# Patient Record
Sex: FEMALE | Race: WHITE | NOT HISPANIC OR LATINO | Employment: OTHER | URBAN - METROPOLITAN AREA
[De-identification: names, ages, dates, MRNs, and addresses within clinical notes are randomized per-mention and may not be internally consistent; named-entity substitution may affect disease eponyms.]

---

## 2023-08-07 ENCOUNTER — HOSPITAL ENCOUNTER (EMERGENCY)
Facility: CLINIC | Age: 88
Discharge: HOME | End: 2023-08-07
Attending: EMERGENCY MEDICINE | Admitting: EMERGENCY MEDICINE
Payer: MEDICARE

## 2023-08-07 VITALS
DIASTOLIC BLOOD PRESSURE: 72 MMHG | RESPIRATION RATE: 18 BRPM | SYSTOLIC BLOOD PRESSURE: 134 MMHG | HEART RATE: 82 BPM | TEMPERATURE: 98 F | OXYGEN SATURATION: 98 %

## 2023-08-07 DIAGNOSIS — R53.1 WEAKNESS: ICD-10-CM

## 2023-08-07 LAB
ALBUMIN UR-MCNC: NEGATIVE MG/DL
ANION GAP SERPL CALCULATED.3IONS-SCNC: 12 MMOL/L (ref 7–15)
APPEARANCE UR: CLEAR
BASOPHILS # BLD AUTO: 0 10E3/UL (ref 0–0.2)
BASOPHILS NFR BLD AUTO: 0 %
BILIRUB UR QL STRIP: NEGATIVE
BUN SERPL-MCNC: 22.4 MG/DL (ref 8–23)
CALCIUM SERPL-MCNC: 8.8 MG/DL (ref 8.8–10.2)
CHLORIDE SERPL-SCNC: 105 MMOL/L (ref 98–107)
COLOR UR AUTO: ABNORMAL
CREAT SERPL-MCNC: 0.71 MG/DL (ref 0.51–0.95)
DEPRECATED HCO3 PLAS-SCNC: 23 MMOL/L (ref 22–29)
EOSINOPHIL # BLD AUTO: 0.1 10E3/UL (ref 0–0.7)
EOSINOPHIL NFR BLD AUTO: 2 %
ERYTHROCYTE [DISTWIDTH] IN BLOOD BY AUTOMATED COUNT: 14.3 % (ref 10–15)
GFR SERPL CREATININE-BSD FRML MDRD: 81 ML/MIN/1.73M2
GLUCOSE SERPL-MCNC: 151 MG/DL (ref 70–99)
GLUCOSE UR STRIP-MCNC: NEGATIVE MG/DL
HCT VFR BLD AUTO: 39.5 % (ref 35–47)
HGB BLD-MCNC: 12.9 G/DL (ref 11.7–15.7)
HGB UR QL STRIP: NEGATIVE
HOLD SPECIMEN: NORMAL
HOLD SPECIMEN: NORMAL
IMM GRANULOCYTES # BLD: 0 10E3/UL
IMM GRANULOCYTES NFR BLD: 0 %
KETONES UR STRIP-MCNC: NEGATIVE MG/DL
LEUKOCYTE ESTERASE UR QL STRIP: ABNORMAL
LYMPHOCYTES # BLD AUTO: 1.3 10E3/UL (ref 0.8–5.3)
LYMPHOCYTES NFR BLD AUTO: 19 %
MCH RBC QN AUTO: 31 PG (ref 26.5–33)
MCHC RBC AUTO-ENTMCNC: 32.7 G/DL (ref 31.5–36.5)
MCV RBC AUTO: 95 FL (ref 78–100)
MONOCYTES # BLD AUTO: 0.6 10E3/UL (ref 0–1.3)
MONOCYTES NFR BLD AUTO: 8 %
NEUTROPHILS # BLD AUTO: 5 10E3/UL (ref 1.6–8.3)
NEUTROPHILS NFR BLD AUTO: 71 %
NITRATE UR QL: NEGATIVE
NRBC # BLD AUTO: 0 10E3/UL
NRBC BLD AUTO-RTO: 0 /100
PH UR STRIP: 5.5 [PH] (ref 5–7)
PLATELET # BLD AUTO: 289 10E3/UL (ref 150–450)
POTASSIUM SERPL-SCNC: 3.8 MMOL/L (ref 3.4–5.3)
RBC # BLD AUTO: 4.16 10E6/UL (ref 3.8–5.2)
RBC URINE: 2 /HPF
SODIUM SERPL-SCNC: 140 MMOL/L (ref 136–145)
SP GR UR STRIP: 1.02 (ref 1–1.03)
SQUAMOUS EPITHELIAL: 1 /HPF
TROPONIN T SERPL HS-MCNC: 24 NG/L
UROBILINOGEN UR STRIP-MCNC: NORMAL MG/DL
WBC # BLD AUTO: 7.1 10E3/UL (ref 4–11)
WBC URINE: 4 /HPF

## 2023-08-07 PROCEDURE — 85014 HEMATOCRIT: CPT | Performed by: EMERGENCY MEDICINE

## 2023-08-07 PROCEDURE — 80048 BASIC METABOLIC PNL TOTAL CA: CPT | Performed by: EMERGENCY MEDICINE

## 2023-08-07 PROCEDURE — 81001 URINALYSIS AUTO W/SCOPE: CPT | Performed by: EMERGENCY MEDICINE

## 2023-08-07 PROCEDURE — 99283 EMERGENCY DEPT VISIT LOW MDM: CPT

## 2023-08-07 PROCEDURE — 85048 AUTOMATED LEUKOCYTE COUNT: CPT | Performed by: EMERGENCY MEDICINE

## 2023-08-07 PROCEDURE — 84484 ASSAY OF TROPONIN QUANT: CPT | Performed by: EMERGENCY MEDICINE

## 2023-08-07 PROCEDURE — 36415 COLL VENOUS BLD VENIPUNCTURE: CPT | Performed by: EMERGENCY MEDICINE

## 2023-08-07 ASSESSMENT — ACTIVITIES OF DAILY LIVING (ADL)
DEPENDENT_IADLS:: LAUNDRY;COOKING;CLEANING
ADLS_ACUITY_SCORE: 33

## 2023-08-07 NOTE — CONSULTS
Care Management Initial Consult    General Information  Assessment completed with: Patient,    Type of CM/SW Visit: Initial Assessment    Primary Care Provider verified and updated as needed: Yes   Readmission within the last 30 days: no previous admission in last 30 days      Reason for Consult: discharge planning  Advance Care Planning:            Communication Assessment  Patient's communication style: spoken language (English or Bilingual)             Cognitive  Cognitive/Neuro/Behavioral:                        Living Environment:   People in home: alone     Current living Arrangements: house      Able to return to prior arrangements: yes       Family/Social Support:  Care provided by: self  Provides care for: no one  Marital Status:   Children          Description of Support System: Involved, Supportive    Support Assessment: Adequate family and caregiver support    Current Resources:   Patient receiving home care services: No     Community Resources: PCA  Equipment currently used at home:    Supplies currently used at home: None    Employment/Financial:  Employment Status:          Financial Concerns:             Does the patient's insurance plan have a 3 day qualifying hospital stay waiver?  No    Lifestyle & Psychosocial Needs:  Social Determinants of Health     Tobacco Use: Not on file   Alcohol Use: Not on file   Financial Resource Strain: Not on file   Food Insecurity: Not on file   Transportation Needs: Not on file   Physical Activity: Not on file   Stress: Not on file   Social Connections: Not on file   Intimate Partner Violence: Not on file   Depression: Not on file   Housing Stability: Not on file       Functional Status:  Prior to admission patient needed assistance:   Dependent ADLs:: Ambulation-walker, Bathing  Dependent IADLs:: Laundry, Cooking, Cleaning  Assesssment of Functional Status: Not at baseline with ADL Functioning    Mental Health Status:          Chemical Dependency Status:                 Values/Beliefs:  Spiritual, Cultural Beliefs, Denominational Practices, Values that affect care:                 Additional Information:    Met with pt/son at bedside to discuss resources. Pt/son explain that pt had a fall a few weeks ago and has been deconditioned ever since. Pt reports that she lives at home alone and uses a walker at baseline. Pt has PCA services 3x per week to assist with cooking, cleaning, bathing, and laundry. Pt/son believe that pt would benefit from skilled homecare. They have PCP appointment set in September to discuss this but do not want to wait that long. Explained that SW can send referrals but if not obtained could set up a PCP with a Young FERGUSON at an earlier date. Pt/son are agreeable to this plan. SW sent homecare referrals and will follow up on referrals post-discharge if pt not staying overnight. Would need orders for homecare PT/OT/RN stating that patient is homebound. [facility] provided additional resources for private duty homecare and [facility] services.      MARILEE Lassiter, SW  Inpatient Care Coordination  Emergency Room /Float  [phone]    Heather Calloway, SAURABH

## 2023-08-07 NOTE — ED NOTES
PIT/Triage Evaluation    Patient presented with concern for bladder infection. Pt had rx for macrobid 11 days ago 5 days for bladder infection. It seemed to help but sx returned over the past week. Sx include burning with urination. Pt has had increased urgency and so has been wearing depends. No fever. NO abdominal pain. No flank pain. No V/D. Pt has had BL leg burning which she thinks is sciatica. THis sx has been going on for a few months. Pt has had increased unsteadiness over past few months and is using a walker. When asked about troponin blood test that was drawn in triage, pt denies any CP or SOB or dizziness.     Exam is notable for:    Patient Vitals for the past 24 hrs:   BP Temp Temp src Pulse Resp SpO2   08/07/23 1426 (!) 140/73 98  F (36.7  C) Temporal 86 18 97 %     General:  Alert, interactive  Cardiovascular:  Well perfused  Lungs:  No respiratory distress, no accessory muscle use  Neuro:  Moving all 4 extremities  Skin:  Warm, dry  Psych:  Normal affect    Appropriate interventions for symptom management were initiated if applicable.  Appropriate diagnostic tests were initiated if indicated.    Important information for subsequent clinician:    I briefly evaluated the patient and developed an initial plan of care. I discussed this plan and explained that this brief interaction does not constitute a full evaluation. Patient/family understands that they should wait to be fully evaluated and discuss any test results with another clinician prior to leaving the hospital.       Mike Ugarte MD  08/07/23 1533       Mike Ugarte MD  08/07/23 1535       Mike Ugarte MD  08/07/23 153

## 2023-08-07 NOTE — ED PROVIDER NOTES
History     Chief Complaint:  Dysuria     The history is provided by the patient and a relative.      Maya Cullen is a 89 year old female with history of hypertension presenting to the emergency department for evaluation of dysuria and sciatica. She reports dysuria onset two weeks ago, for which she was prescribed a five day course of antibiotics. She endorses a history of bladder infections. She additionally reports fracturing her right shoulder in a fall six weeks ago, which aggravates her existing sciatica and has made it difficult to use her right dominant arm and hand. She explains that she has been walking less due to the injury. Her son-in-law notes that family is worried about her living alone with her existing issues and would like additional help so she can remain living at home. She denies chest pain.    Independent Historian:   The patient's son-in-law endorses and supplements the above history.    Review of External Notes:   Chart review and urine cultures    Medications:   Ultram  Tegretol  Norvasc  Naprosyn  Macrobid    Past Medical History:    Glaucoma  Cataracts, bilateral  Hypertension  Anterior basement membrane dystrophy  PVD, right eye  Blepharitis, bilateral  Trigeminal neuralgia  Dermatochalasis    Past Surgical History:    Tonsillectomy  Appendectomy  TERESA and BSO  Hernia repair  Cataract removal  Trabeculectomy  HCHG trabeculoplasty by selective laser unilateral  IA laser surgery of eye    Physical Exam   Patient Vitals for the past 24 hrs:   BP Temp Temp src Pulse Resp SpO2   08/07/23 1426 (!) 140/73 98  F (36.7  C) Temporal 86 18 97 %      Physical Exam  GENERAL: well developed, pleasant  HEAD: atraumatic  EYES: pupils reactive, extraocular muscles intact, conjunctivae normal  ENT:  mucus membranes moist  NECK:  trachea midline, normal range of motion  RESPIRATORY: no tachypnea, breath sounds clear to auscultation   CVS: normal S1/S2, no murmurs, intact distal pulses  ABDOMEN:  soft, nontender, nondistention  MUSCULOSKELETAL: no deformities  SKIN: warm and dry, no acute rashes or ulceration  NEURO: GCS 15, cranial nerves intact, alert and oriented x3  PSYCH:  Mood/affect normal    Emergency Department Course   Laboratory:  Labs Ordered and Resulted from Time of ED Arrival to Time of ED Departure   BASIC METABOLIC PANEL - Abnormal       Result Value    Sodium 140      Potassium 3.8      Chloride 105      Carbon Dioxide (CO2) 23      Anion Gap 12      Urea Nitrogen 22.4      Creatinine 0.71      Calcium 8.8      Glucose 151 (*)     GFR Estimate 81     TROPONIN T, HIGH SENSITIVITY - Abnormal    Troponin T, High Sensitivity 24 (*)    ROUTINE UA WITH MICROSCOPIC REFLEX TO CULTURE - Abnormal    Color Urine Light Yellow      Appearance Urine Clear      Glucose Urine Negative      Bilirubin Urine Negative      Ketones Urine Negative      Specific Gravity Urine 1.018      Blood Urine Negative      pH Urine 5.5      Protein Albumin Urine Negative      Urobilinogen Urine Normal      Nitrite Urine Negative      Leukocyte Esterase Urine Small (*)     RBC Urine 2      WBC Urine 4      Squamous Epithelials Urine 1     CBC WITH PLATELETS AND DIFFERENTIAL    WBC Count 7.1      RBC Count 4.16      Hemoglobin 12.9      Hematocrit 39.5      MCV 95      MCH 31.0      MCHC 32.7      RDW 14.3      Platelet Count 289      % Neutrophils 71      % Lymphocytes 19      % Monocytes 8      % Eosinophils 2      % Basophils 0      % Immature Granulocytes 0      NRBCs per 100 WBC 0      Absolute Neutrophils 5.0      Absolute Lymphocytes 1.3      Absolute Monocytes 0.6      Absolute Eosinophils 0.1      Absolute Basophils 0.0      Absolute Immature Granulocytes 0.0      Absolute NRBCs 0.0       Emergency Department Course & Assessments:       Interventions:  Medications - No data to display     Assessments:  1655 I obtained history and examined the patient as noted above.   1804 I discussed findings and discharge with the  patient. All questions answered.     Independent Interpretation (X-rays, CTs, rhythm strip):  None    Consultations/Discussion of Management or Tests:  ED Course as of 08/07/23 1754   Mon Aug 07, 2023   1750 I spoke with social work regarding the patient's history and presentation in the emergency department today.      Social Determinants of Health affecting care:   None and Transportation    Disposition:  The patient was discharged to home.     Impression & Plan    Medical Decision Making:  Patient presents with concerns for UTI but family is concerned about needing further assistance.  Patient's pretty adamant that she does not want to be admitted or go to a TCU.  They have 4 5 family members helping get her around at home.  She is able to ambulate with assistance.  Family is comfortable with getting assistance with referrals.   was contacted and orders have been made for home services.  Labs and urine are fairly unremarkable.  Discussed outpatient management with them.    Diagnosis:    ICD-10-CM    1. Weakness  R53.1 Home Care Referral        Scribe Disclosure:  I, Marva Pickard, am serving as a scribe at 5:09 PM on 8/7/2023 to document services personally performed by Augustin Marie MD based on my observations and the provider's statements to me.   8/7/2023   Augustin Marie MD Adams, Shaun L, MD  08/07/23 2054

## 2023-08-07 NOTE — CONSULTS
See ED SW note 8/7/2023.    MARILEE Lassiter, [facility]  Inpatient Care Coordination  Emergency Room /Float  [phone]

## 2023-08-07 NOTE — ED TRIAGE NOTES
A&O x4, ABCs intact. Pt presents with concern for UTI. Pt states that she has burning with urination. She also reports more falls recently and sciatic pain that she has had for several months. Pt was sent to ED for this.        Triage Assessment       Row Name 08/07/23 1428       Triage Assessment (Adult)    Airway WDL WDL       Respiratory WDL    Respiratory WDL WDL       Cardiac WDL    Cardiac WDL WDL

## 2023-08-08 NOTE — PROGRESS NOTES
8/8/23-post discharge    [facility] will reach out to pt/PCP as there is not an accepting HC agency at this time. SW left note for [facility]UB yesterday 8/7.     MARILEE Lassiter, [facility]  Inpatient Care Coordination  Emergency Room /Float  [phone]

## 2023-08-11 ENCOUNTER — APPOINTMENT (OUTPATIENT)
Dept: CT IMAGING | Facility: CLINIC | Age: 88
DRG: 552 | End: 2023-08-11
Attending: EMERGENCY MEDICINE
Payer: MEDICARE

## 2023-08-11 ENCOUNTER — APPOINTMENT (OUTPATIENT)
Dept: CT IMAGING | Facility: CLINIC | Age: 88
DRG: 552 | End: 2023-08-11
Attending: PHYSICIAN ASSISTANT
Payer: MEDICARE

## 2023-08-11 ENCOUNTER — HOSPITAL ENCOUNTER (INPATIENT)
Facility: CLINIC | Age: 88
LOS: 3 days | Discharge: NURSING, REHAB OR LONG-TERM CARE | DRG: 552 | End: 2023-08-14
Attending: EMERGENCY MEDICINE | Admitting: HOSPITALIST
Payer: MEDICARE

## 2023-08-11 ENCOUNTER — APPOINTMENT (OUTPATIENT)
Dept: MRI IMAGING | Facility: CLINIC | Age: 88
DRG: 552 | End: 2023-08-11
Attending: PHYSICIAN ASSISTANT
Payer: MEDICARE

## 2023-08-11 ENCOUNTER — APPOINTMENT (OUTPATIENT)
Dept: GENERAL RADIOLOGY | Facility: CLINIC | Age: 88
DRG: 552 | End: 2023-08-11
Attending: PHYSICIAN ASSISTANT
Payer: MEDICARE

## 2023-08-11 DIAGNOSIS — I63.9 CEREBELLAR INFARCT (H): ICD-10-CM

## 2023-08-11 DIAGNOSIS — S32.040A CLOSED COMPRESSION FRACTURE OF L4 VERTEBRA, INITIAL ENCOUNTER (H): ICD-10-CM

## 2023-08-11 DIAGNOSIS — R29.6 RECURRENT FALLS: Primary | ICD-10-CM

## 2023-08-11 DIAGNOSIS — L89.322 PRESSURE ULCER OF LEFT BUTTOCK, STAGE 2 (H): ICD-10-CM

## 2023-08-11 DIAGNOSIS — G47.00 INSOMNIA, UNSPECIFIED TYPE: ICD-10-CM

## 2023-08-11 DIAGNOSIS — R11.0 NAUSEA: ICD-10-CM

## 2023-08-11 LAB
ALBUMIN UR-MCNC: NEGATIVE MG/DL
ANION GAP SERPL CALCULATED.3IONS-SCNC: 9 MMOL/L (ref 7–15)
APPEARANCE UR: CLEAR
BASOPHILS # BLD AUTO: 0 10E3/UL (ref 0–0.2)
BASOPHILS NFR BLD AUTO: 0 %
BILIRUB UR QL STRIP: NEGATIVE
BUN SERPL-MCNC: 20 MG/DL (ref 8–23)
CALCIUM SERPL-MCNC: 9.1 MG/DL (ref 8.8–10.2)
CHLORIDE SERPL-SCNC: 100 MMOL/L (ref 98–107)
CHOLEST SERPL-MCNC: 198 MG/DL
COLOR UR AUTO: YELLOW
CREAT SERPL-MCNC: 0.81 MG/DL (ref 0.51–0.95)
DEPRECATED HCO3 PLAS-SCNC: 26 MMOL/L (ref 22–29)
EOSINOPHIL # BLD AUTO: 0.2 10E3/UL (ref 0–0.7)
EOSINOPHIL NFR BLD AUTO: 2 %
ERYTHROCYTE [DISTWIDTH] IN BLOOD BY AUTOMATED COUNT: 14.5 % (ref 10–15)
GFR SERPL CREATININE-BSD FRML MDRD: 69 ML/MIN/1.73M2
GLUCOSE SERPL-MCNC: 104 MG/DL (ref 70–99)
GLUCOSE UR STRIP-MCNC: NEGATIVE MG/DL
HBA1C MFR BLD: 5.6 %
HCT VFR BLD AUTO: 40.5 % (ref 35–47)
HDLC SERPL-MCNC: 51 MG/DL
HGB BLD-MCNC: 13 G/DL (ref 11.7–15.7)
HGB UR QL STRIP: NEGATIVE
HOLD SPECIMEN: NORMAL
HOLD SPECIMEN: NORMAL
HYALINE CASTS: 1 /LPF
IMM GRANULOCYTES # BLD: 0 10E3/UL
IMM GRANULOCYTES NFR BLD: 0 %
KETONES UR STRIP-MCNC: NEGATIVE MG/DL
LDLC SERPL CALC-MCNC: 121 MG/DL
LEUKOCYTE ESTERASE UR QL STRIP: NEGATIVE
LYMPHOCYTES # BLD AUTO: 1.5 10E3/UL (ref 0.8–5.3)
LYMPHOCYTES NFR BLD AUTO: 17 %
MCH RBC QN AUTO: 30.8 PG (ref 26.5–33)
MCHC RBC AUTO-ENTMCNC: 32.1 G/DL (ref 31.5–36.5)
MCV RBC AUTO: 96 FL (ref 78–100)
MONOCYTES # BLD AUTO: 0.9 10E3/UL (ref 0–1.3)
MONOCYTES NFR BLD AUTO: 10 %
MUCOUS THREADS #/AREA URNS LPF: PRESENT /LPF
NEUTROPHILS # BLD AUTO: 6.3 10E3/UL (ref 1.6–8.3)
NEUTROPHILS NFR BLD AUTO: 71 %
NITRATE UR QL: NEGATIVE
NONHDLC SERPL-MCNC: 147 MG/DL
NRBC # BLD AUTO: 0 10E3/UL
NRBC BLD AUTO-RTO: 0 /100
NT-PROBNP SERPL-MCNC: 2050 PG/ML (ref 0–1800)
PH UR STRIP: 6 [PH] (ref 5–7)
PLATELET # BLD AUTO: 289 10E3/UL (ref 150–450)
POTASSIUM SERPL-SCNC: 4.2 MMOL/L (ref 3.4–5.3)
RBC # BLD AUTO: 4.22 10E6/UL (ref 3.8–5.2)
RBC URINE: 2 /HPF
SODIUM SERPL-SCNC: 135 MMOL/L (ref 136–145)
SP GR UR STRIP: 1.01 (ref 1–1.03)
SQUAMOUS EPITHELIAL: <1 /HPF
TRIGL SERPL-MCNC: 132 MG/DL
TROPONIN T SERPL HS-MCNC: 30 NG/L
TROPONIN T SERPL HS-MCNC: 34 NG/L
TROPONIN T SERPL HS-MCNC: 35 NG/L
UROBILINOGEN UR STRIP-MCNC: NORMAL MG/DL
WBC # BLD AUTO: 8.9 10E3/UL (ref 4–11)
WBC URINE: 2 /HPF

## 2023-08-11 PROCEDURE — 250N000013 HC RX MED GY IP 250 OP 250 PS 637: Performed by: PHYSICIAN ASSISTANT

## 2023-08-11 PROCEDURE — 36415 COLL VENOUS BLD VENIPUNCTURE: CPT | Performed by: PHYSICIAN ASSISTANT

## 2023-08-11 PROCEDURE — 250N000011 HC RX IP 250 OP 636: Performed by: EMERGENCY MEDICINE

## 2023-08-11 PROCEDURE — 71046 X-RAY EXAM CHEST 2 VIEWS: CPT

## 2023-08-11 PROCEDURE — 99223 1ST HOSP IP/OBS HIGH 75: CPT | Mod: AI | Performed by: PHYSICIAN ASSISTANT

## 2023-08-11 PROCEDURE — 96360 HYDRATION IV INFUSION INIT: CPT | Mod: 59

## 2023-08-11 PROCEDURE — 84484 ASSAY OF TROPONIN QUANT: CPT | Performed by: EMERGENCY MEDICINE

## 2023-08-11 PROCEDURE — G0378 HOSPITAL OBSERVATION PER HR: HCPCS

## 2023-08-11 PROCEDURE — 83880 ASSAY OF NATRIURETIC PEPTIDE: CPT | Performed by: PHYSICIAN ASSISTANT

## 2023-08-11 PROCEDURE — 87637 SARSCOV2&INF A&B&RSV AMP PRB: CPT | Performed by: PHYSICIAN ASSISTANT

## 2023-08-11 PROCEDURE — 255N000002 HC RX 255 OP 636: Performed by: EMERGENCY MEDICINE

## 2023-08-11 PROCEDURE — 70498 CT ANGIOGRAPHY NECK: CPT | Mod: MG

## 2023-08-11 PROCEDURE — 120N000001 HC R&B MED SURG/OB

## 2023-08-11 PROCEDURE — 70450 CT HEAD/BRAIN W/O DYE: CPT | Mod: MA

## 2023-08-11 PROCEDURE — 70553 MRI BRAIN STEM W/O & W/DYE: CPT | Mod: MG

## 2023-08-11 PROCEDURE — 72192 CT PELVIS W/O DYE: CPT | Mod: MA

## 2023-08-11 PROCEDURE — G1010 CDSM STANSON: HCPCS

## 2023-08-11 PROCEDURE — A9585 GADOBUTROL INJECTION: HCPCS | Performed by: EMERGENCY MEDICINE

## 2023-08-11 PROCEDURE — 72131 CT LUMBAR SPINE W/O DYE: CPT | Mod: MA

## 2023-08-11 PROCEDURE — 93005 ELECTROCARDIOGRAM TRACING: CPT

## 2023-08-11 PROCEDURE — 82374 ASSAY BLOOD CARBON DIOXIDE: CPT | Performed by: EMERGENCY MEDICINE

## 2023-08-11 PROCEDURE — 84484 ASSAY OF TROPONIN QUANT: CPT | Performed by: PHYSICIAN ASSISTANT

## 2023-08-11 PROCEDURE — 250N000009 HC RX 250: Performed by: EMERGENCY MEDICINE

## 2023-08-11 PROCEDURE — 80061 LIPID PANEL: CPT | Performed by: PHYSICIAN ASSISTANT

## 2023-08-11 PROCEDURE — 85025 COMPLETE CBC W/AUTO DIFF WBC: CPT | Performed by: EMERGENCY MEDICINE

## 2023-08-11 PROCEDURE — 70496 CT ANGIOGRAPHY HEAD: CPT | Mod: MG

## 2023-08-11 PROCEDURE — 36415 COLL VENOUS BLD VENIPUNCTURE: CPT | Performed by: EMERGENCY MEDICINE

## 2023-08-11 PROCEDURE — 258N000003 HC RX IP 258 OP 636: Performed by: EMERGENCY MEDICINE

## 2023-08-11 PROCEDURE — 82310 ASSAY OF CALCIUM: CPT | Performed by: EMERGENCY MEDICINE

## 2023-08-11 PROCEDURE — 99285 EMERGENCY DEPT VISIT HI MDM: CPT | Mod: 25

## 2023-08-11 PROCEDURE — 83036 HEMOGLOBIN GLYCOSYLATED A1C: CPT | Performed by: PHYSICIAN ASSISTANT

## 2023-08-11 PROCEDURE — 81001 URINALYSIS AUTO W/SCOPE: CPT | Performed by: EMERGENCY MEDICINE

## 2023-08-11 RX ORDER — LATANOPROST 50 UG/ML
1 SOLUTION/ DROPS OPHTHALMIC AT BEDTIME
Status: DISCONTINUED | OUTPATIENT
Start: 2023-08-11 | End: 2023-08-14 | Stop reason: HOSPADM

## 2023-08-11 RX ORDER — NALOXONE HYDROCHLORIDE 0.4 MG/ML
0.2 INJECTION, SOLUTION INTRAMUSCULAR; INTRAVENOUS; SUBCUTANEOUS
Status: DISCONTINUED | OUTPATIENT
Start: 2023-08-11 | End: 2023-08-14 | Stop reason: HOSPADM

## 2023-08-11 RX ORDER — TIMOLOL MALEATE 5 MG/ML
1 SOLUTION/ DROPS OPHTHALMIC 2 TIMES DAILY
Status: DISCONTINUED | OUTPATIENT
Start: 2023-08-11 | End: 2023-08-14 | Stop reason: HOSPADM

## 2023-08-11 RX ORDER — ONDANSETRON 4 MG/1
4 TABLET, ORALLY DISINTEGRATING ORAL EVERY 6 HOURS PRN
Status: DISCONTINUED | OUTPATIENT
Start: 2023-08-11 | End: 2023-08-14 | Stop reason: HOSPADM

## 2023-08-11 RX ORDER — CARBAMAZEPINE 100 MG/1
200 TABLET, CHEWABLE ORAL 3 TIMES DAILY
COMMUNITY
End: 2024-07-01

## 2023-08-11 RX ORDER — IOPAMIDOL 755 MG/ML
500 INJECTION, SOLUTION INTRAVASCULAR ONCE
Status: COMPLETED | OUTPATIENT
Start: 2023-08-11 | End: 2023-08-11

## 2023-08-11 RX ORDER — ASPIRIN 81 MG/1
81 TABLET ORAL DAILY
Status: DISCONTINUED | OUTPATIENT
Start: 2023-08-11 | End: 2023-08-14 | Stop reason: HOSPADM

## 2023-08-11 RX ORDER — MULTIPLE VITAMINS W/ MINERALS TAB 9MG-400MCG
1 TAB ORAL DAILY
COMMUNITY

## 2023-08-11 RX ORDER — ACETAMINOPHEN 325 MG/1
975 TABLET ORAL EVERY 8 HOURS
Status: DISCONTINUED | OUTPATIENT
Start: 2023-08-11 | End: 2023-08-14 | Stop reason: HOSPADM

## 2023-08-11 RX ORDER — LIDOCAINE 4 G/G
1-2 PATCH TOPICAL
Status: DISCONTINUED | OUTPATIENT
Start: 2023-08-11 | End: 2023-08-14 | Stop reason: HOSPADM

## 2023-08-11 RX ORDER — OXYCODONE HYDROCHLORIDE 5 MG/1
5 TABLET ORAL EVERY 4 HOURS PRN
Status: DISCONTINUED | OUTPATIENT
Start: 2023-08-11 | End: 2023-08-14 | Stop reason: HOSPADM

## 2023-08-11 RX ORDER — NALOXONE HYDROCHLORIDE 0.4 MG/ML
0.4 INJECTION, SOLUTION INTRAMUSCULAR; INTRAVENOUS; SUBCUTANEOUS
Status: DISCONTINUED | OUTPATIENT
Start: 2023-08-11 | End: 2023-08-14 | Stop reason: HOSPADM

## 2023-08-11 RX ORDER — ONDANSETRON 2 MG/ML
4 INJECTION INTRAMUSCULAR; INTRAVENOUS EVERY 6 HOURS PRN
Status: DISCONTINUED | OUTPATIENT
Start: 2023-08-11 | End: 2023-08-14 | Stop reason: HOSPADM

## 2023-08-11 RX ORDER — GADOBUTROL 604.72 MG/ML
7 INJECTION INTRAVENOUS ONCE
Status: COMPLETED | OUTPATIENT
Start: 2023-08-11 | End: 2023-08-11

## 2023-08-11 RX ORDER — POLYETHYLENE GLYCOL 3350 17 G/17G
17 POWDER, FOR SOLUTION ORAL 2 TIMES DAILY PRN
Status: DISCONTINUED | OUTPATIENT
Start: 2023-08-11 | End: 2023-08-14 | Stop reason: HOSPADM

## 2023-08-11 RX ORDER — LIDOCAINE 40 MG/G
CREAM TOPICAL
Status: DISCONTINUED | OUTPATIENT
Start: 2023-08-11 | End: 2023-08-14 | Stop reason: HOSPADM

## 2023-08-11 RX ORDER — TIMOLOL MALEATE 5 MG/ML
1 SOLUTION/ DROPS OPHTHALMIC 2 TIMES DAILY
COMMUNITY

## 2023-08-11 RX ORDER — AMLODIPINE BESYLATE 5 MG/1
5 TABLET ORAL DAILY
Status: ON HOLD | COMMUNITY
End: 2024-06-19

## 2023-08-11 RX ORDER — LIDOCAINE 40 MG/G
CREAM TOPICAL
Status: DISCONTINUED | OUTPATIENT
Start: 2023-08-11 | End: 2023-08-11

## 2023-08-11 RX ORDER — CARBAMAZEPINE 100 MG/1
100 TABLET, CHEWABLE ORAL 3 TIMES DAILY
Status: DISCONTINUED | OUTPATIENT
Start: 2023-08-11 | End: 2023-08-14 | Stop reason: HOSPADM

## 2023-08-11 RX ORDER — NAPROXEN 500 MG/1
500 TABLET ORAL 2 TIMES DAILY WITH MEALS
Status: ON HOLD | COMMUNITY
End: 2023-08-14

## 2023-08-11 RX ORDER — ASPIRIN 81 MG/1
81 TABLET, CHEWABLE ORAL DAILY
Status: DISCONTINUED | OUTPATIENT
Start: 2023-08-11 | End: 2023-08-14 | Stop reason: HOSPADM

## 2023-08-11 RX ORDER — LATANOPROST 50 UG/ML
1 SOLUTION/ DROPS OPHTHALMIC 2 TIMES DAILY
COMMUNITY
End: 2024-09-17

## 2023-08-11 RX ADMIN — CARBAMAZEPINE 100 MG: 100 TABLET, CHEWABLE ORAL at 22:47

## 2023-08-11 RX ADMIN — SODIUM CHLORIDE 1000 ML: 9 INJECTION, SOLUTION INTRAVENOUS at 15:46

## 2023-08-11 RX ADMIN — LIDOCAINE PATCH 4% 2 PATCH: 40 PATCH TOPICAL at 22:45

## 2023-08-11 RX ADMIN — IOPAMIDOL 75 ML: 755 INJECTION, SOLUTION INTRAVENOUS at 20:15

## 2023-08-11 RX ADMIN — SODIUM CHLORIDE 80 ML: 9 INJECTION, SOLUTION INTRAVENOUS at 20:15

## 2023-08-11 RX ADMIN — ACETAMINOPHEN 975 MG: 325 TABLET, FILM COATED ORAL at 22:45

## 2023-08-11 RX ADMIN — GADOBUTROL 7 ML: 604.72 INJECTION INTRAVENOUS at 20:52

## 2023-08-11 RX ADMIN — ASPIRIN 81 MG: 81 TABLET, COATED ORAL at 22:56

## 2023-08-11 ASSESSMENT — ACTIVITIES OF DAILY LIVING (ADL)
WEAR_GLASSES_OR_BLIND: YES
ADLS_ACUITY_SCORE: 33
WALKING_OR_CLIMBING_STAIRS: AMBULATION DIFFICULTY, DEPENDENT
CONCENTRATING,_REMEMBERING_OR_MAKING_DECISIONS_DIFFICULTY: NO
ADLS_ACUITY_SCORE: 35
DIFFICULTY_EATING/SWALLOWING: NO
DOING_ERRANDS_INDEPENDENTLY_DIFFICULTY: YES
DRESSING/BATHING: BATHING DIFFICULTY, ASSISTANCE 1 PERSON
NUMBER_OF_TIMES_PATIENT_HAS_FALLEN_WITHIN_LAST_SIX_MONTHS: 4
CHANGE_IN_FUNCTIONAL_STATUS_SINCE_ONSET_OF_CURRENT_ILLNESS/INJURY: YES
ADLS_ACUITY_SCORE: 35
EQUIPMENT_CURRENTLY_USED_AT_HOME: WALKER, STANDARD
TRANSFERRING: 1-->ASSISTANCE (EQUIPMENT/PERSON) NEEDED
WALKING_OR_CLIMBING_STAIRS_DIFFICULTY: YES
ADLS_ACUITY_SCORE: 35
BATHING: 1-->ASSISTANCE NEEDED
TRANSFERRING: 1-->ASSISTANCE (EQUIPMENT/PERSON) NEEDED (NOT DEVELOPMENTALLY APPROPRIATE)
DRESS: 1-->ASSISTANCE (EQUIPMENT/PERSON) NEEDED
TOILETING_ISSUES: NO
DRESSING/BATHING_DIFFICULTY: YES
FALL_HISTORY_WITHIN_LAST_SIX_MONTHS: YES
DRESS: 0-->ASSISTANCE NEEDED (DEVELOPMENTALLY APPROPRIATE)

## 2023-08-11 NOTE — ED TRIAGE NOTES
Pt presents for evaluation after a falls. Pt fell this morning around 0500, thinks she slid out of the chair. Landing on her buttocks. Pt called her neighbor and was helped up. Neighbor and son-in-law with pt today. They note increasing falls. Pt has a CNA that comes in 3 times a week, who has also noted falls. Falls have been more prominent in the last week. Pt lives independently other than a CNA that comes 3 times a week. Pt c/o low back pain sciatica like pain down BLE and weakness. Seen on 8/7/23, UA negative at that time. Pt aware she is in need of more help at home.

## 2023-08-11 NOTE — ED NOTES
"[facility]  ED Nurse Handoff Report    ED Chief complaint: Back Pain and Fall  . ED Diagnosis:   Final diagnoses:   None       Allergies:   Allergies   Allergen Reactions    Brimonidine Other (See Comments)     Follicular conjunctivitis    Dorzolamide Other (See Comments)     Follicular conjunctivitis    Benzalkonium Chloride Other (See Comments)     Eye irritation.  Does better with preservative free but can tolerate preserved drops    Fluorescein Itching     fluress    Penicillins     Sulfa Antibiotics Other (See Comments)    Codeine Unknown and Rash    Tartrazine Rash       Code Status: DNR/I    Activity level - Baseline/Home:  independent. Lives at home independently  Activity Level - Current:   assist of 2. Required assist of 2 to transfer patient to bedside commode. Now using external catheter.   Lift room needed: No.   Bariatric: No   Needed: No   Isolation: No.   Infection: Not Applicable.     Respiratory status: Room air    Vital Signs (within 30 minutes):   Vitals:    08/11/23 1230 08/11/23 1715   BP: 118/66 (!) 144/81   Pulse: 87 86   Resp: 20 20   Temp: 97.7  F (36.5  C)    TempSrc: Oral    SpO2: 96% 96%   Weight: 65.6 kg (144 lb 10 oz)    Height: 1.499 m (4' 11\")        Cardiac Rhythm:  ,      Pain level:    Patient confused: No.   Patient Falls Risk: patient and family education.   Elimination Status: Has voided     Patient Report - Initial Complaint: Back pain, fall.   Focused Assessment:  Maya Cullen is a 89 year old female who presents with back pain and dysuria after falling multiple times at home. She reports that she fell around 0500 when she was reaching for a pillow and slid between the chair she was siting in and the ottoman in front of it. She states that she did not hurt herself but that she needed help getting back up. Her son-in-law states that she has been falling more frequently over the past 2 months, and that they have been getting " progressively worse. Maya states that she feels like her legs give out, causing her to fall, and that she is unable to predict when the falls are going to happen. She notes that she currently has bruising on her legs from falling, as well as pain in her lower back and buttocks. In addition to this, she notes she has been expereincing dysuria. Her neighbor reports that she was diagnosed with a UTI 07- at [facility] in [place], and that she tested negative for the same issue on 08-. Maya reports using a walker at baseline, and that she lives independently except for a CNA that comes to her house 3 times a week for 3 hours. Patient denies chest pain, shortness of breath, or abdominal pain.      Abnormal Results:   Labs Ordered and Resulted from Time of ED Arrival to Time of ED Departure   BASIC METABOLIC PANEL - Abnormal       Result Value    Sodium 135 (*)     Potassium 4.2      Chloride 100      Carbon Dioxide (CO2) 26      Anion Gap 9      Urea Nitrogen 20.0      Creatinine 0.81      Calcium 9.1      Glucose 104 (*)     GFR Estimate 69     ROUTINE UA WITH MICROSCOPIC REFLEX TO CULTURE - Abnormal    Color Urine Yellow      Appearance Urine Clear      Glucose Urine Negative      Bilirubin Urine Negative      Ketones Urine Negative      Specific Gravity Urine 1.014      Blood Urine Negative      pH Urine 6.0      Protein Albumin Urine Negative      Urobilinogen Urine Normal      Nitrite Urine Negative      Leukocyte Esterase Urine Negative      Mucus Urine Present (*)     RBC Urine 2      WBC Urine 2      Squamous Epithelials Urine <1      Hyaline Casts Urine 1     TROPONIN T, HIGH SENSITIVITY - Abnormal    Troponin T, High Sensitivity 35 (*)    CBC WITH PLATELETS AND DIFFERENTIAL    WBC Count 8.9      RBC Count 4.22      Hemoglobin 13.0      Hematocrit 40.5      MCV 96      MCH 30.8      MCHC 32.1      RDW 14.5      Platelet Count 289      % Neutrophils 71      % Lymphocytes 17       % Monocytes 10      % Eosinophils 2      % Basophils 0      % Immature Granulocytes 0      NRBCs per 100 WBC 0      Absolute Neutrophils 6.3      Absolute Lymphocytes 1.5      Absolute Monocytes 0.9      Absolute Eosinophils 0.2      Absolute Basophils 0.0      Absolute Immature Granulocytes 0.0      Absolute NRBCs 0.0          Lumbar spine CT w/o contrast   Final Result   IMPRESSION:   1.  L4 anterior compression fracture with approximately 30% vertebral body height loss and minimal bony retropulsion.   2.  Severe L3-L4 and L4-L5 spinal canal stenosis.   3.  Degenerative grade 1 anterolisthesis L4 on L5.   4.  Partially imaged left pleural effusion.      CT Pelvis Bone wo Contrast   Final Result   IMPRESSION:   1.  Both hips are negative for fracture or CT evidence of avascular necrosis.   2.  Pelvis negative for fracture.   3.  SI joints negative for sacroiliitis, ankylosis, or diastasis. There is degenerative change.   4.  Compressive endplate deformity of the superior aspect of L4. MRI could be performed to assess for the presence or absence of bone edema and better gauge the chronicity of this finding.   5.  Fatty infiltration and atrophy of the right gluteus minimus and medius suggestive of chronic tendinopathy and potential partial-thickness tearing.      CT Head w/o Contrast   Final Result   IMPRESSION:   Small left cerebellar infarct, age indeterminate. No evidence of   hemorrhage, mass, or hydrocephalus. Volume loss and patchy areas of   nonspecific white matter hypoattenuation which presumably represent   chronic small vessel ischemic change. No acute osseous abnormality.      DIANA HOANG MD            SYSTEM ID:  [id number]          Treatments provided: See MAR  Family Comments: Neighbor and patients son in law at bedside  OBS brochure/video discussed/provided to patient:  Yes  ED Medications:   Medications   0.9% sodium chloride BOLUS (0 mLs Intravenous Stopped 8/11/23 8631)       Drips infusing:   No  For the majority of the shift this patient was Green.   Interventions performed were rounding    Sepsis treatment initiated: No    Cares/treatment/interventions/medications to be completed following ED care: Monitor VS, pain management.     ED Nurse Name: Alyse Dexter RN  5:26 PM  RECEIVING UNIT ED HANDOFF REVIEW    Above ED Nurse Handoff Report was reviewed: Yes  Reviewed by: Catie Flores RN on August 11, 2023 at 9:27 PM

## 2023-08-11 NOTE — H&P
"[facility]    History and Physical - Hospitalist Service       Date of Admission:  8/11/2023    Assessment & Plan      Maya Cullen is a 89 year old female who lives independently with history of hypertension, glaucoma and trigeminal neuralgia admitted on 8/11/2023 after a fall with back pain and notable increased weakness/falls/gait instability since 6/2023.    In the ED she is afebrile with heart rate of 87, pressure 118/66 and breathing comfortably on room air without hypoxia.  Lab work is remarkable for sodium of 135 and glucose 104 but otherwise normal BMP, high-sensitivity troponin of 35, normal CBC, negative UA, EKG shows sinus rhythm, CT head shows age-indeterminate small left cerebellar stroke, CT pelvis bone is negative but does show compressive deformity at the superior aspect of L4 and CT lumbar spine does show L4 anterior compression fracture.  Admission was requested for evidence of stroke on CT scan and weakness with back pain.    #Low back pain due to L4 compression fracture  -Patient has had back pain for a while but it worsened after her fall today.  She describes the back pain in her low back with a burning radiation down bilateral legs  -CT lumbar spine shows a L4 anterior compression fracture  -Plan for multimodal pain regimen with scheduled Tylenol, Lidoderm patches and low-dose oxycodone  -Neurosurgery consult  -PT assessment  -Social work consult    #Frequent falling  #Shuffling gait  -Since June 2023 family reports that patient is a \"different person\".  She was previously very independent and steady on her feet but something shifted and now she can barely ambulate without falling and shuffles her feet  -She denies vertigo, dizziness, syncope, unilateral weakness, sensation deficits with the exception of burning down her legs  -She denies history of stroke but CT scan of the brain showed an age indeterminant infarct in the left cerebellum which will be " "discussed below  -Concerned that this may have occurred at the time her gait worsened and she began falling more  -PT and social work assessment as above    #Age-indeterminate infarct of the left cerebellum  -Denies dizziness/vertigo but has been falling more frequently stating her legs are just giving out  -Plan to obtain MRI brain and MRA head/neck  -Monitor on telemetry, obtain echocardiogram without bubble  -Lipid/A1c/troponin  -Stroke neurology consult  -Aspirin 81 mg     #Lung congestion  -CT of lumbar spine mentions evidence of pleural effusion  -Obtain chest x-ray    #Hypertension  -Continue PTA amlodipine    #Glaucoma  -Continue PTA latanoprost    #History of trigeminal neuralgia  -Continue Tegretol      Diet:  Regular  DVT Prophylaxis: Pneumatic Compression Devices  Barton Catheter: Not present  Lines: None     Cardiac Monitoring: None  Code Status:  DNR/DNI    Clinically Significant Risk Factors Present on Admission                       # Overweight: Estimated body mass index is 29.21 kg/m  as calculated from the following:    Height as of this encounter: 1.499 m (4' 11\").    Weight as of this encounter: 65.6 kg (144 lb 10 oz).            Disposition Plan      Expected Discharge Date: 08/12/2023                The patient's care was discussed with the Patient, Patient's Family, and ED Consultant(s).    Shonna Grossman PA-C  Hospitalist Service  [facility]  Securely message with [employer] (more info)  Text page via [employer] Paging/Directory     ______________________________________________________________________    Chief Complaint   Weakness, frequent falling, gait instability and back pain    History is obtained from the patient    History of Present Illness   Maya Cullen is a 89 year old female who was brought in today by her family with primary complaint of low back pain.  She reports that she had a fall earlier today and slipped off a chair and was stuck between a " chair and ottoman.  Fortunately she had a cell phone close to her walker and was able to call a neighbor who came over and helped her up.  Since that time her back pain has increased but she does report back pain for quite a while that sends a burning-like sensation down her bilateral legs.  Her family is present and does report that since June of this year she has been a different person.  She was previously quite independent but since June has had more frequent falling and difficulty walking with a shuffling gait that is new.  She has fallen countless times and recently actually broke her arm.  She denies unilateral weakness or sensation changes with the exception of burning down her legs.  She had a recent urinary tract infection but otherwise denies fever, shortness of breath, chest discomfort, nausea, vomiting and watery stool.  She has had some softer stools recently and does report a congested sound when she breathes.  She does not drink alcohol or smoke cigarettes.      Past Medical History    No past medical history on file.    Past Surgical History   Past Surgical History:   Procedure Laterality Date    EYE SURGERY         Prior to Admission Medications   Prior to Admission Medications   Prescriptions Last Dose Informant Patient Reported? Taking?   traMADol (ULTRAM) 50 MG tablet   No No   Sig: Take 1 tablet by mouth every 4 hours as needed for pain.      Facility-Administered Medications: None          Physical Exam   Vital Signs: Temp: 97.7  F (36.5  C) Temp src: Oral BP: (!) 144/81 Pulse: 86   Resp: 20 SpO2: 96 % O2 Device: None (Room air)    Weight: 144 lbs 9.95 oz    General Appearance: Alert and orientated, some trouble with word finding  Respiratory: Bilateral congestion noted throughout lungs  Cardiovascular: RRR without murmur  GI: Bowel sounds are present without tenderness  Skin: Significant bruising noted on both legs   Other: Strength of left arm and left leg seems slightly decreased compared  to right. No sensation deficits bilaterally.     Medical Decision Making       75 MINUTES SPENT BY ME on the date of service doing chart review, history, exam, documentation & further activities per the note.      Data     I have personally reviewed the following data over the past 24 hrs:    8.9  \   13.0   / 289     135 (L) 100 20.0 /  104 (H)   4.2 26 0.81 \     Trop: 35 (H) BNP: N/A       Imaging results reviewed over the past 24 hrs:   Recent Results (from the past 24 hour(s))   CT Head w/o Contrast    Narrative    CT SCAN OF THE HEAD WITHOUT CONTRAST   8/11/2023 4:21 PM     HISTORY: Recurrent falls, weakness    TECHNIQUE:  Axial images of the head and coronal reformations without  IV contrast material. Radiation dose for this scan was reduced using  automated exposure control, adjustment of the mA and/or kV according  to patient size, or iterative reconstruction technique.    COMPARISON: Head CT 7/13/2013      Impression    IMPRESSION:  Small left cerebellar infarct, age indeterminate. No evidence of  hemorrhage, mass, or hydrocephalus. Volume loss and patchy areas of  nonspecific white matter hypoattenuation which presumably represent  chronic small vessel ischemic change. No acute osseous abnormality.    DIANA HOANG MD         SYSTEM ID:  [id number]   CT Pelvis Bone wo Contrast    Narrative    EXAM: CT PELVIS BONE WO CONTRAST  LOCATION: [facility]  DATE: 8/11/2023    INDICATION: Left pelvic pain, recurrent falls, associated low back pain, weakness.  COMPARISON: None.  TECHNIQUE: CT scan of the pelvis was performed without IV contrast. Multiplanar reformats were obtained. Dose reduction techniques were used.  CONTRAST: None.    FINDINGS: Both hips are negative for fracture or CT evidence of avascular necrosis. Pelvis negative for fracture. Degenerative change at the SI joints bilaterally. No evidence for sacroiliitis, ankylosis, or diastasis. Sacrum and coccyx are negative for    fracture. There is some irregularity and compression of the superior endplate of L4. MRI could be performed to assess for the presence or absence of bone edema and better gauge the chronicity of this finding, which is suggestive of a compressive endplate   deformity.    Fatty infiltration and atrophy of the right gluteus minimus and medius suggestive of chronic tendinopathy and partial-thickness tearing. The intrapelvic contents are negative. No evidence for abnormal mass or soft tissue fluid collection.       Impression    IMPRESSION:  1.  Both hips are negative for fracture or CT evidence of avascular necrosis.  2.  Pelvis negative for fracture.  3.  SI joints negative for sacroiliitis, ankylosis, or diastasis. There is degenerative change.  4.  Compressive endplate deformity of the superior aspect of L4. MRI could be performed to assess for the presence or absence of bone edema and better gauge the chronicity of this finding.  5.  Fatty infiltration and atrophy of the right gluteus minimus and medius suggestive of chronic tendinopathy and potential partial-thickness tearing.   Lumbar spine CT w/o contrast    Narrative    EXAM: CT LUMBAR SPINE W/O CONTRAST  LOCATION: [facility]  DATE: 8/11/2023    INDICATION: fall  COMPARISON: None.  TECHNIQUE: Routine CT Lumbar Spine without IV contrast. Multiplanar reformats. Dose reduction techniques were used.     FINDINGS:  VERTEBRA: Grade 1 anterolisthesis L4 on L5 measuring 6 mm. L4 anterior compression fracture with approximately 30% vertebral body height loss compared to the subjacent vertebral body heights. There is bony retropulsion measuring 2.5 mm. No additional   fracture noted.     CANAL/FORAMINA: Mild lumbar disc degenerative change. Advanced L4-L5 facet arthrosis. Moderate lumbar facet arthrosis elsewhere. There is severe spinal canal stenosis at L3-L4 and L4-L5.    PARASPINAL: Partially imaged left pleural effusion.      Impression     IMPRESSION:  1.  L4 anterior compression fracture with approximately 30% vertebral body height loss and minimal bony retropulsion.  2.  Severe L3-L4 and L4-L5 spinal canal stenosis.  3.  Degenerative grade 1 anterolisthesis L4 on L5.  4.  Partially imaged left pleural effusion.

## 2023-08-11 NOTE — ED PROVIDER NOTES
History   Chief Complaint:  Back Pain and Fall     The history is provided by the patient, a relative and a friend.      Maya Cullen is a 89 year old female who presents with back pain and dysuria after falling multiple times at home. She reports that she fell around 0500 when she was reaching for a pillow and slid between the chair she was siting in and the ottoman in front of it. She states that she did not hurt herself but that she needed help getting back up. Her son-in-law states that she has been falling more frequently over the past 2 months, and that they have been getting progressively worse. Maya states that she feels like her legs give out, causing her to fall, and that she is unable to predict when the falls are going to happen. She notes that she currently has bruising on her legs from falling, as well as pain in her lower back and buttocks. In addition to this, she notes she has been experiencing dysuria. Her neighbor reports that she was diagnosed with a UTI 07- at [facility] in [place], and that she tested negative for the same issue on 08-. Maya reports using a walker at baseline, and that she lives independently except for a CNA that comes to her house 3 times a week for 3 hours. Patient denies chest pain, shortness of breath, or abdominal pain.     Independent Historian:   They report as noted above.     Medications:    Tramadol    Carbamazepine   Amlodipine   Naproxen   Nitrofurantoin monohydrate macrocrystal     Past Medical History:    Impaired fasting glucose  Essential hypertension   Conjunctivitis   AMBD  Primary open-angle glaucoma  PCD  Blepharitis, bilateral   Trigeminal neuralgia   Dermatochalasis     Past Surgical History:    Eye surgery   Tonsillectomy   Appendectomy   TERESA & BSO  Hernia repair   Trabeculectomy   [facility] Trabeculoplasty by selective laser unilateral          Physical Exam   Patient Vitals for the past 24 hrs:   BP Temp Temp src  "Pulse Resp SpO2 Height Weight   08/11/23 1715 (!) 144/81 -- -- 86 20 96 % -- --   08/11/23 1230 118/66 97.7  F (36.5  C) Oral 87 20 96 % 1.499 m (4' 11\") 65.6 kg (144 lb 10 oz)      Physical Exam  General: Alert, appears frail and elderly. Cooperative.     In mild distress  HEENT:  Head:  Atraumatic  Ears:  External ears are normal  Mouth/Throat:  Oropharynx is without erythema or exudate and mucous membranes are moist.   Eyes:   Conjunctivae normal and EOM are normal. No scleral icterus.  CV:  Normal rate, regular rhythm, normal heart sounds and radial pulses are 2+ and symmetric.    Resp:  Breath sounds are clear bilaterally    Non-labored, no retractions or accessory muscle use  GI:  Abdomen is soft, no distension, no tenderness. No rebound or guarding.  No CVA tenderness bilaterally  MS:  Normal range of motion. No edema.    Back atraumatic.    No midline cervical, thoracic, or lumbar tenderness  Skin:  Warm and dry.  No rash or lesions noted.  Neuro:   Alert. Globally weak.  Sensation intact in all 4 extremities. GCS: 15  Psych: Normal mood and affect.    Emergency Department Course   ECG  ECG taken at 1305  Normal sinus rhythm  Inferior infarct, age undetermined  Abnormal ECG  No prior ECG for comparison  Rate 84 bpm. RI interval 122 ms. QRS duration 96 ms. QT/QTc 370/437 ms. P-R-T axes 53 4 39.     Imaging:  Lumbar spine CT w/o contrast   Final Result   IMPRESSION:   1.  L4 anterior compression fracture with approximately 30% vertebral body height loss and minimal bony retropulsion.   2.  Severe L3-L4 and L4-L5 spinal canal stenosis.   3.  Degenerative grade 1 anterolisthesis L4 on L5.   4.  Partially imaged left pleural effusion.      CT Pelvis Bone wo Contrast   Final Result   IMPRESSION:   1.  Both hips are negative for fracture or CT evidence of avascular necrosis.   2.  Pelvis negative for fracture.   3.  SI joints negative for sacroiliitis, ankylosis, or diastasis. There is degenerative change.   4.  " Compressive endplate deformity of the superior aspect of L4. MRI could be performed to assess for the presence or absence of bone edema and better gauge the chronicity of this finding.   5.  Fatty infiltration and atrophy of the right gluteus minimus and medius suggestive of chronic tendinopathy and potential partial-thickness tearing.      CT Head w/o Contrast   Final Result   IMPRESSION:   Small left cerebellar infarct, age indeterminate. No evidence of   hemorrhage, mass, or hydrocephalus. Volume loss and patchy areas of   nonspecific white matter hypoattenuation which presumably represent   chronic small vessel ischemic change. No acute osseous abnormality.      DIANA HOANG MD            SYSTEM ID:  [id number]         Report per radiology    Laboratory:  Labs Ordered and Resulted from Time of ED Arrival to Time of ED Departure   BASIC METABOLIC PANEL - Abnormal       Result Value    Sodium 135 (*)     Potassium 4.2      Chloride 100      Carbon Dioxide (CO2) 26      Anion Gap 9      Urea Nitrogen 20.0      Creatinine 0.81      Calcium 9.1      Glucose 104 (*)     GFR Estimate 69     ROUTINE UA WITH MICROSCOPIC REFLEX TO CULTURE - Abnormal    Color Urine Yellow      Appearance Urine Clear      Glucose Urine Negative      Bilirubin Urine Negative      Ketones Urine Negative      Specific Gravity Urine 1.014      Blood Urine Negative      pH Urine 6.0      Protein Albumin Urine Negative      Urobilinogen Urine Normal      Nitrite Urine Negative      Leukocyte Esterase Urine Negative      Mucus Urine Present (*)     RBC Urine 2      WBC Urine 2      Squamous Epithelials Urine <1      Hyaline Casts Urine 1     TROPONIN T, HIGH SENSITIVITY - Abnormal    Troponin T, High Sensitivity 35 (*)    CBC WITH PLATELETS AND DIFFERENTIAL    WBC Count 8.9      RBC Count 4.22      Hemoglobin 13.0      Hematocrit 40.5      MCV 96      MCH 30.8      MCHC 32.1      RDW 14.5      Platelet Count 289      % Neutrophils 71      %  Lymphocytes 17      % Monocytes 10      % Eosinophils 2      % Basophils 0      % Immature Granulocytes 0      NRBCs per 100 WBC 0      Absolute Neutrophils 6.3      Absolute Lymphocytes 1.5      Absolute Monocytes 0.9      Absolute Eosinophils 0.2      Absolute Basophils 0.0      Absolute Immature Granulocytes 0.0      Absolute NRBCs 0.0        Emergency Department Course & Assessments:    Interventions:  Medications   0.9% sodium chloride BOLUS (0 mLs Intravenous Stopped 8/11/23 1708)      Assessments:  1529 I obtained the patient's history and examined as noted above.    0387 I spoke with Shonna Grossman PA-C, for Dr. Fairchild, hospitalist.     Independent Interpretation (X-rays, CTs, rhythm strip):  None    Consultations/Discussion of Management or Tests:  None     Social Determinants of Health affecting care:   None    Disposition:  The patient was admitted to the hospital under the care of Dr. Ricardo MD.     Impression & Plan      Medical Decision Making:  Patient is 89-year-old female who presents with back pain and recurrent falls.  Work-up in the emergency department concerning for newly diagnosed small left cerebellar infarct with unclear age.  I wonder whether this could contribute to the patient's balance issues and gait instability and recurrent falls in recent weeks.  No personal history of stroke that is known at this time.  No known history of cardiac arrhythmias.  EKG showed normal sinus rhythm.  CT imaging obtained of the L-spine and pelvis bone given the patient's left pelvic discomfort as well as midline L-spine tenderness.  Unfortunately patient appears to have L4 compression fracture likely secondary to one of her multiple falls in recent weeks.  She is moving all extremities at this time but given instability, balance issues, and recurrent falls I plan to admit the patient to the hospital for further management and evaluation.  Patient may require further medical evaluation to better determine  etiology of newly detected small left cerebellar infarct.  No evidence of urinary tract infection.  Laboratory work otherwise unremarkable.  Spoke with hospital service who is agreeable to admission.      Diagnosis:    ICD-10-CM    1. Recurrent falls  R29.6       2. Cerebellar infarct (H)  I63.9       3. Closed compression fracture of L4 vertebra, initial encounter (H)  S32.040A          Scribe Disclosure:  Madyson ZIMMERMAN, am serving as a scribe at 4:34 PM on 8/11/2023 to document services personally performed by Ronal Lucio MD based on my observations and the provider's statements to me.     8/11/2023   Ronal Lucio MD White, Scott, MD  08/12/23 0002

## 2023-08-12 ENCOUNTER — APPOINTMENT (OUTPATIENT)
Dept: SPEECH THERAPY | Facility: CLINIC | Age: 88
DRG: 552 | End: 2023-08-12
Attending: PHYSICIAN ASSISTANT
Payer: MEDICARE

## 2023-08-12 ENCOUNTER — APPOINTMENT (OUTPATIENT)
Dept: CARDIOLOGY | Facility: CLINIC | Age: 88
DRG: 552 | End: 2023-08-12
Attending: PHYSICIAN ASSISTANT
Payer: MEDICARE

## 2023-08-12 ENCOUNTER — APPOINTMENT (OUTPATIENT)
Dept: PHYSICAL THERAPY | Facility: CLINIC | Age: 88
DRG: 552 | End: 2023-08-12
Attending: PHYSICIAN ASSISTANT
Payer: MEDICARE

## 2023-08-12 LAB
ANION GAP SERPL CALCULATED.3IONS-SCNC: 8 MMOL/L (ref 7–15)
BUN SERPL-MCNC: 15.8 MG/DL (ref 8–23)
CALCIUM SERPL-MCNC: 8.4 MG/DL (ref 8.8–10.2)
CHLORIDE SERPL-SCNC: 107 MMOL/L (ref 98–107)
CREAT SERPL-MCNC: 0.81 MG/DL (ref 0.51–0.95)
DEPRECATED HCO3 PLAS-SCNC: 25 MMOL/L (ref 22–29)
ERYTHROCYTE [DISTWIDTH] IN BLOOD BY AUTOMATED COUNT: 14.6 % (ref 10–15)
FLUAV RNA SPEC QL NAA+PROBE: NEGATIVE
FLUBV RNA RESP QL NAA+PROBE: NEGATIVE
GFR SERPL CREATININE-BSD FRML MDRD: 69 ML/MIN/1.73M2
GLUCOSE BLDC GLUCOMTR-MCNC: 103 MG/DL (ref 70–99)
GLUCOSE BLDC GLUCOMTR-MCNC: 109 MG/DL (ref 70–99)
GLUCOSE BLDC GLUCOMTR-MCNC: 123 MG/DL (ref 70–99)
GLUCOSE BLDC GLUCOMTR-MCNC: 123 MG/DL (ref 70–99)
GLUCOSE SERPL-MCNC: 104 MG/DL (ref 70–99)
HCT VFR BLD AUTO: 37.6 % (ref 35–47)
HGB BLD-MCNC: 12 G/DL (ref 11.7–15.7)
LVEF ECHO: NORMAL
MCH RBC QN AUTO: 31 PG (ref 26.5–33)
MCHC RBC AUTO-ENTMCNC: 31.9 G/DL (ref 31.5–36.5)
MCV RBC AUTO: 97 FL (ref 78–100)
PLATELET # BLD AUTO: 255 10E3/UL (ref 150–450)
POTASSIUM SERPL-SCNC: 3.6 MMOL/L (ref 3.4–5.3)
RBC # BLD AUTO: 3.87 10E6/UL (ref 3.8–5.2)
RSV RNA SPEC NAA+PROBE: NEGATIVE
SARS-COV-2 RNA RESP QL NAA+PROBE: NEGATIVE
SODIUM SERPL-SCNC: 140 MMOL/L (ref 136–145)
WBC # BLD AUTO: 6.5 10E3/UL (ref 4–11)

## 2023-08-12 PROCEDURE — 120N000001 HC R&B MED SURG/OB

## 2023-08-12 PROCEDURE — 93306 TTE W/DOPPLER COMPLETE: CPT

## 2023-08-12 PROCEDURE — 93306 TTE W/DOPPLER COMPLETE: CPT | Mod: 26 | Performed by: INTERNAL MEDICINE

## 2023-08-12 PROCEDURE — 85018 HEMOGLOBIN: CPT | Performed by: PHYSICIAN ASSISTANT

## 2023-08-12 PROCEDURE — 250N000009 HC RX 250: Performed by: PHYSICIAN ASSISTANT

## 2023-08-12 PROCEDURE — 92610 EVALUATE SWALLOWING FUNCTION: CPT | Mod: GN

## 2023-08-12 PROCEDURE — 250N000013 HC RX MED GY IP 250 OP 250 PS 637: Performed by: PHYSICIAN ASSISTANT

## 2023-08-12 PROCEDURE — 99232 SBSQ HOSP IP/OBS MODERATE 35: CPT | Performed by: INTERNAL MEDICINE

## 2023-08-12 PROCEDURE — 99221 1ST HOSP IP/OBS SF/LOW 40: CPT | Performed by: PHYSICIAN ASSISTANT

## 2023-08-12 PROCEDURE — 97530 THERAPEUTIC ACTIVITIES: CPT | Mod: GP

## 2023-08-12 PROCEDURE — 80048 BASIC METABOLIC PNL TOTAL CA: CPT | Performed by: PHYSICIAN ASSISTANT

## 2023-08-12 PROCEDURE — 97116 GAIT TRAINING THERAPY: CPT | Mod: GP

## 2023-08-12 PROCEDURE — 36415 COLL VENOUS BLD VENIPUNCTURE: CPT | Performed by: PHYSICIAN ASSISTANT

## 2023-08-12 PROCEDURE — 97161 PT EVAL LOW COMPLEX 20 MIN: CPT | Mod: GP

## 2023-08-12 RX ADMIN — ASPIRIN 81 MG: 81 TABLET, CHEWABLE ORAL at 08:20

## 2023-08-12 RX ADMIN — ACETAMINOPHEN 975 MG: 325 TABLET, FILM COATED ORAL at 05:01

## 2023-08-12 RX ADMIN — TIMOLOL MALEATE 1 DROP: 5 SOLUTION/ DROPS OPHTHALMIC at 18:20

## 2023-08-12 RX ADMIN — LATANOPROST 1 DROP: 50 SOLUTION OPHTHALMIC at 21:06

## 2023-08-12 RX ADMIN — CARBAMAZEPINE 100 MG: 100 TABLET, CHEWABLE ORAL at 21:04

## 2023-08-12 RX ADMIN — ACETAMINOPHEN 975 MG: 325 TABLET, FILM COATED ORAL at 21:04

## 2023-08-12 RX ADMIN — CARBAMAZEPINE 100 MG: 100 TABLET, CHEWABLE ORAL at 08:20

## 2023-08-12 RX ADMIN — CARBAMAZEPINE 100 MG: 100 TABLET, CHEWABLE ORAL at 14:48

## 2023-08-12 RX ADMIN — ACETAMINOPHEN 975 MG: 325 TABLET, FILM COATED ORAL at 14:48

## 2023-08-12 RX ADMIN — TIMOLOL MALEATE 1 DROP: 5 SOLUTION/ DROPS OPHTHALMIC at 07:01

## 2023-08-12 ASSESSMENT — ACTIVITIES OF DAILY LIVING (ADL)
ADLS_ACUITY_SCORE: 41
ADLS_ACUITY_SCORE: 41
ADLS_ACUITY_SCORE: 44
ADLS_ACUITY_SCORE: 41

## 2023-08-12 NOTE — CONSULTS
[facility]    Neurosurgery Consultation     Date of Admission:  8/11/2023  Date of Consult (When I saw the patient): 08/12/23    Assessment & Plan   Maya Cullen is a 89 year old female who was admitted on 8/11/2023. I was asked to see the patient for worsening low back pain after mechanical fall noted to have L4 compression fracture.    Principal Problem:    Recurrent falls  Active Problems:    Cerebellar infarct (H)  Closed compression fracture of L4 vertebra, initial encounter (H)    Plan:   No brace need for current fracture could offer for comfort of which patient states she would rather not have a brace  Advance activity as tolerated with PT/OT  No further neurosurgical intervention presently indicated  Will sign off please re-consult if indicated or call with any questions  Can follow up as needed if pain persists in 6 weeks with lumbar xray   Should patient change her decision on brace can order off the shelf LSO to be worn for comfort       I have discussed the following assessment and plan with Dr. Ron who is in agreement with initial plan and will follow up with further consultation recommendations.    CUAUHTEMOC Quezada [facility] [place] [address]  Tel [phone]         Code Status    No CPR- Do NOT Intubate    Reason for Consult   Reason for consult: I was asked by Dr. Parnell to evaluate this patient for L4 compression fracture .    Primary Care Physician   Jordyn Jon    Chief Complaint   Back pain and generalized weakness     History is obtained from the patient    History of Present Illness   Maya Cullen is a 89 year old female who presents with history of hypertension, glaucoma and trigeminal neuralgia presented to ED after increase in back pain from a fall. She states that yesterday morning she was sitting in her chair when she attempted to get up and slide to  the ground and states that she was not able to get up on her own and waiting on the floor for hours until she called her neighbor for help. She notes another fall in early June of which she was walking in her house and her legs gave out of her. She fell the the right and was treated by [facility] for a shoulder fracture of which she has follow up at that end of this morning. She notes continued right shoulder pain but states it is improving. She states that since her fall in June she has had progressive complaint of low back pain that radiates into her buttocks. She also notes bilateral lower extremity burning leg pain that will improve with rest but worsened with walking or prolonged standing. She notes that when she ambulates her legs feel weak and heavy and will give out on her. She notes that her symptoms have been progressive over the past few months and feels that she is so unsteady that she has been stating in her chair a majority of the time as she worries of falling. She notes bilateral hand numbness that has been more noticeable over the past few months as well but denies any neck pain or radicular upper extremity pain. She denies dropping items or feeling off balance. She denies any changes in bowel or bladder habits. She was noted to have L4 compression fracture of which could related to her increased back pain as well as degenerative disc disease and L4-L5 spondylolysis with severe spinal stenosis at L3-L4 and L4-L5 which would correlate with her generalized weakness and neuro claudicating symptoms.  Discussed that we would recommend conservative treatment with therapy and could trial outpatient injections for pain control of which she states she only wants conservative treatment and would not want surgical intervention.       Past Medical History   I have reviewed this patient's medical history and updated it with pertinent information if needed.   No past medical history on file.    Past Surgical History    I have reviewed this patient's surgical history and updated it with pertinent information if needed.  Past Surgical History:   Procedure Laterality Date    EYE SURGERY         Prior to Admission Medications   Prior to Admission Medications   Prescriptions Last Dose Informant Patient Reported? Taking?   amLODIPine (NORVASC) 5 MG tablet 8/11/2023 at am  Yes Yes   Sig: Take 5 mg by mouth daily   carBAMazepine (TEGRETOL) 100 MG chewable tablet 8/11/2023 at x1  Yes Yes   Sig: Take 100 mg by mouth 3 times daily   latanoprost (XALATAN) 0.005 % ophthalmic solution 8/10/2023 at hs  Yes Yes   Sig: Place 1 drop into both eyes At Bedtime   multivitamin w/minerals (THERA-VIT-M) tablet 8/11/2023 at am  Yes Yes   Sig: Take 1 tablet by mouth daily   naproxen (NAPROSYN) 500 MG tablet 8/11/2023 at x1  Yes Yes   Sig: Take 500 mg by mouth 2 times daily (with meals)   timolol maleate (TIMOPTIC) 0.5 % ophthalmic solution 8/11/2023 at x1  Yes Yes   Sig: Place 1 drop into both eyes 2 times daily      Facility-Administered Medications: None     Allergies   Allergies   Allergen Reactions    Brimonidine Other (See Comments)     Follicular conjunctivitis    Dorzolamide Other (See Comments)     Follicular conjunctivitis    Benzalkonium Chloride Other (See Comments)     Eye irritation.  Does better with preservative free but can tolerate preserved drops    Fluorescein Itching     fluress    Penicillins     Sulfa Antibiotics Other (See Comments)    Codeine Unknown and Rash    Tartrazine Rash       Social History   I have reviewed this patient's social history and updated it with pertinent information if needed. Maya Cullen  reports that she has never smoked. She does not have any smokeless tobacco history on file. She reports that she does not drink alcohol and does not use drugs.    Family History   I have reviewed this patient's family history and updated it with pertinent information if needed.   No family history on  "file.    Review of Systems   14 point review of systems negative with exception to HPI     Physical Exam   Temp: 97.8  F (36.6  C) Temp src: Temporal BP: 133/67 Pulse: 76   Resp: 20 SpO2: 95 % O2 Device: None (Room air)    Vital Signs with Ranges  Temp:  [97.4  F (36.3  C)-97.9  F (36.6  C)] 97.8  F (36.6  C)  Pulse:  [69-87] 76  Resp:  [12-20] 20  BP: (118-147)/(58-81) 133/67  SpO2:  [94 %-97 %] 95 %  144 lbs 9.95 oz     , Blood pressure 133/67, pulse 76, temperature 97.8  F (36.6  C), temperature source Temporal, resp. rate 20, height 1.499 m (4' 11\"), weight 65.6 kg (144 lb 10 oz), SpO2 95 %, not currently breastfeeding.  144 lbs 9.95 oz  HEENT:  Normocephalic, atraumatic.  PERRLA.  EOM s intact.   Neck:  Supple, non-tender, without lymphadenopathy.  Heart:  No peripheral edema  Lungs:  No SOB  Abdomen:  Soft, non-tender, non-distended.  Normal bowel sounds.  Skin:  Warm and dry, good capillary refill.  Extremities:  Good radial and dorsalis pedis pulses bilaterally, no edema, cyanosis or clubbing.    NEUROLOGICAL EXAMINATION:   Mental status:  Alert and Oriented x 3, speech is fluent.  Cranial nerves:  II-XII intact.   Motor:  Strength is 5/5 throughout the upper and lower extremities  Deltoids:  Right: 4/5   Left:  5/5 able to lift arm above head but slight weakness secondary to recent should fracture on right  Biceps:  Right: 5/5   Left:  5/5  Triceps: Right: 5/5   Left:  5/5                       Wrist Extensors: Right: 5/5   Left:  5/5        Wrist Flexors:Right: 5/5   Left:  5/5             Hand : Right: 5/5   Left:  5/5         Hip Flexor: Right: 5/5   Left:  5/5                 Knee extension :Right: 5/5   Left:  5/5               Knee flexion: Right: 5/5   Left:  5/5              Plantar flexion:Right: 5/5   Left:  5/5        dorsiflexion:Right: 5/5   Left:  5/5                        EHL:Right: 5/5   Left:  5/5                     Sensation:  intact throughout   Reflexes:  Negative Babinski.  " Negative Clonus.    Coordination:  Smooth finger to nose testing.   Negative pronator drift.   Gait:  not tested    Cervical examination reveals good range of motion.  No tenderness to palpation of the cervical spine or paraspinous muscles bilaterally.     Lumbar examination reveals no tenderness of the spine or paraspinous muscles. Straight leg raise is negative bilaterally.     Images reviewed personally   IMPRESSION:  1.  L4 anterior compression fracture with approximately 30% vertebral body height loss and minimal bony retropulsion.  2.  Severe L3-L4 and L4-L5 spinal canal stenosis.  3.  Degenerative grade 1 anterolisthesis L4 on L5.  4.  Partially imaged left pleural effusion.    IMPRESSION: head CT  Small left cerebellar infarct, age indeterminate. No evidence of  hemorrhage, mass, or hydrocephalus. Volume loss and patchy areas of  nonspecific white matter hypoattenuation which presumably represent  chronic small vessel ischemic change. No acute osseous abnormality.    IMPRESSION: brain MRI  1.  No acute findings.   2.  Age-related changes.    IMPRESSION: HEAD CTA:   1.  No stenosis/occlusion, aneurysm, or high flow vascular malformation.     NECK CTA:  1.  Atherosclerotic plaque results in less than 50% stenosis in the right ICA. No dissection.  2.  No dissection.    Data     CBC RESULTS:   Recent Labs   Lab Test 08/12/23  0549   WBC 6.5   RBC 3.87   HGB 12.0   HCT 37.6   MCV 97   MCH 31.0   MCHC 31.9   RDW 14.6        Basic Metabolic Panel:  Lab Results   Component Value Date     08/12/2023     07/13/2013      Lab Results   Component Value Date    POTASSIUM 3.6 08/12/2023    POTASSIUM 4.1 07/13/2013     Lab Results   Component Value Date    CHLORIDE 107 08/12/2023    CHLORIDE 102 07/13/2013     Lab Results   Component Value Date    GILL 8.4 08/12/2023    GILL 9.3 07/13/2013     Lab Results   Component Value Date    CO2 25 08/12/2023    CO2 25 07/13/2013     Lab Results   Component Value Date     BUN 15.8 08/12/2023    BUN 13 07/13/2013     Lab Results   Component Value Date    CR 0.81 08/12/2023    CR 0.89 07/13/2013     Lab Results   Component Value Date     08/12/2023     08/12/2023     07/13/2013     INR:  No results found for: INR

## 2023-08-12 NOTE — CONSULTS
"SPIRITUAL HEALTH SERVICES Progress Note   Cardiac Services    Saw pt Maya Cullen per Spiritual Health Consult.    Patient/Family Understanding of Illness and Goals of Care - Maya expressed her course of hospitalizations for the past few months that comes from decreasing independence. She mentions that through these visits she discovered she had a small stroke. She tells me that she knows that her living circumstances might need to change as a result. She shares she is on the journey toward contentment and acceptance.    Distress and Loss - Maya tells me of her life and her family. She grieves the death of her children and , saying \"it's only me now.\" She later shares her grandson-in-law, close neighbors, and Mu-ism friends often check in on her. She lost the vibrancy of her Mu-ism community, who she is a founding member of, in the pandemic though she still watches online. She mentions being the only person on her block who was there when she moved in. She most recently lost her son two years ago in the pandemic.    Strengths, Coping, and Resources - Maya has a dynamic Adventist rigo that supports her. She has neighbors, friends, and \"chosen family\" who check in on her often and are sources of strength for her. Prayer and singing especially resonate with her. We prayed together and sang \"Amazing Aparna\" together. She has a sense of humor that helps her cope.     Meaning, Beliefs, and Spirituality - Maya is a charter member of [employer]. Her rigo, Gnosticist, and spirituality have supported her through her life and through losing and grieving her  and children. She moves toward acceptance of her new normal by saying \"I trust that God will lead me.\"    Plan of Care - No further plans from this author. Please consult should further need or desire arise.    Umang Celestin M.Div.  Associate   On-Call Pager: [phone]    "

## 2023-08-12 NOTE — PLAN OF CARE
Goal Outcome Evaluation:  Pt admitted to  645.  Alert and oriented x 4.  Vital signs stable, on Room air.  C/o pain in right arm and lower back, tylenol taken, lidocaine patches applied.  Skin and luis armando cares done.Purewick applied for urine collection.  Pt denies numbness or tingling in extremities.  Pre existing decubitus ulcers to left buttock cheek, cleansed with microclenz spray , foam dressing applied,   Redness to groin folds, area cleansed with soap and water, barrier cream applied.  Swallowing intact.  Neuros  intact.  Spiritual service, neuro , speech, PT consulted.  Fall risk precautions

## 2023-08-12 NOTE — PLAN OF CARE
Temp: 97.6  F (36.4  C) Temp src: Temporal BP: (!) 147/76 Pulse: 70   Resp: 20 SpO2: 94 % O2 Device: None (Room air)       A&Ox4. Ax2 with a lift for transfers. On RA.On a regular diet. Q4 neuros unchanged & intact. PIV in R arm is saline locked. Purewick in place & draining yellow colored urine adequately. No reports of pain/discomfort overnight. On telemetry- NSR.

## 2023-08-12 NOTE — PROGRESS NOTES
"   08/12/23 0900   Appointment Info   Signing Clinician's Name / Credentials (SLP) Veda Hinojosa M.A. CCC-SLP   General Information   Onset of Illness/Injury or Date of Surgery 08/11/23   Referring Physician Shonna Grossman PA-C   Patient/Family Therapy Goal Statement (SLP) The patient denied having a swallowing goal.   Pertinent History of Current Problem Per MD note: \"Maya Cullen is a 89 year old female who lives independently with history of hypertension, glaucoma and trigeminal neuralgia admitted on 8/11/2023 after a fall with back pain and notable increased weakness/falls/gait instability since 6/2023.     In the ED she is afebrile with heart rate of 87, pressure 118/66 and breathing comfortably on room air without hypoxia.  Lab work is remarkable for sodium of 135 and glucose 104 but otherwise normal BMP, high-sensitivity troponin of 35, normal CBC, negative UA, EKG shows sinus rhythm, CT head shows age-indeterminate small left cerebellar stroke, CT pelvis bone is negative but does show compressive deformity at the superior aspect of L4 and CT lumbar spine does show L4 anterior compression fracture.  Admission was requested for evidence of stroke on CT scan and weakness with back pain.\"  SLP consulted due to stroke workup.   General Observations Alert, pleasant, cooperative   Type of Evaluation   Type of Evaluation Swallow Evaluation   Oral Motor   Oral Musculature generally intact   Dentition (Oral Motor)   Dentition (Oral Motor) natural dentition   Facial Symmetry (Oral Motor)   Facial Symmetry (Oral Motor) WNL   Lip Function (Oral Motor)   Lip Range of Motion (Oral Motor) WNL   Tongue Function (Oral Motor)   Tongue ROM (Oral Motor) WNL   Jaw Function (Oral Motor)   Jaw Function (Oral Motor) WNL   Cough/Swallow/Gag Reflex (Oral Motor)   Soft Palate/Velum (Oral Motor) WNL   Volitional Throat Clear/Cough (Oral Motor) WNL   Volitional Swallow (Oral Motor) WNL   Vocal Quality/Secretion " Management (Oral Motor)   Secretion Management (Oral Motor) WNL   General Swallowing Observations   Past History of Dysphagia No prior dysphagia hx   Respiratory Support (General Swallowing Observations) none   Current Diet/Method of Nutritional Intake (General Swallowing Observations, NIS) regular diet;thin liquids (level 0)   Swallowing Evaluation Clinical swallow evaluation   Clinical Swallow Evaluation   Feeding Assistance no assistance needed   Clinical Swallow Evaluation Textures Trialed thin liquids;solid foods   Clinical Swallow Eval: Thin Liquid Texture Trial   Mode of Presentation, Thin Liquids cup;straw;self-fed   Volume of Liquid or Food Presented 6 oz thin H20   Pharyngeal Phase of Swallow intact   Diagnostic Statement WNL with thin consistency   Clinical Swallow Evaluation: Solid Food Texture Trial   Mode of Presentation self-fed   Volume Presented fruit cup of grapes, pineapple, cantelope   Oral Phase WFL   Pharyngeal Phase intact   Diagnostic Statement WNL with regular texture solid   Esophageal Phase of Swallow   Patient reports or presents with symptoms of esophageal dysphagia No   Swallowing Recommendations   Diet Consistency Recommendations ice chips only;regular diet   Supervision Level for Intake patient independent   Medication Administration Recommendations, Swallowing (SLP) ok for pills one at a time (pt reports preference for taking pills with H20 by cup vs straw)   Instrumental Assessment Recommendations instrumental evaluation not recommended at this time   Clinical Impression   Criteria for Skilled Therapeutic Interventions Met (SLP Eval) Evaluation only   SLP Diagnosis WNL oropharyngeal swallowing mechanism   Risks & Benefits of therapy have been explained evaluation/treatment results reviewed;care plan/treatment goals reviewed;current/potential barriers reviewed;participants voiced agreement with care plan;participants included;patient   Clinical Impression Comments Clinical dysphagia  eval completed per MD order. The patient was alert, pleasant and cooperative. She denies any noticeable dysphagia. Oral musculature is intact with good strength, coordination and symmetry. Laryngeal elevation timely and strong. She consumed 100% of a regular fruit cup with good mastication and bolus control. No overt signs or symptoms of aspiration with regular solids or with thin H20 by cup or straw. Voice clear and strong. Recommend the patient continue a regular texture diet and thin liquids. She reports preference for taking whole pills one at a time with H20 by cup (she does not like using a straw with pills). SLP will sign off on dysphagia as no further needs are identified. In discussion with SLP she did occasionally seem forgetful or to have word finding difficulty. SLP will plan to f/u for a speech-language eval if needed.   SLP Total Evaluation Time   Eval: oral/pharyngeal swallow function, clinical swallow Minutes (29356) 15   SLP Discharge Planning   SLP Plan Swallow eval only; will f/u for speech-language eval as needed and pending plan   SLP Discharge Recommendation   (defer to OT/PT)   SLP Rationale for DC Rec Able to safely swallow baseline diet level, no dysphagia needs identified. SLP will f/u for speech-language eval as needed.   SLP Brief overview of current status  Recommend a regular texture diet and thin liquids.   Total Session Time   Total Session Time (sum of timed and untimed services) 15

## 2023-08-12 NOTE — PLAN OF CARE
Goal Outcome Evaluation:      Plan of Care Reviewed With: patient, friend    [phone] RN    Alert and oriented x 4.  Vital signs stable, RA  C/o pain in right arm and lower back, tylenol taken, lidocaine patches taken off.  Skin and luis armando cares done.   Pt said baseline NB bilateral fingers  Pre existing decubitus ulcers to left buttock cheek, Mepilex on.  Scan drainage noted. Redness to groin folds, barrier cream applied. WOC consult will be done Monday.  Swallowing intact.  MD discontinued Neuro checks but they are intact.   Spiritual service saw pt. neuro  consult discontinued. Speech evaluated pt. PT assessment being done.     Echocardiogram done at 1925. Pt tolerated recliner. 1523 bladder scanned for 140 ml. Purewick adequate output. +2 edema BLE ankle/feet.

## 2023-08-12 NOTE — CONSULTS
Care Management Initial Consult    General Information  Assessment completed with: Patient,    Type of CM/SW Visit: Initial Assessment    Primary Care Provider verified and updated as needed:     Readmission within the last 30 days:        Reason for Consult: discharge planning  Advance Care Planning:            Communication Assessment  Patient's communication style: spoken language (English or Bilingual)    Hearing Difficulty or Deaf: no   Wear Glasses or Blind: yes    Cognitive  Cognitive/Neuro/Behavioral: WDL                 Speech: spontaneous, clear, logical    Living Environment:   People in home: alone     Current living Arrangements: house      Able to return to prior arrangements:         Family/Social Support:  Care provided by: self  Provides care for: no one     Neighbor, Other (specify) (son n law)          Description of Support System: Supportive, Involved         Current Resources:   Patient receiving home care services: No     Community Resources: PCA  Equipment currently used at home: grab bar, tub/shower, shower chair, walker, rolling  Supplies currently used at home:      Employment/Financial:  Employment Status:          Financial Concerns:             Does the patient's insurance plan have a 3 day qualifying hospital stay waiver?  No    Lifestyle & Psychosocial Needs:  Social Determinants of Health     Tobacco Use: Not on file   Alcohol Use: Not on file   Financial Resource Strain: Not on file   Food Insecurity: Not on file   Transportation Needs: Not on file   Physical Activity: Not on file   Stress: Not on file   Social Connections: Not on file   Intimate Partner Violence: Not on file   Depression: Not on file   Housing Stability: Not on file       Functional Status:  Prior to admission patient needed assistance: yes, bathing, cooking, cleaning, laundry, transportation     Mental Health Status:  Mental Health Status: No Current Concerns       Chemical Dependency Status:  Chemical Dependency  Status: No Current Concerns             Values/Beliefs:  Spiritual, Cultural Beliefs, Sikh Practices, Values that affect care:                 Additional Information:  Met with pt who reports she lives alone in a house.  She reports her two children are , therefore her son-n-law, Christian Grossman is her POA and support, she reports she has a granddaughter who lives in [place].     Pt reports she has a PCA services (she doesn't remember the agency name) but reports this is private pay and not through the county.  She reports someone comes in on M, W,  and helps with bathing, cleaning, laundry.  She reports she has five neighbors who have been helping with meals.     She reports she uses a walker and has had five falls in the last six months.  She denies ever having to go to a TCU, it appears her last admit to the ED  a HC referral was made. She [facility] for shared room.  She reports her son-n-law or neighbor can provide transport.     Plan: waiting on PT/OT consult.      Alanna HUNT, [facility]  Inpatient Care Coordination   [facility]   [phone]       Addendum:  [facility] has accepted for Monday or Tuesday. If [facility] accepts that would be her first preference.

## 2023-08-12 NOTE — PROGRESS NOTES
"[facility]  Hospitalist Progress Note  Ronal Parnell MD 08/12/2023    Reason for Stay (Diagnosis): fall, compression fx         Assessment and Plan:      Summary of Stay: Maya Cullen is a 89 year old female who lives independently with history of hypertension, glaucoma and trigeminal neuralgia admitted on 8/11/2023 after a fall with back pain and notable increased weakness/falls/gait instability since 6/2023.     In the ED she is afebrile with heart rate of 87, pressure 118/66 and breathing comfortably on room air without hypoxia.  Lab work is remarkable for sodium of 135 and glucose 104 but otherwise normal BMP, high-sensitivity troponin of 35, normal CBC, negative UA, EKG shows sinus rhythm, CT head shows age-indeterminate small left cerebellar stroke, CT pelvis bone is negative but does show compressive deformity at the superior aspect of L4 and CT lumbar spine does show L4 anterior compression fracture.  Admission was requested for evidence of stroke on CT scan and weakness with back pain.    Plans today:  - cancel neuro consult as no e/o acute CVA  - neurosurg consult reviewed; conservative management; no need for brace  - rehab consult; anticipate TCU need  - I updated friend at bedside today     #Low back pain due to L4 compression fracture  -Patient has had back pain for a while but it worsened after her fall today.  She describes the back pain in her low back with a burning radiation down bilateral legs  -CT lumbar spine shows a L4 anterior compression fracture  -Plan for multimodal pain regimen with scheduled Tylenol, Lidoderm patches and low-dose oxycodone  -Neurosurgery consulted; non-operative management and no brace needed  -PT assessment  -Social work consult     #Frequent falling  #Shuffling gait  -Since June 2023 family reports that patient is a \"different person\".  She was previously very independent and steady on her feet but something shifted and now she can barely " "ambulate without falling and shuffles her feet  - brain MRI with previous cerebellar CVA; no acute CVA noted     #Lung congestion  -CT of lumbar spine mentions evidence of pleural effusion  -CXR shows ? Vascular congestion  - given lack of resp sx or hypoxia, no treatment at this time.  If becomes symptomatic consider trial of Lasix     #Hypertension  -Continue PTA amlodipine     #Glaucoma  -Continue PTA latanoprost     #History of trigeminal neuralgia  -Continue Tegretol    #L buttocks ulcer  - present on admission  - WOC consult ordered        Diet:  Regular  DVT Prophylaxis: Pneumatic Compression Devices  Barton Catheter: Not present  Lines: None     Cardiac Monitoring: None  Code Status:  DNR/DNI  DISPO:  TCU anticipated on discharge        Interval History (Subjective):      Back pain minimal.  Anticipates need for TCU on discharge                  Physical Exam:      Last Vital Signs:  BP (!) 140/68 (BP Location: Left arm)   Pulse 80   Temp 97.8  F (36.6  C) (Temporal)   Resp 18   Ht 1.499 m (4' 11\")   Wt 65.6 kg (144 lb 10 oz)   SpO2 95%   Breastfeeding No   BMI 29.21 kg/m        Intake/Output Summary (Last 24 hours) at 8/12/2023 1541  Last data filed at 8/12/2023 1457  Gross per 24 hour   Intake 503 ml   Output 1925 ml   Net -1422 ml       Constitutional: Awake, alert, cooperative, no apparent distress     Respiratory: Clear to auscultation bilaterally, no crackles or wheezing   Cardiovascular: Regular rate and rhythm, normal S1 and S2, and no murmur noted   Abdomen: Normal bowel sounds, soft, non-distended, non-tender   Skin: No rashes, no cyanosis, dry to touch   Neuro: Alert and oriented x3, no weakness, numbness, memory loss   Extremities: No edema, normal range of motion   Other(s):        All other systems: Negative          Medications:      All current medications were reviewed with changes reflected in problem list.         Data:      All new lab and imaging data was reviewed.   Labs:     "   Lab Results   Component Value Date     08/12/2023     08/11/2023     08/07/2023     07/13/2013    Lab Results   Component Value Date    CHLORIDE 107 08/12/2023    CHLORIDE 100 08/11/2023    CHLORIDE 105 08/07/2023    CHLORIDE 102 07/13/2013    Lab Results   Component Value Date    BUN 15.8 08/12/2023    BUN 20.0 08/11/2023    BUN 22.4 08/07/2023    BUN 13 07/13/2013      Lab Results   Component Value Date    POTASSIUM 3.6 08/12/2023    POTASSIUM 4.2 08/11/2023    POTASSIUM 3.8 08/07/2023    POTASSIUM 4.1 07/13/2013    Lab Results   Component Value Date    CO2 25 08/12/2023    CO2 26 08/11/2023    CO2 23 08/07/2023    CO2 25 07/13/2013    Lab Results   Component Value Date    CR 0.81 08/12/2023    CR 0.81 08/11/2023    CR 0.71 08/07/2023    CR 0.89 07/13/2013         Imaging:   Recent Results (from the past 24 hour(s))   CT Head w/o Contrast    Narrative    CT SCAN OF THE HEAD WITHOUT CONTRAST   8/11/2023 4:21 PM     HISTORY: Recurrent falls, weakness    TECHNIQUE:  Axial images of the head and coronal reformations without  IV contrast material. Radiation dose for this scan was reduced using  automated exposure control, adjustment of the mA and/or kV according  to patient size, or iterative reconstruction technique.    COMPARISON: Head CT 7/13/2013      Impression    IMPRESSION:  Small left cerebellar infarct, age indeterminate. No evidence of  hemorrhage, mass, or hydrocephalus. Volume loss and patchy areas of  nonspecific white matter hypoattenuation which presumably represent  chronic small vessel ischemic change. No acute osseous abnormality.    DIANA HOANG MD         SYSTEM ID:  [id number]   CT Pelvis Bone wo Contrast    Narrative    EXAM: CT PELVIS BONE WO CONTRAST  LOCATION: [facility]  DATE: 8/11/2023    INDICATION: Left pelvic pain, recurrent falls, associated low back pain, weakness.  COMPARISON: None.  TECHNIQUE: CT scan of the pelvis was performed  without IV contrast. Multiplanar reformats were obtained. Dose reduction techniques were used.  CONTRAST: None.    FINDINGS: Both hips are negative for fracture or CT evidence of avascular necrosis. Pelvis negative for fracture. Degenerative change at the SI joints bilaterally. No evidence for sacroiliitis, ankylosis, or diastasis. Sacrum and coccyx are negative for   fracture. There is some irregularity and compression of the superior endplate of L4. MRI could be performed to assess for the presence or absence of bone edema and better gauge the chronicity of this finding, which is suggestive of a compressive endplate   deformity.    Fatty infiltration and atrophy of the right gluteus minimus and medius suggestive of chronic tendinopathy and partial-thickness tearing. The intrapelvic contents are negative. No evidence for abnormal mass or soft tissue fluid collection.       Impression    IMPRESSION:  1.  Both hips are negative for fracture or CT evidence of avascular necrosis.  2.  Pelvis negative for fracture.  3.  SI joints negative for sacroiliitis, ankylosis, or diastasis. There is degenerative change.  4.  Compressive endplate deformity of the superior aspect of L4. MRI could be performed to assess for the presence or absence of bone edema and better gauge the chronicity of this finding.  5.  Fatty infiltration and atrophy of the right gluteus minimus and medius suggestive of chronic tendinopathy and potential partial-thickness tearing.   Lumbar spine CT w/o contrast    Narrative    EXAM: CT LUMBAR SPINE W/O CONTRAST  LOCATION: [facility]  DATE: 8/11/2023    INDICATION: fall  COMPARISON: None.  TECHNIQUE: Routine CT Lumbar Spine without IV contrast. Multiplanar reformats. Dose reduction techniques were used.     FINDINGS:  VERTEBRA: Grade 1 anterolisthesis L4 on L5 measuring 6 mm. L4 anterior compression fracture with approximately 30% vertebral body height loss compared to the subjacent  vertebral body heights. There is bony retropulsion measuring 2.5 mm. No additional   fracture noted.     CANAL/FORAMINA: Mild lumbar disc degenerative change. Advanced L4-L5 facet arthrosis. Moderate lumbar facet arthrosis elsewhere. There is severe spinal canal stenosis at L3-L4 and L4-L5.    PARASPINAL: Partially imaged left pleural effusion.      Impression    IMPRESSION:  1.  L4 anterior compression fracture with approximately 30% vertebral body height loss and minimal bony retropulsion.  2.  Severe L3-L4 and L4-L5 spinal canal stenosis.  3.  Degenerative grade 1 anterolisthesis L4 on L5.  4.  Partially imaged left pleural effusion.   XR Chest 2 Views    Narrative    EXAM: XR CHEST 2 VIEWS  LOCATION: [facility]  DATE: 8/11/2023    INDICATION: Cough. Hypoxia.  COMPARISON: None.      Impression    IMPRESSION: Cardiac silhouette size is at the regions of normal. Small bilateral pleural effusions, greater on the left. Prominence of the pulmonary vascularity suggesting vascular congestion. Increased interstitial markings which is likely secondary to   edema. Calcified atherosclerotic changes of the thoracic aorta. Posttraumatic deformity of the left humeral head.   CT Head Neck Angio w/o & w Contrast    Narrative    EXAM: CTA HEAD NECK W CONTRAST  LOCATION: [facility]  DATE: 8/11/2023    INDICATION: evidence of stroke on CT head  COMPARISON: Brain MRI 08/11/2023  CONTRAST: 75mL Isovue 370  TECHNIQUE: Head and neck CT angiogram with IV contrast. Noncontrast head CT followed by axial helical CT images of the head and neck vessels obtained during the arterial phase of intravenous contrast administration. Axial 2D reconstructed images and   multiplanar 3D MIP reconstructed images of the head and neck vessels were performed by the technologist. Dose reduction techniques were used. All stenosis measurements made according to NASCET criteria unless otherwise  specified.    FINDINGS:     HEAD CTA:  ANTERIOR CIRCULATION: No stenosis/occlusion, aneurysm, or high flow vascular malformation. Standard [place] of [place] anatomy.    POSTERIOR CIRCULATION: Scattered atheromatous disease. No stenosis/occlusion, aneurysm, or high flow vascular malformation. Dominant right and smaller left vertebral artery contribute to a normal basilar artery.     DURAL VENOUS SINUSES: Expected enhancement of the major dural venous sinuses.    NECK CTA:  RIGHT CAROTID: Atherosclerotic plaque results in less than 50% stenosis in the right ICA. No dissection.    LEFT CAROTID: No measurable stenosis or dissection.    VERTEBRAL ARTERIES: No focal stenosis or dissection. Dominant right and smaller left vertebral arteries.    AORTIC ARCH: Classic aortic arch anatomy with no significant stenosis at the origin of the great vessels.    NONVASCULAR STRUCTURES: Unremarkable.      Impression    IMPRESSION:     HEAD CTA:   1.  No stenosis/occlusion, aneurysm, or high flow vascular malformation.    NECK CTA:  1.  Atherosclerotic plaque results in less than 50% stenosis in the right ICA. No dissection.  2.  No dissection.   MR Brain w/o & w Contrast    Narrative    EXAM: MR BRAIN W/O and W CONTRAST  LOCATION: [facility]  DATE: 8/11/2023    INDICATION: evidence of stroke on CT  COMPARISON: CTA head and neck on the same date  CONTRAST: 7mL Gadavist  TECHNIQUE: Routine multiplanar multisequence head MRI without and with intravenous contrast.    FINDINGS:  INTRACRANIAL CONTENTS: No acute or subacute infarct. No mass, acute hemorrhage, or extra-axial fluid collections. Patchy and confluent nonspecific T2/FLAIR hyperintensities within the cerebral white matter most consistent with moderate chronic   microvascular ischemic change. Mild to moderate generalized cerebral atrophy. No hydrocephalus. Chronic lacunar infarcts bilateral cerebellum. Normal position of the cerebellar tonsils. No pathologic  contrast enhancement.    SELLA: No abnormality accounting for technique.    OSSEOUS STRUCTURES/SOFT TISSUES: Normal marrow signal. The major intracranial vascular flow voids are maintained.     ORBITS: No abnormality accounting for technique.     SINUSES/MASTOIDS: No paranasal sinus mucosal disease. No middle ear or mastoid effusion.       Impression    IMPRESSION:  1.  No acute findings.   2.  Age-related changes.

## 2023-08-12 NOTE — PROGRESS NOTES
08/12/23 0800   Appointment Info   Signing Clinician's Name / Credentials (PT) Sebastián Borrego, PT, DPT   Rehab Comments (PT) R shoulder fracture in June 2nd, 2023, unclear whether patient has WB precautions/restrictions at this time. Stated that she has been using walker to ambulate at home ever since initial injury.   Living Environment   People in Home alone   Current Living Arrangements house   Home Accessibility no concerns  (has ramp to enter 1 level house)   Transportation Anticipated family or friend will provide   Living Environment Comments Son in law is only family patient has left, otherwise neighbors assist   Self-Care   Usual Activity Tolerance fair   Current Activity Tolerance poor   Equipment Currently Used at Home walker, rolling  (FWW)   Fall history within last six months yes   Number of times patient has fallen within last six months 4   Activity/Exercise/Self-Care Comment Patient receiving assist from aide MWF 9am-12pm to assist with housekeeping, lunch, laundry, showering. Sleeps in recliner, avoiding sleeping in bed due to shoulder pain following fall in June 2023 resulting in fracture. Neighbor present during session, neighbors have been assisting with lunch and dinner at home. For last 2 weeks, neighbors have been assisting with patient going to bathroom. Uses walker at baseline and has continued use of walker even since shoulder injury on June 2nd, 2023   General Information   Onset of Illness/Injury or Date of Surgery 08/11/23   Referring Physician Shonna Grossman PA-C   Patient/Family Therapy Goals Statement (PT) Patient would like to get better   Pertinent History of Current Problem (include personal factors and/or comorbidities that impact the POC) 89 year old female who lives independently with history of hypertension, glaucoma and trigeminal neuralgia admitted on 8/11/2023 after a fall with back pain and notable increased weakness/falls/gait instability since 6/2023.    Existing Precautions/Restrictions fall;weight bearing;spinal   Weight-Bearing Status - RUE other (see comments)  (Fall and fracture in R arm/shoulder on June 2nd, 2023. Was in sling and had follow-up July 30th, MD said that it was not fully healed but allowing ROM to R shoulder in mid August.)   General Observations Per neurosurgery note, patient does not currently need LSO brace but could use for comfort if patient wants.   Cognition   Affect/Mental Status (Cognition) WFL   Orientation Status (Cognition) oriented x 4   Follows Commands (Cognition) WFL   Pain Assessment   Patient Currently in Pain Yes, see Vital Sign flowsheet  (back pain and shoulder pain)   Range of Motion (ROM)   ROM Comment impaired RUE ROM following injury on Jund 2nd, 2023   Strength (Manual Muscle Testing)   Strength Comments able to reach neutral with ankle DF, ankle DF 2+/5 bilaterally   Bed Mobility   Bed Mobility supine-sit;sit-supine   Supine-Sit [place] (Bed Mobility) contact guard   Sit-Supine [place] (Bed Mobility) moderate assist (50% patient effort)   Comment, (Bed Mobility) supine to sit transfer with CGA and HOB fully elevated, swinging LE's to EOB. Sit to supine with attempts at log roll technique but difficutly noted requiring mod assist x2 for LE positioning in bed.   Transfers   Transfers sit-stand transfer   Sit-Stand Transfer   Sit-Stand [place] (Transfers) minimum assist (75% patient effort)   Assistive Device (Sit-Stand Transfers) walker, front-wheeled   Comment, (Sit-Stand Transfer) sit<>stand with min assist x1 and FWW   Gait/Stairs (Locomotion)   [place] Level (Gait) minimum assist (75% patient effort)   Assistive Device (Gait) walker, front-wheeled   Distance in Feet 20' eval and treat   Distance in Feet (Gait) 20'   Comment, (Gait/Stairs) ambulating with FWW and min assist x1 for balance, decreased step length and gait velocity noted.   Balance   Balance Comments impaired standing balance  requring assist and walker   Sensory Examination   Sensory Perception patient reports no sensory changes   Clinical Impression   Criteria for Skilled Therapeutic Intervention Yes, treatment indicated   PT Diagnosis (PT) impaired mobility   Influenced by the following impairments weakness, reduced activity tolerance, impaired balance, impaired RUE ROM   Functional limitations due to impairments impaired functional mobility, impaired gait, transfers, bed mobility   Clinical Presentation (PT Evaluation Complexity) Stable/Uncomplicated   Clinical Presentation Rationale clinical judgement   Clinical Decision Making (Complexity) low complexity   Planned Therapy Interventions (PT) balance training;bed mobility training;gait training;neuromuscular re-education;patient/family education;strengthening;transfer training;progressive activity/exercise   Risk & Benefits of therapy have been explained evaluation/treatment results reviewed;care plan/treatment goals reviewed;risks/benefits reviewed;current/potential barriers reviewed;participants included;participants voiced agreement with care plan;patient   PT Total Evaluation Time   PT Eval, Low Complexity Minutes (43483) 8   Physical Therapy Goals   PT Frequency 5x/week   PT Predicted Duration/Target Date for Goal Attainment 08/19/23   PT Goals Bed Mobility;Transfers;Gait   PT: Bed Mobility Independent;Supine to/from sit   PT: Transfers Independent;Sit to/from stand;Assistive device   PT: Gait Independent;Standard walker;Modified independent   Interventions   Interventions Quick Adds Gait Training;Therapeutic Activity   Therapeutic Activity   Therapeutic Activities: dynamic activities to improve functional performance Minutes (61335) 23   Symptoms Noted During/After Treatment Fatigue;Increased pain   Treatment Detail/Skilled Intervention Patient supine in bed at beginning of session, agreeable to participate in PT. Educated patient on spinal precautions given back injury.  Educated patient on benefits of wearing back brace, patient currently declining brace as she stated she will have difficulty donning.  Supine to sit transfer with CGA and HOB fully elevated, able to move LE's to EOB and sit up. Cues for scooting hips to EOB. Patient performing sit<>stand transfer from EOB with min assist x1 and FWW, cues for hand placemet. Educated patient on log roll technique for sit to supine but patient having difficulty implementing. Required max assist x1 to position LE's into bed. PAtient supine in bed at end of session with call light next to her and bed alarm on.   Gait Training   Gait Training Minutes (71439) 8   Symptoms Noted During/After Treatment (Gait Training) fatigue;increased pain   Treatment Detail/Skilled Intervention Patient completing bout of ambulation for 20' with FWW and min assist x1. Cues for light use of RUE on walker. Cues for keeping walker on ground and not lifting. Difficulty with foot clearance noted bilaterally but no instability or overt LOB. Increased time needed to complete bout. Increased pain noted in back.   PT Discharge Planning   PT Plan progress gait distance, repeated sit<>stands, bed mobility with log roll technique   PT Discharge Recommendation (DC Rec) Transitional Care Facility   PT Rationale for DC Rec Patient below baseline functional mobility. Presents with weakness, reduced activity tolerance, impaired balance, and impaired independece with functional moblity and cares. Patient lives at home alone and has had decline in functional mobility and independence recently. Patient would benefit from discharge to TCU for continued skilled therapies to improve strength, balance, activity tolerance, and overall independence. Patient would benefit from more supportive living environment.   PT Brief overview of current status min assist x1 sit<>stands and ambulation with FWW   Total Session Time   Timed Code Treatment Minutes 31   Total Session Time (sum of  timed and untimed services) 39

## 2023-08-12 NOTE — PHARMACY-ADMISSION MEDICATION HISTORY
Pharmacist Admission Medication History    Admission medication history is complete. The information provided in this note is only as accurate as the sources available at the time of the update.    Medication reconciliation/reorder completed by provider prior to medication history? No    Information Source(s): Patient and Family member via in-person    Pertinent Information: none    Changes made to PTA medication list:  Added: all listed  Deleted: tramadol  Changed: None    Medication Affordability:  Not including over the counter (OTC) medications, was there a time in the past 3 months when you did not take your medications as prescribed because of cost?: No    Allergies reviewed with patient and updates made in EHR: yes    Medication History Completed By: Obdulio Tompkins RPH 8/11/2023 8:59 PM    Prior to Admission medications    Medication Sig Last Dose Taking? Auth Provider Long Term End Date   amLODIPine (NORVASC) 5 MG tablet Take 5 mg by mouth daily 8/11/2023 at am Yes Unknown, Entered By History Yes    carBAMazepine (TEGRETOL) 100 MG chewable tablet Take 100 mg by mouth 3 times daily 8/11/2023 at x1 Yes Unknown, Entered By History Yes    latanoprost (XALATAN) 0.005 % ophthalmic solution Place 1 drop into both eyes At Bedtime 8/10/2023 at hs Yes Unknown, Entered By History Yes    multivitamin w/minerals (THERA-VIT-M) tablet Take 1 tablet by mouth daily 8/11/2023 at am Yes Unknown, Entered By History     naproxen (NAPROSYN) 500 MG tablet Take 500 mg by mouth 2 times daily (with meals) 8/11/2023 at x1 Yes Unknown, Entered By History     timolol maleate (TIMOPTIC) 0.5 % ophthalmic solution Place 1 drop into both eyes 2 times daily 8/11/2023 at x1 Yes Unknown, Entered By History

## 2023-08-12 NOTE — PROGRESS NOTES
ADDENDUM to H and P    CXR returned with evidence of pulmonary congestion and small bilateral pleural effusions.    Added on BNP and repeat troponin.    Not hypoxic or short of breath but may need diuresis. Will hold on diuresis tonight until we assess acuity of stroke.

## 2023-08-13 ENCOUNTER — APPOINTMENT (OUTPATIENT)
Dept: PHYSICAL THERAPY | Facility: CLINIC | Age: 88
DRG: 552 | End: 2023-08-13
Payer: MEDICARE

## 2023-08-13 LAB
ANION GAP SERPL CALCULATED.3IONS-SCNC: 11 MMOL/L (ref 7–15)
BUN SERPL-MCNC: 16.6 MG/DL (ref 8–23)
CALCIUM SERPL-MCNC: 8.5 MG/DL (ref 8.8–10.2)
CHLORIDE SERPL-SCNC: 107 MMOL/L (ref 98–107)
CREAT SERPL-MCNC: 0.76 MG/DL (ref 0.51–0.95)
DEPRECATED HCO3 PLAS-SCNC: 23 MMOL/L (ref 22–29)
ERYTHROCYTE [DISTWIDTH] IN BLOOD BY AUTOMATED COUNT: 14.5 % (ref 10–15)
GFR SERPL CREATININE-BSD FRML MDRD: 74 ML/MIN/1.73M2
GLUCOSE SERPL-MCNC: 111 MG/DL (ref 70–99)
HCT VFR BLD AUTO: 39.3 % (ref 35–47)
HGB BLD-MCNC: 12.6 G/DL (ref 11.7–15.7)
MCH RBC QN AUTO: 31.1 PG (ref 26.5–33)
MCHC RBC AUTO-ENTMCNC: 32.1 G/DL (ref 31.5–36.5)
MCV RBC AUTO: 97 FL (ref 78–100)
PLATELET # BLD AUTO: 251 10E3/UL (ref 150–450)
POTASSIUM SERPL-SCNC: 4 MMOL/L (ref 3.4–5.3)
RBC # BLD AUTO: 4.05 10E6/UL (ref 3.8–5.2)
SODIUM SERPL-SCNC: 141 MMOL/L (ref 136–145)
WBC # BLD AUTO: 7.9 10E3/UL (ref 4–11)

## 2023-08-13 PROCEDURE — 99232 SBSQ HOSP IP/OBS MODERATE 35: CPT | Performed by: INTERNAL MEDICINE

## 2023-08-13 PROCEDURE — 97116 GAIT TRAINING THERAPY: CPT | Mod: GP

## 2023-08-13 PROCEDURE — 97530 THERAPEUTIC ACTIVITIES: CPT | Mod: GP

## 2023-08-13 PROCEDURE — 250N000013 HC RX MED GY IP 250 OP 250 PS 637: Performed by: PHYSICIAN ASSISTANT

## 2023-08-13 PROCEDURE — 250N000013 HC RX MED GY IP 250 OP 250 PS 637: Performed by: INTERNAL MEDICINE

## 2023-08-13 PROCEDURE — 120N000001 HC R&B MED SURG/OB

## 2023-08-13 PROCEDURE — 82310 ASSAY OF CALCIUM: CPT | Performed by: PHYSICIAN ASSISTANT

## 2023-08-13 PROCEDURE — 36415 COLL VENOUS BLD VENIPUNCTURE: CPT | Performed by: PHYSICIAN ASSISTANT

## 2023-08-13 PROCEDURE — 85014 HEMATOCRIT: CPT | Performed by: PHYSICIAN ASSISTANT

## 2023-08-13 RX ORDER — AMLODIPINE BESYLATE 2.5 MG/1
5 TABLET ORAL DAILY
Status: DISCONTINUED | OUTPATIENT
Start: 2023-08-13 | End: 2023-08-14 | Stop reason: HOSPADM

## 2023-08-13 RX ADMIN — TIMOLOL MALEATE 1 DROP: 5 SOLUTION/ DROPS OPHTHALMIC at 18:07

## 2023-08-13 RX ADMIN — ACETAMINOPHEN 975 MG: 325 TABLET, FILM COATED ORAL at 21:14

## 2023-08-13 RX ADMIN — CARBAMAZEPINE 100 MG: 100 TABLET, CHEWABLE ORAL at 08:54

## 2023-08-13 RX ADMIN — LATANOPROST 1 DROP: 50 SOLUTION OPHTHALMIC at 21:15

## 2023-08-13 RX ADMIN — AMLODIPINE BESYLATE 5 MG: 2.5 TABLET ORAL at 10:13

## 2023-08-13 RX ADMIN — ACETAMINOPHEN 975 MG: 325 TABLET, FILM COATED ORAL at 06:21

## 2023-08-13 RX ADMIN — CARBAMAZEPINE 100 MG: 100 TABLET, CHEWABLE ORAL at 14:12

## 2023-08-13 RX ADMIN — TIMOLOL MALEATE 1 DROP: 5 SOLUTION/ DROPS OPHTHALMIC at 06:22

## 2023-08-13 RX ADMIN — LIDOCAINE PATCH 4% 1 PATCH: 40 PATCH TOPICAL at 21:15

## 2023-08-13 RX ADMIN — ACETAMINOPHEN 975 MG: 325 TABLET, FILM COATED ORAL at 14:12

## 2023-08-13 RX ADMIN — CARBAMAZEPINE 100 MG: 100 TABLET, CHEWABLE ORAL at 21:14

## 2023-08-13 RX ADMIN — ASPIRIN 81 MG: 81 TABLET, COATED ORAL at 08:54

## 2023-08-13 ASSESSMENT — ACTIVITIES OF DAILY LIVING (ADL)
ADLS_ACUITY_SCORE: 44
ADLS_ACUITY_SCORE: 40
ADLS_ACUITY_SCORE: 44
ADLS_ACUITY_SCORE: 44
ADLS_ACUITY_SCORE: 40
ADLS_ACUITY_SCORE: 44
ADLS_ACUITY_SCORE: 40

## 2023-08-13 NOTE — PLAN OF CARE
Goal Outcome Evaluation:      Plan of Care Reviewed With: patient    [id number] RN     Alert and oriented x 4.  Vital signs stable, RA  Pt pain was tolerable today with her right shoulder and lower back pain. She only took her scheduled Tylenol and refused her lidocaine patches last night.   Skin and luis armando cares done.   Pt said baseline NB bilateral fingers  Pre existing decubitus ulcers to left buttock cheek, Mepilex on. Redness to groin folds.  WOC consult will be done Monday.  Swallowing intact.  MD discontinued Neuro checks but they are intact   2 edema BLE ankle/feet.  Walked in ascencio and sat in recliner and tolerated it well.  Potassium 4.0 recheck in am  Plan is TCU tomorrow.

## 2023-08-13 NOTE — PROGRESS NOTES
"[facility]  Hospitalist Progress Note  Ronal Parnell MD 08/13/2023    Reason for Stay (Diagnosis): fall, compression fx, TCU placement         Assessment and Plan:      Summary of Stay: Maya Cullen is a 89 year old female who lives independently with history of hypertension, glaucoma and trigeminal neuralgia admitted on 8/11/2023 after a fall with back pain and notable increased weakness/falls/gait instability since 6/2023.     In the ED she is afebrile with heart rate of 87, pressure 118/66 and breathing comfortably on room air without hypoxia.  Lab work is remarkable for sodium of 135 and glucose 104 but otherwise normal BMP, high-sensitivity troponin of 35, normal CBC, negative UA, EKG shows sinus rhythm, CT head shows age-indeterminate small left cerebellar stroke, CT pelvis bone is negative but does show compressive deformity at the superior aspect of L4 and CT lumbar spine does show L4 anterior compression fracture.  Admission was requested for evidence of stroke on CT scan and weakness with back pain.     Plans today:  - to TCU when facility found.  Medically stable for discharge     #Low back pain due to L4 compression fracture  -Patient has had back pain for a while but it worsened after her fall today.  She describes the back pain in her low back with a burning radiation down bilateral legs  -CT lumbar spine shows a L4 anterior compression fracture  -Plan for multimodal pain regimen with scheduled Tylenol, Lidoderm patches and low-dose oxycodone  -Neurosurgery consulted; non-operative management and no brace needed  -PT assessment  -Social work consult     #Frequent falling  #Shuffling gait  -Since June 2023 family reports that patient is a \"different person\".  She was previously very independent and steady on her feet but something shifted and now she can barely ambulate without falling and shuffles her feet  - brain MRI with previous cerebellar CVA; no acute CVA noted   " "  #Lung congestion  -CT of lumbar spine mentions evidence of pleural effusion  -CXR shows ? Vascular congestion  - given lack of resp sx or hypoxia, no treatment at this time.  If becomes symptomatic consider trial of Lasix     #Hypertension  -Continue PTA amlodipine     #Glaucoma  -Continue PTA latanoprost     #History of trigeminal neuralgia  -Continue Tegretol     #L buttocks ulcer  - present on admission  - WOC consult ordered        Diet:  Regular  DVT Prophylaxis: Pneumatic Compression Devices  Barton Catheter: Not present  Lines: None     Cardiac Monitoring: None  Code Status:  DNR/DNI  DISPO:  TCU anticipated on discharge           Interval History (Subjective):      No new concerns today.  TCU anticipated on discharge                  Physical Exam:      Last Vital Signs:  /66 (BP Location: Left arm)   Pulse 77   Temp 98  F (36.7  C) (Temporal)   Resp 16   Ht 1.499 m (4' 11\")   Wt 65.6 kg (144 lb 10 oz)   SpO2 95%   Breastfeeding No   BMI 29.21 kg/m        Intake/Output Summary (Last 24 hours) at 8/13/2023 1400  Last data filed at 8/13/2023 0450  Gross per 24 hour   Intake 300 ml   Output 1450 ml   Net -1150 ml       Constitutional: Awake, alert, cooperative, no apparent distress     Respiratory: Clear to auscultation bilaterally, no crackles or wheezing   Cardiovascular: Regular rate and rhythm, normal S1 and S2, and no murmur noted   Abdomen: Normal bowel sounds, soft, non-distended, non-tender   Skin: No rashes, no cyanosis, dry to touch   Neuro: Alert and oriented x3, no weakness, numbness, memory loss   Extremities: No edema, normal range of motion   Other(s):        All other systems: Negative          Medications:      All current medications were reviewed with changes reflected in problem list.         Data:      All new lab and imaging data was reviewed.   Labs:       Lab Results   Component Value Date     08/13/2023     08/12/2023     08/11/2023     07/13/2013 "    Lab Results   Component Value Date    CHLORIDE 107 2023    CHLORIDE 107 2023    CHLORIDE 100 2023    CHLORIDE 102 2013    Lab Results   Component Value Date    BUN 16.6 2023    BUN 15.8 2023    BUN 20.0 2023    BUN 13 2013      Lab Results   Component Value Date    POTASSIUM 4.0 2023    POTASSIUM 3.6 2023    POTASSIUM 4.2 2023    POTASSIUM 4.1 2013    Lab Results   Component Value Date    CO2 23 2023    CO2 25 2023    CO2 26 2023    CO2 25 2013    Lab Results   Component Value Date    CR 0.76 2023    CR 0.81 2023    CR 0.81 2023    CR 0.89 2013         Imaging:   Recent Results (from the past 24 hour(s))   Echocardiogram Complete - For age > 60 yrs   Result Value    LVEF  40-45%    [facility]    [id number]  [id number]  [id number]  [id number]^PEDRO^AMARA^AILIN     [facility]  Echocardiography Laboratory  [address] [place] [address]     Name: CARLOS WEBBER  MRN: [id number]  : 1934  Study Date: 2023 05:04 PM  Age: 89 yrs  Gender: Female  Patient Location: [facility]  Reason For Study: Cerebrovascular Incident  Ordering Physician: AMARA VASQUEZ  Performed By: Miguel De Los Santos RDCS     BSA: 1.6 m2  Height: 59 in  Weight: 144 lb  HR: 85  BP: 144/81 mmHg  ______________________________________________________________________________  Procedure  Complete Portable Echo Adult.  ______________________________________________________________________________  Interpretation Summary     The left ventricle is severely dilated. There is severe eccentric left  ventricular hypertrophy.  Left ventricular systolic function is mild to moderately reduced. The visual  ejection fraction is 40-45%.  Severe hypokinesis of the inferior and inferolateral walls. Flattened septum  is consistent with RV pressure/volume overload.  The right ventricular systolic function is  mildly reduced.  There is mod-severe to severe (3-4+) mitral regurgitation.  Mild to moderate (1-2+) tricuspid regurgitation.  Pulmonary hypertension present. The right ventricular systolic pressure is  approximated at 47mmHg plus the right atrial pressure.  Dilation of the inferior vena cava is present with abnormal respiratory  variation in diameter.  Moderate left pleural effusion.     There are no prior studies available for comparison.  ______________________________________________________________________________  Left Ventricle  The left ventricle is severely dilated. There is severe eccentric left  ventricular hypertrophy. Left ventricular systolic function is mild to  moderately reduced. The visual ejection fraction is 40-45%. Left ventricular  diastolic function is not assessable. Severe hypokinesis of the inferior and  inferolateral walls. Flattened septum is consistent with RV pressure/volume  overload.     Right Ventricle  The right ventricle is normal size. The right ventricular systolic function is  mildly reduced.     Atria  Normal left atrial size. Right atrial size is normal.     Mitral Valve  There is moderate mitral annular calcification. There is mod-severe to severe  (3-4+) mitral regurgitation.     Tricuspid Valve  There is mild to moderate (1-2+) tricuspid regurgitation. The right  ventricular systolic pressure is approximated at 47mmHg plus the right atrial  pressure. Pulmonary hypertension.     Aortic Valve  The aortic valve is trileaflet with aortic valve sclerosis.     Pulmonic Valve  There is mild (1+) pulmonic valvular regurgitation.     Vessels  The aortic root is normal size. Dilation of the inferior vena cava is present  with abnormal respiratory variation in diameter.     Pericardium  There is no pericardial effusion. Moderate left pleural effusion.     ______________________________________________________________________________  MMode/2D Measurements & Calculations  IVSd: 0.78  cm  LVIDd: 6.4 cm  LVIDs: 5.0 cm  LVPWd: 0.80 cm  IVC diam: 2.4 cm  FS: 21.9 %     LV mass(C)d: 205.8 grams  LV mass(C)dI: 128.3 grams/m2  Ao root diam: 3.3 cm  LA dimension: 4.4 cm  asc Aorta Diam: 3.2 cm  LA/Ao: 1.3     EF(MOD-bp): 36.3 %  LA Volume (BP): 49.0 ml  LA Volume Index (BP): 30.6 ml/m2  RV Base: 3.6 cm  RWT: 0.25  TAPSE: 1.6 cm     Doppler Measurements & Calculations  MV E max edwin: 154.0 cm/sec  MV A max edwin: 83.7 cm/sec  MV E/A: 1.8  MV dec slope: 958.0 cm/sec2  MV dec time: 0.16 sec  MR PISA: 2.7 cm2  PI end-d edwin: 197.3 cm/sec  TR max edwin: 344.1 cm/sec  TR max P.4 mmHg  E/E' av.3  Lateral E/e': 17.7  Medial E/e': 28.8  RV S Edwin: 13.5 cm/sec     ______________________________________________________________________________  Report approved by: Ramy Howell 2023 08:29 PM

## 2023-08-13 NOTE — PLAN OF CARE
"Occupational Therapy: Orders received. Chart reviewed and discussed with care team.? Occupational Therapy not indicated due to patient requiring TCU level of care at discharge. Per MD note \"cancel neuro consult as no e/o acute CVA.\"  Consult for low back pain and frequent falling. Per SAURABH note dated 8/12 patient has been accepted at [facility] with planned discharge Monday/Tuesday. Recommend skilled OT services including evaluation and treatment at TCU.? Defer discharge recommendations to PT/medical teamg.? Will complete orders.  "

## 2023-08-13 NOTE — PLAN OF CARE
Temp: 97.7  F (36.5  C) Temp src: Temporal BP: (!) 174/85 Pulse: 93   Resp: 18 SpO2: 95 % O2 Device: None (Room air)       A&Ox4. Ax1 with a gait belt and walker. On RA. On a regular diet. PIV in R arm is saline locked. Purewick used overnight & pulled this AM. Neuros Q4h unchanged & intact. New sacral Mepilex applied to backside & powder applied to groin area. Elevated BPs overnight. Loose stools x2 this AM. No reports of pain/discomfort. On telemetry- NSR w/frequent PVCs.

## 2023-08-13 NOTE — PROGRESS NOTES
SPIRITUAL HEALTH SERVICES Progress Note  RH  645    Saw pt Maya Cullen per admission request.    Illness Narrative - Maya is here after a fall at home.  She states that she feels pretty unsteady on her feet.    Distress - She is worried that with her increasing falls, she may have to sell her home and go into some type of senior facility.  She isn't totally opposed to this but would prefer to stay in her home as long as she can.      Coping - She has good friends in her neighborhood and [employer] community who support her in many ways.      Meaning-Making - She is strong of rigo and trusts that God will take care of her.    Plan - [facility] remains available if further support is needed.    Sangeetha Abebe  Associate   Pager number:  [phone]  [facility] remains available 24/7 for emergent requests/referrals, either by having the on-call  paged or by entering an ASAP/STAT consult in Epic (this will also page the on-call ). Routine Epic consults receive an initial response within 24 hours.

## 2023-08-14 ENCOUNTER — LAB REQUISITION (OUTPATIENT)
Dept: LAB | Facility: CLINIC | Age: 88
End: 2023-08-14
Payer: MEDICARE

## 2023-08-14 ENCOUNTER — LAB REQUISITION (OUTPATIENT)
Dept: LAB | Facility: CLINIC | Age: 88
End: 2023-08-14
Payer: COMMERCIAL

## 2023-08-14 VITALS
WEIGHT: 144.62 LBS | HEART RATE: 80 BPM | TEMPERATURE: 98.3 F | HEIGHT: 59 IN | BODY MASS INDEX: 29.16 KG/M2 | DIASTOLIC BLOOD PRESSURE: 76 MMHG | OXYGEN SATURATION: 94 % | SYSTOLIC BLOOD PRESSURE: 141 MMHG | RESPIRATION RATE: 16 BRPM

## 2023-08-14 DIAGNOSIS — Z11.1 ENCOUNTER FOR SCREENING FOR RESPIRATORY TUBERCULOSIS: ICD-10-CM

## 2023-08-14 LAB
ANION GAP SERPL CALCULATED.3IONS-SCNC: 9 MMOL/L (ref 7–15)
ATRIAL RATE - MUSE: 84 BPM
BUN SERPL-MCNC: 16.6 MG/DL (ref 8–23)
CALCIUM SERPL-MCNC: 9.1 MG/DL (ref 8.8–10.2)
CHLORIDE SERPL-SCNC: 105 MMOL/L (ref 98–107)
CREAT SERPL-MCNC: 0.9 MG/DL (ref 0.51–0.95)
DEPRECATED HCO3 PLAS-SCNC: 24 MMOL/L (ref 22–29)
DIASTOLIC BLOOD PRESSURE - MUSE: NORMAL MMHG
ERYTHROCYTE [DISTWIDTH] IN BLOOD BY AUTOMATED COUNT: 14.6 % (ref 10–15)
GFR SERPL CREATININE-BSD FRML MDRD: 61 ML/MIN/1.73M2
GLUCOSE SERPL-MCNC: 111 MG/DL (ref 70–99)
HCT VFR BLD AUTO: 38.5 % (ref 35–47)
HGB BLD-MCNC: 12.2 G/DL (ref 11.7–15.7)
INTERPRETATION ECG - MUSE: NORMAL
MCH RBC QN AUTO: 30.8 PG (ref 26.5–33)
MCHC RBC AUTO-ENTMCNC: 31.7 G/DL (ref 31.5–36.5)
MCV RBC AUTO: 97 FL (ref 78–100)
P AXIS - MUSE: 53 DEGREES
PLATELET # BLD AUTO: 276 10E3/UL (ref 150–450)
POTASSIUM SERPL-SCNC: 3.9 MMOL/L (ref 3.4–5.3)
PR INTERVAL - MUSE: 122 MS
QRS DURATION - MUSE: 96 MS
QT - MUSE: 370 MS
QTC - MUSE: 437 MS
R AXIS - MUSE: 4 DEGREES
RBC # BLD AUTO: 3.96 10E6/UL (ref 3.8–5.2)
SODIUM SERPL-SCNC: 138 MMOL/L (ref 136–145)
SYSTOLIC BLOOD PRESSURE - MUSE: NORMAL MMHG
T AXIS - MUSE: 39 DEGREES
VENTRICULAR RATE- MUSE: 84 BPM
WBC # BLD AUTO: 7 10E3/UL (ref 4–11)

## 2023-08-14 PROCEDURE — 250N000013 HC RX MED GY IP 250 OP 250 PS 637: Performed by: INTERNAL MEDICINE

## 2023-08-14 PROCEDURE — G0463 HOSPITAL OUTPT CLINIC VISIT: HCPCS

## 2023-08-14 PROCEDURE — 36415 COLL VENOUS BLD VENIPUNCTURE: CPT | Performed by: PHYSICIAN ASSISTANT

## 2023-08-14 PROCEDURE — 80048 BASIC METABOLIC PNL TOTAL CA: CPT | Performed by: PHYSICIAN ASSISTANT

## 2023-08-14 PROCEDURE — 250N000013 HC RX MED GY IP 250 OP 250 PS 637: Performed by: PHYSICIAN ASSISTANT

## 2023-08-14 PROCEDURE — 99239 HOSP IP/OBS DSCHRG MGMT >30: CPT | Performed by: INTERNAL MEDICINE

## 2023-08-14 PROCEDURE — 85027 COMPLETE CBC AUTOMATED: CPT | Performed by: PHYSICIAN ASSISTANT

## 2023-08-14 RX ORDER — ACETAMINOPHEN 325 MG/1
975 TABLET ORAL EVERY 8 HOURS PRN
DISCHARGE
Start: 2023-08-14 | End: 2024-06-20

## 2023-08-14 RX ORDER — ONDANSETRON 4 MG/1
4 TABLET, ORALLY DISINTEGRATING ORAL EVERY 6 HOURS PRN
Status: ON HOLD | DISCHARGE
Start: 2023-08-14 | End: 2024-06-17

## 2023-08-14 RX ADMIN — CARBAMAZEPINE 100 MG: 100 TABLET, CHEWABLE ORAL at 08:15

## 2023-08-14 RX ADMIN — ACETAMINOPHEN 975 MG: 325 TABLET, FILM COATED ORAL at 06:30

## 2023-08-14 RX ADMIN — TIMOLOL MALEATE 1 DROP: 5 SOLUTION/ DROPS OPHTHALMIC at 06:30

## 2023-08-14 RX ADMIN — AMLODIPINE BESYLATE 5 MG: 2.5 TABLET ORAL at 08:14

## 2023-08-14 RX ADMIN — ASPIRIN 81 MG: 81 TABLET, COATED ORAL at 08:14

## 2023-08-14 ASSESSMENT — ACTIVITIES OF DAILY LIVING (ADL)
ADLS_ACUITY_SCORE: 42
ADLS_ACUITY_SCORE: 40
ADLS_ACUITY_SCORE: 42
ADLS_ACUITY_SCORE: 40
ADLS_ACUITY_SCORE: 44
ADLS_ACUITY_SCORE: 40
ADLS_ACUITY_SCORE: 40

## 2023-08-14 NOTE — PLAN OF CARE
Afebrile.  Pain managed with sched. Tylenol, declined any cold/heat.  No nausea.  LS diminished bilat., RA, hourly IS.  CMS+ except baseline neuropathy, BLEs mild edema encouraged elevation.  Wound L buttock, WOC consulted.  Up A1 belt + walker, sat up in chair, ambulating.  Voiding, LBM today.  Reviewed DC instructions with patient & friend, questions answered.  [facility] & [facility].  Pt's son will transport around 1300.

## 2023-08-14 NOTE — CONSULTS
[facility] Nurse Inpatient Assessment     Consulted for: coccyx     Had a dimple since birth, left buttock has open wounds that are a combination of pressure incontinence friction/shear.    Patient History (according to provider note(s):      Maya Cullen is a 89 year old female who lives independently with history of hypertension, glaucoma and trigeminal neuralgia admitted on 8/11/2023 after a fall with back pain and notable increased weakness/falls/gait instability since 6/2023.     In the ED she is afebrile with heart rate of 87, pressure 118/66 and breathing comfortably on room air without hypoxia.  Lab work is remarkable for sodium of 135 and glucose 104 but otherwise normal BMP, high-sensitivity troponin of 35, normal CBC, negative UA, EKG shows sinus rhythm, CT head shows age-indeterminate small left cerebellar stroke, CT pelvis bone is negative but does show compressive deformity at the superior aspect of L4 and CT lumbar spine does show L4 anterior compression fracture.  Admission was requested for evidence of stroke on CT scan and weakness with back pain.    Assessment:      Pressure Injury Location: coccyx (dimple and left buttock pressure injury)    Last photo: 8/14  Left buttock  Wound type: Pressure Injury, Friction, Shear, and Incontinence Associated Dermatitis (IAD)     Pressure Injury Stage: 2, present on admission     Wound history/plan of care:   patient states she fell and on her shoulder and favors her right side, RN created LDA on date of admission     Wound base: 100 % fibrin,      Palpation of the wound bed: normal      Drainage: small     Description of drainage: yellow     Measurements (length x width x depth, in cm)   Left buttock promimal 0.4x0.6 x0.1  Distal 0.3  x 0.4  x  0.1 cm      Tunneling N/A     Undermining N/A  Periwound skin: Erythema- blanchable, Superficial erosion, and measuring 8x 6 covering gluteal cleft and bilateral buttocks        Color: pink and red      Temperature: normal   Odor: none  Pain: denies , none  Pain intervention prior to dressing change: patient tolerated well  Treatment goal: Heal , Protection, and Promote epidermal migration  STATUS: initial assessment  Supplies ordered: ordered vashe iodosorb gel     My PI Risk Assessment     Sensory Perception: 4 - No impairment     Moisture: 2 - Very moist      Activity: 4 - Walks frequently      Mobility: 4 - No limitation      Nutrition: 3 - Adequate     Friction/Shear: 1 - Problem     TOTAL: 18     Treatment Plan:     Coccyx (2 wounds on left buttock and a dimple midline on coccyx)  Cleanse wounds with vashe #[id number] moistened gauze x 5 minutes  Apply iodosorb gel #[id number] to adaptic gauze and apply that gel side down on wounds  Cover with sacral foam   Change every other day until healed then stop the iodosorb gel and change sacral foam every 3 days    Orders: Written    RECOMMEND PRIMARY TEAM ORDER: None, at this time  Education provided: plan of care  Discussed plan of care with: Patient  WOC nurse follow-up plan: weekly  Notify WOC if wound(s) deteriorate.  Nursing to notify the Provider(s) and re-consult the WOC Nurse if new skin concern.    DATA:     Current support surface: Standard  Low air loss (PAUL pump, Isolibrium, Pulsate, skin guard, etc)  Containment of urine/stool: Continent of bowel, Incontinence Protocol, and Brief  BMI: Body mass index is 29.21 kg/m .   Active diet order: Orders Placed This Encounter      Combination Diet Regular Diet Adult      Combination Diet     Output: No intake/output data recorded.     Labs:   Recent Labs   Lab 08/14/23  0647 08/12/23  0549 08/11/23  1544   HGB 12.2   < > 13.0   WBC 7.0   < > 8.9   A1C  --   --  5.6    < > = values in this interval not displayed.     Pressure injury risk assessment:   Sensory Perception: 3-->slightly limited  Moisture: 3-->occasionally moist  Activity: 3-->walks occasionally  Mobility: 3-->slightly  limited  Nutrition: 3-->adequate  Friction and Shear: 2-->potential problem  Rob Score: 17    Yuliya Calderon RN BS CWOCN  Pager no longer is use, please contact through [employer] group: [facility] Nurse [facility]   Department Phone: [phone]

## 2023-08-14 NOTE — DISCHARGE SUMMARY
"[facility]  Hospitalist Discharge Summary      Date of Admission:  8/11/2023  Date of Discharge:  8/14/2023  1:07 PM  Discharging Provider: Ronal Parnell MD  Discharge Service: Hospitalist Service    Discharge Diagnoses   #L4 compression fracture secondary to mechanical fall  -CT lumbar spine shows a L4 anterior compression fracture  -Neurosurgery consulted; non-operative management and no brace needed  -minimal pain controlled with acetaminophen     #Frequent falling with shuffling gait  - brain MRI shows previous cerebellar CVA; no acute CVA noted on imaging this admission     #Hypertension     #Glaucoma     #History of trigeminal neuralgia  -treated with Tegretol     #L buttocks ulcer  - present on admission  - WOC consult ordered    #chronic systolic dysfunction  - TTE 8/23 shows EF 40-45%    Clinically Significant Risk Factors     # Overweight: Estimated body mass index is 29.21 kg/m  as calculated from the following:    Height as of this encounter: 1.499 m (4' 11\").    Weight as of this encounter: 65.6 kg (144 lb 10 oz).       Follow-ups Needed After Discharge   Follow-up Appointments     Follow Up and recommended labs and tests      Follow up with your primary provider within 1-2 weeks after discharge   from rehabilitation center        Follow-up and recommended labs and tests       follow up as needed with [facility] Neurosurgery if pain persists in 6   weeks with lumbar xray   Corrina Fierro PA-C  Tel [phone]            Unresulted Labs Ordered in the Past 30 Days of this Admission       No orders found from 7/12/2023 to 8/12/2023.            Discharge Disposition   Discharged to home  Condition at discharge: Stable    Hospital Course   89 year old female who lives independently with history of hypertension, glaucoma and trigeminal neuralgia admitted on 8/11/2023 after a fall with back pain and notable increased weakness/falls/gait instability since 6/2023.     In the ED she " is afebrile with heart rate of 87, pressure 118/66 and breathing comfortably on room air without hypoxia.  Lab work is remarkable for sodium of 135 and glucose 104 but otherwise normal BMP, high-sensitivity troponin of 35, normal CBC, negative UA, EKG shows sinus rhythm, CT head shows age-indeterminate small left cerebellar stroke, CT pelvis bone is negative but does show compressive deformity at the superior aspect of L4 and CT lumbar spine does show L4 anterior compression fracture.     The patient was seen by neurosurgery this admission.  Nonoperative management of compression fracture was recommended.  Patient had minimal to no back pain and did not require back brace.    Patient was seen by rehabilitation therapy this admission.  TCU recommended.  Patient was agreeable and discharged to a transitional care unit today    Consultations This Hospital Stay   PATIENT [facility] IP CONSULT  NEUROLOGY IP STROKE CONSULT  SPEECH LANGUAGE PATH ADULT IP CONSULT  PHARMACY IP CONSULT  PHARMACY IP CONSULT  PHARMACY IP CONSULT  PHYSICAL THERAPY ADULT IP CONSULT  OCCUPATIONAL THERAPY ADULT IP CONSULT  REHAB ADMISSIONS LIAISON IP CONSULT  CARE MANAGEMENT / SOCIAL WORK IP CONSULT  NEUROSURGERY IP CONSULT  SPIRITUAL HEALTH SERVICES IP CONSULT  WOUND OSTOMY CONTINENCE NURSE  IP CONSULT  PHYSICAL THERAPY ADULT IP CONSULT  OCCUPATIONAL THERAPY ADULT IP CONSULT    Code Status   Prior    Time Spent on this Encounter   I, Ronal Parnell MD, personally saw the patient today and spent greater than 30 minutes discharging this patient.       Ronal Parnell MD  [facility] ORTHO SPINE  [address] [place] [address]  Phone: [phone]  Fax: [phone]  ______________________________________________________________________    Physical Exam   Vital Signs: Temp: 98.3  F (36.8  C) Temp src: Temporal BP: (!) 141/76 Pulse: 80   Resp: 16 SpO2: 94 % O2 Device: None (Room air)    Weight: 144 lbs 9.95  oz  Awake, alert, oriented  RRR  CTA bilaterally  NT/ND.  Soft    Primary Care Physician   Jordyn Jon    Discharge Orders      Follow-up and recommended labs and tests     follow up as needed with [facility] Neurosurgery if pain persists in 6 weeks with lumbar xray   Corrina Fierro PA-C  Tel [phone]     General info for SNF    Length of Stay Estimate: Short Term Care: Estimated # of Days <30  Condition at Discharge: Stable  Level of care:skilled   Rehabilitation Potential: Fair  Admission H&P remains valid and up-to-date: Yes  Recent Chemotherapy: N/A  Use Nursing Home Standing Orders: Yes     Mantoux instructions    Give two-step Mantoux (PPD) Per Facility Policy Yes     Follow Up and recommended labs and tests    Follow up with your primary provider within 1-2 weeks after discharge from rehabilitation center     Reason for your hospital stay    Falls, lumbar compression fracture     Activity - Up with nursing assistance     Physical Therapy Adult Consult    Evaluate and treat as clinically indicated.    Reason:  weakness, falls     Occupational Therapy Adult Consult    Evaluate and treat as clinically indicated.    Reason:  weakness, falls     Fall precautions     Diet    Follow this diet upon discharge: regular diet       Significant Results and Procedures   Results for orders placed or performed during the hospital encounter of 08/11/23   Lumbar spine CT w/o contrast    Narrative    EXAM: CT LUMBAR SPINE W/O CONTRAST  LOCATION: [facility]  DATE: 8/11/2023    INDICATION: fall  COMPARISON: None.  TECHNIQUE: Routine CT Lumbar Spine without IV contrast. Multiplanar reformats. Dose reduction techniques were used.     FINDINGS:  VERTEBRA: Grade 1 anterolisthesis L4 on L5 measuring 6 mm. L4 anterior compression fracture with approximately 30% vertebral body height loss compared to the subjacent vertebral body heights. There is bony retropulsion measuring 2.5 mm. No additional   fracture  noted.     CANAL/FORAMINA: Mild lumbar disc degenerative change. Advanced L4-L5 facet arthrosis. Moderate lumbar facet arthrosis elsewhere. There is severe spinal canal stenosis at L3-L4 and L4-L5.    PARASPINAL: Partially imaged left pleural effusion.      Impression    IMPRESSION:  1.  L4 anterior compression fracture with approximately 30% vertebral body height loss and minimal bony retropulsion.  2.  Severe L3-L4 and L4-L5 spinal canal stenosis.  3.  Degenerative grade 1 anterolisthesis L4 on L5.  4.  Partially imaged left pleural effusion.   CT Pelvis Bone wo Contrast    Narrative    EXAM: CT PELVIS BONE WO CONTRAST  LOCATION: [facility]  DATE: 8/11/2023    INDICATION: Left pelvic pain, recurrent falls, associated low back pain, weakness.  COMPARISON: None.  TECHNIQUE: CT scan of the pelvis was performed without IV contrast. Multiplanar reformats were obtained. Dose reduction techniques were used.  CONTRAST: None.    FINDINGS: Both hips are negative for fracture or CT evidence of avascular necrosis. Pelvis negative for fracture. Degenerative change at the SI joints bilaterally. No evidence for sacroiliitis, ankylosis, or diastasis. Sacrum and coccyx are negative for   fracture. There is some irregularity and compression of the superior endplate of L4. MRI could be performed to assess for the presence or absence of bone edema and better gauge the chronicity of this finding, which is suggestive of a compressive endplate   deformity.    Fatty infiltration and atrophy of the right gluteus minimus and medius suggestive of chronic tendinopathy and partial-thickness tearing. The intrapelvic contents are negative. No evidence for abnormal mass or soft tissue fluid collection.       Impression    IMPRESSION:  1.  Both hips are negative for fracture or CT evidence of avascular necrosis.  2.  Pelvis negative for fracture.  3.  SI joints negative for sacroiliitis, ankylosis, or diastasis. There is  degenerative change.  4.  Compressive endplate deformity of the superior aspect of L4. MRI could be performed to assess for the presence or absence of bone edema and better gauge the chronicity of this finding.  5.  Fatty infiltration and atrophy of the right gluteus minimus and medius suggestive of chronic tendinopathy and potential partial-thickness tearing.   CT Head w/o Contrast    Narrative    CT SCAN OF THE HEAD WITHOUT CONTRAST   8/11/2023 4:21 PM     HISTORY: Recurrent falls, weakness    TECHNIQUE:  Axial images of the head and coronal reformations without  IV contrast material. Radiation dose for this scan was reduced using  automated exposure control, adjustment of the mA and/or kV according  to patient size, or iterative reconstruction technique.    COMPARISON: Head CT 7/13/2013      Impression    IMPRESSION:  Small left cerebellar infarct, age indeterminate. No evidence of  hemorrhage, mass, or hydrocephalus. Volume loss and patchy areas of  nonspecific white matter hypoattenuation which presumably represent  chronic small vessel ischemic change. No acute osseous abnormality.    DIANA HOANG MD         SYSTEM ID:  [id number]   XR Chest 2 Views    Narrative    EXAM: XR CHEST 2 VIEWS  LOCATION: [facility]  DATE: 8/11/2023    INDICATION: Cough. Hypoxia.  COMPARISON: None.      Impression    IMPRESSION: Cardiac silhouette size is at the regions of normal. Small bilateral pleural effusions, greater on the left. Prominence of the pulmonary vascularity suggesting vascular congestion. Increased interstitial markings which is likely secondary to   edema. Calcified atherosclerotic changes of the thoracic aorta. Posttraumatic deformity of the left humeral head.   MR Brain w/o & w Contrast    Narrative    EXAM: MR BRAIN W/O and W CONTRAST  LOCATION: [facility]  DATE: 8/11/2023    INDICATION: evidence of stroke on CT  COMPARISON: CTA head and neck on the same  date  CONTRAST: 7mL Gadavist  TECHNIQUE: Routine multiplanar multisequence head MRI without and with intravenous contrast.    FINDINGS:  INTRACRANIAL CONTENTS: No acute or subacute infarct. No mass, acute hemorrhage, or extra-axial fluid collections. Patchy and confluent nonspecific T2/FLAIR hyperintensities within the cerebral white matter most consistent with moderate chronic   microvascular ischemic change. Mild to moderate generalized cerebral atrophy. No hydrocephalus. Chronic lacunar infarcts bilateral cerebellum. Normal position of the cerebellar tonsils. No pathologic contrast enhancement.    SELLA: No abnormality accounting for technique.    OSSEOUS STRUCTURES/SOFT TISSUES: Normal marrow signal. The major intracranial vascular flow voids are maintained.     ORBITS: No abnormality accounting for technique.     SINUSES/MASTOIDS: No paranasal sinus mucosal disease. No middle ear or mastoid effusion.       Impression    IMPRESSION:  1.  No acute findings.   2.  Age-related changes.     CT Head Neck Angio w/o & w Contrast    Narrative    EXAM: CTA HEAD NECK W CONTRAST  LOCATION: [facility]  DATE: 8/11/2023    INDICATION: evidence of stroke on CT head  COMPARISON: Brain MRI 08/11/2023  CONTRAST: 75mL Isovue 370  TECHNIQUE: Head and neck CT angiogram with IV contrast. Noncontrast head CT followed by axial helical CT images of the head and neck vessels obtained during the arterial phase of intravenous contrast administration. Axial 2D reconstructed images and   multiplanar 3D MIP reconstructed images of the head and neck vessels were performed by the technologist. Dose reduction techniques were used. All stenosis measurements made according to NASCET criteria unless otherwise specified.    FINDINGS:     HEAD CTA:  ANTERIOR CIRCULATION: No stenosis/occlusion, aneurysm, or high flow vascular malformation. Standard [place] anatomy.    POSTERIOR CIRCULATION: Scattered atheromatous  disease. No stenosis/occlusion, aneurysm, or high flow vascular malformation. Dominant right and smaller left vertebral artery contribute to a normal basilar artery.     DURAL VENOUS SINUSES: Expected enhancement of the major dural venous sinuses.    NECK CTA:  RIGHT CAROTID: Atherosclerotic plaque results in less than 50% stenosis in the right ICA. No dissection.    LEFT CAROTID: No measurable stenosis or dissection.    VERTEBRAL ARTERIES: No focal stenosis or dissection. Dominant right and smaller left vertebral arteries.    AORTIC ARCH: Classic aortic arch anatomy with no significant stenosis at the origin of the great vessels.    NONVASCULAR STRUCTURES: Unremarkable.      Impression    IMPRESSION:     HEAD CTA:   1.  No stenosis/occlusion, aneurysm, or high flow vascular malformation.    NECK CTA:  1.  Atherosclerotic plaque results in less than 50% stenosis in the right ICA. No dissection.  2.  No dissection.   Echocardiogram Complete - For age > 60 yrs     Value    LVEF  40-45%    Narrative    [id number]  [id number]  [id number]  [id number]^PEDRO^AMARA^AILIN     [facility]  Echocardiography Laboratory  [address] [place] [address]     Name: CARLOS WEBBER  MRN: [id number]  : 1934  Study Date: 2023 05:04 PM  Age: 89 yrs  Gender: Female  Patient Location: [facility]  Reason For Study: Cerebrovascular Incident  Ordering Physician: AMARA VASQUEZ  Performed By: Miguel De Los Santos RDCS     BSA: 1.6 m2  Height: 59 in  Weight: 144 lb  HR: 85  BP: 144/81 mmHg  ______________________________________________________________________________  Procedure  Complete Portable Echo Adult.  ______________________________________________________________________________  Interpretation Summary     The left ventricle is severely dilated. There is severe eccentric left  ventricular hypertrophy.  Left ventricular systolic function is mild to moderately reduced. The visual  ejection  fraction is 40-45%.  Severe hypokinesis of the inferior and inferolateral walls. Flattened septum  is consistent with RV pressure/volume overload.  The right ventricular systolic function is mildly reduced.  There is mod-severe to severe (3-4+) mitral regurgitation.  Mild to moderate (1-2+) tricuspid regurgitation.  Pulmonary hypertension present. The right ventricular systolic pressure is  approximated at 47mmHg plus the right atrial pressure.  Dilation of the inferior vena cava is present with abnormal respiratory  variation in diameter.  Moderate left pleural effusion.     There are no prior studies available for comparison.  ______________________________________________________________________________  Left Ventricle  The left ventricle is severely dilated. There is severe eccentric left  ventricular hypertrophy. Left ventricular systolic function is mild to  moderately reduced. The visual ejection fraction is 40-45%. Left ventricular  diastolic function is not assessable. Severe hypokinesis of the inferior and  inferolateral walls. Flattened septum is consistent with RV pressure/volume  overload.     Right Ventricle  The right ventricle is normal size. The right ventricular systolic function is  mildly reduced.     Atria  Normal left atrial size. Right atrial size is normal.     Mitral Valve  There is moderate mitral annular calcification. There is mod-severe to severe  (3-4+) mitral regurgitation.     Tricuspid Valve  There is mild to moderate (1-2+) tricuspid regurgitation. The right  ventricular systolic pressure is approximated at 47mmHg plus the right atrial  pressure. Pulmonary hypertension.     Aortic Valve  The aortic valve is trileaflet with aortic valve sclerosis.     Pulmonic Valve  There is mild (1+) pulmonic valvular regurgitation.     Vessels  The aortic root is normal size. Dilation of the inferior vena cava is present  with abnormal respiratory variation in diameter.     Pericardium  There is  no pericardial effusion. Moderate left pleural effusion.     ______________________________________________________________________________  MMode/2D Measurements & Calculations  IVSd: 0.78 cm  LVIDd: 6.4 cm  LVIDs: 5.0 cm  LVPWd: 0.80 cm  IVC diam: 2.4 cm  FS: 21.9 %     LV mass(C)d: 205.8 grams  LV mass(C)dI: 128.3 grams/m2  Ao root diam: 3.3 cm  LA dimension: 4.4 cm  asc Aorta Diam: 3.2 cm  LA/Ao: 1.3     EF(MOD-bp): 36.3 %  LA Volume (BP): 49.0 ml  LA Volume Index (BP): 30.6 ml/m2  RV Base: 3.6 cm  RWT: 0.25  TAPSE: 1.6 cm     Doppler Measurements & Calculations  MV E max edwin: 154.0 cm/sec  MV A max edwin: 83.7 cm/sec  MV E/A: 1.8  MV dec slope: 958.0 cm/sec2  MV dec time: 0.16 sec  MR PISA: 2.7 cm2  PI end-d edwin: 197.3 cm/sec  TR max edwin: 344.1 cm/sec  TR max P.4 mmHg  E/E' av.3  Lateral E/e': 17.7  Medial E/e': 28.8  RV S Edwin: 13.5 cm/sec     ______________________________________________________________________________  Report approved by: Ramy Howell 2023 08:29 PM             Discharge Medications   Discharge Medication List as of 2023 12:13 PM        START taking these medications    Details   acetaminophen (TYLENOL) 325 MG tablet Take 3 tablets (975 mg) by mouth every 8 hours as needed for mild pain, Transitional      melatonin 1 MG TABS tablet Take 1 tablet (1 mg) by mouth nightly as needed for sleep, Transitional      ondansetron (ZOFRAN ODT) 4 MG ODT tab Take 1 tablet (4 mg) by mouth every 6 hours as needed for nausea or vomiting, Transitional           CONTINUE these medications which have NOT CHANGED    Details   amLODIPine (NORVASC) 5 MG tablet Take 5 mg by mouth daily, Historical      carBAMazepine (TEGRETOL) 100 MG chewable tablet Take 100 mg by mouth 3 times daily, Historical      latanoprost (XALATAN) 0.005 % ophthalmic solution Place 1 drop into both eyes At Bedtime, Historical      multivitamin w/minerals (THERA-VIT-M) tablet Take 1 tablet by mouth daily, Historical       timolol maleate (TIMOPTIC) 0.5 % ophthalmic solution Place 1 drop into both eyes 2 times daily, Historical           STOP taking these medications       naproxen (NAPROSYN) 500 MG tablet Comments:   Reason for Stopping:             Allergies   Allergies   Allergen Reactions    Brimonidine Other (See Comments)     Follicular conjunctivitis    Dorzolamide Other (See Comments)     Follicular conjunctivitis    Benzalkonium Chloride Other (See Comments)     Eye irritation.  Does better with preservative free but can tolerate preserved drops    Fluorescein Itching     fluress    Penicillins     Sulfa Antibiotics Other (See Comments)    Codeine Unknown and Rash    Tartrazine Rash

## 2023-08-14 NOTE — DISCHARGE INSTRUCTIONS
Wound care instructions:  Coccyx (2 wounds on left buttock and a dimple midline on coccyx)   Cleanse wounds with vashe #[id number] moistened gauze x 5 minutes  2.    Apply iodosorb gel #[id number] to adaptic gauze and apply that gel side down on wounds  3.   Cover with sacral foam   Change every other day until healed then stop the iodosorb gel and change sacral foam every 3 days

## 2023-08-14 NOTE — PLAN OF CARE
Physical Therapy Discharge Summary    Reason for therapy discharge:    Discharged to transitional care facility.    Progress towards therapy goal(s). See goals on Care Plan in [facility] electronic health record for goal details.  Goals not met.  Barriers to achieving goals:   discharge from facility.    Therapy recommendation(s):    Continued therapy is recommended.  Rationale/Recommendations:  Recommend continued rehab therapies at TCU to progress independence with mobility.

## 2023-08-14 NOTE — PLAN OF CARE
Goal Outcome Evaluation:      Plan of Care Reviewed With: patient    Overall Patient Progress: improving    Patient vital signs are at baseline: Yes except elevated BP's systolic in the 160's.   Patient able to ambulate as they were prior to admission or with assist devices provided by therapies during their stay:  Yes up with assist of 1 gait belt and walker.   Patient MUST void prior to discharge:  Yes assisted to the bathroom several times this shift and incontinent of B.  Patient able to tolerate oral intake:  Yes denies N/V  Pain has adequate pain control using Oral analgesics:  Yes received tylenol and lidocaine patch for pain with relief.   Does patient have an identified : no. Pt to discharge to a care facility.   Has goal D/C date and time been discussed with patient:  pt to discharge to a care facility.     Pt AOX4. CMS intact except baseline numbness and tingling to BLE. Edema present. Decubitus Ulcers to left buttock cheek. Woc consult in place.

## 2023-08-14 NOTE — PROGRESS NOTES
Care Management Follow Up    Length of Stay (days): 3    Expected Discharge Date: 08/14/2023     Concerns to be Addressed:       Patient plan of care discussed at interdisciplinary rounds: Yes    Anticipated Discharge Disposition:       Anticipated Discharge Services:    Anticipated Discharge DME:      Patient/family educated on Medicare website which has current facility and service quality ratings:    Education Provided on the Discharge Plan:    Patient/Family in Agreement with the Plan: yes    Referrals Placed by CM/SW:    Private pay costs discussed: Not applicable    Additional Information:  Sw completed PAS for pt to go to [facility].  If pt ends up discharging to [facility] (her first preference) Senior Linkage Line will need to be contacted and facility changed.    MARILEE Mason, [facility]  Inpatient Care Coordination  [facility]  [phone]      MARILEE Mason

## 2023-08-14 NOTE — PROGRESS NOTES
Care Management Discharge Note    Discharge Date: 08/14/2023       Discharge Disposition: Skilled Nursing Facility, Transitional Care    Discharge Services:      Discharge DME:      Discharge Transportation: family or friend will provide    Private pay costs discussed: Not applicable    Does the patient's insurance plan have a 3 day qualifying hospital stay waiver?  No    PAS Confirmation Code: [id number]  Patient/family educated on Medicare website which has current facility and service quality ratings:      Education Provided on the Discharge Plan:    Persons Notified of Discharge Plans: son, patient and AVV  Patient/Family in Agreement with the Plan: yes      Additional Information:  Pt accepted at [facility], PAS updated to this. Pt is being transported by sonChristian. SW called Christian, he will  pt at 1 pm.  Updated AVV of transport time. Orders sent.    MARILEE Marroquin, [facility]  Inpatient Care Coordination  [facility]  [phone]

## 2023-08-14 NOTE — PROGRESS NOTES
I saw and examined this patient today    I reviewed her lab results and blood pressure readings with her    She appears stable for discharge to TCU today

## 2023-08-15 PROCEDURE — 36415 COLL VENOUS BLD VENIPUNCTURE: CPT | Performed by: FAMILY MEDICINE

## 2023-08-15 PROCEDURE — P9604 ONE-WAY ALLOW PRORATED TRIP: HCPCS | Performed by: FAMILY MEDICINE

## 2023-08-15 PROCEDURE — 86481 TB AG RESPONSE T-CELL SUSP: CPT | Performed by: FAMILY MEDICINE

## 2023-08-16 LAB
GAMMA INTERFERON BACKGROUND BLD IA-ACNC: 0.04 IU/ML
M TB IFN-G BLD-IMP: NEGATIVE
M TB IFN-G CD4+ BCKGRND COR BLD-ACNC: 1.59 IU/ML
MITOGEN IGNF BCKGRD COR BLD-ACNC: 0 IU/ML
MITOGEN IGNF BCKGRD COR BLD-ACNC: 0.02 IU/ML
QUANTIFERON MITOGEN: 1.63 IU/ML
QUANTIFERON NIL TUBE: 0.04 IU/ML
QUANTIFERON TB1 TUBE: 0.06 IU/ML
QUANTIFERON TB2 TUBE: 0.04

## 2024-06-16 ENCOUNTER — APPOINTMENT (OUTPATIENT)
Dept: CT IMAGING | Facility: CLINIC | Age: 89
DRG: 551 | End: 2024-06-16
Attending: EMERGENCY MEDICINE
Payer: MEDICARE

## 2024-06-16 ENCOUNTER — APPOINTMENT (OUTPATIENT)
Dept: GENERAL RADIOLOGY | Facility: CLINIC | Age: 89
DRG: 551 | End: 2024-06-16
Attending: EMERGENCY MEDICINE
Payer: MEDICARE

## 2024-06-16 ENCOUNTER — HOSPITAL ENCOUNTER (INPATIENT)
Facility: CLINIC | Age: 89
LOS: 3 days | Discharge: NURSING, REHAB OR LONG-TERM CARE | DRG: 551 | End: 2024-06-19
Attending: EMERGENCY MEDICINE | Admitting: INTERNAL MEDICINE
Payer: MEDICARE

## 2024-06-16 DIAGNOSIS — I50.9 ACUTE ON CHRONIC CONGESTIVE HEART FAILURE, UNSPECIFIED HEART FAILURE TYPE (H): ICD-10-CM

## 2024-06-16 DIAGNOSIS — R11.0 NAUSEA: ICD-10-CM

## 2024-06-16 DIAGNOSIS — I50.21 ACUTE SYSTOLIC HEART FAILURE (H): Primary | ICD-10-CM

## 2024-06-16 DIAGNOSIS — J96.01 ACUTE RESPIRATORY FAILURE WITH HYPOXIA (H): ICD-10-CM

## 2024-06-16 DIAGNOSIS — S32.040A CLOSED COMPRESSION FRACTURE OF L4 VERTEBRA, INITIAL ENCOUNTER (H): ICD-10-CM

## 2024-06-16 DIAGNOSIS — E87.6 HYPOKALEMIA: ICD-10-CM
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
## 2024-06-16 DIAGNOSIS — S12.110A: ICD-10-CM

## 2024-06-16 LAB
ANION GAP SERPL CALCULATED.3IONS-SCNC: 19 MMOL/L (ref 7–15)
BASE EXCESS BLDV CALC-SCNC: -1.3 MMOL/L (ref -3–3)
BASOPHILS # BLD AUTO: 0 10E3/UL (ref 0–0.2)
BASOPHILS NFR BLD AUTO: 0 %
BUN SERPL-MCNC: 22.1 MG/DL (ref 8–23)
CALCIUM SERPL-MCNC: 9 MG/DL (ref 8.2–9.6)
CHLORIDE SERPL-SCNC: 98 MMOL/L (ref 98–107)
CREAT SERPL-MCNC: 1.06 MG/DL (ref 0.51–0.95)
DEPRECATED HCO3 PLAS-SCNC: 20 MMOL/L (ref 22–29)
EGFRCR SERPLBLD CKD-EPI 2021: 50 ML/MIN/1.73M2
EOSINOPHIL # BLD AUTO: 0.1 10E3/UL (ref 0–0.7)
EOSINOPHIL NFR BLD AUTO: 1 %
ERYTHROCYTE [DISTWIDTH] IN BLOOD BY AUTOMATED COUNT: 14.4 % (ref 10–15)
GLUCOSE SERPL-MCNC: 196 MG/DL (ref 70–99)
HCO3 BLDV-SCNC: 22 MMOL/L (ref 21–28)
HCT VFR BLD AUTO: 42.6 % (ref 35–47)
HGB BLD-MCNC: 13.9 G/DL (ref 11.7–15.7)
HOLD SPECIMEN: NORMAL
IMM GRANULOCYTES # BLD: 0 10E3/UL
IMM GRANULOCYTES NFR BLD: 0 %
LYMPHOCYTES # BLD AUTO: 1.3 10E3/UL (ref 0.8–5.3)
LYMPHOCYTES NFR BLD AUTO: 13 %
MCH RBC QN AUTO: 31.7 PG (ref 26.5–33)
MCHC RBC AUTO-ENTMCNC: 32.6 G/DL (ref 31.5–36.5)
MCV RBC AUTO: 97 FL (ref 78–100)
MONOCYTES # BLD AUTO: 0.6 10E3/UL (ref 0–1.3)
MONOCYTES NFR BLD AUTO: 6 %
NEUTROPHILS # BLD AUTO: 8.2 10E3/UL (ref 1.6–8.3)
NEUTROPHILS NFR BLD AUTO: 80 %
NRBC # BLD AUTO: 0 10E3/UL
NRBC BLD AUTO-RTO: 0 /100
NT-PROBNP SERPL-MCNC: ABNORMAL PG/ML (ref 0–1800)
O2/TOTAL GAS SETTING VFR VENT: 3 %
OXYHGB MFR BLDV: 96 % (ref 70–75)
PCO2 BLDV: 34 MM HG (ref 40–50)
PH BLDV: 7.43 [PH] (ref 7.32–7.43)
PLATELET # BLD AUTO: 210 10E3/UL (ref 150–450)
PO2 BLDV: 82 MM HG (ref 25–47)
POTASSIUM SERPL-SCNC: 3.6 MMOL/L (ref 3.4–5.3)
RBC # BLD AUTO: 4.39 10E6/UL (ref 3.8–5.2)
SAO2 % BLDV: 97.3 % (ref 70–75)
SODIUM SERPL-SCNC: 137 MMOL/L (ref 135–145)
TROPONIN T SERPL HS-MCNC: 40 NG/L
WBC # BLD AUTO: 10.3 10E3/UL (ref 4–11)

## 2024-06-16 PROCEDURE — 83880 ASSAY OF NATRIURETIC PEPTIDE: CPT | Performed by: EMERGENCY MEDICINE

## 2024-06-16 PROCEDURE — 80048 BASIC METABOLIC PNL TOTAL CA: CPT | Performed by: EMERGENCY MEDICINE

## 2024-06-16 PROCEDURE — 99285 EMERGENCY DEPT VISIT HI MDM: CPT | Mod: 25

## 2024-06-16 PROCEDURE — 250N000013 HC RX MED GY IP 250 OP 250 PS 637: Performed by: EMERGENCY MEDICINE

## 2024-06-16 PROCEDURE — 85025 COMPLETE CBC W/AUTO DIFF WBC: CPT | Performed by: EMERGENCY MEDICINE

## 2024-06-16 PROCEDURE — 70450 CT HEAD/BRAIN W/O DYE: CPT | Mod: MA

## 2024-06-16 PROCEDURE — 71045 X-RAY EXAM CHEST 1 VIEW: CPT

## 2024-06-16 PROCEDURE — 84484 ASSAY OF TROPONIN QUANT: CPT | Performed by: EMERGENCY MEDICINE

## 2024-06-16 PROCEDURE — 73030 X-RAY EXAM OF SHOULDER: CPT | Mod: RT

## 2024-06-16 PROCEDURE — 83036 HEMOGLOBIN GLYCOSYLATED A1C: CPT | Performed by: INTERNAL MEDICINE

## 2024-06-16 PROCEDURE — 93005 ELECTROCARDIOGRAM TRACING: CPT

## 2024-06-16 PROCEDURE — 72125 CT NECK SPINE W/O DYE: CPT | Mod: MA

## 2024-06-16 PROCEDURE — 82805 BLOOD GASES W/O2 SATURATION: CPT | Performed by: EMERGENCY MEDICINE

## 2024-06-16 PROCEDURE — 36415 COLL VENOUS BLD VENIPUNCTURE: CPT | Performed by: EMERGENCY MEDICINE

## 2024-06-16 PROCEDURE — 120N000001 HC R&B MED SURG/OB

## 2024-06-16 RX ORDER — ACETAMINOPHEN 500 MG
500 TABLET ORAL ONCE
Status: DISCONTINUED | OUTPATIENT
Start: 2024-06-16 | End: 2024-06-16

## 2024-06-16 RX ORDER — FUROSEMIDE 10 MG/ML
20 INJECTION INTRAMUSCULAR; INTRAVENOUS ONCE
Status: DISCONTINUED | OUTPATIENT
Start: 2024-06-16 | End: 2024-06-17

## 2024-06-16 RX ADMIN — ACETAMINOPHEN 500 MG: 500 TABLET, FILM COATED ORAL at 22:27

## 2024-06-16 ASSESSMENT — COLUMBIA-SUICIDE SEVERITY RATING SCALE - C-SSRS
1. IN THE PAST MONTH, HAVE YOU WISHED YOU WERE DEAD OR WISHED YOU COULD GO TO SLEEP AND NOT WAKE UP?: NO
6. HAVE YOU EVER DONE ANYTHING, STARTED TO DO ANYTHING, OR PREPARED TO DO ANYTHING TO END YOUR LIFE?: NO
2. HAVE YOU ACTUALLY HAD ANY THOUGHTS OF KILLING YOURSELF IN THE PAST MONTH?: NO

## 2024-06-16 ASSESSMENT — ACTIVITIES OF DAILY LIVING (ADL)
ADLS_ACUITY_SCORE: 38
ADLS_ACUITY_SCORE: 38

## 2024-06-17 ENCOUNTER — APPOINTMENT (OUTPATIENT)
Dept: GENERAL RADIOLOGY | Facility: CLINIC | Age: 89
DRG: 551 | End: 2024-06-17
Attending: NURSE PRACTITIONER
Payer: MEDICARE

## 2024-06-17 ENCOUNTER — APPOINTMENT (OUTPATIENT)
Dept: CARDIOLOGY | Facility: CLINIC | Age: 89
DRG: 551 | End: 2024-06-17
Attending: INTERNAL MEDICINE
Payer: MEDICARE

## 2024-06-17 ENCOUNTER — APPOINTMENT (OUTPATIENT)
Dept: MRI IMAGING | Facility: CLINIC | Age: 89
DRG: 551 | End: 2024-06-17
Payer: MEDICARE

## 2024-06-17 LAB
ANION GAP SERPL CALCULATED.3IONS-SCNC: 17 MMOL/L (ref 7–15)
ATRIAL RATE - MUSE: 77 BPM
BUN SERPL-MCNC: 19.9 MG/DL (ref 8–23)
CALCIUM SERPL-MCNC: 8.8 MG/DL (ref 8.2–9.6)
CHLORIDE SERPL-SCNC: 98 MMOL/L (ref 98–107)
CREAT SERPL-MCNC: 1.02 MG/DL (ref 0.51–0.95)
DEPRECATED HCO3 PLAS-SCNC: 24 MMOL/L (ref 22–29)
DIASTOLIC BLOOD PRESSURE - MUSE: NORMAL MMHG
EGFRCR SERPLBLD CKD-EPI 2021: 52 ML/MIN/1.73M2
ERYTHROCYTE [DISTWIDTH] IN BLOOD BY AUTOMATED COUNT: 14.4 % (ref 10–15)
GLUCOSE SERPL-MCNC: 134 MG/DL (ref 70–99)
HBA1C MFR BLD: 5.9 %
HCT VFR BLD AUTO: 45.5 % (ref 35–47)
HGB BLD-MCNC: 14.7 G/DL (ref 11.7–15.7)
INTERPRETATION ECG - MUSE: NORMAL
LVEF ECHO: NORMAL
MAGNESIUM SERPL-MCNC: 2 MG/DL (ref 1.7–2.3)
MCH RBC QN AUTO: 31.6 PG (ref 26.5–33)
MCHC RBC AUTO-ENTMCNC: 32.3 G/DL (ref 31.5–36.5)
MCV RBC AUTO: 98 FL (ref 78–100)
P AXIS - MUSE: 47 DEGREES
PLATELET # BLD AUTO: 201 10E3/UL (ref 150–450)
POTASSIUM SERPL-SCNC: 3.8 MMOL/L (ref 3.4–5.3)
PR INTERVAL - MUSE: 146 MS
QRS DURATION - MUSE: 112 MS
QT - MUSE: 438 MS
QTC - MUSE: 495 MS
R AXIS - MUSE: -13 DEGREES
RBC # BLD AUTO: 4.65 10E6/UL (ref 3.8–5.2)
SODIUM SERPL-SCNC: 139 MMOL/L (ref 135–145)
SYSTOLIC BLOOD PRESSURE - MUSE: NORMAL MMHG
T AXIS - MUSE: 94 DEGREES
TROPONIN T SERPL HS-MCNC: 36 NG/L
VENTRICULAR RATE- MUSE: 77 BPM
WBC # BLD AUTO: 7.1 10E3/UL (ref 4–11)

## 2024-06-17 PROCEDURE — 250N000013 HC RX MED GY IP 250 OP 250 PS 637: Performed by: INTERNAL MEDICINE

## 2024-06-17 PROCEDURE — 90714 TD VACC NO PRESV 7 YRS+ IM: CPT | Mod: JZ | Performed by: EMERGENCY MEDICINE

## 2024-06-17 PROCEDURE — 84484 ASSAY OF TROPONIN QUANT: CPT | Performed by: EMERGENCY MEDICINE

## 2024-06-17 PROCEDURE — 36415 COLL VENOUS BLD VENIPUNCTURE: CPT | Performed by: INTERNAL MEDICINE

## 2024-06-17 PROCEDURE — 85027 COMPLETE CBC AUTOMATED: CPT | Performed by: INTERNAL MEDICINE

## 2024-06-17 PROCEDURE — 80048 BASIC METABOLIC PNL TOTAL CA: CPT | Performed by: INTERNAL MEDICINE

## 2024-06-17 PROCEDURE — 93306 TTE W/DOPPLER COMPLETE: CPT | Mod: 26 | Performed by: INTERNAL MEDICINE

## 2024-06-17 PROCEDURE — 99223 1ST HOSP IP/OBS HIGH 75: CPT | Mod: AI | Performed by: INTERNAL MEDICINE

## 2024-06-17 PROCEDURE — 90471 IMMUNIZATION ADMIN: CPT | Performed by: EMERGENCY MEDICINE

## 2024-06-17 PROCEDURE — 255N000002 HC RX 255 OP 636: Performed by: INTERNAL MEDICINE

## 2024-06-17 PROCEDURE — 999N000208 ECHOCARDIOGRAM COMPLETE

## 2024-06-17 PROCEDURE — 250N000011 HC RX IP 250 OP 636: Mod: JZ | Performed by: EMERGENCY MEDICINE

## 2024-06-17 PROCEDURE — 250N000011 HC RX IP 250 OP 636: Performed by: INTERNAL MEDICINE

## 2024-06-17 PROCEDURE — 72040 X-RAY EXAM NECK SPINE 2-3 VW: CPT

## 2024-06-17 PROCEDURE — 36415 COLL VENOUS BLD VENIPUNCTURE: CPT | Performed by: EMERGENCY MEDICINE

## 2024-06-17 PROCEDURE — G1010 CDSM STANSON: HCPCS

## 2024-06-17 PROCEDURE — 83735 ASSAY OF MAGNESIUM: CPT | Performed by: INTERNAL MEDICINE

## 2024-06-17 PROCEDURE — 120N000001 HC R&B MED SURG/OB

## 2024-06-17 PROCEDURE — 250N000009 HC RX 250: Performed by: INTERNAL MEDICINE

## 2024-06-17 PROCEDURE — 99221 1ST HOSP IP/OBS SF/LOW 40: CPT | Performed by: NURSE PRACTITIONER

## 2024-06-17 RX ORDER — OXYCODONE HYDROCHLORIDE 5 MG/1
5 TABLET ORAL EVERY 4 HOURS PRN
Status: DISCONTINUED | OUTPATIENT
Start: 2024-06-17 | End: 2024-06-19 | Stop reason: HOSPADM

## 2024-06-17 RX ORDER — AMLODIPINE BESYLATE 5 MG/1
5 TABLET ORAL DAILY
Status: DISCONTINUED | OUTPATIENT
Start: 2024-06-17 | End: 2024-06-19 | Stop reason: HOSPADM

## 2024-06-17 RX ORDER — PREDNISOLONE ACETATE 10 MG/ML
1 SUSPENSION/ DROPS OPHTHALMIC 4 TIMES DAILY
Status: ON HOLD | COMMUNITY
Start: 2024-05-22 | End: 2024-06-17

## 2024-06-17 RX ORDER — NALOXONE HYDROCHLORIDE 0.4 MG/ML
0.4 INJECTION, SOLUTION INTRAMUSCULAR; INTRAVENOUS; SUBCUTANEOUS
Status: DISCONTINUED | OUTPATIENT
Start: 2024-06-17 | End: 2024-06-19 | Stop reason: HOSPADM

## 2024-06-17 RX ORDER — PROCHLORPERAZINE 25 MG
12.5 SUPPOSITORY, RECTAL RECTAL EVERY 12 HOURS PRN
Status: DISCONTINUED | OUTPATIENT
Start: 2024-06-17 | End: 2024-06-19 | Stop reason: HOSPADM

## 2024-06-17 RX ORDER — ONDANSETRON 2 MG/ML
4 INJECTION INTRAMUSCULAR; INTRAVENOUS EVERY 6 HOURS PRN
Status: DISCONTINUED | OUTPATIENT
Start: 2024-06-17 | End: 2024-06-19 | Stop reason: HOSPADM

## 2024-06-17 RX ORDER — PROCHLORPERAZINE MALEATE 5 MG
5 TABLET ORAL EVERY 6 HOURS PRN
Status: DISCONTINUED | OUTPATIENT
Start: 2024-06-17 | End: 2024-06-19 | Stop reason: HOSPADM

## 2024-06-17 RX ORDER — ONDANSETRON 4 MG/1
4 TABLET, ORALLY DISINTEGRATING ORAL EVERY 6 HOURS PRN
Status: DISCONTINUED | OUTPATIENT
Start: 2024-06-17 | End: 2024-06-19 | Stop reason: HOSPADM

## 2024-06-17 RX ORDER — POLYETHYLENE GLYCOL 3350 17 G/17G
17 POWDER, FOR SOLUTION ORAL 2 TIMES DAILY PRN
Status: DISCONTINUED | OUTPATIENT
Start: 2024-06-17 | End: 2024-06-19 | Stop reason: HOSPADM

## 2024-06-17 RX ORDER — LATANOPROST 50 UG/ML
1 SOLUTION/ DROPS OPHTHALMIC AT BEDTIME
Status: DISCONTINUED | OUTPATIENT
Start: 2024-06-17 | End: 2024-06-19 | Stop reason: HOSPADM

## 2024-06-17 RX ORDER — NALOXONE HYDROCHLORIDE 0.4 MG/ML
0.2 INJECTION, SOLUTION INTRAMUSCULAR; INTRAVENOUS; SUBCUTANEOUS
Status: DISCONTINUED | OUTPATIENT
Start: 2024-06-17 | End: 2024-06-19 | Stop reason: HOSPADM

## 2024-06-17 RX ORDER — PREDNISOLONE ACETATE 10 MG/ML
1 SUSPENSION/ DROPS OPHTHALMIC 4 TIMES DAILY
Status: DISCONTINUED | OUTPATIENT
Start: 2024-06-17 | End: 2024-06-19 | Stop reason: HOSPADM

## 2024-06-17 RX ORDER — TIMOLOL MALEATE 5 MG/ML
1 SOLUTION/ DROPS OPHTHALMIC 2 TIMES DAILY
Status: DISCONTINUED | OUTPATIENT
Start: 2024-06-17 | End: 2024-06-19 | Stop reason: HOSPADM

## 2024-06-17 RX ORDER — ACETAMINOPHEN 325 MG/1
975 TABLET ORAL 3 TIMES DAILY
Status: DISCONTINUED | OUTPATIENT
Start: 2024-06-17 | End: 2024-06-19 | Stop reason: HOSPADM

## 2024-06-17 RX ORDER — FUROSEMIDE 10 MG/ML
40 INJECTION INTRAMUSCULAR; INTRAVENOUS
Status: DISCONTINUED | OUTPATIENT
Start: 2024-06-17 | End: 2024-06-19

## 2024-06-17 RX ORDER — LORAZEPAM 0.5 MG/1
0.5 TABLET ORAL ONCE
Status: DISCONTINUED | OUTPATIENT
Start: 2024-06-17 | End: 2024-06-17 | Stop reason: SINTOL

## 2024-06-17 RX ORDER — HYDROMORPHONE HCL IN WATER/PF 6 MG/30 ML
0.2 PATIENT CONTROLLED ANALGESIA SYRINGE INTRAVENOUS
Status: DISCONTINUED | OUTPATIENT
Start: 2024-06-17 | End: 2024-06-19 | Stop reason: HOSPADM

## 2024-06-17 RX ORDER — LORAZEPAM 0.5 MG/1
0.25 TABLET ORAL ONCE
Status: COMPLETED | OUTPATIENT
Start: 2024-06-17 | End: 2024-06-17

## 2024-06-17 RX ORDER — CARBAMAZEPINE 100 MG/1
200 TABLET, CHEWABLE ORAL 3 TIMES DAILY
Status: DISCONTINUED | OUTPATIENT
Start: 2024-06-17 | End: 2024-06-19 | Stop reason: HOSPADM

## 2024-06-17 RX ORDER — MAGNESIUM HYDROXIDE/ALUMINUM HYDROXICE/SIMETHICONE 120; 1200; 1200 MG/30ML; MG/30ML; MG/30ML
30 SUSPENSION ORAL EVERY 4 HOURS PRN
Status: DISCONTINUED | OUTPATIENT
Start: 2024-06-17 | End: 2024-06-19 | Stop reason: HOSPADM

## 2024-06-17 RX ORDER — LIDOCAINE 40 MG/G
CREAM TOPICAL
Status: DISCONTINUED | OUTPATIENT
Start: 2024-06-17 | End: 2024-06-19 | Stop reason: HOSPADM

## 2024-06-17 RX ADMIN — CARBAMAZEPINE 200 MG: 100 TABLET, CHEWABLE ORAL at 22:50

## 2024-06-17 RX ADMIN — CLOSTRIDIUM TETANI TOXOID ANTIGEN (FORMALDEHYDE INACTIVATED) AND CORYNEBACTERIUM DIPHTHERIAE TOXOID ANTIGEN (FORMALDEHYDE INACTIVATED) 0.5 ML: 5; 2 INJECTION, SUSPENSION INTRAMUSCULAR at 01:07

## 2024-06-17 RX ADMIN — HUMAN ALBUMIN MICROSPHERES AND PERFLUTREN 3 ML: 10; .22 INJECTION, SOLUTION INTRAVENOUS at 15:41

## 2024-06-17 RX ADMIN — CARBAMAZEPINE 200 MG: 100 TABLET, CHEWABLE ORAL at 17:14

## 2024-06-17 RX ADMIN — ACETAMINOPHEN 975 MG: 325 TABLET, FILM COATED ORAL at 10:06

## 2024-06-17 RX ADMIN — ACETAMINOPHEN 975 MG: 325 TABLET, FILM COATED ORAL at 22:50

## 2024-06-17 RX ADMIN — ACETAMINOPHEN 975 MG: 325 TABLET, FILM COATED ORAL at 17:13

## 2024-06-17 RX ADMIN — CARBAMAZEPINE 200 MG: 100 TABLET, CHEWABLE ORAL at 12:22

## 2024-06-17 RX ADMIN — LATANOPROST 1 DROP: 50 SOLUTION/ DROPS OPHTHALMIC at 21:47

## 2024-06-17 RX ADMIN — TIMOLOL MALEATE 1 DROP: 5 SOLUTION/ DROPS OPHTHALMIC at 12:20

## 2024-06-17 RX ADMIN — LORAZEPAM 0.25 MG: 0.5 TABLET ORAL at 10:49

## 2024-06-17 RX ADMIN — FUROSEMIDE 20 MG: 10 INJECTION, SOLUTION INTRAMUSCULAR; INTRAVENOUS at 00:06

## 2024-06-17 RX ADMIN — FUROSEMIDE 40 MG: 10 INJECTION, SOLUTION INTRAMUSCULAR; INTRAVENOUS at 10:06

## 2024-06-17 RX ADMIN — FUROSEMIDE 40 MG: 10 INJECTION, SOLUTION INTRAMUSCULAR; INTRAVENOUS at 17:13

## 2024-06-17 ASSESSMENT — ACTIVITIES OF DAILY LIVING (ADL)
ADLS_ACUITY_SCORE: 38
DRESSING/BATHING: BATHING DIFFICULTY, ASSISTANCE 1 PERSON
DRESSING/BATHING_DIFFICULTY: YES
DOING_ERRANDS_INDEPENDENTLY_DIFFICULTY: NO
ADLS_ACUITY_SCORE: 37
ADLS_ACUITY_SCORE: 37
ADLS_ACUITY_SCORE: 38
ADLS_ACUITY_SCORE: 37
WEAR_GLASSES_OR_BLIND: YES
ADLS_ACUITY_SCORE: 38
ADLS_ACUITY_SCORE: 37
ADLS_ACUITY_SCORE: 33
ADLS_ACUITY_SCORE: 33
ADLS_ACUITY_SCORE: 37
NUMBER_OF_TIMES_PATIENT_HAS_FALLEN_WITHIN_LAST_SIX_MONTHS: 5
DIFFICULTY_EATING/SWALLOWING: NO
ADLS_ACUITY_SCORE: 33
ADLS_ACUITY_SCORE: 37
ADLS_ACUITY_SCORE: 33
TOILETING: 0-->INDEPENDENT
TOILETING: 0-->INDEPENDENT
TOILETING_ASSISTANCE: TOILETING DIFFICULTY, REQUIRES EQUIPMENT
WALKING_OR_CLIMBING_STAIRS: AMBULATION DIFFICULTY, REQUIRES EQUIPMENT
FALL_HISTORY_WITHIN_LAST_SIX_MONTHS: YES
ADLS_ACUITY_SCORE: 37
DIFFICULTY_COMMUNICATING: NO
CONCENTRATING,_REMEMBERING_OR_MAKING_DECISIONS_DIFFICULTY: NO
ADLS_ACUITY_SCORE: 33
CHANGE_IN_FUNCTIONAL_STATUS_SINCE_ONSET_OF_CURRENT_ILLNESS/INJURY: NO
HEARING_DIFFICULTY_OR_DEAF: NO
ADLS_ACUITY_SCORE: 37
ADLS_ACUITY_SCORE: 37
EQUIPMENT_CURRENTLY_USED_AT_HOME: WALKER, STANDARD;WHEELCHAIR, MANUAL
TOILETING_ISSUES: YES
ADLS_ACUITY_SCORE: 37
ADLS_ACUITY_SCORE: 33
WALKING_OR_CLIMBING_STAIRS_DIFFICULTY: YES
ADLS_ACUITY_SCORE: 33
ADLS_ACUITY_SCORE: 38

## 2024-06-17 NOTE — PLAN OF CARE
"Orientation: AAOx4, VSS  Pain: pain is controlled by PRN pain medicatiom.  O2: oxymask @ 2LPM  GI/: Purewick in placed  Skin: abrasion on the forehead. Redness on the right groin.  Activity: bedrest  Diet: regular diet  Protocols: K, Mg  Major shift events:  Plan: MRI today  Problem: Adult Inpatient Plan of Care  Goal: Plan of Care Review  Description: The Plan of Care Review/Shift note should be completed every shift.  The Outcome Evaluation is a brief statement about your assessment that the patient is improving, declining, or no change.  This information will be displayed automatically on your shift  note.  Outcome: Progressing  Flowsheets (Taken 6/17/2024 0423)  Plan of Care Reviewed With: patient  Overall Patient Progress: no change  Goal: Patient-Specific Goal (Individualized)  Description: You can add care plan individualizations to a care plan. Examples of Individualization might be:  \"Parent requests to be called daily at 9am for status\", \"I have a hard time hearing out of my right ear\", or \"Do not touch me to wake me up as it startles  me\".  Outcome: Progressing  Goal: Absence of Hospital-Acquired Illness or Injury  Outcome: Progressing  Goal: Optimal Comfort and Wellbeing  Outcome: Progressing  Goal: Readiness for Transition of Care  Outcome: Progressing  Intervention: Mutually Develop Transition Plan  Recent Flowsheet Documentation  Taken 6/17/2024 0418 by Federico Mallory, RN  Equipment Currently Used at Home:   walker, standard   wheelchair, manual     Problem: Fall Injury Risk  Goal: Absence of Fall and Fall-Related Injury  Outcome: Progressing     Problem: Pain Acute  Goal: Optimal Pain Control and Function  Outcome: Progressing   Goal Outcome Evaluation:      Plan of Care Reviewed With: patient    Overall Patient Progress: no changeOverall Patient Progress: no change           "

## 2024-06-17 NOTE — PHARMACY-ADMISSION MEDICATION HISTORY
Pharmacist Admission Medication History    Admission medication history is complete. The information provided in this note is only as accurate as the sources available at the time of the update.    Information Source(s): Family member via  Mee(granddaughter)    Pertinent Information: Patient stopped using new eye drops(prednisolone and Dulce Maria-128) d/t eye irritation/blurred vision.  Per ophthalmology note from 5/30, those 2 eye drops were supposed to taper off over 3 weeks.    Changes made to PTA medication list:  Added: None  Deleted: prednisolone eye drops  Changed: carbamazepine    Allergies reviewed with patient and updates made in EHR: unable to assess    Medication History Completed By: Lucien Damon [facility] 6/17/2024 10:36 AM    PTA Med List   Medication Sig Last Dose    acetaminophen (TYLENOL) 325 MG tablet Take 3 tablets (975 mg) by mouth every 8 hours as needed for mild pain     amLODIPine (NORVASC) 5 MG tablet Take 5 mg by mouth daily 6/16/2024    carBAMazepine (TEGRETOL) 100 MG chewable tablet Take 200 mg by mouth 3 times daily 6/16/2024    latanoprost (XALATAN) 0.005 % ophthalmic solution Place 1 drop into both eyes At Bedtime 6/15/2024    multivitamin w/minerals (THERA-VIT-M) tablet Take 1 tablet by mouth daily 6/16/2024    timolol maleate (TIMOPTIC) 0.5 % ophthalmic solution Place 1 drop into both eyes 2 times daily 6/16/2024

## 2024-06-17 NOTE — ED NOTES
[facility]  ED Nurse Handoff Report    ED Chief complaint: Fall  . ED Diagnosis:   Final diagnoses:   Fall, initial encounter   Closed type II fracture of odontoid process (H)   Acute on chronic congestive heart failure, unspecified heart failure type (H)   Acute respiratory failure with hypoxia (H)       Allergies:   Allergies   Allergen Reactions    Brimonidine Other (See Comments)     Follicular conjunctivitis    Dorzolamide Other (See Comments)     Follicular conjunctivitis    Benzalkonium Chloride Other (See Comments)     Eye irritation.  Does better with preservative free but can tolerate preserved drops    Fluorescein Itching     fluress    Penicillins     Sulfa Antibiotics Other (See Comments)    Codeine Unknown and Rash    Fd&C Yellow #5 (Tartrazine) Rash       Code Status: DNR / DNI    Activity level - Baseline/Home:  walker.  Activity Level - Current:   assist of 1.   Lift room needed: No.   Bariatric: No   Needed: No   Isolation: No.   Infection: Not Applicable.     Respiratory status: Oximask    Vital Signs (within 30 minutes):   Vitals:    06/16/24 2138 06/16/24 2200 06/16/24 2330 06/17/24 0000   BP:  123/77 108/60 122/84   Pulse:  81 71 78   Resp:       SpO2: 94% 97% 98% 99%       Cardiac Rhythm:  ,   Cardiac  Cardiac Rhythm: Normal sinus rhythm  Pain level:    Patient confused: No.   Patient Falls Risk: nonskid shoes/slippers when out of bed, arm band in place, patient and family education, and assistive device/personal items within reach.   Elimination Status: Has voided     Patient Report - Initial Complaint: fall.   Focused Assessment: history of hypertension who presents to the ED today with her son-in-law and granddaughter for evaluation after a fall. The patient reports she was getting ready for bed tonight when she reached for something, misjudged the distance, and fell to the ground, hitting her head on the way. She reports she has head and neck pain, but no  abdominal or chest pain. She also has left shoulder pain, but her daughter reports this is chronic due to a bad rotator cuff. The patient says she occasionally has shortness of breath, but not currently. She is not normally on oxygen but is today due to hypoxia upon arrival in the 70s. The patient's granddaughter reports that Maya has had a cough and her legs have been swollen for some time that she wears compression socks for. She also reports that the patient has beginning heart failure and takes medications for hypertension. She reports that the patient had a mini stroke two years ago and now lives in assisted living. The patient is not anticoagulated. The patient mentions that she tested positive for Covid-19 about a month ago.     Abnormal Results:   Labs Ordered and Resulted from Time of ED Arrival to Time of ED Departure   BASIC METABOLIC PANEL - Abnormal       Result Value    Sodium 137      Potassium 3.6      Chloride 98      Carbon Dioxide (CO2) 20 (*)     Anion Gap 19 (*)     Urea Nitrogen 22.1      Creatinine 1.06 (*)     GFR Estimate 50 (*)     Calcium 9.0      Glucose 196 (*)    TROPONIN T, HIGH SENSITIVITY - Abnormal    Troponin T, High Sensitivity 40 (*)    NT PROBNP INPATIENT - Abnormal    N terminal Pro BNP Inpatient 12,064 (*)    BLOOD GAS VENOUS - Abnormal    pH Venous 7.43      pCO2 Venous 34 (*)     pO2 Venous 82 (*)     Bicarbonate Venous 22      Base Excess/Deficit Venous -1.3      FIO2 3      Oxyhemoglobin Venous 96 (*)     O2 Sat, Venous 97.3 (*)    CBC WITH PLATELETS AND DIFFERENTIAL    WBC Count 10.3      RBC Count 4.39      Hemoglobin 13.9      Hematocrit 42.6      MCV 97      MCH 31.7      MCHC 32.6      RDW 14.4      Platelet Count 210      % Neutrophils 80      % Lymphocytes 13      % Monocytes 6      % Eosinophils 1      % Basophils 0      % Immature Granulocytes 0      NRBCs per 100 WBC 0      Absolute Neutrophils 8.2      Absolute Lymphocytes 1.3      Absolute Monocytes 0.6       Absolute Eosinophils 0.1      Absolute Basophils 0.0      Absolute Immature Granulocytes 0.0      Absolute NRBCs 0.0     TROPONIN T, HIGH SENSITIVITY        XR Chest 1 View   Final Result   IMPRESSION: Mild cardiomegaly. Small bilateral pleural effusions. Mild vascular congestion and subtle interstitial edema compatible with congestive heart failure. No pneumothorax. Old fracture or for is suggested in the left humeral head.      XR Shoulder Right 2 Views   Final Result   IMPRESSION: Osteopenia. No acute, displaced fracture or dislocation. Flattening of the contour of the lateral aspect of the humeral head could relate to a Hill-Sachs deformity from prior dislocation. Arthritic acromioclavicular joint without AC    separation.      CT Cervical Spine w/o Contrast   Final Result   Addendum (preliminary) 1 of 1   COMMUNICATION ADDENDUM:   Findings were discussed with Dr. Betancourt on 6/16/2024 11:28 PM CDT.      END ADDENDUM      Final   IMPRESSION:   1.  Nondisplaced acute type II odontoid process fracture.      Head CT w/o contrast   Final Result   IMPRESSION:   1.  No acute intracranial findings.      Cervical spine MRI w/o contrast    (Results Pending)       Treatments provided: see MAR  Family Comments: family at bedside  OBS brochure/video discussed/provided to patient:  N/A  ED Medications:   Medications   acetaminophen (TYLENOL) tablet 500 mg (500 mg Oral $Given 6/16/24 2227)   furosemide (LASIX) injection 20 mg (20 mg Intravenous $Given 6/17/24 0006)       Drips infusing:  No  For the majority of the shift this patient was Green.   Interventions performed were none needed.    Sepsis treatment initiated: No    Cares/treatment/interventions/medications to be completed following ED care: continue plan of care.    ED Nurse Name: Dian Blue RN  12:13 AM   RECEIVING UNIT ED HANDOFF REVIEW    Above ED Nurse Handoff Report was reviewed: Yes  Reviewed by: Federico Mallory RN on June 17, 2024 at 3:48 AM   ELSIE Caballero  called the ED to inform them the note was read: Yes

## 2024-06-17 NOTE — ED PROVIDER NOTES
Emergency Department Note      History of Present Illness     Chief Complaint  Fall    HPI  Maya Cullen is a 89 year old female with a history of hypertension who presents to the ED today with her son-in-law and granddaughter for evaluation after a fall. The patient reports she was getting ready for bed tonight when she reached for something, misjudged the distance, and fell to the ground, hitting her head on the way. She reports she has head and neck pain, but no abdominal or chest pain.  Denies any lower back pain. She also has right shoulder pain, but her daughter reports this is chronic due to a bad rotator cuff. The patient says she occasionally has shortness of breath, but not currently. She is not normally on oxygen but is today due to hypoxia upon arrival in the 70s. The patient's granddaughter reports that Maya has had a cough and her legs have been swollen for some time that she wears compression socks for. She also reports that the patient has beginning heart failure and takes medications for hypertension.  The patient is not anticoagulated. The patient mentions that she tested positive for Covid-19 about a month ago.     Independent Historian  Granddaughter as detailed above.    Review of External Notes  I reviewed PCP visit note from 6/12/2024, where patient was found to have dyspnea on exertion, chronic systolic dysfunction of the left ventricle and lower extremity edema and prescribed Lasix.    Past Medical History   Medical History and Problem List  ABMD  Bilateral blepharitis  Capsular glaucoma of left eye  Conjunctivitis  Dermatochalasis  Hypertension  Hypertrophic cardiomyopathy  PVD, right eye  Trigeminal neuralgia  Cerebellar infarct    Medications  Amlodipine  Carbamazepine  Zofran  Furosemide  Naproxen  Nitrofurantoin    Surgical History   Eye surgery  Tonsillectomy  Appendectomy  Hernia repair  Trabeculectomy    Physical Exam   Patient Vitals for the past 24 hrs:   BP Pulse  Resp SpO2   06/17/24 0016 -- -- -- 99 %   06/17/24 0000 122/84 78 -- 99 %   06/16/24 2330 108/60 71 -- 98 %   06/16/24 2200 123/77 81 -- 97 %   06/16/24 2138 -- -- -- 94 %   06/16/24 2122 119/74 83 18 96 %     Physical Exam  General:   Well-nourished   Speaking in full sentences   GCS 15  Head:   Abrasion to lateral aspect of left scalp   Small surrounding ecchymoses  Eyes:   Conjunctiva without injection or scleral icterus  ENT:   Moist mucous membranes   Nares patent   Pinnae normal  Neck:   C-collar in place   Midline tenderness present  Resp:   Lungs CTAB   No crackles, wheezing or audible rubs   Good air movement  CV:    Normal rate, regular rhythm   S1 and S2 present   No murmur, gallop or rub  GI:   BS present   Abdomen soft without distention   Non-tender to light and deep palpation   No guarding or rebound tenderness  Skin:   Warm, dry, well perfused   No rashes or open wounds on exposed skin  MSK:   Mild TTP about R shoulder without obvious deformity   1+ pitting LE edema   Remainder of extremities without obvious deformity  Neuro:   Alert   Answers questions appropriately   Moves all extremities equally  Psych:   Normal affect, normal mood      Diagnostics   Lab Results   Labs Ordered and Resulted from Time of ED Arrival to Time of ED Departure   BASIC METABOLIC PANEL - Abnormal       Result Value    Sodium 137      Potassium 3.6      Chloride 98      Carbon Dioxide (CO2) 20 (*)     Anion Gap 19 (*)     Urea Nitrogen 22.1      Creatinine 1.06 (*)     GFR Estimate 50 (*)     Calcium 9.0      Glucose 196 (*)    TROPONIN T, HIGH SENSITIVITY - Abnormal    Troponin T, High Sensitivity 40 (*)    NT PROBNP INPATIENT - Abnormal    N terminal Pro BNP Inpatient 12,064 (*)    BLOOD GAS VENOUS - Abnormal    pH Venous 7.43      pCO2 Venous 34 (*)     pO2 Venous 82 (*)     Bicarbonate Venous 22      Base Excess/Deficit Venous -1.3      FIO2 3      Oxyhemoglobin Venous 96 (*)     O2 Sat, Venous 97.3 (*)    TROPONIN T,  HIGH SENSITIVITY - Abnormal    Troponin T, High Sensitivity 36 (*)    CBC WITH PLATELETS AND DIFFERENTIAL    WBC Count 10.3      RBC Count 4.39      Hemoglobin 13.9      Hematocrit 42.6      MCV 97      MCH 31.7      MCHC 32.6      RDW 14.4      Platelet Count 210      % Neutrophils 80      % Lymphocytes 13      % Monocytes 6      % Eosinophils 1      % Basophils 0      % Immature Granulocytes 0      NRBCs per 100 WBC 0      Absolute Neutrophils 8.2      Absolute Lymphocytes 1.3      Absolute Monocytes 0.6      Absolute Eosinophils 0.1      Absolute Basophils 0.0      Absolute Immature Granulocytes 0.0      Absolute NRBCs 0.0         Imaging  XR Chest 1 View   Final Result   IMPRESSION: Mild cardiomegaly. Small bilateral pleural effusions. Mild vascular congestion and subtle interstitial edema compatible with congestive heart failure. No pneumothorax. Old fracture or for is suggested in the left humeral head.      XR Shoulder Right 2 Views   Final Result   IMPRESSION: Osteopenia. No acute, displaced fracture or dislocation. Flattening of the contour of the lateral aspect of the humeral head could relate to a Hill-Sachs deformity from prior dislocation. Arthritic acromioclavicular joint without AC    separation.      CT Cervical Spine w/o Contrast   Final Result   Addendum (preliminary) 1 of 1   COMMUNICATION ADDENDUM:   Findings were discussed with Dr. Betancourt on 6/16/2024 11:28 PM CDT.      END ADDENDUM      Final   IMPRESSION:   1.  Nondisplaced acute type II odontoid process fracture.      Head CT w/o contrast   Final Result   IMPRESSION:   1.  No acute intracranial findings.          EKG   ECG taken at 2225, ECG read at 2234  Normal sinus rhythm  Minimal voltage criteria for LVH, may be normal variant ([place] product)  Inferior infarct, age undetermined   Two inversin, 1 aVL as compared to prior, dated 8/11/23.  Rate 77 bpm. SD interval 146 ms. QRS duration 112 ms. QT/QTc 438/495 ms. P-R-T axes 47 -13  94.    Independent Interpretation  CXR shows pulmonary edema    ED Course    Medications Administered  Medications   Td (tetanus & diphtheria toxoids) -  adult formulation - for ages 7 years and older (has no administration in time range)   acetaminophen (TYLENOL) tablet 500 mg (500 mg Oral $Given 6/16/24 2227)   furosemide (LASIX) injection 20 mg (20 mg Intravenous $Given 6/17/24 0006)     Discussion of Management  Neurosurgery, Dr. Calix  Admitting Hospitalist, Dr. Argueta    Social Determinants of Health adding to complexity of care  None    ED Course  ED Course as of 06/17/24 0102   Sun Jun 16, 2024 2150 I obtained history and examined the patient as noted above.    2333 I rechecked and updated the patient.    2338 I spoke with Dr. Calix, neurosurgery, regarding the patient's spinal fracture.    2343 I rechecked and updated the patient.    2355 I spoke with Dr. Argueta, hospitalist, regarding the patient's admission.     Medical Decision Making / Diagnosis   CMS Diagnoses: None    MIPS  None    [facility]  Maya Cullen is a 89 year old female presenting to the emergency department for evaluation of a fall.  History most suggestive of mechanical fall, with patient sustaining injury to her head as well as right shoulder and neck.  Patient placed in a cervical collar.  Advanced imaging reveals no evidence of intracranial hemorrhage, nor skull fracture, though she is found to have a nondisplaced type II odontoid fracture.  On initial repeat evaluation, she denies any radicular pain to her upper extremities, and demonstrates 5/5  strength.  Granddaughter acknowledges some radicular symptoms to lower extremities, though this has been going on for weeks, and preceded her fall.  Case reviewed with neurosurgery, who recommended immobilization, and MRI.  Unfortunately, MRI is not available this evening, and will need to be completed in the morning.  Patient not on chronic anticoagulation requiring emergent  reversal.  Of note, patient is found to be hypoxic here on arrival.  She was placed on supplemental oxygen via oxime mask and maintaining oxygen saturations in the mid to upper 90s.  I suspect this most likely represents CHF exacerbation as supported by chest x-ray findings, elevated BNP.  EKG demonstrates sinus rhythm with nonspecific ST-T wave changes and mildly prolonged QT interval.  Initial troponin returned elevated at 40, with repeat troponin improved at 36.  This is most likely demand ischemia in the context of CHF.  Patient provided a dose of Lasix from the ED, and will need close monitoring of response to therapy as well as ongoing monitoring of renal function.  Wound about the scalp does not require suture repair.  Tetanus updated.  Will plan hospital admission for neurosurgery consultation as well as further management of hypoxia.  Questions answered of patient and family prior to admission.    Disposition  The patient was admitted to the hospital.     ICD-10 Codes:    ICD-10-CM    1. Fall, initial encounter  W19.XXXA       2. Closed type II fracture of odontoid process (H)  S12.110A       3. Acute on chronic congestive heart failure, unspecified heart failure type (H)  I50.9       4. Acute respiratory failure with hypoxia (H)  J96.01            Scribe Disclosure:  I, Adriane Gonzalez, am serving as a scribe at 10:12 PM on 6/16/2024 to document services personally performed by Anshu Betancourt MD based on my observations and the provider's statements to me.        Anshu Betancourt MD  06/17/24 0107       Anshu Betancourt MD  06/17/24 0119

## 2024-06-17 NOTE — H&P
[facility]  Hospitalist Admission Note  Name: Maya Cullen    MRN: [id number]  YOB: 1934    Age: 89 year old  Date of admission: 6/16/2024  Primary care provider: Jordyn Jon    Chief Complaint:  fall, neck pain, shortness of breath    Assessment and Plan:   Mechanical fall  Acute traumatic C2 type II odontoid fracture  Scalp hematoma  Chronic right shoulder pain: She was ambulating with her walker when she reached out for something that was too far away causing her to fall forward hitting her head on the wall on the way down.  She is reporting some pain in her head and neck.  She has chronic right shoulder pain reported with a bone chip a year ago after a fall.  She denies any new focal weakness, numbness, tingling in her extremities and she has normal strength and light sensation on exam.  She has a visible scalp hematoma, but noncontrast head CT is negative for acute pathology.  I do not believe she is on anticoagulation or aspirin.  Cervical spine CT shows a nondisplaced type II odontoid process fracture and C5-6 moderate to severe bilateral foraminal stenosis.  X-ray of the right shoulder does not show any acute pathology.  Chest x-ray shows acute CHF, but no rib fractures.  -Consult neurosurgery.  They were contacted in the ER and recommended admission at [facility] with noncontrast MRI of the cervical spine, placement in a hard collar which has been done, neurochecks every 4 hours, and they will formally consult in the morning.  MRI will be done this morning as it is not currently available overnight  -Strict bedrest until seen by neurosurgery and then defer activity level to their expertise  -Fall precautions  -Consult PT/OT/SW  -Acetaminophen scheduled 975 mg 3 times daily, oxycodone 2.5 mg for moderate and 5 mg for severe pain every 4 hours as needed, IV Dilaudid 0.2 mg every 3 hours as needed for severe pain if cannot take p.o.    Acute hypoxemic respiratory  failure  Acute on chronic systolic CHF  Severe mitral regurgitation  Mild to moderate tricuspid regurgitation  Pulmonary hypertension  HTN: For the last 2 weeks she has had increasing lower extremity edema bilaterally and dyspnea on exertion.  Prescribed 3 days of furosemide 20 mg daily a few days ago when she saw her clinic physician and at that time her weight was 138 pounds.  Bed weight here is 144 pounds.  Has not had much improvement in the shortness of breath or edema with furosemide.  She is hypoxic in the ER down to the 70s which is improved on 3 L of oxygen via facemask.  She did have a TTE completed in August 2023 when she fell and was found to have a L4 fracture.  At that time her EF was reduced to 40-45%, she had severe 3-4+ mitral vegetation, 1-2+ tricuspid regurgitation, pulmonary HTN with PA pressure 47 mmHg, LVH, dilated LV, and severe inferior and inferior lateral wall hypokinesis.  She has not had any ischemic workup, but certainly may be the etiology for her TTE findings then.  She denies any chest pain or pressure sensation recently.  Her NT proBNP is elevated at [address].  Her troponin T is up slightly at 40 downtrending to 36 on repeat check.  EKG shows NSR with no ST elevation or depression.  I suspect the troponin elevation is secondary to demand ischemia in the setting of hypoxia an acute CHF.  Her chest x-ray shows mild cardiomegaly, small bilateral pleural effusions, and pulmonary edema consistent with acute CHF.  -Obtain TTE  -Received 20 mg IV Lasix in the ER, continue with IV Lasix but increase dose to 40 mg twice daily  -Trend creatinine which is already slightly elevated I suspect from the Lasix she is already received the last few days, but given hypoxia we will continue with IV diuresis milliliters and the creatinine may rise slightly.  -Cardiac monitoring  -Strict intake and output and daily weights  -Hold PTA amlodipine that was recently reduced to 5 mg daily  -Potassium and  magnesium replacement protocols  -Continue supplemental oxygen, wean as able  -Eventual discussion for further ischemic cardiac workup with potential stress test or discussion with cardiologist about a potential coronary angiogram, but I think we should wait until she is not in acute CHF with hypoxia and also further out from her acute C2 fracture.  Family agreed.    Mild MARICRUZ: Creatinine up to 1.06 with baseline probably closer to 0.7-0.9.  This may be secondary to diuretics for the past few days, but she still has ongoing acute CHF.  Cardiorenal syndrome would also be in the differential.  -Trend creatinine/BMP with diuresis    History CVA: She was found to have a cerebellar CVA on previous MRI a year ago after a fall.  I am not certain that she saw a neurologist at this time and she does not appear to be on either statin or aspirin/anticoagulation.  She does have frequent falls.  -Recommend further discussion with family about initiating aspirin 81 mg daily a couple of days after monitoring her following her acute head neck trauma    Glaucoma: Resume PTA timolol, latanoprost, and prednisolone drops.    Mild cognitive impairment: Patient family reports some mild short-term memory loss at baseline.    Impaired glucose tolerance: Not currently on any medications.  Glucose elevated at 196 in the ER.  -Add on A1c    Trigeminal neuralgia: Resume PTA carbamazepine 200 mg 3 times daily.    DVT Prophylaxis: Pneumatic Compression Devices  Code Status: DNR / DNI -confirmed with family at the bedside, consistent with previous wishes  FEN: 2 g sodium restriction, 1.8 L fluid restriction  Discharge Dispo: Consult PT/OT/SW, anticipate TCU will be needed  Estimated Disch Date / # of Days until Disch: Admit inpatient for fall with C2 fracture and also CHF exacerbation requiring IV diuresis.  Anticipate likely 3 or 4 night hospitalization.      History of Present Illness:  Maya Cullen is a 89 year old female with PMH  including systolic CHF, severe mitral regurgitation, pulmonary hypertension, LVH, HTN, glaucoma, trigeminal neuralgia, impaired glucose tolerance, mild cognitive impairment, and falls who presents after a fall.  She currently ambulates with a walker.  She was walking and reached out for something which was farther away than she thought and she fell forward hitting her head on the wall on the way down.  She reports pain in her head and neck currently.  She also has some right shoulder pain, but this is more chronic for the last year after she had a chip in the bone in her shoulder per family.  She denies any chest pain or abdominal pain.  Denies loss of consciousness or lightheadedness prior to falling.  She does not report any new weakness, numbness, or tingling in her arms or legs.  Recently she has developed shortness of breath that is worse with exertion and bilateral lower extremity edema over the past 2 weeks.  She saw her clinic provider a few days ago on 6/12 and she was prescribed 3 days of furosemide 20 mg daily.  It was recommended she reduce her amlodipine at that time as well.  There has not been much improvement in lower extreme edema or shortness of breath since then.  She denies any chest pain or pressure sensation with ambulation.  They have been using ERIK hose.  She did have COVID-19 a little over a month ago and has still had a cough since then.  No recent fevers or chills.    History obtained from patient, patient's family, medical record, and from Dr. Betancourt in the emergency department.  Blood pressure initially 119/70 then down to 108/60, heart rate 83, oxygen was not the 70s initially on room air that improved to 94% on 3 L via facemask.  Initial labs showed WBC 10.3, hemoglobin 13.9, platelet count 210.  VBG shows pH 7.43, pCO2 34, pO2 82, bicarb 22.  NT proBNP [place] elevated at 28[address].  Creatinine is 1.06 with bicarb 20 and potassium 3.6.  Glucose elevated 186.  Troponin T is 40 with repeat down  to 36.  EKG shows NSR with no ST elevation or depression.  Chest x-ray shows mild cardiomegaly, small bilateral pleural effusions, and pulmonary edema.  Noncontrast head CT shows a scalp hematoma, but no intracranial pathology.  Right shoulder x-ray does not show any acute fracture.  Cervical spine CT does show a type II odontoid fracture.  Dr. Betancourt spoke with neurosurgery on-call who recommended admission to [facility], hard collar placement which was done, cervical MRI without contrast, every 4 hours neurochecks.  Admit inpatient.       Clinically Significant Risk Factors Present on Admission             # Anion Gap Metabolic Acidosis: Highest Anion Gap = 19 mmol/L in last 2 days, will monitor and treat as appropriate         # Heart failure, NOS: heart failure noted on the problem list and last echo with EF 40-50%                                 Past Medical History reviewed:  Past Medical History:   Diagnosis Date    Cerebrovascular accident (H)     Cerebellar CVA seen on MRI 2023    Glaucoma     HTN (hypertension)     L4 vertebral fracture (H)     Mild cognitive impairment     Pulmonary hypertension (H)     Severe mitral regurgitation     Systolic CHF, chronic (H)     Tricuspid regurgitation     Trigeminal neuralgia      Past Surgical History reviewed:  Past Surgical History:   Procedure Laterality Date    EYE SURGERY       Social History reviewed:  Social History     Tobacco Use    Smoking status: Never    Smokeless tobacco: Not on file   Substance Use Topics    Alcohol use: No     Social History     Social History Narrative    Not on file     Family History reviewed:  Reviewed and noncontributory this admission    Allergies:  Allergies   Allergen Reactions    Brimonidine Other (See Comments)     Follicular conjunctivitis    Dorzolamide Other (See Comments)     Follicular conjunctivitis    Benzalkonium Chloride Other (See Comments)     Eye irritation.  Does better with preservative free but can tolerate preserved  drops    Fluorescein Itching     fluress    Penicillins     Sulfa Antibiotics Other (See Comments)    Codeine Unknown and Rash    Fd&C Yellow #5 (Tartrazine) Rash     Medications:  Prior to Admission medications    Medication Sig Last Dose Taking? Auth Provider Long Term End Date   prednisoLONE acetate (PRED FORTE) 1 % ophthalmic suspension Place 1 drop Into the left eye 4 times daily  Yes Reported, Patient     acetaminophen (TYLENOL) 325 MG tablet Take 3 tablets (975 mg) by mouth every 8 hours as needed for mild pain   Ronal Parnell MD     amLODIPine (NORVASC) 5 MG tablet Take 5 mg by mouth daily   Unknown, Entered By History Yes    carBAMazepine (TEGRETOL) 100 MG chewable tablet Take 100 mg by mouth 3 times daily   Unknown, Entered By History Yes    latanoprost (XALATAN) 0.005 % ophthalmic solution Place 1 drop into both eyes At Bedtime   Unknown, Entered By History Yes    melatonin 1 MG TABS tablet Take 1 tablet (1 mg) by mouth nightly as needed for sleep   Ronal Parnell MD     multivitamin w/minerals (THERA-VIT-M) tablet Take 1 tablet by mouth daily   Unknown, Entered By History     ondansetron (ZOFRAN ODT) 4 MG ODT tab Take 1 tablet (4 mg) by mouth every 6 hours as needed for nausea or vomiting   Ronal Parnell MD     timolol maleate (TIMOPTIC) 0.5 % ophthalmic solution Place 1 drop into both eyes 2 times daily   Unknown, Entered By History       Review of Systems:  A Comprehensive greater than 10 system review of systems was carried out.  Pertinent positives and negatives are noted above.  Otherwise negative.     Physical Exam:  Blood pressure 112/66, pulse 74, resp. rate 18, SpO2 95%, not currently breastfeeding.  Wt Readings from Last 1 Encounters:   08/11/23 65.6 kg (144 lb 10 oz)     Exam:  Constitutional: Awake, NAD   Eyes: sclera white  HEENT: Scalp hematoma  Respiratory: Slightly tachypneic, bibasilar crackles, no wheeze, on 3 L via facemask  Cardiovascular: RRR.  2/6 systolic  murmur  GI: non-tender, not distended, bowel sounds present  Skin: no rash  Musculoskeletal/extremities: 1-2+ bilateral lower extremity pitting edema  Neurologic: Alert, oriented to being in the ER, was able to tell me about her fall, strength equal in all extremities, light touch sensation intact peripherally, follows commands  Psychiatric: calm, cooperative    Lab and imaging data personally reviewed:  Labs:  Recent Labs   Lab 06/16/24 2147   PHV 7.43   PO2V 82*   PCO2V 34*   HCO3V 22     Recent Labs   Lab 06/16/24 2147   WBC 10.3   HGB 13.9   HCT 42.6   MCV 97        Recent Labs   Lab 06/16/24 2147      POTASSIUM 3.6   CHLORIDE 98   CO2 20*   ANIONGAP 19*   *   BUN 22.1   CR 1.06*   GFRESTIMATED 50*   GILL 9.0     Recent Labs   Lab 06/16/24 2147   NTBNPI 12,064*     Troponin T 40 with repeat 36    EKG: NSR, no ST elevation or depression    Imaging:  Recent Results (from the past 24 hour(s))   Head CT w/o contrast    Narrative    EXAM: CT HEAD W/O CONTRAST  LOCATION: [facility]  DATE: 6/16/2024    INDICATION: Head injury. Headache.  COMPARISON: CT 8/11/2023  TECHNIQUE: Routine CT Head without IV contrast. Multiplanar reformats. Dose reduction techniques were used.    FINDINGS:  INTRACRANIAL CONTENTS: No intracranial hemorrhage, extraaxial collection, or mass effect.  No CT evidence of acute infarct. Moderate presumed chronic small vessel ischemic changes. Moderate generalized volume loss. No hydrocephalus.     VISUALIZED ORBITS/SINUSES/MASTOIDS: No intraorbital abnormality. No paranasal sinus mucosal disease. No middle ear or mastoid effusion.    BONES/SOFT TISSUES: No skull fracture. Moderate left frontal scalp swelling.      Impression    IMPRESSION:  1.  No acute intracranial findings.   CT Cervical Spine w/o Contrast    Addendum: 6/16/2024    COMMUNICATION ADDENDUM:  Findings were discussed with Dr. Betancourt on 6/16/2024 11:28 PM CDT.    END ADDENDUM      Narrative     EXAM: CT CERVICAL SPINE W/O CONTRAST  LOCATION: [facility]  DATE: 6/16/2024    INDICATION: Fall, head injury. Neck pain.  COMPARISON: None.  TECHNIQUE: Routine CT Cervical Spine without IV contrast. Multiplanar reformats. Dose reduction techniques were used.    FINDINGS:  VERTEBRA: Nondisplaced type II odontoid process fracture. Normal vertebral body heights and alignment.      CANAL/FORAMINA: At C5-6, moderate severe bilateral foraminal stenosis    PARASPINAL: Bilateral pleural effusions      Impression    IMPRESSION:  1.  Nondisplaced acute type II odontoid process fracture.   XR Shoulder Right 2 Views    Narrative    EXAM: XR SHOULDER RIGHT 2 VIEWS  LOCATION: [facility]  DATE: 6/16/2024    INDICATION: pain, fall  COMPARISON: None.      Impression    IMPRESSION: Osteopenia. No acute, displaced fracture or dislocation. Flattening of the contour of the lateral aspect of the humeral head could relate to a Hill-Sachs deformity from prior dislocation. Arthritic acromioclavicular joint without AC   separation.   XR Chest 1 View    Narrative    EXAM: XR CHEST 1 VIEW  LOCATION: [facility]  DATE: 6/16/2024    INDICATION: sob  COMPARISON: 8/11/2023      Impression    IMPRESSION: Mild cardiomegaly. Small bilateral pleural effusions. Mild vascular congestion and subtle interstitial edema compatible with congestive heart failure. No pneumothorax. Old fracture or for is suggested in the left humeral head.       Corwin Argueta MD  Hospitalist  [facility]

## 2024-06-17 NOTE — PROGRESS NOTES
OT: Attempted evaluation per MD orders.  Pt in bed, awake but sleepy and in pain.  Granddaughter present, providing some background.  [place] detention, dresses herself and had been using a walker to go down to meals until recently.  Has had to be wheeled down by staff lately.  Staff also helps with showers.  Pt is active in social events.  Has supportive family, appears willing to participate in therapy and seems to be a good rehab candidate.  Will hold formal eval today and check back on status 6/18.

## 2024-06-17 NOTE — CONSULTS
[facility]    Neurosurgery Consultation     Date of Admission:  6/16/2024  Date of Consult (When I saw the patient): 06/17/24    Assessment & Plan   Maya Cullen is a 89 year old female who was admitted on 6/16/2024 with neck pain after a mechanical fall.  Cervical spine CT reveals a nondisplaced acute type II odontoid process fracture.  Cervical spine MRI reveals multilevel degenerative changes, mild ligamentous injury from C1-C5, odontoid process fracture is not well-visualized on MRI.    -No surgery planned at this time  -Upright cervical spine x-ray  -Continue to wear collar.  Orthotics consulted to assess for better fitting collar.  -Pain control measures as needed  -Physical therapy  -Avoid heavy lifting, bending, twisting  -Follow-up with our neurosurgery clinic in 6 weeks with upright flexion-extension cervical spine x-ray  -Appreciate assistance from specialties      I have discussed the following assessment and plan with Dr. Mueller, patient, family, and nurse.     Adelaide Bourne, CNP  [facility] Neurosurgery  Tel [phone]  Pager [phone]    Code Status    No CPR- Do NOT Intubate    Reason for Consult   I was asked by Dr. Argueta to evaluate this patient for:   Fall  Type II odontoid fracture    Primary Care Physician   Jordyn Jon    Chief Complaint   Neck pain    History is obtained from the patient, electronic health record, and patient's family    History of Present Illness   Maya Cullen is a 89 year old female with history including CHF, pulmonary hypertension, hypertension, mild short-term memory loss, and glaucoma who was admitted on 6/16/2024 with neck pain after a mechanical fall.  Patient reports she was getting ready for bed last night when she reached for an object, misjudged the distance, and fell forward hitting her head on the wall.  She developed neck pain after the fall.  She presented to the ED for further evaluation. Cervical  spine CT reveals a nondisplaced acute type II odontoid process fracture.  Cervical spine MRI reveals multilevel degenerative changes, mild ligamentous injury from C1-C5, odontoid process fracture is not well-visualized on MRI.  Today, patient reports 5/10 neck pain.  Denies any radicular pain or weakness.  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 Hospitalist is following for acute respiratory failure and acute on chronic CHF.    Past Medical History   I have reviewed this patient's medical history and updated it with pertinent information if needed.   Past Medical History:   Diagnosis Date    Cerebrovascular accident (H)     Cerebellar CVA seen on MRI 2023    Glaucoma     HTN (hypertension)     L4 vertebral fracture (H)     Mild cognitive impairment     Pulmonary hypertension (H)     Severe mitral regurgitation     Systolic CHF, chronic (H)     Tricuspid regurgitation     Trigeminal neuralgia        Past Surgical History   I have reviewed this patient's surgical history and updated it with pertinent information if needed.  Past Surgical History:   Procedure Laterality Date    EYE SURGERY         Prior to Admission Medications   Prior to Admission Medications   Prescriptions Last Dose Informant Patient Reported? Taking?   acetaminophen (TYLENOL) 325 MG tablet   No Yes   Sig: Take 3 tablets (975 mg) by mouth every 8 hours as needed for mild pain   amLODIPine (NORVASC) 5 MG tablet 6/16/2024  Yes Yes   Sig: Take 5 mg by mouth daily   carBAMazepine (TEGRETOL) 100 MG chewable tablet 6/16/2024  Yes Yes   Sig: Take 200 mg by mouth 3 times daily   latanoprost (XALATAN) 0.005 % ophthalmic solution 6/15/2024  Yes Yes   Sig: Place 1 drop into both eyes At Bedtime   multivitamin w/minerals (THERA-VIT-M) tablet 6/16/2024  Yes Yes   Sig: Take 1 tablet by mouth daily   timolol maleate (TIMOPTIC) 0.5 % ophthalmic solution 6/16/2024  Yes Yes   Sig: Place 1 drop into both eyes 2 times daily       Facility-Administered Medications: None     Allergies   Allergies   Allergen Reactions    Brimonidine Other (See Comments)     Follicular conjunctivitis    Dorzolamide Other (See Comments)     Follicular conjunctivitis    Benzalkonium Chloride Other (See Comments)     Eye irritation.  Does better with preservative free but can tolerate preserved drops    Fluorescein Itching     fluress    Penicillins     Sulfa Antibiotics Other (See Comments)    Codeine Unknown and Rash    Fd&C Yellow #5 (Tartrazine) Rash       Social History   I have reviewed this patient's social history and updated it with pertinent information if needed. Maya Cullen  reports that she has never smoked. She does not have any smokeless tobacco history on file. She reports that she does not drink alcohol and does not use drugs.    Family History   I have reviewed this patient's family history and updated it with pertinent information if needed.   No family history on file.    Review of Systems   10 point ROS negative other than symptoms noted in HPI.    Physical Exam   Vital signs with ranges:   Temp:  [96.6  F (35.9  C)-97.4  F (36.3  C)] 97.2  F (36.2  C)  Pulse:  [71-83] 77  Resp:  [18-20] 18  BP: (108-151)/(60-88) 131/76  SpO2:  [94 %-99 %] 95 %  145 lbs 15.11 oz    NEUROLOGICAL EXAMINATION:   Mental status:  Alert and Oriented x 3, speech is fluent, following commands  Motor: Right shoulder exam is slightly limited due to chronic right shoulder pain.  Shoulder Abduction  Right:  5/5   Left:  5/5  Biceps                      Right:  5/5   Left:  5/5  Triceps                     Right:  5/5   Left:  5/5  Wrist Extensors        Right:  5/5   Left:  5/5  Wrist Flexors            Right:  5/5   Left:  5/5  Intrinsics                   Right:  5/5   Left:  5/5    Iliopsoas  (hip flexion)               Right: 5/5  Left:  5/5  Quadriceps  (knee extension)       Right:  5/5  Left:  5/5  Hamstrings  (knee flexion)            Right:  5/5  Left:  " 5/5  Gastroc Soleus  (PF)                          Right:  5/5  Left:  5/5  Tibialis Ant  (DF)                          Right:  5/5  Left:  5/5  EHL                          Right:  5/5  Left:  5/5    Sensation:  Intact to light touch   Reflexes:   Negative Clonus.  Negative Nix's sign.     Cervical collar in place.  Tenderness to palpation of the cervical spine.    Data   MRI CERVICAL SPINE WITHOUT CONTRAST 6/17/2024 11:36 AM                                                          IMPRESSION:    1. Nondisplaced type II dens fracture better appreciated on CT.  2. Minimal prevertebral edema.  3. Mild posterior paraspinal edema at C1-C5, likely mild ligamentous  injury.  4. Multilevel degenerative change.     EXAM: CT CERVICAL SPINE W/O CONTRAST  LOCATION: [facility]  DATE: 6/16/2024                                                   IMPRESSION:  1.  Nondisplaced acute type II odontoid process fracture.      CBC RESULTS:   Recent Labs   Lab Test 06/17/24  0748   WBC 7.1   RBC 4.65   HGB 14.7   HCT 45.5   MCV 98   MCH 31.6   MCHC 32.3   RDW 14.4        Basic Metabolic Panel:  Lab Results   Component Value Date     06/17/2024     07/13/2013      Lab Results   Component Value Date    POTASSIUM 3.8 06/17/2024    POTASSIUM 4.1 07/13/2013     Lab Results   Component Value Date    CHLORIDE 98 06/17/2024    CHLORIDE 102 07/13/2013     Lab Results   Component Value Date    GILL 8.8 06/17/2024    GILL 9.3 07/13/2013     Lab Results   Component Value Date    CO2 24 06/17/2024    CO2 25 07/13/2013     Lab Results   Component Value Date    BUN 19.9 06/17/2024    BUN 13 07/13/2013     Lab Results   Component Value Date    CR 1.02 06/17/2024    CR 0.89 07/13/2013     Lab Results   Component Value Date     06/17/2024     08/12/2023     07/13/2013     INR:  No results found for: \"INR\"      "

## 2024-06-17 NOTE — PROGRESS NOTES
Patient seen and examined today.  H&P reviewed.  89-year-old female patient with past medical history of systolic congestive heart failure, severe MR, pulmonary hypertension, LVH, hypertension, glaucoma, trigeminal neuralgia, mild cognitive impairment, admitted this morning by Dr. Argueta for evaluation after a fall, acute traumatic C2 type odontoid fracture, chronic right shoulder pain,, acute hypoxic respiratory failure, acute on chronic systolic CHF.  Spine surgery consulted.  -Fall precautions  -Will continue PT/OT/social work  -Continue pain control.  Will continue neurochecks every 4 hours  -Neurosurgery consulted for further recommendations

## 2024-06-17 NOTE — PROGRESS NOTES
NSGY Pager Note    Paged by and discussed with Dr. Betancourt    89 year old female not anticoagulated presented to ED for evaluation after a fall and hitting her head. Currently noting head and neck pain. Patient reporting some lower extremity radicular symptoms; however these were present prior to fall. No upper extremity radicular pain or paresthesia. Intact on exam per ED. Cervical CT significant for nondisplaced type 2 odontoid process fracture, C5-C6 moderate severe bilateral foraminal stenosis.     ED provider planning on admitting patient under hospitalist services.    Plan:  - Okay to admit under hospitalist services  - Cervical MRI w/o, further recs pending  - Hard cervical collar   - Neuro checks every 4 hours   - Full NSGY consult Monday AM    Priscilla Calix PA-C  [facility] Neurosurgery  [address] [place] [id number]

## 2024-06-17 NOTE — ED TRIAGE NOTES
Patient BIBA for a fall. Patient was reaching for the walker and missed falling and hitting head on the wall. Patient denies LOC and is not on thinners, patient has bump and skin tear on left side of head. PIV 20 g R arm 1mg zofran patient felt nausea. Pt endorses head pain 6/10 collared by EMS. VSS     Triage Assessment (Adult)       Row Name 06/16/24 9252          Triage Assessment    Airway WDL WDL        Respiratory WDL    Respiratory WDL WDL        Skin Circulation/Temperature WDL    Skin Circulation/Temperature WDL X  left side head skin tear and bump on head        Peripheral/Neurovascular WDL    Peripheral Neurovascular WDL WDL        Cognitive/Neuro/Behavioral WDL    Cognitive/Neuro/Behavioral WDL WDL

## 2024-06-18 ENCOUNTER — APPOINTMENT (OUTPATIENT)
Dept: OCCUPATIONAL THERAPY | Facility: CLINIC | Age: 89
DRG: 551 | End: 2024-06-18
Attending: INTERNAL MEDICINE
Payer: MEDICARE

## 2024-06-18 ENCOUNTER — APPOINTMENT (OUTPATIENT)
Dept: PHYSICAL THERAPY | Facility: CLINIC | Age: 89
DRG: 551 | End: 2024-06-18
Attending: INTERNAL MEDICINE
Payer: MEDICARE

## 2024-06-18 DIAGNOSIS — I50.21 ACUTE SYSTOLIC CONGESTIVE HEART FAILURE (H): Primary | ICD-10-CM

## 2024-06-18 LAB
ANION GAP SERPL CALCULATED.3IONS-SCNC: 18 MMOL/L (ref 7–15)
BUN SERPL-MCNC: 17.4 MG/DL (ref 8–23)
CALCIUM SERPL-MCNC: 8.4 MG/DL (ref 8.2–9.6)
CHLORIDE SERPL-SCNC: 97 MMOL/L (ref 98–107)
CREAT SERPL-MCNC: 1 MG/DL (ref 0.51–0.95)
DEPRECATED HCO3 PLAS-SCNC: 25 MMOL/L (ref 22–29)
EGFRCR SERPLBLD CKD-EPI 2021: 53 ML/MIN/1.73M2
GLUCOSE SERPL-MCNC: 114 MG/DL (ref 70–99)
MAGNESIUM SERPL-MCNC: 2 MG/DL (ref 1.7–2.3)
POTASSIUM SERPL-SCNC: 3.2 MMOL/L (ref 3.4–5.3)
POTASSIUM SERPL-SCNC: 3.2 MMOL/L (ref 3.4–5.3)
SODIUM SERPL-SCNC: 140 MMOL/L (ref 135–145)

## 2024-06-18 PROCEDURE — 99232 SBSQ HOSP IP/OBS MODERATE 35: CPT | Performed by: INTERNAL MEDICINE

## 2024-06-18 PROCEDURE — 250N000011 HC RX IP 250 OP 636: Performed by: INTERNAL MEDICINE

## 2024-06-18 PROCEDURE — 97165 OT EVAL LOW COMPLEX 30 MIN: CPT | Mod: GO

## 2024-06-18 PROCEDURE — 84132 ASSAY OF SERUM POTASSIUM: CPT | Performed by: INTERNAL MEDICINE

## 2024-06-18 PROCEDURE — 36415 COLL VENOUS BLD VENIPUNCTURE: CPT | Performed by: INTERNAL MEDICINE

## 2024-06-18 PROCEDURE — 250N000009 HC RX 250: Performed by: INTERNAL MEDICINE

## 2024-06-18 PROCEDURE — 99231 SBSQ HOSP IP/OBS SF/LOW 25: CPT | Performed by: PHYSICIAN ASSISTANT

## 2024-06-18 PROCEDURE — 250N000013 HC RX MED GY IP 250 OP 250 PS 637: Performed by: INTERNAL MEDICINE

## 2024-06-18 PROCEDURE — 97535 SELF CARE MNGMENT TRAINING: CPT | Mod: GO

## 2024-06-18 PROCEDURE — 80048 BASIC METABOLIC PNL TOTAL CA: CPT | Performed by: INTERNAL MEDICINE

## 2024-06-18 PROCEDURE — 120N000001 HC R&B MED SURG/OB

## 2024-06-18 PROCEDURE — 83735 ASSAY OF MAGNESIUM: CPT | Performed by: INTERNAL MEDICINE

## 2024-06-18 PROCEDURE — 97530 THERAPEUTIC ACTIVITIES: CPT | Mod: GP | Performed by: PHYSICAL THERAPIST

## 2024-06-18 PROCEDURE — 97161 PT EVAL LOW COMPLEX 20 MIN: CPT | Mod: GP | Performed by: PHYSICAL THERAPIST

## 2024-06-18 RX ORDER — ESTRADIOL 0.1 MG/G
2 CREAM VAGINAL AT BEDTIME
Status: DISCONTINUED | OUTPATIENT
Start: 2024-06-18 | End: 2024-06-19 | Stop reason: HOSPADM

## 2024-06-18 RX ORDER — POTASSIUM CHLORIDE 1500 MG/1
40 TABLET, EXTENDED RELEASE ORAL ONCE
Status: COMPLETED | OUTPATIENT
Start: 2024-06-18 | End: 2024-06-18

## 2024-06-18 RX ADMIN — CARBAMAZEPINE 200 MG: 100 TABLET, CHEWABLE ORAL at 19:57

## 2024-06-18 RX ADMIN — TIMOLOL MALEATE 1 DROP: 5 SOLUTION/ DROPS OPHTHALMIC at 08:50

## 2024-06-18 RX ADMIN — TIMOLOL MALEATE 1 DROP: 5 SOLUTION/ DROPS OPHTHALMIC at 20:04

## 2024-06-18 RX ADMIN — LATANOPROST 1 DROP: 50 SOLUTION/ DROPS OPHTHALMIC at 23:17

## 2024-06-18 RX ADMIN — FUROSEMIDE 40 MG: 10 INJECTION, SOLUTION INTRAMUSCULAR; INTRAVENOUS at 15:43

## 2024-06-18 RX ADMIN — ACETAMINOPHEN 325 MG: 325 TABLET, FILM COATED ORAL at 13:43

## 2024-06-18 RX ADMIN — ACETAMINOPHEN 975 MG: 325 TABLET, FILM COATED ORAL at 19:57

## 2024-06-18 RX ADMIN — OXYCODONE HYDROCHLORIDE 5 MG: 5 TABLET ORAL at 10:05

## 2024-06-18 RX ADMIN — FUROSEMIDE 40 MG: 10 INJECTION, SOLUTION INTRAMUSCULAR; INTRAVENOUS at 08:49

## 2024-06-18 RX ADMIN — ACETAMINOPHEN 975 MG: 325 TABLET, FILM COATED ORAL at 23:16

## 2024-06-18 RX ADMIN — PREDNISOLONE ACETATE 1 DROP: 10 SUSPENSION/ DROPS OPHTHALMIC at 08:50

## 2024-06-18 RX ADMIN — CARBAMAZEPINE 200 MG: 100 TABLET, CHEWABLE ORAL at 23:16

## 2024-06-18 RX ADMIN — POTASSIUM CHLORIDE 40 MEQ: 1500 TABLET, EXTENDED RELEASE ORAL at 19:56

## 2024-06-18 RX ADMIN — ESTRADIOL 2 G: 0.1 CREAM VAGINAL at 23:16

## 2024-06-18 ASSESSMENT — ACTIVITIES OF DAILY LIVING (ADL)
ADLS_ACUITY_SCORE: 41
ADLS_ACUITY_SCORE: 37
ADLS_ACUITY_SCORE: 41
ADLS_ACUITY_SCORE: 37
ADLS_ACUITY_SCORE: 37
ADLS_ACUITY_SCORE: 41
ADLS_ACUITY_SCORE: 37
ADLS_ACUITY_SCORE: 41
ADLS_ACUITY_SCORE: 37
ADLS_ACUITY_SCORE: 41
ADLS_ACUITY_SCORE: 37
ADLS_ACUITY_SCORE: 41
ADLS_ACUITY_SCORE: 41
DEPENDENT_IADLS:: LAUNDRY;COOKING;CLEANING
ADLS_ACUITY_SCORE: 37
ADLS_ACUITY_SCORE: 41

## 2024-06-18 NOTE — PLAN OF CARE
"Goal Outcome Evaluation:      Patient alert and oriented. Confused @ times. VSS, on 1L or for comfort. Up Ax1 gait belt and walker. LS clear. BS active, no BM this shift. On tele SR. PIV SL. Denies nausea and SOB. Neuro checks intact. Oxy given once with relief.     On abx, pain management, c-collar at all times.         Problem: Adult Inpatient Plan of Care  Goal: Plan of Care Review  Description: The Plan of Care Review/Shift note should be completed every shift.  The Outcome Evaluation is a brief statement about your assessment that the patient is improving, declining, or no change.  This information will be displayed automatically on your shift  note.  Outcome: Progressing  Flowsheets (Taken 6/18/2024 1814)  Outcome Evaluation: Oxy given once this shift. C-collar in place.  Plan of Care Reviewed With: patient  Overall Patient Progress: improving  Goal: Patient-Specific Goal (Individualized)  Description: You can add care plan individualizations to a care plan. Examples of Individualization might be:  \"Parent requests to be called daily at 9am for status\", \"I have a hard time hearing out of my right ear\", or \"Do not touch me to wake me up as it startles  me\".  Outcome: Progressing  Goal: Absence of Hospital-Acquired Illness or Injury  Outcome: Progressing  Intervention: Identify and Manage Fall Risk  Recent Flowsheet Documentation  Taken 6/18/2024 0855 by Niki Ramírez RN  Safety Promotion/Fall Prevention:   activity supervised   room near nurse's station   safety round/check completed   nonskid shoes/slippers when out of bed  Intervention: Prevent Skin Injury  Recent Flowsheet Documentation  Taken 6/18/2024 0855 by Niki Ramírez RN  Body Position: position changed independently  Intervention: Prevent and Manage VTE (Venous Thromboembolism) Risk  Recent Flowsheet Documentation  Taken 6/18/2024 0855 by Niki Ramírez RN  VTE Prevention/Management: SCDs (sequential compression devices) " off  Intervention: Prevent Infection  Recent Flowsheet Documentation  Taken 6/18/2024 0855 by Niki Ramírez RN  Infection Prevention: single patient room provided  Goal: Optimal Comfort and Wellbeing  Outcome: Progressing  Intervention: Monitor Pain and Promote Comfort  Recent Flowsheet Documentation  Taken 6/18/2024 1005 by Niki Ramírez RN  Pain Management Interventions: medication (see MAR)  Goal: Readiness for Transition of Care  Outcome: Progressing     Problem: Fall Injury Risk  Goal: Absence of Fall and Fall-Related Injury  Outcome: Progressing  Intervention: Identify and Manage Contributors  Recent Flowsheet Documentation  Taken 6/18/2024 0855 by Niki Ramírez RN  Medication Review/Management: medications reviewed  Intervention: Promote Injury-Free Environment  Recent Flowsheet Documentation  Taken 6/18/2024 0855 by Niki Ramírez RN  Safety Promotion/Fall Prevention:   activity supervised   room near nurse's station   safety round/check completed   nonskid shoes/slippers when out of bed     Problem: Pain Acute  Goal: Optimal Pain Control and Function  Outcome: Progressing  Intervention: Develop Pain Management Plan  Recent Flowsheet Documentation  Taken 6/18/2024 1005 by Niki Ramírez RN  Pain Management Interventions: medication (see MAR)  Intervention: Prevent or Manage Pain  Recent Flowsheet Documentation  Taken 6/18/2024 0855 by Niki Ramírez RN  Medication Review/Management: medications reviewed     Problem: Comorbidity Management  Goal: Maintenance of Heart Failure Symptom Control  Outcome: Progressing  Intervention: Maintain Heart Failure Management  Recent Flowsheet Documentation  Taken 6/18/2024 0855 by Niki Ramírez RN  Medication Review/Management: medications reviewed         Plan of Care Reviewed With: patient    Overall Patient Progress: improvingOverall Patient Progress: improving    Outcome Evaluation: Oxy given once this shift. C-collar in place.

## 2024-06-18 NOTE — PLAN OF CARE
"Alert. Pt woke up and tries to get out of bed and forgets where she was currently, writer managed to reorient pt and remembers her situation,what happens to her before she went to [facility] and what month today. Neuro intact forgetful. SL. Denies nausea and sob. Reports of minimal pain Tylenol given with some relief. C-Colar  on. Scattered bruising and scabs noted. Redness on luis armando area noted with cream  on. Purewick in place, voiding good. Not OOB during my shift. Refused eye drops, education given and pt states understanding.       Goal Outcome Evaluation:    Overall Patient Progress: improvingOverall Patient Progress: improving       Problem: Adult Inpatient Plan of Care  Goal: Plan of Care Review  Description: The Plan of Care Review/Shift note should be completed every shift.  The Outcome Evaluation is a brief statement about your assessment that the patient is improving, declining, or no change.  This information will be displayed automatically on your shift  note.  Outcome: Progressing  Flowsheets (Taken 6/18/2024 0044)  Overall Patient Progress: improving  Goal: Patient-Specific Goal (Individualized)  Description: You can add care plan individualizations to a care plan. Examples of Individualization might be:  \"Parent requests to be called daily at 9am for status\", \"I have a hard time hearing out of my right ear\", or \"Do not touch me to wake me up as it startles  me\".  Outcome: Progressing  Goal: Absence of Hospital-Acquired Illness or Injury  Outcome: Progressing  Intervention: Identify and Manage Fall Risk  Recent Flowsheet Documentation  Taken 6/17/2024 2004 by Lisa Martell RN  Safety Promotion/Fall Prevention:   supervised activity   safety round/check completed   room near nurse's station   patient and family education   nonskid shoes/slippers when out of bed   mobility aid in reach   lighting adjusted   increase visualization of patient   increased rounding and observation  Intervention: Prevent " Skin Injury  Recent Flowsheet Documentation  Taken 6/17/2024 2004 by Lisa Martell RN  Body Position: position changed independently  Skin Protection: transparent dressing maintained  Device Skin Pressure Protection:   tubing/devices free from skin contact   skin-to-skin areas padded  Intervention: Prevent and Manage VTE (Venous Thromboembolism) Risk  Recent Flowsheet Documentation  Taken 6/17/2024 2004 by Lisa Martell RN  VTE Prevention/Management: SCDs (sequential compression devices) on  Goal: Optimal Comfort and Wellbeing  Outcome: Progressing  Goal: Readiness for Transition of Care  Outcome: Progressing     Problem: Fall Injury Risk  Goal: Absence of Fall and Fall-Related Injury  Outcome: Progressing  Intervention: Identify and Manage Contributors  Recent Flowsheet Documentation  Taken 6/17/2024 2004 by Lisa Martell RN  Medication Review/Management: medications reviewed  Intervention: Promote Injury-Free Environment  Recent Flowsheet Documentation  Taken 6/17/2024 2004 by Lisa Martell RN  Safety Promotion/Fall Prevention:   supervised activity   safety round/check completed   room near nurse's station   patient and family education   nonskid shoes/slippers when out of bed   mobility aid in reach   lighting adjusted   increase visualization of patient   increased rounding and observation     Problem: Pain Acute  Goal: Optimal Pain Control and Function  Outcome: Progressing  Intervention: Prevent or Manage Pain  Recent Flowsheet Documentation  Taken 6/17/2024 2004 by Lisa Martell RN  Medication Review/Management: medications reviewed  Intervention: Optimize Psychosocial Wellbeing  Recent Flowsheet Documentation  Taken 6/17/2024 2004 by Lisa Martell RN  Supportive Measures: active listening utilized     Problem: Comorbidity Management  Goal: Maintenance of Heart Failure Symptom Control  Outcome: Progressing  Intervention: Maintain Heart Failure Management  Recent Flowsheet Documentation  Taken 6/17/2024 2004 by  Lisa Martell, RN  Medication Review/Management: medications reviewed

## 2024-06-18 NOTE — PROGRESS NOTES
[facility]    CORE Enrollment orders w/ labs (BMP, NT pro BNP, TSH) orders placed after New MD appt post hospital H[facility] hospitalization    Messaged scheduling to arrange    Future Appointments   Date Time Provider Department Center   6/19/2024 10:00 AM RH OT 1 WAITLIST [facility] [place] [facility]   6/19/2024 12:00 PM RH PT 1 WAITLIST [facility]RE[facility] [place] [facility]   7/3/2024  1:45 PM Shefali Ochoa MD [facility]   8/5/2024  9:00 AM RU LAB [facility]CL[facility] [place] [facility]   8/5/2024 10:00 AM Prasanna Moe NP [facility]   8/6/2024  1:00 PM [id number] [facility] - [facility]   8/6/2024  1:30 PM Sam Rodriguez PA-C [facility]         Anaya Talley RN BSN   [facility]- [place], [place]  [facility] Care Coordinator  06/18/24, 4:34 PM

## 2024-06-18 NOTE — PROGRESS NOTES
06/18/24 0914   Appointment Info   Signing Clinician's Name / Credentials (OT) Oswald Clay, OTR/L   Living Environment   People in Home alone;facility resident   Current Living Arrangements assisted living   Home Accessibility no concerns   Self-Care   Usual Activity Tolerance moderate   Current Activity Tolerance fair   Equipment Currently Used at Home walker, standard;wheelchair, manual   Fall history within last six months yes   Number of times patient has fallen within last six months 5   Activity/Exercise/Self-Care Comment Pt lives in an [facility], dresses herself and had been using a walker to go down to meals until recently.  Has had to be wheeled down by staff lately.  Staff also helps with showers.  Pt is active in social events.  Has supportive family   General Information   Onset of Illness/Injury or Date of Surgery 06/16/24   Referring Physician Corwin Argueta MD   Patient/Family Therapy Goal Statement (OT) Get better   Additional Occupational Profile Info/Pertinent History of Current Problem 89 YO female with PMH of systolic congestive heart failure, severe MR, pulmonary hypertension, LVH, hypertension, glaucoma, trigeminal neuralgia, mild cognitive impairment, admitted after fall. Found to have Acute traumatic C2 type II odontoid fracture.   Existing Precautions/Restrictions fall   Limitations/Impairments safety/cognitive   Cognitive Status Examination   Cognitive Status Comments Pt is oriented to self, place, and month. States year is 2014. Pt is forgetful and some confusion noted.   Visual Perception   Visual Impairment/Limitations corrective lenses full-time;other (see comments)  (glaucoma)   Sensory   Sensory Comments numbness in fingers per report.   Pain Assessment   Patient Currently in Pain Yes, see Vital Sign flowsheet   Range of Motion Comprehensive   Comment, General Range of Motion Moderate AROM deficits at shoulders. Limitations at cervical region 2/2 pain and precautions    Strength Comprehensive (MMT)   Comment, General Manual Muscle Testing (MMT) Assessment Generalized weakness noted with ADL   Bed Mobility   Bed Mobility supine-sit;sit-supine;scooting/bridging   Scooting/Bridging [place] (Bed Mobility) supervision   Supine-Sit [place] (Bed Mobility) supervision   Sit-Supine [place] (Bed Mobility) supervision   Transfers   Transfers sit-stand transfer;toilet transfer;shower transfer   Sit-Stand Transfer   Sit-Stand [place] (Transfers) contact guard   Shower Transfer   [place] Level (Shower Transfer) minimum assist (75% patient effort)   Toilet Transfer   [place] Level (Toilet Transfer) contact guard   Balance   Balance Comments Unsteady. CG-min A with FWW   Activities of Daily Living   BADL Assessment/Intervention upper body dressing;grooming;toileting;feeding   Upper Body Dressing Assessment/Training   [place] Level (Upper Body Dressing) moderate assist (50% patient effort)   Grooming Assessment/Training   [place] Level (Grooming) minimum assist (75% patient effort)   Eating/Self Feeding   [place] Level (Feeding) knife use;prepare tray/open items;feeding skills;moderate assist (50% patient effort)   Toileting   [place] Level (Toileting) minimum assist (75% patient effort)   Clinical Impression   Criteria for Skilled Therapeutic Interventions Met (OT) Yes, treatment indicated   OT Diagnosis Decline in function   OT Problem List-Impairments impacting ADL problems related to;activity tolerance impaired;balance;cognition;range of motion (ROM);strength;pain;vision   Assessment of Occupational Performance 5 or more Performance Deficits   Identified Performance Deficits dressing, bathing, toileting, functional mobility, eating, grooming   OT Total Evaluation Time   OT Eval, Low Complexity Minutes (03286) 10   OT Goals   Therapy Frequency (OT) 5 times/week   OT Predicted Duration/Target Date for Goal Attainment 06/28/24   OT Goals  Hygiene/Grooming;Upper Body Dressing;Lower Body Dressing;Toilet Transfer/Toileting;Cognition   OT: Hygiene/Grooming supervision/stand-by assist;using adaptive equipment;within precautions   OT: Upper Body Dressing Supervision/stand-by assist;using adaptive equipment;within precautions   OT: Lower Body Dressing Supervision/stand-by assist;using adaptive equipment;within precautions;including set-up/clothing retrieval   OT: Toilet Transfer/Toileting Supervision/stand-by assist;toilet transfer;cleaning and garment management;using adaptive equipment;within precautions   OT: Cognitive Patient/caregiver will verbalize understanding of cognitive assessment results/recommendations as needed for safe discharge planning   Self-Care/Home Management   Self-Care/Home Mgmt/ADL, Compensatory, Meal Prep Minutes (61902) 36   Symptoms Noted During/After Treatment (Meal Preparation/Planning Training) fatigue;increased pain   Treatment Detail/Skilled Intervention SBA to EOB. STS w/ FWW and CGA, CGA-min A for balance and navigation as pt ambulating in room to gather g/h supplies and bring to sink. Requires direct VCs to locate tooth brush and tooth paste, appears greatly impacted by low vision. Cues for FWW proximity w/ judi in room. Requires cues to navigate simple directions like L and R and through doorway in room. seated rest on chair and adjusted pt's collar. Edu on purpose and fit of collar. Pt standing at sink for g/h, requires min A to manipulate supplies such as opening cap of paste and squeezing paste out. Cues for hand placement and decreased problem solving noted. Pt completes toilet transfer w/ CGA, cues for hand placement of FWW vs grab bar. Pt sitting EOB CGA and transfers supine with SBA. Positioning pt upright in bed and set pt up with meal. pt unable to manipulate supplies and eat independently. Cues to locate food or utensils on tray and assist to set pt up. Assist to cut bread and prepare food. Alarm on and all needs  in reach.   OT Discharge Planning   OT Plan dressing, shower transfer, standing g/h, monitor cog   OT Discharge Recommendation (DC Rec) Transitional Care Facility   OT Rationale for DC Rec Pt presents below baseline with impaired balance, weakness, confusion, and low vision. Pt is typically modified independent with most self-cares and mobility in room. Now requiring assist with all mobility and ADL. Recommend TCU for progression of ADL safety and independence.   OT Brief overview of current status Ax1 w/ FWW   Total Session Time   Timed Code Treatment Minutes 27   Total Session Time (sum of timed and untimed services) 37

## 2024-06-18 NOTE — DISCHARGE INSTRUCTIONS
Please call [facility] Card[facility]) with any concerns:  Monday-Friday 8:00 AM to 4:30 PM   General Line: [phone] or  [place] Nurse Line: [phone]  After hours:  [phone]    -- Please start weighing self daily and write in weight log chart to bring into your cardiology/CORE clinic follow up appts.   -- Please bring in current medication bottles to cardiology/CORE appts for review.   -- Call nurse with weight gain greater than 2 pounds overnight, and/or 5 pounds in a week, and/or worsening shortness of breath/edema(swelling).       Your labs are NOT FASTING on 8/5/24 @ 9 AM.  OK to eat.

## 2024-06-18 NOTE — CONSULTS
"CLINICAL NUTRITION SERVICES  -  ASSESSMENT NOTE    Recommendations Ordered by Registered Dietitian (RD):   Diet per MD     Ordered Gelatein Plus with banana and whipped topping on the side at 2pm     Deferred low sodium diet education at this time given patient has some short term memory loss at baseline. She also appears to receive some meals from facility.    Malnutrition:   % Weight Loss:  Weight loss does not meet criteria for malnutrition - very little to trend however   % Intake:  <75% for > 7 days (moderate malnutrition)  Subcutaneous Fat Loss:  Orbital region mild depletion and Upper arm region mild depletion - likely d/t advanced age, will not use   Muscle Loss:  Acromion bone region mild depletion and Scapular bone region mild depletion - likely d/t advanced age, will not use   Fluid Retention: mild (CHF)     Malnutrition Diagnosis: Patient does not meet two of the above criteria necessary for diagnosing malnutrition      REASON FOR ASSESSMENT  Maya Cullen is a 89 year old female seen by Registered Dietitian for Admission Nutrition Risk Screen for positive     And \"Heart Failure - Dietitian to instruct patient on 2 gram sodium diet\"     Past medical history:  systolic CHF, severe mitral regurgitation, pulmonary hypertension, LVH, HTN, glaucoma, trigeminal neuralgia, impaired glucose tolerance, mild cognitive impairment     Admitted for:   Mechanical fall  Acute traumatic C2 type II odontoid fracture  Scalp hematoma  Chronic right shoulder pain    NUTRITION HISTORY  - Information obtained from patient and chart - granddaughter also helping at bedside  - Typical diet at home: low sodium diet   - Typical food/fluid intake PTA: pt typically consumes 3 meals per day at her facility. Over the past 2 weeks she has been skipping meals (lunch and breakfast). Granddaughter provides that it may be d/t overall not feeling well with heart conditions.   - Supplements: none   - Food allergies: NKFA    CURRENT " "NUTRITION ORDERS  Diet Order:     2 g Na Diet + 1800 mL fluid restriction + No Caffeine      Current Intake/Tolerance:  No information recorded in the flowsheets to comment on     Obtained from Chart/Interdisciplinary Team:  - Neurosurgery following   - Reviewed stooling patterns  - Please refer to flowsheets for more information on skin     ANTHROPOMETRICS  Height: 4' 11\"  Weight: 63.7 kg ( 140 lbs 6.93 oz)   Body mass index is 28.36 kg/m .  Weight Status:  Overweight BMI 25-29.9  Weight History: pt' granddaughter feels as though the patient has lost weight since February.   Wt Readings from Last 10 Encounters:   06/18/24 63.7 kg (140 lb 6.9 oz)   08/11/23 65.6 kg (144 lb 10 oz)     Per care everywhere:  62.7 kg (138 lb 3.2 oz) 06/12/2024 2:27 PM CDT     LABS  Labs reviewed    Labs:  Electrolytes  Potassium (mmol/L)   Date Value   06/18/2024 3.2 (L)   06/17/2024 3.8   06/16/2024 3.6   07/13/2013 4.1    Blood Glucose  Glucose (mg/dL)   Date Value   06/17/2024 134 (H)   06/16/2024 196 (H)   08/14/2023 111 (H)   08/13/2023 111 (H)   08/12/2023 104 (H)   07/13/2013 101 (H)     GLUCOSE BY METER POCT (mg/dL)   Date Value   08/12/2023 123 (H)   08/12/2023 103 (H)   08/12/2023 109 (H)   08/11/2023 123 (H)     Hemoglobin A1C (%)   Date Value   06/16/2024 5.9 (H)   08/11/2023 5.6    Inflammatory Markers  WBC (10e9/L)   Date Value   07/13/2013 6.6     WBC Count (10e3/uL)   Date Value   06/17/2024 7.1   06/16/2024 10.3   08/14/2023 7.0      Magnesium (mg/dL)   Date Value   06/18/2024 2.0   06/17/2024 2.0     Sodium (mmol/L)   Date Value   06/17/2024 139   06/16/2024 137   08/14/2023 138   07/13/2013 141    Renal  Urea Nitrogen (mg/dL)   Date Value   06/17/2024 19.9   06/16/2024 22.1   08/14/2023 16.6   07/13/2013 13     Creatinine (mg/dL)   Date Value   06/17/2024 1.02 (H)   06/16/2024 1.06 (H)   08/14/2023 0.90   07/13/2013 0.89     Additional  Triglycerides (mg/dL)   Date Value   08/11/2023 132     Ketones Urine (mg/dL) "   Date Value   08/11/2023 Negative        MEDICATIONS  Medications reviewed    Current Facility-Administered Medications   Medication Dose Route Frequency Provider Last Rate Last Admin    acetaminophen (TYLENOL) tablet 975 mg  975 mg Oral TID Corwin Argueta MD   975 mg at 06/17/24 2250    [Held by provider] amLODIPine (NORVASC) tablet 5 mg  5 mg Oral Daily Corwin Argueta MD        carBAMazepine (TEGretol) chewable tablet 200 mg  200 mg Oral TID Corwin Argueta MD   200 mg at 06/17/24 2250    furosemide (LASIX) injection 40 mg  40 mg Intravenous BID Corwin Argueta MD   40 mg at 06/18/24 0849    latanoprost (XALATAN) 0.005 % ophthalmic solution 1 drop  1 drop Both Eyes At Bedtime Corwin Argueta MD   1 drop at 06/17/24 2147    prednisoLONE acetate (PRED FORTE) 1 % ophthalmic susp 1 drop  1 drop Left Eye 4x Daily Corwin Argueta MD   1 drop at 06/18/24 0850    sodium chloride (PF) 0.9% PF flush 3 mL  3 mL Intracatheter Q8H Corwin Argueta MD   3 mL at 06/18/24 0855    timolol maleate (TIMOPTIC) 0.5 % ophthalmic solution 1 drop  1 drop Both Eyes BID Corwin Argueta MD   1 drop at 06/18/24 0850      Current Facility-Administered Medications   Medication Dose Route Frequency Provider Last Rate Last Admin      Current Facility-Administered Medications   Medication Dose Route Frequency Provider Last Rate Last Admin    alum & mag hydroxide-simethicone (MAALOX) suspension 30 mL  30 mL Oral Q4H PRN Corwin Argueta MD        HYDROmorphone (DILAUDID) injection 0.2 mg  0.2 mg Intravenous Q3H PRN Corwin Argueta MD        lidocaine (LMX4) cream   Topical Q1H PRN Corwin Argueta MD        lidocaine 1 % 0.1-1 mL  0.1-1 mL Other Q1H PRN Corwin Argueta MD        melatonin tablet 1 mg  1 mg Oral At Bedtime PRN Corwin Argueta MD        naloxone (NARCAN) injection 0.2 mg  0.2 mg Intravenous Q2 Min PRN Pop Tapia DO        Or     naloxone (NARCAN) injection 0.4 mg  0.4 mg Intravenous Q2 Min PRN Pop Tapia DO        Or    naloxone (NARCAN) injection 0.2 mg  0.2 mg Intramuscular Q2 Min PRN Pop Tapia DO        Or    naloxone (NARCAN) injection 0.4 mg  0.4 mg Intramuscular Q2 Min PRN Pop Tapia DO        ondansetron (ZOFRAN ODT) ODT tab 4 mg  4 mg Oral Q6H PRN Corwin Argueta MD        Or    ondansetron (ZOFRAN) injection 4 mg  4 mg Intravenous Q6H PRN Corwin Argueta MD        oxyCODONE (ROXICODONE) tablet 5 mg  5 mg Oral Q4H PRN Corwin Argueta MD        oxyCODONE IR (ROXICODONE) half-tab 2.5 mg  2.5 mg Oral Q4H PRN Corwin Argueta MD        polyethylene glycol (MIRALAX) Packet 17 g  17 g Oral BID PRN Corwin Argueta MD        prochlorperazine (COMPAZINE) injection 5 mg  5 mg Intravenous Q6H PRN Corwin Argueta MD        Or    prochlorperazine (COMPAZINE) tablet 5 mg  5 mg Oral Q6H PRN Corwin Argueta MD        Or    prochlorperazine (COMPAZINE) suppository 12.5 mg  12.5 mg Rectal Q12H PRN Corwin Argueta MD        sodium chloride (PF) 0.9% PF flush 3 mL  3 mL Intracatheter q1 min prn Corwin Argueta MD            ASSESSED NUTRITION NEEDS PER APPROVED PRACTICE GUIDELINES:    Dosing Weight 63.7 kg   Estimated Energy Needs: 0250-1005 kcals (25-30 Kcal/Kg)  Justification: maintenance  Estimated Protein Needs: 64-76 grams protein (1-1.2 g pro/Kg)  Justification: maintenance  Estimated Fluid Needs: per MD     NUTRITION DIAGNOSIS:  Inadequate oral intake related to poor appetite d/t feeling poorly given CHF/other heart conditions with as evidenced by pt likely consuming <75% of nutritional needs over the past >7 days     NUTRITION INTERVENTIONS  Recommendations / Nutrition Prescription  Diet per MD     Ordered Gelatein Plus with banana and whipped topping on the side at 2pm     Deferred low sodium diet education at this time given patient has some  short term memory loss at baseline. She also appears to receive some meals from facility.     Implementation  Nutrition education: Provided education on the different oral nutritional supplements offered.   Medical Food Supplement: as above     Nutrition Goals  Patient to consume 75% of meals or oral nutritional supplements ordered TID    MONITORING AND EVALUATION:  Progress towards goals will be monitored and evaluated per protocol and Practice Guidelines    Janet Arshad RD, LD  Clinical Dietitian     3rd floor/ICU: [phone]  All other floors: [phone]  Weekend/holiday: [phone]  Office: [phone]

## 2024-06-18 NOTE — PLAN OF CARE
"Goal Outcome Evaluation:    /81 (BP Location: Left arm)   Pulse 74   Temp 96.8  F (36  C) (Temporal)   Resp 18   Ht 1.499 m (4' 11\")   Wt 66.2 kg (145 lb 15.1 oz)   SpO2 98%   BMI 29.48 kg/m        Plan of Care Reviewed With: patient    Overall Patient Progress: improvingOverall Patient Progress: improving    Outcome Evaluation: Patient slept between cares. Denies pain. Neuros baseline. 1L oxygen NC. C-Collar in place.      Problem: Adult Inpatient Plan of Care  Goal: Plan of Care Review  Description: The Plan of Care Review/Shift note should be completed every shift.  The Outcome Evaluation is a brief statement about your assessment that the patient is improving, declining, or no change.  This information will be displayed automatically on your shift  note.  Outcome: Progressing  Flowsheets (Taken 6/18/2024 0258)  Outcome Evaluation: Patient slept between cares. Denies pain. Neuros baseline. 1L oxygen NC. C-Collar in place.  Plan of Care Reviewed With: patient  Overall Patient Progress: improving  Goal: Patient-Specific Goal (Individualized)  Description: You can add care plan individualizations to a care plan. Examples of Individualization might be:  \"Parent requests to be called daily at 9am for status\", \"I have a hard time hearing out of my right ear\", or \"Do not touch me to wake me up as it startles  me\".  Outcome: Progressing  Goal: Absence of Hospital-Acquired Illness or Injury  Outcome: Progressing  Intervention: Identify and Manage Fall Risk  Recent Flowsheet Documentation  Taken 6/18/2024 0000 by Wendy Peña RN  Safety Promotion/Fall Prevention:   supervised activity   safety round/check completed   room near nurse's station   patient and family education   nonskid shoes/slippers when out of bed   mobility aid in reach   lighting adjusted   increase visualization of patient   increased rounding and observation  Intervention: Prevent Skin Injury  Recent Flowsheet Documentation  Taken " 6/18/2024 0000 by Wendy Peña RN  Body Position: position changed independently  Skin Protection: transparent dressing maintained  Device Skin Pressure Protection:   tubing/devices free from skin contact   skin-to-skin areas padded  Intervention: Prevent and Manage VTE (Venous Thromboembolism) Risk  Recent Flowsheet Documentation  Taken 6/18/2024 0000 by Wendy Peña RN  VTE Prevention/Management: SCDs (sequential compression devices) on  Goal: Optimal Comfort and Wellbeing  Outcome: Progressing  Goal: Readiness for Transition of Care  Outcome: Progressing     Problem: Fall Injury Risk  Goal: Absence of Fall and Fall-Related Injury  Outcome: Progressing  Intervention: Identify and Manage Contributors  Recent Flowsheet Documentation  Taken 6/18/2024 0000 by Wendy Peña RN  Medication Review/Management: medications reviewed  Intervention: Promote Injury-Free Environment  Recent Flowsheet Documentation  Taken 6/18/2024 0000 by Wendy Peña RN  Safety Promotion/Fall Prevention:   supervised activity   safety round/check completed   room near nurse's station   patient and family education   nonskid shoes/slippers when out of bed   mobility aid in reach   lighting adjusted   increase visualization of patient   increased rounding and observation     Problem: Pain Acute  Goal: Optimal Pain Control and Function  Outcome: Progressing  Intervention: Prevent or Manage Pain  Recent Flowsheet Documentation  Taken 6/18/2024 0000 by Wendy Peña RN  Medication Review/Management: medications reviewed  Intervention: Optimize Psychosocial Wellbeing  Recent Flowsheet Documentation  Taken 6/18/2024 0000 by Wendy Peña RN  Supportive Measures: active listening utilized     Problem: Comorbidity Management  Goal: Maintenance of Heart Failure Symptom Control  Outcome: Progressing  Intervention: Maintain Heart Failure Management  Recent Flowsheet Documentation  Taken 6/18/2024 0000 by Wendy Peña RN  Medication  Review/Management: medications reviewed

## 2024-06-18 NOTE — PLAN OF CARE
"Goal Outcome Evaluation:      Plan of Care Reviewed With: patient    Overall Patient Progress: no changeOverall Patient Progress: no change    Outcome Evaluation: Patient cleared for PT/oT by neruosurgery. Refused therapy and OOB. Trialed bedpan. Switched to external catheter d/t frequency r/t furosemide and intolerance of activity. C-collar adjusted by orthotics. Fits better. Weened to 1L NC. Poor intake. Stays well within 1800mL fluid restriction.      Problem: Adult Inpatient Plan of Care  Goal: Plan of Care Review  Description: The Plan of Care Review/Shift note should be completed every shift.  The Outcome Evaluation is a brief statement about your assessment that the patient is improving, declining, or no change.  This information will be displayed automatically on your shift  note.  Outcome: Progressing  Flowsheets (Taken 6/17/2024 1958)  Outcome Evaluation: Patient cleared for PT/oT by kiarrauosurgery. Refused therapy and OOB. Trialed bedpan. Switched to external catheter d/t frequency r/t furosemide and intolerance of activity. C-collar adjusted by orthotics. Fits better. Weened to 1L NC. Poor intake. Stays well within 1800mL fluid restriction.  Plan of Care Reviewed With: patient  Overall Patient Progress: no change  Goal: Patient-Specific Goal (Individualized)  Description: You can add care plan individualizations to a care plan. Examples of Individualization might be:  \"Parent requests to be called daily at 9am for status\", \"I have a hard time hearing out of my right ear\", or \"Do not touch me to wake me up as it startles  me\".  Outcome: Progressing  Goal: Absence of Hospital-Acquired Illness or Injury  Outcome: Progressing  Intervention: Identify and Manage Fall Risk  Recent Flowsheet Documentation  Taken 6/17/2024 0900 by Quinten Collier RN  Safety Promotion/Fall Prevention:   activity supervised   assistive device/personal items within reach   patient and family education   safety round/check " completed  Intervention: Prevent Skin Injury  Recent Flowsheet Documentation  Taken 6/17/2024 0900 by Quinten Collier RN  Body Position: position changed independently  Intervention: Prevent and Manage VTE (Venous Thromboembolism) Risk  Recent Flowsheet Documentation  Taken 6/17/2024 0900 by Quinten Collier RN  VTE Prevention/Management: SCDs (sequential compression devices) on  Goal: Optimal Comfort and Wellbeing  Outcome: Progressing  Intervention: Monitor Pain and Promote Comfort  Recent Flowsheet Documentation  Taken 6/17/2024 1713 by Quinten Collier RN  Pain Management Interventions: medication (see MAR)  Taken 6/17/2024 0932 by Quinten Collier RN  Pain Management Interventions: medication (see MAR)  Goal: Readiness for Transition of Care  Outcome: Progressing     Problem: Fall Injury Risk  Goal: Absence of Fall and Fall-Related Injury  Outcome: Progressing  Intervention: Identify and Manage Contributors  Recent Flowsheet Documentation  Taken 6/17/2024 0900 by Quinten Collier RN  Medication Review/Management: medications reviewed  Intervention: Promote Injury-Free Environment  Recent Flowsheet Documentation  Taken 6/17/2024 0900 by Quinten Collier RN  Safety Promotion/Fall Prevention:   activity supervised   assistive device/personal items within reach   patient and family education   safety round/check completed     Problem: Pain Acute  Goal: Optimal Pain Control and Function  Outcome: Progressing  Intervention: Develop Pain Management Plan  Recent Flowsheet Documentation  Taken 6/17/2024 1713 by Quinten Collier RN  Pain Management Interventions: medication (see MAR)  Taken 6/17/2024 0932 by Quinten Collier RN  Pain Management Interventions: medication (see MAR)  Intervention: Prevent or Manage Pain  Recent Flowsheet Documentation  Taken 6/17/2024 0900 by Quinten Collier RN  Medication Review/Management: medications reviewed     Problem: Comorbidity Management  Goal:  Maintenance of Heart Failure Symptom Control  Outcome: Progressing  Intervention: Maintain Heart Failure Management  Recent Flowsheet Documentation  Taken 6/17/2024 0900 by Quinten Collier RN  Medication Review/Management: medications reviewed

## 2024-06-18 NOTE — CONSULTS
"[facility]- [facility]    Received CORE Clinic Consult from Dr. Argueta.    Reviewed Maya's chart and note they are admitted for acute systolic heart failure exacerbation, hypoxic respiratory failure, acute traumatic C2 type II odontoid fracture. Echocardiogram on 6/17/24 shows LVEF 20-25%.  This is their first admission(s) in the past year for concerns of heart failure.    Given above information, Maya meets criteria for CORE Clinic as patients EF is =/<40%.     Patient's primary insurance:  Medicare    Medication review: Patient is not on both Entresto and a SGLT2 Inhibitor (Jardiance, Farxiga, Invokana), Pharmacy Liaison Consult placed for coverage review.     Bedside RN to review heart failure education materials with pt/family including \"A Self Help Guide for Patients with Heart Failure\" and the HF stoplight tool.     If pt needing further low sodium education, consider nutrition consult.    Education materials provided:    Low sodium food and drink handout  Low sodium food product examples  Already prepared low salt meal delivery services  HF stoplight tool  Guide to HF booklet      I have contacted scheduling to arrange new MD cardiology follow-up within 1 week of discharge (or soonest available) w/ CORE enrollment 1 month later w/ labs.     On AVS, instructed pt to call General Cardiology at [phone] with questions/concerns prior to this visit.  Specifically instructed them to call RN with weight gain greater than 2 lbs overnight, and/or 5 lbs in a week, and/or worsening SOB/edema.     Future Appointments   Date Time Provider Department Center   6/19/2024 10:00 AM RH OT 1 WAITLIST [facility] [place] [facility]   6/19/2024 12:00 PM RH PT 1 WAITLIST [facility]RE[facility] [place] [facility]   7/3/2024  1:45 PM Shefali Ochoa MD [facility]   8/5/2024  9:00 AM RU LAB [facility]CL[facility] [place] [facility]   8/5/2024 10:00 AM Prasanna Moe NP [facility]   8/6/2024  1:00 PM [id number] BUFSXR [facility] - [facility] "   8/6/2024  1:30 PM Sam Rodriguez PA-C [facility]     Please call with any further questions.    Anaya Talley RN BSN   [place], [place]  [facility] Care Coordinator  [phone]

## 2024-06-18 NOTE — PROGRESS NOTES
06/18/24 1151   Appointment Info   Signing Clinician's Name / Credentials (PT) ADAM Martino   Student Supervision Direct supervision provided;Direct Patient Contact Provided   Rehab Comments (PT) Precautions for cervical lifting, flexion or rotation.  Cervical brace to be worn at all times.   Living Environment   People in Home alone   Current Living Arrangements assisted living   Home Accessibility no concerns   Transportation Anticipated agency   Living Environment Comments Pt typically uses [facility] within apt, gets w/c assistance for long distance activity.   Self-Care   Usual Activity Tolerance moderate   Current Activity Tolerance fair   Regular Exercise No   Equipment Currently Used at Home walker, standard;wheelchair, manual   Fall history within last six months yes   Number of times patient has fallen within last six months 1   Activity/Exercise/Self-Care Comment Pt is active in social event and has supportive family.   General Information   Onset of Illness/Injury or Date of Surgery 06/16/24   Referring Physician Corwin Argueta MD   Patient/Family Therapy Goals Statement (PT) return to home   Pertinent History of Current Problem (include personal factors and/or comorbidities that impact the POC) Per H&P, Pt is a 89-year-old female patient with past medical history of systolic congestive heart failure, severe MR, pulmonary hypertension, LVH, hypertension, glaucoma, trigeminal neuralgia, mild cognitive impairment, admitted by Dr. Argueta for evaluation after a fall, acute traumatic C2 type odontoid fracture, chronic right shoulder pain,, acute hypoxic respiratory failure, acute on chronic systolic CHF.   Existing Precautions/Restrictions lifting;brace worn when out of bed;spinal   Cognition   Affect/Mental Status (Cognition) WFL   Orientation Status (Cognition) oriented x 3   Follows Commands (Cognition) over 90% accuracy;verbal cues/prompting required;repetition of directions  required;physical/tactile prompts required   Safety Deficit (Cognition) impulsivity   Pain Assessment   Patient Currently in Pain Yes, see Vital Sign flowsheet  (Cervical neck pain)   Posture    Posture Forward head position;Protracted shoulders   Range of Motion (ROM)   Range of Motion ROM deficits secondary to pain   ROM Comment decreased cervical ROM   Strength (Manual Muscle Testing)   Strength (Manual Muscle Testing) Deficits observed during functional mobility   Strength Comments Pt demonstrated mms weakness throughout body; decreased activity tolerance   Bed Mobility   Bed Mobility supine-sit   Comment, (Bed Mobility) SBA   Transfers   Transfers sit-stand transfer   Comment, (Transfers) CGA   Sit-Stand Transfer   Sit-Stand [place] (Transfers) contact guard   Assistive Device (Sit-Stand Transfers) walker, front-wheeled   Gait/Stairs (Locomotion)   Bay Level (Gait) contact guard   Assistive Device (Gait) walker, front-wheeled   Distance in Feet (Gait) 2   Pattern (Gait) step-to   Deviations/Abnormal Patterns (Gait) base of support, narrow;fernie decreased;gait speed decreased;stride length decreased   Balance   Balance other (describe)   Balance Comments Pt demonstrated that she was able to maintain balance while sitting, SBA, CGA for standing balance with FWW   Sensory Examination   Sensory Perception patient reports no sensory changes   Coordination   Coordination no deficits were identified   Muscle Tone   Muscle Tone no deficits were identified   Clinical Impression   Criteria for Skilled Therapeutic Intervention Yes, treatment indicated   PT Diagnosis (PT) decreased functional mobility   Influenced by the following impairments Impaired mms strength and balance, cervical ROM   Functional limitations due to impairments Impaired bed mobility, transfers, and gait.   Clinical Presentation (PT Evaluation Complexity) stable   Clinical Presentation Rationale Pt medically stable, current functional  status   Clinical Decision Making (Complexity) low complexity   Planned Therapy Interventions (PT) balance training;bed mobility training;gait training;home exercise program;patient/family education;strengthening;stretching;transfer training;progressive activity/exercise   Risk & Benefits of therapy have been explained evaluation/treatment results reviewed;care plan/treatment goals reviewed;risks/benefits reviewed;current/potential barriers reviewed;participants voiced agreement with care plan;participants included;patient  (and granddaughter)   PT Total Evaluation Time   PT Eval, Low Complexity Minutes (84892) 10   Physical Therapy Goals   PT Frequency 5x/week   PT Predicted Duration/Target Date for Goal Attainment 06/21/24   PT Goals Bed Mobility;Transfers;Gait   PT: Bed Mobility Independent;Supine to/from sit   PT: Transfers Modified independent;Assistive device;Sit to/from stand   PT: Gait Modified independent;Standard walker;100 feet   PT Discharge Planning   PT Plan Progress bed mobility, transfers, and assess gait   PT Discharge Recommendation (DC Rec) Transitional Care Facility   PT Rationale for DC Rec Pt below baseline for functional activities of bed mobility, transfers, and gait.  Pt currently lives in long-term and supportive family.  Pt demonstrated heavy fatigue during session and would need to increase activity tolerence and need education for FWW to return to home safely.   PT Brief overview of current status A x 1, FWW   Total Session Time   Timed Code Treatment Minutes 30   Total Session Time (sum of timed and untimed services) 40

## 2024-06-18 NOTE — CONSULTS
Care Management Initial Consult    General Information  Assessment completed with: Patient, VM-chart review, Family, Mee  Type of CM/SW Visit: Initial Assessment    Primary Care Provider verified and updated as needed:     Readmission within the last 30 days: no previous admission in last 30 days      Reason for Consult: discharge planning  Advance Care Planning:            Communication Assessment  Patient's communication style: spoken language (English or Bilingual)    Hearing Difficulty or Deaf: no   Wear Glasses or Blind: yes    Cognitive  Cognitive/Neuro/Behavioral: WDL  Level of Consciousness: alert  Arousal Level: opens eyes spontaneously  Orientation: oriented x 4  Mood/Behavior: calm, cooperative  Best Language: 0 - No aphasia  Speech: clear, spontaneous    Living Environment:   People in home: alone     Current living Arrangements: assisted living  Name of Facility: [facility]   Able to return to prior arrangements: no       Family/Social Support:  Care provided by: self (facility)  Provides care for: no one  Marital Status:   Facility resident(s)/Staff, Children          Description of Support System: Supportive, Involved         Current Resources:   Patient receiving home care services: No     Community Resources: None  Equipment currently used at home: walker, standard, wheelchair, manual  Supplies currently used at home:       Does the patient's insurance plan have a 3 day qualifying hospital stay waiver?  No    Lifestyle & Psychosocial Needs:  Social Determinants of Health     Food Insecurity: Not on file   Depression: Not at risk (6/12/2024)    Received from [employer]    PHQ-2     PHQ-2 Score: 1   Housing Stability: Not on file   Tobacco Use: Unknown (6/17/2024)    Patient History     Smoking Tobacco Use: Never     Smokeless Tobacco Use: Unknown     Passive Exposure: Not on file   Financial Resource Strain: Not on file   Alcohol Use: Not on file   Transportation Needs: Not on  file   Physical Activity: Not on file   Interpersonal Safety: Not on file   Stress: Not on file   Social Connections: Not on file   Health Literacy: Not on file       Functional Status:  Prior to admission patient needed assistance:   Dependent ADLs:: Ambulation-walker, Bathing  Dependent IADLs:: Laundry, Cooking, Cleaning    Additional Information:  SW consulted for discharge planning.  Therapy is recommending TCU. Saurabh met with pt and granddaughter, Mee and discussed. They are agreeable to go to TCU. Pt prefers to go to [facility] since she's been there before. Leaning towards a private room, Mee will talk to her father about this today.    Pt lives at [place] [facility]. Per Mee's report, pt receives assistance with showers and has a pendant to use for assistance. Pt uses a walker at baseline, but has recently been getting help with wheelchair to meals since recent heart issue.     Mee gave permission for SAURABH to contact [facility] to discuss discharge planning. SW left a VM at [facility], [phone].    Referral sent to [facility].    Update: SHADI Hernandez from [facility] returned call. Provided an update on discharge plans.    Update: Ailin [facility] here visiting pt, pt and Ailin requested to speak to  for discharge planning. Pt has confusion and couldn't remember discussions this morning. SW shared discharge plans.  Pt needs reminding she's only going to rehab short term, she gets worried she's not going home.    MARILEE Marroquin, [facility]  Inpatient Care Coordination  [facility]  [phone]      TATYANA BARAHONA [facility] Hospital[facility]ABH

## 2024-06-18 NOTE — PROGRESS NOTES
Neurosurgery progress note    Upright cervical x-rays with stable alignment, and stable appearance of the odontoid fracture.    Working with PT on mobility.  Denies neck pain.  Cervical collar fitting appropriately with adjustments.    Exam    Alert, no acute distress, moving all extremities  Cervical collar in place    Assessment     nondisplaced acute type II odontoid process fracture.   multilevel degenerative changes, mild ligamentous injury from C1-C5    Status post mechanical fall    Plan    Wear cervical collar at all times, okay to remove for hygiene, and when lying flat.  Follow-up in 6 weeks with neurosurgery clinic with upright flexion-extension cervical x-rays prior.  This has been scheduled.  Avoid excessive bending and twisting, avoid heavy lifting.    Neurosurgery will sign off

## 2024-06-18 NOTE — PROGRESS NOTES
[facility]    Hospitalist Progress Note  Provider : Clarissa Morales MD, MD  Date of Service (when I saw the patient): 06/18/2024    Assessment & Plan   Mechanical fall  Acute traumatic C2 type II odontoid fracture  Scalp hematoma  Chronic right shoulder pain: She was ambulating with her walker when she reached out for something that was too far away causing her to fall forward hitting her head on the wall on the way down.  She is reporting some pain in her head and neck.  She has chronic right shoulder pain reported with a bone chip a year ago after a fall.  She denies any new focal weakness, numbness, tingling in her extremities and she has normal strength and light sensation on exam.  She has a visible scalp hematoma, but noncontrast head CT is negative for acute pathology.  I do not believe she is on anticoagulation or aspirin.  Cervical spine CT shows a nondisplaced type II odontoid process fracture and C5-6 moderate to severe bilateral foraminal stenosis.  X-ray of the right shoulder does not show any acute pathology.  Chest x-ray shows acute CHF, but no rib fractures.  -Consult neurosurgery.  They were contacted in the ER and recommended admission at [facility] with noncontrast MRI of the cervical spine, placement in a hard collar which has been done, neurochecks every 4 hours, and they will formally consult in the morning.  MRI will be done this morning as it is not currently available overnight  -Strict bedrest until seen by neurosurgery and then defer activity level to their expertise.   -Fall precautions  -Consult PT/OT/SW  -Acetaminophen scheduled 975 mg 3 times daily, oxycodone 2.5 mg for moderate and 5 mg for severe pain every 4 hours as needed, IV Dilaudid 0.2 mg every 3 hours as needed for severe pain if cannot take p.o.     Acute hypoxemic respiratory failure  Acute on chronic systolic CHF  Severe mitral regurgitation  Mild to moderate tricuspid regurgitation  Pulmonary  hypertension  HTN: For the last 2 weeks she has had increasing lower extremity edema bilaterally and dyspnea on exertion.  Prescribed 3 days of furosemide 20 mg daily a few days ago when she saw her clinic physician and at that time her weight was 138 pounds.  Bed weight here is 144 pounds.  Has not had much improvement in the shortness of breath or edema with furosemide.  She is hypoxic in the ER down to the 70s which is improved on 3 L of oxygen via facemask.  She did have a TTE completed in August 2023 when she fell and was found to have a L4 fracture.  At that time her EF was reduced to 40-45%, she had severe 3-4+ mitral vegetation, 1-2+ tricuspid regurgitation, pulmonary HTN with PA pressure 47 mmHg, LVH, dilated LV, and severe inferior and inferior lateral wall hypokinesis.  She has not had any ischemic workup, but certainly may be the etiology for her TTE findings then.  She denies any chest pain or pressure sensation recently.  Her [place] proBNP [place] [address].  Her troponin T is up slightly at 40 downtrending to 36 on repeat check.  EKG shows NSR with no ST elevation or depression.  I suspect the troponin elevation is secondary to demand ischemia in the setting of hypoxia an acute CHF.  Her chest x-ray shows mild cardiomegaly, small bilateral pleural effusions, and pulmonary edema consistent with acute CHF.  -Obtain TTE  -Received 20 mg IV Lasix in the ER, continued increased dose of Lasix at 40 mg twice daily.   -Cardiac monitoring  -Strict intake and output and daily weights  -Hold PTA amlodipine that was recently reduced to 5 mg daily  -Potassium and magnesium replacement protocols  -Continue supplemental oxygen, wean as able  -Echocardiogram showed reduced left ventricular systolic function with visual ejection fraction is 20-25%.  -Will likely need  ischemic cardiac workup with potential stress test or  potential coronary angiogram, but I think we should wait until she is not in acute CHF with  hypoxia and also further out from her acute C2 fracture. Discussed with family at bedside and family agreed.    Hypokalemia  -Replaced per protocol     Mild MARICRUZ: Creatinine up to 1.06 with baseline probably closer to 0.7-0.9.  This may be secondary to diuretics for the past few days, but she still has ongoing acute CHF.  Cardiorenal syndrome would also be in the differential.  -Trend creatinine/BMP with diuresis     History CVA: She was found to have a cerebellar CVA on previous MRI a year ago after a fall.  I am not certain that she saw a neurologist at this time and she does not appear to be on either statin or aspirin/anticoagulation.  She does have frequent falls.  -Recommend further discussion with family about initiating aspirin 81 mg daily a couple of days after monitoring her following her acute head neck trauma     Glaucoma: Resume PTA timolol, latanoprost, and prednisolone drops.     Mild cognitive impairment: Patient family reports some mild short-term memory loss at baseline.     Impaired glucose tolerance: Not currently on any medications.  Glucose elevated at 196 in the ER.  -Add on A1c     Trigeminal neuralgia: Resume PTA carbamazepine 200 mg 3 times daily.    DVT Prophylaxis: Pneumatic Compression Devices  Code Status: No CPR- Do NOT Intubate    Disposition: Expected discharge in 1-2 days     Clarissa Morales MD    Interval History   Patient seen and examined. Chart reviewed. Patient stated that she has intermittent neck pain but overall improving.     -Data reviewed today: I reviewed all new labs and imaging results over the last 24 hours. I personally reviewed no images or EKG's today.    Physical Exam   Temp: 97.6  F (36.4  C) Temp src: Temporal BP: 125/82 Pulse: 70   Resp: 18 SpO2: 96 % O2 Device: None (Room air) Oxygen Delivery: 1 LPM  Vitals:    06/17/24 0937 06/18/24 0500   Weight: 66.2 kg (145 lb 15.1 oz) 63.7 kg (140 lb 6.9 oz)     Vital Signs with Ranges  Temp:  [96.8  F (36  C)-97.8  F  (36.6  C)] 97.6  F (36.4  C)  Pulse:  [70-81] 70  Resp:  [18] 18  BP: (124-128)/(79-88) 125/82  SpO2:  [96 %-99 %] 96 %  I/O last 3 completed shifts:  In: 490 [P.O.:490]  Out: 1375 [Urine:1375]    GEN:  Alert, oriented x 3, appears comfortable, NAD.  HEENT:  Normocephalic/atraumatic, no scleral icterus, no nasal discharge, mouth moist.  CV:  Regular rate and rhythm, no murmur or JVD.  S1 + S2 noted, no S3 or S4.  LUNGS:  Clear to auscultation bilaterally without rales/rhonchi/wheezing/retractions.  Symmetric chest rise on inhalation noted.  ABD:  Active bowel sounds, soft, non-tender/non-distended.  No rebound/guarding/rigidity.  EXT:  No edema or cyanosis.  Hands/feet warm to touch with good signs of peripheral perfusion.  No joint synovitis noted.  SKIN:  Dry to touch, no exanthems noted in the visualized areas.  NEURO:  Symmetric muscle strength, sensation to touch grossly intact.  No new focal deficits appreciated.    Medications   Current Facility-Administered Medications   Medication Dose Route Frequency Provider Last Rate Last Admin     Current Facility-Administered Medications   Medication Dose Route Frequency Provider Last Rate Last Admin    acetaminophen (TYLENOL) tablet 975 mg  975 mg Oral TID Corwin Argueta MD   975 mg at 06/17/24 2250    [Held by provider] amLODIPine (NORVASC) tablet 5 mg  5 mg Oral Daily Corwin Argueta MD        carBAMazepine (TEGretol) chewable tablet 200 mg  200 mg Oral TID Corwin Argueta MD   200 mg at 06/17/24 2250    furosemide (LASIX) injection 40 mg  40 mg Intravenous BID Corwin Argueta MD   40 mg at 06/18/24 0849    latanoprost (XALATAN) 0.005 % ophthalmic solution 1 drop  1 drop Both Eyes At Bedtime Corwin Argueta MD   1 drop at 06/17/24 2147    prednisoLONE acetate (PRED FORTE) 1 % ophthalmic susp 1 drop  1 drop Left Eye 4x Daily Corwin Argueta MD   1 drop at 06/18/24 0850    sodium chloride (PF) 0.9% PF flush 3 mL  3 mL  Intracatheter Q8H Chris Argueta MD   3 mL at 24 0855    timolol maleate (TIMOPTIC) 0.5 % ophthalmic solution 1 drop  1 drop Both Eyes BID Chris Argueta MD   1 drop at 24 0850       Data   Recent Labs   Lab 24  0610 24  0748 24  2147   WBC  --  7.1 10.3   HGB  --  14.7 13.9   MCV  --  98 97   PLT  --  201 210   NA  --  139 137   POTASSIUM 3.2* 3.8 3.6   CHLORIDE  --  98 98   CO2  --  24 20*   BUN  --  19.9 22.1   CR  --  1.02* 1.06*   ANIONGAP  --  17* 19*   GILL  --  8.8 9.0   GLC  --  134* 196*       Recent Results (from the past 24 hour(s))   Echocardiogram Complete   Result Value    LVEF  20-25%    Narrative    [id number]  [id number]  [id number]  [id number]^PAM^CHRIS^DARIEN     [facility]  Echocardiography Laboratory  [address] [place] [address]     Name: CARLOS WEBBER  MRN: [id number]  : 1934  Study Date: 2024 03:11 PM  Age: 89 yrs  Gender: Female  Patient Location: [facility]  Reason For Study: Heart Failure  Ordering Physician: CHRIS ARGUETA  Referring Physician: Jordyn Jon  Performed By: Paul Henriquez RDCS     BSA: 1.6 m2  Height: 59 in  Weight: 144 lb  HR: 71  BP: 135/87 mmHg  ______________________________________________________________________________  Procedure  Complete Portable Echo Adult. Optison (NDC #[id number]) given intravenously.  ______________________________________________________________________________  Interpretation Summary     The visual ejection fraction is 20-25%.  Left ventricular systolic function is severely reduced.  The right ventricular systolic function is severely reduced.  There is moderate (2+) mitral regurgitation.  There is mild to moderate (1-2+) tricuspid regurgitation.  Right ventricular systolic pressure is elevated, consistent with moderate to  severe pulmonary hypertension.  The overall left ventricular systolic function appears to be worse on the  study. The degree  of tricuspid regurgitation appears to be similar but the  degree of mitral regurgitation appears less.  ______________________________________________________________________________  Left Ventricle  The left ventricle is borderline dilated. There is normal left ventricular  wall thickness. Diastolic Doppler findings (E/E' ratio and/or other  parameters) suggest left ventricular filling pressures are increased. The  visual ejection fraction is 20-25%. Left ventricular systolic function is  severely reduced. Grade III or advanced diastolic dysfunction. There is severe  global hypokinesia of the left ventricle. The inferior and inferolateral walls  appear to contract least well and appear akinetic. A false chord is noted  (normal variant).     Right Ventricle  The right ventricle is normal size. The right ventricular systolic function is  severely reduced.     Atria  The left atrium is moderately dilated. The right atrium is borderline dilated.  There is no color Doppler evidence of an atrial shunt.     Mitral Valve  There is moderate mitral annular calcification. There is moderate (2+) mitral  regurgitation.     Tricuspid Valve  There is mild to moderate (1-2+) tricuspid regurgitation. The right  ventricular systolic pressure is approximated at 52mmHg plus the right atrial  pressure. IVC diameter >2.1 cm collapsing <50% with sniff suggests a high RA  pressure estimated at 15 mmHg or greater. Right ventricular systolic pressure  is elevated, consistent with moderate to severe pulmonary hypertension.     Aortic Valve  The aortic valve is trileaflet. No aortic regurgitation is present. No  hemodynamically significant valvular aortic stenosis.     Pulmonic Valve  There is mild to moderate (1-2+) pulmonic valvular regurgitation. Right  ventricular diastolic pressure is approximated at 7mmHg plus the right atrial  pressure. Normal pulmonic valve velocity.     Vessels  The aortic root is normal size. Normal size ascending  aorta. IVC diameter >2.1  cm collapsing <50% with sniff suggests a high RA pressure estimated at 15 mmHg  or greater.     Pericardium  There is no pericardial effusion. Moderate left pleural effusion.     Rhythm  The rhythm was sinus with wide QRS.  ______________________________________________________________________________  MMode/2D Measurements & Calculations  IVSd: 0.79 cm     LVIDd: 5.7 cm  LVIDs: 5.0 cm  LVPWd: 0.89 cm  IVC diam: 2.6 cm  FS: 12.2 %  LV mass(C)d: 182.7 grams  LV mass(C)dI: 113.9 grams/m2  Ao root diam: 3.3 cm  asc Aorta Diam: 3.1 cm  LVOT diam: 1.8 cm  LVOT area: 2.4 cm2  Ao root diam index Ht(cm/m): 2.2  Ao root diam index BSA (cm/m2): 2.1  Asc Ao diam index BSA (cm/m2): 1.9  Asc Ao diam index Ht(cm/m): 2.1  EF Biplane: 10.5 %  LA Volume (BP): 67.0 ml     LA Volume Index (BP): 41.9 ml/m2  RWT: 0.31  TAPSE: 1.7 cm     Time Measurements  Aortic HR: 69.0 BPM     Doppler Measurements & Calculations  MV E max edwin: 155.0 cm/sec  MV A max edwin: 59.2 cm/sec  MV E/A: 2.6  MV max P.3 mmHg  MV mean P.9 mmHg  MV V2 VTI: 33.5 cm  MVA(VTI): 1.0 cm2  MV dec time: 0.16 sec  LV V1 max P.7 mmHg  LV V1 max: 82.8 cm/sec  LV V1 VTI: 14.1 cm  MR PISA: 3.0 cm2  MR ERO: 0.27 cm2  MR volume: 38.2 ml  CO(LVOT): 2.3 l/min  CI(LVOT): 1.5 l/min/m2  SV(LVOT): 34.0 ml  SI(LVOT): 21.2 ml/m2     PA acc time: 0.07 sec  PI end-d edwin: 135.7 cm/sec  TR max edwin: 350.9 cm/sec  TR max P.4 mmHg  E/E' av.9  Lateral E/e': 24.1  Medial E/e': 35.6  RV S Edwin: 10.0 cm/sec     ______________________________________________________________________________  Report approved by: Ramy Jefferson 2024 04:18 PM         XR Cervical Spine 2/3 Views    Narrative    EXAM: XR CERVICAL SPINE 2/3 VIEWS  LOCATION: [facility]  DATE: 2024    INDICATION: Upright cervical spine x ray:  fall, neck pain, odontoid process fracture  COMPARISON: CT performed earlier today.      Impression     IMPRESSION: Diffuse osseous demineralization, suboptimal patient positioning, and overlying cervical collar limiting the assessment of fine osseous detail. Subtle lucency on the lateral radiograph corresponding to the known acute nondisplaced odontoid   fracture, better assessed on recent CT. Unchanged cervical spine alignment with straightening of the normal lordosis and 1-2 mm likely degenerative anterolisthesis at C3-C4. Maintained vertebral body heights. Multilevel spondylosis, better evaluated on   recent CT. Prevertebral soft tissues within normal limits.

## 2024-06-18 NOTE — PROGRESS NOTES
Here to check collar.  Fit is appropriate but loose.  I tightened collar for fit and function.  Note that chin piece of orthosis protrudes beyond the patient's chin, other than unsightly, fit is appropriate in positioning and support.  Please call orthotics if questions.  Ce BERRY.

## 2024-06-19 ENCOUNTER — LAB REQUISITION (OUTPATIENT)
Dept: LAB | Facility: CLINIC | Age: 89
End: 2024-06-19
Payer: MEDICARE

## 2024-06-19 ENCOUNTER — DOCUMENTATION ONLY (OUTPATIENT)
Dept: GERIATRICS | Facility: CLINIC | Age: 89
End: 2024-06-19
Payer: MEDICARE

## 2024-06-19 VITALS
OXYGEN SATURATION: 93 % | WEIGHT: 128.9 LBS | BODY MASS INDEX: 25.99 KG/M2 | RESPIRATION RATE: 17 BRPM | SYSTOLIC BLOOD PRESSURE: 123 MMHG | HEIGHT: 59 IN | TEMPERATURE: 97.9 F | HEART RATE: 78 BPM | DIASTOLIC BLOOD PRESSURE: 80 MMHG

## 2024-06-19 DIAGNOSIS — Z11.1 ENCOUNTER FOR SCREENING FOR RESPIRATORY TUBERCULOSIS: ICD-10-CM

## 2024-06-19 LAB
ANION GAP SERPL CALCULATED.3IONS-SCNC: 14 MMOL/L (ref 7–15)
BUN SERPL-MCNC: 23.1 MG/DL (ref 8–23)
CALCIUM SERPL-MCNC: 8.6 MG/DL (ref 8.2–9.6)
CHLORIDE SERPL-SCNC: 97 MMOL/L (ref 98–107)
CREAT SERPL-MCNC: 0.98 MG/DL (ref 0.51–0.95)
DEPRECATED HCO3 PLAS-SCNC: 27 MMOL/L (ref 22–29)
EGFRCR SERPLBLD CKD-EPI 2021: 55 ML/MIN/1.73M2
ERYTHROCYTE [DISTWIDTH] IN BLOOD BY AUTOMATED COUNT: 14.3 % (ref 10–15)
GLUCOSE SERPL-MCNC: 134 MG/DL (ref 70–99)
HCT VFR BLD AUTO: 45.5 % (ref 35–47)
HGB BLD-MCNC: 14.7 G/DL (ref 11.7–15.7)
MAGNESIUM SERPL-MCNC: 2.1 MG/DL (ref 1.7–2.3)
MCH RBC QN AUTO: 31.4 PG (ref 26.5–33)
MCHC RBC AUTO-ENTMCNC: 32.3 G/DL (ref 31.5–36.5)
MCV RBC AUTO: 97 FL (ref 78–100)
PLATELET # BLD AUTO: 186 10E3/UL (ref 150–450)
POTASSIUM SERPL-SCNC: 3.8 MMOL/L (ref 3.4–5.3)
POTASSIUM SERPL-SCNC: 3.8 MMOL/L (ref 3.4–5.3)
RBC # BLD AUTO: 4.68 10E6/UL (ref 3.8–5.2)
SODIUM SERPL-SCNC: 138 MMOL/L (ref 135–145)
WBC # BLD AUTO: 6 10E3/UL (ref 4–11)

## 2024-06-19 PROCEDURE — 250N000013 HC RX MED GY IP 250 OP 250 PS 637: Performed by: INTERNAL MEDICINE

## 2024-06-19 PROCEDURE — 99238 HOSP IP/OBS DSCHRG MGMT 30/<: CPT | Performed by: INTERNAL MEDICINE

## 2024-06-19 PROCEDURE — 36415 COLL VENOUS BLD VENIPUNCTURE: CPT | Performed by: INTERNAL MEDICINE

## 2024-06-19 PROCEDURE — 250N000011 HC RX IP 250 OP 636: Mod: JZ | Performed by: INTERNAL MEDICINE

## 2024-06-19 PROCEDURE — 83735 ASSAY OF MAGNESIUM: CPT | Performed by: INTERNAL MEDICINE

## 2024-06-19 PROCEDURE — 80048 BASIC METABOLIC PNL TOTAL CA: CPT | Performed by: INTERNAL MEDICINE

## 2024-06-19 PROCEDURE — 84132 ASSAY OF SERUM POTASSIUM: CPT | Performed by: INTERNAL MEDICINE

## 2024-06-19 PROCEDURE — 85027 COMPLETE CBC AUTOMATED: CPT | Performed by: INTERNAL MEDICINE

## 2024-06-19 RX ORDER — FUROSEMIDE 20 MG
20 TABLET ORAL DAILY
Status: DISCONTINUED | OUTPATIENT
Start: 2024-06-19 | End: 2024-06-19 | Stop reason: HOSPADM

## 2024-06-19 RX ORDER — FUROSEMIDE 40 MG
40 TABLET ORAL DAILY
Status: DISCONTINUED | OUTPATIENT
Start: 2024-06-19 | End: 2024-06-19

## 2024-06-19 RX ORDER — FUROSEMIDE 20 MG
20 TABLET ORAL DAILY
DISCHARGE
Start: 2024-06-20 | End: 2024-06-19

## 2024-06-19 RX ORDER — FUROSEMIDE 40 MG
40 TABLET ORAL DAILY
Status: ON HOLD | DISCHARGE
Start: 2024-06-20 | End: 2024-09-18

## 2024-06-19 RX ORDER — FUROSEMIDE 20 MG
20 TABLET ORAL DAILY
Status: DISCONTINUED | OUTPATIENT
Start: 2024-06-19 | End: 2024-06-19

## 2024-06-19 RX ORDER — ONDANSETRON 4 MG/1
4 TABLET, ORALLY DISINTEGRATING ORAL EVERY 6 HOURS PRN
DISCHARGE
Start: 2024-06-19

## 2024-06-19 RX ORDER — POTASSIUM CHLORIDE 1125 MG/1
15 TABLET, EXTENDED RELEASE ORAL DAILY
DISCHARGE
Start: 2024-06-19

## 2024-06-19 RX ORDER — OXYCODONE HYDROCHLORIDE 5 MG/1
2.5 TABLET ORAL EVERY 4 HOURS PRN
Qty: 3 TABLET | Refills: 0 | Status: SHIPPED | OUTPATIENT
Start: 2024-06-19 | End: 2024-07-01

## 2024-06-19 RX ADMIN — FUROSEMIDE 20 MG: 20 TABLET ORAL at 09:45

## 2024-06-19 RX ADMIN — ONDANSETRON 4 MG: 4 TABLET, ORALLY DISINTEGRATING ORAL at 12:41

## 2024-06-19 RX ADMIN — TIMOLOL MALEATE 1 DROP: 5 SOLUTION/ DROPS OPHTHALMIC at 09:10

## 2024-06-19 ASSESSMENT — ACTIVITIES OF DAILY LIVING (ADL)
ADLS_ACUITY_SCORE: 44
ADLS_ACUITY_SCORE: 42
ADLS_ACUITY_SCORE: 44
ADLS_ACUITY_SCORE: 42
ADLS_ACUITY_SCORE: 42
ADLS_ACUITY_SCORE: 44
ADLS_ACUITY_SCORE: 42
ADLS_ACUITY_SCORE: 44
ADLS_ACUITY_SCORE: 42

## 2024-06-19 NOTE — PLAN OF CARE
"Goal Outcome Evaluation:      Plan of Care Reviewed With: patient    Overall Patient Progress: improvingOverall Patient Progress: improving    Outcome Evaluation: Pt Ax1 Gb/walker; troponin levels trending high; C-collar on at all times, okay to take off when pt is laying flat per chart; strict I&Os, tele - sinus rhythm; d/c to rehab, date and time tbd.      Problem: Adult Inpatient Plan of Care  Goal: Plan of Care Review  Description: The Plan of Care Review/Shift note should be completed every shift.  The Outcome Evaluation is a brief statement about your assessment that the patient is improving, declining, or no change.  This information will be displayed automatically on your shift  note.  Outcome: Progressing  Flowsheets (Taken 6/19/2024 0543)  Outcome Evaluation:   Pt Ax1 Gb/walker   troponin levels trending high   C-collar on at all times, okay to take off when pt is laying flat per chart   strict I&Os, tele - sinus rhythm   d/c to rehab, date and time tbd.  Plan of Care Reviewed With: patient  Overall Patient Progress: improving  Goal: Patient-Specific Goal (Individualized)  Description: You can add care plan individualizations to a care plan. Examples of Individualization might be:  \"Parent requests to be called daily at 9am for status\", \"I have a hard time hearing out of my right ear\", or \"Do not touch me to wake me up as it startles  me\".  Outcome: Progressing  Goal: Absence of Hospital-Acquired Illness or Injury  Outcome: Progressing  Intervention: Identify and Manage Fall Risk  Recent Flowsheet Documentation  Taken 6/18/2024 2316 by Nena Anthony, RN  Safety Promotion/Fall Prevention:   activity supervised   clutter free environment maintained   mobility aid in reach   nonskid shoes/slippers when out of bed   room near nurse's station   safety round/check completed  Intervention: Prevent Skin Injury  Recent Flowsheet Documentation  Taken 6/18/2024 2316 by Nena Anthony, RN  Body " Position: position changed independently  Skin Protection:   adhesive use limited   incontinence pads utilized  Device Skin Pressure Protection:   absorbent pad utilized/changed   adhesive use limited  Taken 6/18/2024 1957 by Nena Anthony RN  Body Position: position changed independently  Intervention: Prevent and Manage VTE (Venous Thromboembolism) Risk  Recent Flowsheet Documentation  Taken 6/18/2024 2316 by Nena Anthony RN  VTE Prevention/Management: SCDs (sequential compression devices) off  Intervention: Prevent Infection  Recent Flowsheet Documentation  Taken 6/18/2024 2316 by Nena Anthony RN  Infection Prevention:   rest/sleep promoted   single patient room provided  Goal: Optimal Comfort and Wellbeing  Outcome: Progressing  Intervention: Monitor Pain and Promote Comfort  Recent Flowsheet Documentation  Taken 6/18/2024 1957 by Nena Anthony RN  Pain Management Interventions: medication (see MAR)  Goal: Readiness for Transition of Care  Outcome: Progressing     Problem: Fall Injury Risk  Goal: Absence of Fall and Fall-Related Injury  Outcome: Progressing  Intervention: Identify and Manage Contributors  Recent Flowsheet Documentation  Taken 6/18/2024 2316 by Nena Anthony RN  Medication Review/Management: medications reviewed  Intervention: Promote Injury-Free Environment  Recent Flowsheet Documentation  Taken 6/18/2024 2316 by Nena Anthnoy RN  Safety Promotion/Fall Prevention:   activity supervised   clutter free environment maintained   mobility aid in reach   nonskid shoes/slippers when out of bed   room near nurse's station   safety round/check completed     Problem: Pain Acute  Goal: Optimal Pain Control and Function  Outcome: Progressing  Intervention: Develop Pain Management Plan  Recent Flowsheet Documentation  Taken 6/18/2024 1957 by Nena Anthony RN  Pain Management Interventions: medication (see MAR)  Intervention:  Prevent or Manage Pain  Recent Flowsheet Documentation  Taken 6/18/2024 2316 by Nena Anthony, RN  Medication Review/Management: medications reviewed  Intervention: Optimize Psychosocial Wellbeing  Recent Flowsheet Documentation  Taken 6/18/2024 2316 by Nena Anthony, RN  Supportive Measures:   active listening utilized   self-care encouraged   verbalization of feelings encouraged     Problem: Comorbidity Management  Goal: Maintenance of Heart Failure Symptom Control  Outcome: Progressing  Intervention: Maintain Heart Failure Management  Recent Flowsheet Documentation  Taken 6/18/2024 2316 by Nena Anthony, RN  Medication Review/Management: medications reviewed

## 2024-06-19 NOTE — PLAN OF CARE
Occupational Therapy Discharge Summary    Reason for therapy discharge:    Discharged to transitional care facility.    Progress towards therapy goal(s). See goals on Care Plan in [facility] electronic health record for goal details.  Goals not met.  Barriers to achieving goals:   discharge from facility.    Therapy recommendation(s):    Continued therapy is recommended.  Rationale/Recommendations:  Pt presents below baseline with impaired balance, weakness, confusion, and low vision. Pt is typically modified independent with most self-cares and mobility in room. Now requiring assist with all mobility and ADL. Recommend TCU for progression of ADL safety and independence.    Pt not seen by writer on this date. Note written based on previous treating OT's note and recommendations.

## 2024-06-19 NOTE — PLAN OF CARE
"Goal Outcome Evaluation:      Plan of Care Reviewed With: patient    Overall Patient Progress: improving     Outcome Evaluation: Pt discharging with Son to TCU. Up Assist of 1 GB and walker and cervical collar in place. Poor appetite this am said she didn't feel like eating much. Denied pain this am.Pt had episode of small emesis and nausea. Prn Zofran given with nausea improved. Baseline tingling BUE. Weaned to RA. VSS. PO Lasix started. Voiding adequate incontinent at times. Remote tele SR. Discharge packet given to son for TCU and copy of discharge given to him and pt. Questions answered. All belongings taken with pt.    Problem: Adult Inpatient Plan of Care  Goal: Plan of Care Review  Description: The Plan of Care Review/Shift note should be completed every shift.  The Outcome Evaluation is a brief statement about your assessment that the patient is improving, declining, or no change.  This information will be displayed automatically on your shift  note.  Outcome: Adequate for Care Transition  Flowsheets (Taken 6/19/2024 1250)  Plan of Care Reviewed With: patient  Goal: Patient-Specific Goal (Individualized)  Description: You can add care plan individualizations to a care plan. Examples of Individualization might be:  \"Parent requests to be called daily at 9am for status\", \"I have a hard time hearing out of my right ear\", or \"Do not touch me to wake me up as it startles  me\".  Outcome: Adequate for Care Transition  Goal: Absence of Hospital-Acquired Illness or Injury  Outcome: Adequate for Care Transition  Intervention: Identify and Manage Fall Risk  Recent Flowsheet Documentation  Taken 6/19/2024 0900 by Hannah Forbes RN  Safety Promotion/Fall Prevention:   activity supervised   assistive device/personal items within reach   clutter free environment maintained   lighting adjusted   mobility aid in reach   nonskid shoes/slippers when out of bed   patient and family education   safety round/check " completed   supervised activity  Intervention: Prevent Skin Injury  Recent Flowsheet Documentation  Taken 6/19/2024 0900 by Hannah Forbes RN  Skin Protection:   adhesive use limited   incontinence pads utilized  Device Skin Pressure Protection:   absorbent pad utilized/changed   adhesive use limited   tubing/devices free from skin contact  Intervention: Prevent and Manage VTE (Venous Thromboembolism) Risk  Recent Flowsheet Documentation  Taken 6/19/2024 0900 by Hannah Forbes RN  VTE Prevention/Management: SCDs (sequential compression devices) off  Intervention: Prevent Infection  Recent Flowsheet Documentation  Taken 6/19/2024 0900 by Hannah Forbes RN  Infection Prevention:   hand hygiene promoted   environmental surveillance performed   rest/sleep promoted  Goal: Optimal Comfort and Wellbeing  Outcome: Adequate for Care Transition  Intervention: Monitor Pain and Promote Comfort  Recent Flowsheet Documentation  Taken 6/19/2024 0900 by Hannah Forbes RN  Pain Management Interventions: pain management plan reviewed with patient/caregiver  Goal: Readiness for Transition of Care  Outcome: Adequate for Care Transition     Problem: Fall Injury Risk  Goal: Absence of Fall and Fall-Related Injury  Outcome: Adequate for Care Transition  Intervention: Identify and Manage Contributors  Recent Flowsheet Documentation  Taken 6/19/2024 0900 by Hannah Forbes RN  Self-Care Promotion:   independence encouraged   BADL personal objects within reach   adaptive equipment use encouraged   meal set-up provided  Medication Review/Management: medications reviewed  Intervention: Promote Injury-Free Environment  Recent Flowsheet Documentation  Taken 6/19/2024 0900 by Hannah Forbes RN  Safety Promotion/Fall Prevention:   activity supervised   assistive device/personal items within reach   clutter free environment maintained   lighting adjusted   mobility aid in reach   nonskid shoes/slippers when out of bed    patient and family education   safety round/check completed   supervised activity     Problem: Pain Acute  Goal: Optimal Pain Control and Function  Outcome: Adequate for Care Transition  Intervention: Develop Pain Management Plan  Recent Flowsheet Documentation  Taken 6/19/2024 0900 by Hannah Forbes RN  Pain Management Interventions: pain management plan reviewed with patient/caregiver  Intervention: Prevent or Manage Pain  Recent Flowsheet Documentation  Taken 6/19/2024 0900 by Hannah Forbes RN  Medication Review/Management: medications reviewed  Intervention: Optimize Psychosocial Wellbeing  Recent Flowsheet Documentation  Taken 6/19/2024 0900 by Hannah Forbes RN  Supportive Measures:   active listening utilized   goal-setting facilitated   positive reinforcement provided   self-care encouraged     Problem: Comorbidity Management  Goal: Maintenance of Heart Failure Symptom Control  Outcome: Adequate for Care Transition  Intervention: Maintain Heart Failure Management  Recent Flowsheet Documentation  Taken 6/19/2024 0900 by Hannah Forbes RN  Medication Review/Management: medications reviewed

## 2024-06-19 NOTE — CONSULTS
Patient has Medicare D through [employer].    Jardiance/Farxiga  --Upon receipt of RX, Discharge Pharmacy can provide 1 mo free Farxiga or 14 days Jardiance.  --Patient will pay 100% of the first $545 in drugs costs ($423 remains; first month will be as much as $453)  --Subsequent fills will be $98/mo.  --lf/when total drug costs exceed $5,030, price will increase to a 25% coinsurance, equivalent to $153/mo.    Inpefa is not covered.     Brenzaavy (bexagliflozin) is a newer SGLT2i that is currently only indicated for diabetes.  It is available only at [facility] mail order pharmacy, for $48/mo.       Entresto  --Upon receipt of RX, Discharge Pharmacy can provide 1 mo free.  --Patient will pay 100% of the first $545 in drugs costs ($423 remains; first month will be as much as $466)  --Subsequent fills will be $110/mo.  --lf/when total drug costs exceed $5,030, price will increase to a 25% coinsurance, equivalent to $172/mo.    Marlena Nicole  Pharmacy Technician/Liaison, Discharge Pharmacy   [phone] (voice or text)  [email or web address]  Available on [email or web address] and Teams

## 2024-06-19 NOTE — PROGRESS NOTES
Care Management Discharge Note    Discharge Date: 06/19/2024       Discharge Disposition: Transitional Care    Discharge Services:      Discharge DME:      Discharge Transportation: family or friend will provide    Private pay costs discussed: private room/amenity fees    Does the patient's insurance plan have a 3 day qualifying hospital stay waiver?  No    PAS Confirmation Code: [id number]  Patient/family educated on Medicare website which has current facility and service quality ratings: yes    Education Provided on the Discharge Plan:    Persons Notified of Discharge Plans: son, pt and TCU  Patient/Family in Agreement with the Plan: yes    Additional Information:  Pt discharging to [facility] TCU today, son transporting.  Orders sent.    MARILEE Schaeffer, [facility]  Inpatient Care Coordination  [facility]  [phone]      DANUTA SCHAEFFER

## 2024-06-19 NOTE — PLAN OF CARE
Physical Therapy Discharge Summary    Reason for therapy discharge:    Discharged to transitional care facility.    Progress towards therapy goal(s). See goals on Care Plan in [facility] electronic health record for goal details.  Goals not met.  Barriers to achieving goals:   discharge from facility.    Therapy recommendation(s):    Continued therapy is recommended.  Rationale/Recommendations:  TCU recommended for further progression of strength and IND mobility.

## 2024-06-20 ENCOUNTER — TRANSITIONAL CARE UNIT VISIT (OUTPATIENT)
Dept: GERIATRICS | Facility: CLINIC | Age: 89
End: 2024-06-20
Payer: MEDICARE

## 2024-06-20 ENCOUNTER — TELEPHONE (OUTPATIENT)
Dept: CARDIOLOGY | Facility: CLINIC | Age: 89
End: 2024-06-20

## 2024-06-20 ENCOUNTER — LAB REQUISITION (OUTPATIENT)
Dept: LAB | Facility: CLINIC | Age: 89
End: 2024-06-20
Payer: MEDICARE

## 2024-06-20 VITALS
WEIGHT: 144.8 LBS | OXYGEN SATURATION: 98 % | SYSTOLIC BLOOD PRESSURE: 126 MMHG | RESPIRATION RATE: 16 BRPM | BODY MASS INDEX: 29.19 KG/M2 | HEIGHT: 59 IN | HEART RATE: 80 BPM | DIASTOLIC BLOOD PRESSURE: 77 MMHG | TEMPERATURE: 97 F

## 2024-06-20 DIAGNOSIS — K59.00 CONSTIPATION, UNSPECIFIED CONSTIPATION TYPE: ICD-10-CM

## 2024-06-20 DIAGNOSIS — N18.32 STAGE 3B CHRONIC KIDNEY DISEASE (H): ICD-10-CM

## 2024-06-20 DIAGNOSIS — S12.110A: Primary | ICD-10-CM

## 2024-06-20 DIAGNOSIS — S12.112D NONDISPLACED TYPE II DENS FRACTURE, SUBSEQUENT ENCOUNTER FOR FRACTURE WITH ROUTINE HEALING: ICD-10-CM

## 2024-06-20 DIAGNOSIS — I50.23 ACUTE ON CHRONIC SYSTOLIC HEART FAILURE (H): ICD-10-CM

## 2024-06-20 DIAGNOSIS — I50.23 ACUTE ON CHRONIC SYSTOLIC (CONGESTIVE) HEART FAILURE (H): ICD-10-CM

## 2024-06-20 DIAGNOSIS — I27.20 PULMONARY HYPERTENSION (H): ICD-10-CM

## 2024-06-20 DIAGNOSIS — G50.0 TRIGEMINAL NEURALGIA: ICD-10-CM

## 2024-06-20 DIAGNOSIS — I34.0 MITRAL VALVE INSUFFICIENCY, UNSPECIFIED ETIOLOGY: ICD-10-CM

## 2024-06-20 DIAGNOSIS — R09.02 HYPOXIA: ICD-10-CM

## 2024-06-20 PROCEDURE — P9603 ONE-WAY ALLOW PRORATED MILES: HCPCS | Performed by: PHYSICIAN ASSISTANT

## 2024-06-20 PROCEDURE — 36415 COLL VENOUS BLD VENIPUNCTURE: CPT | Performed by: PHYSICIAN ASSISTANT

## 2024-06-20 PROCEDURE — 86481 TB AG RESPONSE T-CELL SUSP: CPT | Performed by: PHYSICIAN ASSISTANT

## 2024-06-20 PROCEDURE — 99309 SBSQ NF CARE MODERATE MDM 30: CPT | Performed by: PHYSICIAN ASSISTANT

## 2024-06-20 RX ORDER — SENNA AND DOCUSATE SODIUM 50; 8.6 MG/1; MG/1
1 TABLET, FILM COATED ORAL AT BEDTIME
Refills: 1 | Status: SHIPPED
Start: 2024-06-20 | End: 2024-06-24

## 2024-06-20 RX ORDER — ACETAMINOPHEN 325 MG/1
975 TABLET ORAL 3 TIMES DAILY
Status: SHIPPED | DISCHARGE
Start: 2024-06-20

## 2024-06-20 RX ORDER — CHOLECALCIFEROL (VITAMIN D3) 50 MCG
1 TABLET ORAL DAILY
Status: SHIPPED
Start: 2024-06-20 | End: 2024-06-24

## 2024-06-20 NOTE — PROGRESS NOTES
"[facility] GERIATRICS    PRIMARY CARE PROVIDER AND CLINIC:  Jordyn Jon, [address] [facility] [address]  Chief Complaint   Patient presents with    Hospital F/U      [facility] Medical Record Number:  [id number]  Place of Service where encounter took place:  [facility] [[id number]]    Maya Cullen  is a 89 year old  (2/13/1934), admitted to the above facility from  [facility]. Hospital stay '6/17/24 through 6/19/24..   HPI:    Notes from hospitalization reviewed including H&P Neurosurgery consult and D/c summary    Maya Cullen is an exceptionally pleasant 89-year-old female with a past medical history of previous CVA, glaucoma, hypertension who was recently hospitalized at [facility].  Presented for evaluation of a fall.  Found to have a nondisplaced odontoid fracture.  Neurosurgery consulted who recommended conservative management.  Additionally, noted to be hypoxic on presentation with worsening lower extremity edema.  TTE obtained which showed declining EF of 20-25%.  Diuresed with IV Lasix and transition to oral Lasix at discharge.  She had been taking oral Lasix as outpatient but only for a few days prior to admission.  Assessed by therapies and discharged to TCU    \"Barbie\" is evaluated in her room with a family friend at bedside.  Overall doing well.  Minimal pain.  Does agree to have Tylenol scheduled.  Wants to try to avoid oxycodone.  Feels a bit constipated so we will schedule some senna S.  Lives in [facility] but receives minimal services.  Reviewed cardiac workup done in the hospital as well.  Discussed plans for outpatient cardiology follow-up.  Tolerating p.o. Lasix.  Had only been initiated on it for a few days prior to hospital presentation.    Message left for KEVEN Gonzales, to coordinate care    Review of nursing home EMR: -151    CODE STATUS/ADVANCE DIRECTIVES DISCUSSION:  No CPR- Do NOT Intubate    ALLERGIES: "   Allergies   Allergen Reactions    Brimonidine Other (See Comments)     Follicular conjunctivitis    Dorzolamide Other (See Comments)     Follicular conjunctivitis    Benzalkonium Chloride Other (See Comments)     Eye irritation.  Does better with preservative free but can tolerate preserved drops    Fluorescein Itching     fluress    Penicillins     Sulfa Antibiotics Other (See Comments)    Codeine Unknown and Rash    Fd&C Yellow #5 (Tartrazine) Rash      PAST MEDICAL HISTORY:   Past Medical History:   Diagnosis Date    Cerebrovascular accident (H)     Cerebellar CVA seen on MRI 2023    Glaucoma     HTN (hypertension)     L4 vertebral fracture (H)     Mild cognitive impairment     Pulmonary hypertension (H)     Severe mitral regurgitation     Systolic CHF, chronic (H)     Tricuspid regurgitation     Trigeminal neuralgia       PAST SURGICAL HISTORY:   has a past surgical history that includes Eye surgery.  FAMILY HISTORY: family history is not on file.  SOCIAL HISTORY:   reports that she has never smoked. She does not have any smokeless tobacco history on file. She reports that she does not drink alcohol and does not use drugs.  Patient's living condition: Lives in [facility] with minimal services    Post Discharge Medication Reconciliation Status:   MED REC REQUIRED  Post Medication Reconciliation Status: discharge medications reconciled and changed, per note/orders       Current Outpatient Medications   Medication Sig Dispense Refill    acetaminophen (TYLENOL) 325 MG tablet Take 3 tablets (975 mg) by mouth 3 times daily      carBAMazepine (TEGRETOL) 100 MG chewable tablet Take 200 mg by mouth 3 times daily      furosemide (LASIX) 40 MG tablet Take 1 tablet (40 mg) by mouth daily      latanoprost (XALATAN) 0.005 % ophthalmic solution Place 1 drop into both eyes At Bedtime      multivitamin w/minerals (THERA-VIT-M) tablet Take 1 tablet by mouth daily      ondansetron (ZOFRAN ODT) 4 MG ODT tab Take 1 tablet (4 mg) by mouth  "every 6 hours as needed for nausea      SENNA-docusate sodium (SENNA S) 8.6-50 MG tablet Take 1 tablet by mouth at bedtime  1    vitamin D3 (CHOLECALCIFEROL) 50 mcg (2000 units) tablet Take 1 tablet (50 mcg) by mouth daily      oxyCODONE (ROXICODONE) 5 MG tablet Take 0.5 tablets (2.5 mg) by mouth every 4 hours as needed for moderate to severe pain 3 tablet 0    potassium chloride nick ER (KLOR-CON M15) 15 MEQ CR tablet Take 1 tablet (15 mEq) by mouth daily      timolol maleate (TIMOPTIC) 0.5 % ophthalmic solution Place 1 drop into both eyes 2 times daily       No current facility-administered medications for this visit.       ROS:  4 point ROS including Respiratory, CV, GI and , other than that noted in the HPI,  is negative    Vitals:  /77   Pulse 80   Temp 97  F (36.1  C)   Resp 16   Ht 1.499 m (4' 11\")   Wt 65.7 kg (144 lb 12.8 oz)   SpO2 98%   BMI 29.25 kg/m    Exam:  Physical Exam  Vitals (Facility EMR) reviewed.   Constitutional:       General: She is not in acute distress.  HENT:      Head: Normocephalic.   Eyes:      General: No scleral icterus.  Neck:      Comments: Cervical collar in place.  Pulmonary:      Effort: Pulmonary effort is normal.   Abdominal:      General: There is no distension.      Tenderness: There is no abdominal tenderness.   Musculoskeletal:      Comments: 3+ edema   Skin:     General: Skin is warm and dry.      Findings: No rash.   Neurological:      Mental Status: She is alert. Mental status is at baseline.   Psychiatric:         Behavior: Behavior normal.         Lab/Diagnostic data:  Recent labs in [facility] reviewed by me today.  and Most Recent 3 CBC's:  Recent Labs   Lab Test 06/19/24  0609 06/17/24  0748 06/16/24  2147   WBC 6.0 7.1 10.3   HGB 14.7 14.7 13.9   MCV 97 98 97    201 210     Most Recent 3 BMP's:  Recent Labs   Lab Test 06/19/24  0609 06/19/24  0047 06/18/24  0610 06/17/24  0748     --  140 139   POTASSIUM 3.8 3.8 3.2*  3.2* 3.8   CHLORIDE 97* "  --  97* 98   CO2 27  --  25 24   BUN 23.1*  --  17.4 19.9   CR 0.98*  --  1.00* 1.02*   ANIONGAP 14  --  18* 17*   GILL 8.6  --  8.4 8.8   *  --  114* 134*     Most Recent 2 LFT's:No lab results found.  Most Recent TSH and T4:No lab results found.  Most Recent Hemoglobin A1c:  Recent Labs   Lab Test 06/16/24  2147   A1C 5.9*     Most Recent ESR & CRP:No lab results found.  Most Recent Anemia Panel:  Recent Labs   Lab Test 06/19/24  0609   WBC 6.0   HGB 14.7   HCT 45.5   MCV 97        MRI CERVICAL SPINE WITHOUT CONTRAST 6/17/2024 11:36 AM      HISTORY: Type II odontoid fracture.     TECHNIQUE: Multiplanar, multisequence MRI of the cervical spine  without contrast.      COMPARISON: None.     FINDINGS: Nondisplaced type II odontoid fracture is better  demonstrated on prior CT. Minimal prevertebral edema. T2 hyperintense  signal within the posterior soft tissues along the C1-C4 spinous  processes, likely mild ligamentous injury.     Alignment is significant for multilevel grade 1 spondylolisthesis.  Bone marrow also demonstrates background of scattered mild  degenerative endplate changes. No abnormal cord signal. Bilateral  pleural effusions.     Level by level as follows:     C2-C3:  Disc height maintained. No herniation. Moderate bilateral  facet arthropathy. Mild right foraminal stenosis. No left foraminal  stenosis. No spinal canal stenosis.      C3-C4:  Mild disc height loss. Disc osteophyte complex. Moderate to  severe bilateral facet arthropathy. Mild bilateral foraminal stenosis.  No spinal canal stenosis.      C4-C5:  Moderate disc height loss. Disc osteophyte complex. Moderate  bilateral facet arthropathy. Moderate bilateral foraminal stenosis.  Mild spinal canal stenosis.      C5-C6:  Moderate disc height loss. Disc osteophyte complex. Moderate  bilateral facet arthropathy. Moderate right and severe left foraminal  stenosis. Mild spinal canal stenosis.     C6-C7:  Moderate disc height loss. Disc  osteophyte complex. Moderate  bilateral facet arthropathy. Mild right and moderate to severe left  foraminal stenosis. Mild spinal canal stenosis.     C7-T1: Disc height maintained. No herniation. Moderate to severe  bilateral facet arthropathy. No right foraminal stenosis. Moderate  left foraminal stenosis. No spinal canal stenosis.                                                                      IMPRESSION:    1. Nondisplaced type II dens fracture better appreciated on CT.  2. Minimal prevertebral edema.  3. Mild posterior paraspinal edema at C1-C5, likely mild ligamentous  injury.  4. Multilevel degenerative change.        Details    Reading Physician Reading Date Result Priority   Zaire Akers MD  [phone] 6/17/2024      Narrative & Impression       Study Date: 06/17/2024 03:11 PM       BSA: 1.6 m2  Height: 59 in  Weight: 144 lb  HR: 71  BP: 135/87 mmHg  ______________________________________________________________________________  Procedure  Complete Portable Echo Adult. Optison (NDC #[id number]) given intravenously.  ______________________________________________________________________________  Interpretation Summary     The visual ejection fraction is 20-25%.  Left ventricular systolic function is severely reduced.  The right ventricular systolic function is severely reduced.  There is moderate (2+) mitral regurgitation.  There is mild to moderate (1-2+) tricuspid regurgitation.  Right ventricular systolic pressure is elevated, consistent with moderate to  severe pulmonary hypertension.  The overall left ventricular systolic function appears to be worse on the  study. The degree of tricuspid regurgitation appears to be similar but the  degree of mitral regurgitation appears less.         ASSESSMENT/PLAN:  Mechanical fall  Acute C2 type II odontoid fracture:  - Adjust Tylenol to 1000 mg 3 times daily scheduled  - Continue as needed oxycodone  - Start vitamin D 2000 units daily  -  Cervical collar at all times  - Check vitamin D level  - PT/OT  -Follow-up with neurosurgery 8/6    Acute hypoxic respiratory failure, resolved  Acute HFrEF  Severe MR  Pulmonary hypertension  Hypertension: TTE with declining EF 20-25%.  Had started Lasix just a few days prior to the fall for lower extremity edema and shortness of breath.  - Now off O2  - Continue Lasix 40 mg daily and KCl  - Monitor weights 2 times weekly  - BMP 6/24  - Compression socks to LE  - Has follow-up with cardiology to establish care 7/3    CKD: Estimated Creatinine Clearance: 39.6 mL/min (A) (based on SCr of 0.98 mg/dL (H)).  -BMP 6/24    Trigeminal neuralgia  -Continue Tegretol      Electronically signed by:  Mercedes Pelayo PA-C

## 2024-06-20 NOTE — LETTER
" 6/20/2024      Maya Cullen  [address]  [facility] [address]        [facility] GERIATRICS    PRIMARY CARE PROVIDER AND CLINIC:  Jordyn Jon, [address] [facility] / [facility] [address]  Chief Complaint   Patient presents with     Hospital F/U      [facility]on Medical Record Number:  [id number]  Place of Service where encounter took place:  [facility] ([facility]) [[id number]]    Maya Cullen  is a 89 year old  (2/13/1934), admitted to the above facility from  [facility]. Hospital stay '6/17/24 through 6/19/24..   HPI:    Notes from hospitalization reviewed including H&P Neurosurgery consult and D/c summary    Maya Cullen is an exceptionally pleasant 89-year-old female with a past medical history of previous CVA, glaucoma, hypertension who was recently hospitalized at [facility]  Presented for evaluation of a fall.  Found to have a nondisplaced odontoid fracture.  Neurosurgery consulted who recommended conservative management.  Additionally, noted to be hypoxic on presentation with worsening lower extremity edema.  TTE obtained which showed declining EF of 20-25%.  Diuresed with IV Lasix and transition to oral Lasix at discharge.  She had been taking oral Lasix as outpatient but only for a few days prior to admission.  Assessed by therapies and discharged to TCU    \"Barbie\" is evaluated in her room with a family friend at bedside.  Overall doing well.  Minimal pain.  Does agree to have Tylenol scheduled.  Wants to try to avoid oxycodone.  Feels a bit constipated so we will schedule some senna S.  Lives in [facility] but receives minimal services.  Reviewed cardiac workup done in the hospital as well.  Discussed plans for outpatient cardiology follow-up.  Tolerating p.o. Lasix.  Had only been initiated on it for a few days prior to hospital presentation.    Message left for KEVEN Gonzales, to coordinate care    Review of nursing home EMR: SBP " 123-151    CODE STATUS/ADVANCE DIRECTIVES DISCUSSION:  No CPR- Do NOT Intubate    ALLERGIES:   Allergies   Allergen Reactions     Brimonidine Other (See Comments)     Follicular conjunctivitis     Dorzolamide Other (See Comments)     Follicular conjunctivitis     Benzalkonium Chloride Other (See Comments)     Eye irritation.  Does better with preservative free but can tolerate preserved drops     Fluorescein Itching     fluress     Penicillins      Sulfa Antibiotics Other (See Comments)     Codeine Unknown and Rash     Fd&C Yellow #5 (Tartrazine) Rash      PAST MEDICAL HISTORY:   Past Medical History:   Diagnosis Date     Cerebrovascular accident (H)     Cerebellar CVA seen on MRI 2023     Glaucoma      HTN (hypertension)      L4 vertebral fracture (H)      Mild cognitive impairment      Pulmonary hypertension (H)      Severe mitral regurgitation      Systolic CHF, chronic (H)      Tricuspid regurgitation      Trigeminal neuralgia       PAST SURGICAL HISTORY:   has a past surgical history that includes Eye surgery.  FAMILY HISTORY: family history is not on file.  SOCIAL HISTORY:   reports that she has never smoked. She does not have any smokeless tobacco history on file. She reports that she does not drink alcohol and does not use drugs.  Patient's living condition: Lives in [facility] with minimal services    Post Discharge Medication Reconciliation Status:   MED REC REQUIRED  Post Medication Reconciliation Status: discharge medications reconciled and changed, per note/orders       Current Outpatient Medications   Medication Sig Dispense Refill     acetaminophen (TYLENOL) 325 MG tablet Take 3 tablets (975 mg) by mouth 3 times daily       carBAMazepine (TEGRETOL) 100 MG chewable tablet Take 200 mg by mouth 3 times daily       furosemide (LASIX) 40 MG tablet Take 1 tablet (40 mg) by mouth daily       latanoprost (XALATAN) 0.005 % ophthalmic solution Place 1 drop into both eyes At Bedtime       multivitamin w/minerals  "(THERA-VIT-M) tablet Take 1 tablet by mouth daily       ondansetron (ZOFRAN ODT) 4 MG ODT tab Take 1 tablet (4 mg) by mouth every 6 hours as needed for nausea       SENNA-docusate sodium (SENNA S) 8.6-50 MG tablet Take 1 tablet by mouth at bedtime  1     vitamin D3 (CHOLECALCIFEROL) 50 mcg (2000 units) tablet Take 1 tablet (50 mcg) by mouth daily       oxyCODONE (ROXICODONE) 5 MG tablet Take 0.5 tablets (2.5 mg) by mouth every 4 hours as needed for moderate to severe pain 3 tablet 0     potassium chloride nick ER (KLOR-CON M15) 15 MEQ CR tablet Take 1 tablet (15 mEq) by mouth daily       timolol maleate (TIMOPTIC) 0.5 % ophthalmic solution Place 1 drop into both eyes 2 times daily       No current facility-administered medications for this visit.       ROS:  4 point ROS including Respiratory, CV, GI and , other than that noted in the HPI,  is negative    Vitals:  /77   Pulse 80   Temp 97  F (36.1  C)   Resp 16   Ht 1.499 m (4' 11\")   Wt 65.7 kg (144 lb 12.8 oz)   SpO2 98%   BMI 29.25 kg/m    Exam:  Physical Exam  Vitals (Facility EMR) reviewed.   Constitutional:       General: She is not in acute distress.  HENT:      Head: Normocephalic.   Eyes:      General: No scleral icterus.  Neck:      Comments: Cervical collar in place.  Pulmonary:      Effort: Pulmonary effort is normal.   Abdominal:      General: There is no distension.      Tenderness: There is no abdominal tenderness.   Musculoskeletal:      Comments: 3+ edema   Skin:     General: Skin is warm and dry.      Findings: No rash.   Neurological:      Mental Status: She is alert. Mental status is at baseline.   Psychiatric:         Behavior: Behavior normal.         Lab/Diagnostic data:  Recent labs in [facility] reviewed by me today.  and Most Recent 3 CBC's:  Recent Labs   Lab Test 06/19/24  0609 06/17/24  0748 06/16/24  2147   WBC 6.0 7.1 10.3   HGB 14.7 14.7 13.9   MCV 97 98 97    201 210     Most Recent 3 BMP's:  Recent Labs   Lab Test " 06/19/24  0609 06/19/24  0047 06/18/24  0610 06/17/24  0748     --  140 139   POTASSIUM 3.8 3.8 3.2*  3.2* 3.8   CHLORIDE 97*  --  97* 98   CO2 27  --  25 24   BUN 23.1*  --  17.4 19.9   CR 0.98*  --  1.00* 1.02*   ANIONGAP 14  --  18* 17*   GILL 8.6  --  8.4 8.8   *  --  114* 134*     Most Recent 2 LFT's:No lab results found.  Most Recent TSH and T4:No lab results found.  Most Recent Hemoglobin A1c:  Recent Labs   Lab Test 06/16/24  2147   A1C 5.9*     Most Recent ESR & CRP:No lab results found.  Most Recent Anemia Panel:  Recent Labs   Lab Test 06/19/24  0609   WBC 6.0   HGB 14.7   HCT 45.5   MCV 97        MRI CERVICAL SPINE WITHOUT CONTRAST 6/17/2024 11:36 AM      HISTORY: Type II odontoid fracture.     TECHNIQUE: Multiplanar, multisequence MRI of the cervical spine  without contrast.      COMPARISON: None.     FINDINGS: Nondisplaced type II odontoid fracture is better  demonstrated on prior CT. Minimal prevertebral edema. T2 hyperintense  signal within the posterior soft tissues along the C1-C4 spinous  processes, likely mild ligamentous injury.     Alignment is significant for multilevel grade 1 spondylolisthesis.  Bone marrow also demonstrates background of scattered mild  degenerative endplate changes. No abnormal cord signal. Bilateral  pleural effusions.     Level by level as follows:     C2-C3:  Disc height maintained. No herniation. Moderate bilateral  facet arthropathy. Mild right foraminal stenosis. No left foraminal  stenosis. No spinal canal stenosis.      C3-C4:  Mild disc height loss. Disc osteophyte complex. Moderate to  severe bilateral facet arthropathy. Mild bilateral foraminal stenosis.  No spinal canal stenosis.      C4-C5:  Moderate disc height loss. Disc osteophyte complex. Moderate  bilateral facet arthropathy. Moderate bilateral foraminal stenosis.  Mild spinal canal stenosis.      C5-C6:  Moderate disc height loss. Disc osteophyte complex. Moderate  bilateral facet  arthropathy. Moderate right and severe left foraminal  stenosis. Mild spinal canal stenosis.     C6-C7:  Moderate disc height loss. Disc osteophyte complex. Moderate  bilateral facet arthropathy. Mild right and moderate to severe left  foraminal stenosis. Mild spinal canal stenosis.     C7-T1: Disc height maintained. No herniation. Moderate to severe  bilateral facet arthropathy. No right foraminal stenosis. Moderate  left foraminal stenosis. No spinal canal stenosis.                                                                      IMPRESSION:    1. Nondisplaced type II dens fracture better appreciated on CT.  2. Minimal prevertebral edema.  3. Mild posterior paraspinal edema at C1-C5, likely mild ligamentous  injury.  4. Multilevel degenerative change.        Details    Reading Physician Reading Date Result Priority   Zaire Akers MD  [phone] 6/17/2024      Narrative & Impression       Study Date: 06/17/2024 03:11 PM       BSA: 1.6 m2  Height: 59 in  Weight: 144 lb  HR: 71  BP: 135/87 mmHg  ______________________________________________________________________________  Procedure  Complete Portable Echo Adult. Optison (NDC #[id number]) given intravenously.  ______________________________________________________________________________  Interpretation Summary     The visual ejection fraction is 20-25%.  Left ventricular systolic function is severely reduced.  The right ventricular systolic function is severely reduced.  There is moderate (2+) mitral regurgitation.  There is mild to moderate (1-2+) tricuspid regurgitation.  Right ventricular systolic pressure is elevated, consistent with moderate to  severe pulmonary hypertension.  The overall left ventricular systolic function appears to be worse on the  study. The degree of tricuspid regurgitation appears to be similar but the  degree of mitral regurgitation appears less.         ASSESSMENT/PLAN:  Mechanical fall  Acute C2 type II odontoid  fracture:  - Adjust Tylenol to 1000 mg 3 times daily scheduled  - Continue as needed oxycodone  - Start vitamin D 2000 units daily  - Cervical collar at all times  - Check vitamin D level  - PT/OT  -Follow-up with neurosurgery 8/6    Acute hypoxic respiratory failure, resolved  Acute HFrEF  Severe MR  Pulmonary hypertension  Hypertension: TTE with declining EF 20-25%.  Had started Lasix just a few days prior to the fall for lower extremity edema and shortness of breath.  - Now off O2  - Continue Lasix 40 mg daily and KCl  - Monitor weights 2 times weekly  - BMP 6/24  - Compression socks to LE  - Has follow-up with cardiology to establish care 7/3    CKD: Estimated Creatinine Clearance: 39.6 mL/min (A) (based on SCr of 0.98 mg/dL (H)).  -BMP 6/24    Trigeminal neuralgia  -Continue Tegretol      Electronically signed by:  Mercedes Pelayo PA-C                    Sincerely,        Mercedes Pelayo PA-C

## 2024-06-21 LAB
GAMMA INTERFERON BACKGROUND BLD IA-ACNC: 0 IU/ML
M TB IFN-G BLD-IMP: NEGATIVE
M TB IFN-G CD4+ BCKGRND COR BLD-ACNC: 1.34 IU/ML
MITOGEN IGNF BCKGRD COR BLD-ACNC: 0 IU/ML
MITOGEN IGNF BCKGRD COR BLD-ACNC: 0 IU/ML
QUANTIFERON MITOGEN: 1.34 IU/ML
QUANTIFERON NIL TUBE: 0 IU/ML
QUANTIFERON TB1 TUBE: 0 IU/ML
QUANTIFERON TB2 TUBE: 0

## 2024-06-24 ENCOUNTER — TRANSITIONAL CARE UNIT VISIT (OUTPATIENT)
Dept: GERIATRICS | Facility: CLINIC | Age: 89
End: 2024-06-24
Payer: MEDICARE

## 2024-06-24 VITALS
TEMPERATURE: 97.6 F | HEIGHT: 59 IN | WEIGHT: 128 LBS | HEART RATE: 85 BPM | SYSTOLIC BLOOD PRESSURE: 134 MMHG | OXYGEN SATURATION: 96 % | DIASTOLIC BLOOD PRESSURE: 92 MMHG | RESPIRATION RATE: 16 BRPM | BODY MASS INDEX: 25.8 KG/M2

## 2024-06-24 DIAGNOSIS — I50.23 ACUTE ON CHRONIC SYSTOLIC HEART FAILURE (H): ICD-10-CM

## 2024-06-24 DIAGNOSIS — N18.32 STAGE 3B CHRONIC KIDNEY DISEASE (H): ICD-10-CM

## 2024-06-24 DIAGNOSIS — E87.1 HYPONATREMIA: ICD-10-CM

## 2024-06-24 DIAGNOSIS — S12.110A: Primary | ICD-10-CM

## 2024-06-24 LAB
ANION GAP SERPL CALCULATED.3IONS-SCNC: 13 MMOL/L (ref 7–15)
BUN SERPL-MCNC: 18 MG/DL (ref 8–23)
CALCIUM SERPL-MCNC: 9 MG/DL (ref 8.2–9.6)
CHLORIDE SERPL-SCNC: 89 MMOL/L (ref 98–107)
CREAT SERPL-MCNC: 0.95 MG/DL (ref 0.51–0.95)
DEPRECATED HCO3 PLAS-SCNC: 30 MMOL/L (ref 22–29)
EGFRCR SERPLBLD CKD-EPI 2021: 57 ML/MIN/1.73M2
GLUCOSE SERPL-MCNC: 115 MG/DL (ref 70–99)
POTASSIUM SERPL-SCNC: 3.5 MMOL/L (ref 3.4–5.3)
SODIUM SERPL-SCNC: 132 MMOL/L (ref 135–145)
VIT D+METAB SERPL-MCNC: 66 NG/ML (ref 20–50)

## 2024-06-24 PROCEDURE — 99309 SBSQ NF CARE MODERATE MDM 30: CPT | Performed by: PHYSICIAN ASSISTANT

## 2024-06-24 PROCEDURE — 36415 COLL VENOUS BLD VENIPUNCTURE: CPT | Performed by: PHYSICIAN ASSISTANT

## 2024-06-24 PROCEDURE — P9604 ONE-WAY ALLOW PRORATED TRIP: HCPCS | Performed by: PHYSICIAN ASSISTANT

## 2024-06-24 PROCEDURE — 82306 VITAMIN D 25 HYDROXY: CPT | Performed by: PHYSICIAN ASSISTANT

## 2024-06-24 PROCEDURE — 80048 BASIC METABOLIC PNL TOTAL CA: CPT | Performed by: PHYSICIAN ASSISTANT

## 2024-06-24 NOTE — PROGRESS NOTES
"  Chief Complaint   Patient presents with    Nursing Home Acute       HPI:  Maya Cullen is a 89 year old  (2/13/1934), who is being seen today for an episodic care visit at: [facility] ([facility]) [[id number]].     Brief summary: Maya Cullen is an exceptionally pleasant 89-year-old female with a past medical history of previous CVA, glaucoma, hypertension who was recently hospitalized at [facility].  Presented for evaluation of a fall.  Found to have a nondisplaced odontoid fracture.  Neurosurgery consulted who recommended conservative management.  Additionally, noted to be hypoxic on presentation with worsening lower extremity edema.  TTE obtained which showed declining EF of 20-25%.  Diuresed with IV Lasix and transition to oral Lasix at discharge.  She had been taking oral Lasix as outpatient but only for a few days prior to admission.  Assessed by therapies and discharged to U    Today's concern is: Minimal pain.  Has had some loose stools in the morning..  Scheduled stool softener.  Denies shortness of breath.  Remains off oxygen.  Edema is improving.    Review of nursing home EMR:-134, Wt 128,      Allergies, and PMH/PSH reviewed in [facility] today.    REVIEW OF SYSTEMS:  4 point ROS including Respiratory, CV, GI and , other than that noted in the HPI,  is negative    Objective:   BP (!) 134/92   Pulse 85   Temp 97.6  F (36.4  C)   Resp 16   Ht 1.499 m (4' 11\")   Wt 58.1 kg (128 lb)   SpO2 96%   BMI 25.85 kg/m      Physical Exam  Vitals (Facility EMR) reviewed.   Constitutional:       General: She is not in acute distress.  HENT:      Head: Normocephalic and atraumatic.   Eyes:      General: No scleral icterus.  Neck:      Comments: Cervical collar in place  Cardiovascular:      Rate and Rhythm: Normal rate and regular rhythm.      Heart sounds: No murmur heard.  Pulmonary:      Effort: Pulmonary effort is normal.      Breath sounds: No wheezing.   Musculoskeletal: "      Comments: Trace LE edema   Skin:     General: Skin is warm and dry.      Findings: No rash.   Neurological:      Mental Status: She is alert. Mental status is at baseline.   Psychiatric:         Behavior: Behavior normal.          MED REC REQUIRED  Post Medication Reconciliation Status: discharge medications reconciled and changed, per note/orders      Recent labs in [facility] reviewed by me today.  and Most Recent 3 CBC's:  Recent Labs   Lab Test 06/19/24  0609 06/17/24  0748 06/16/24  2147   WBC 6.0 7.1 10.3   HGB 14.7 14.7 13.9   MCV 97 98 97    201 210     Most Recent 3 BMP's:  Recent Labs   Lab Test 06/24/24  0554 06/19/24  0609 06/19/24  0047 06/18/24  0610   * 138  --  140   POTASSIUM 3.5 3.8 3.8 3.2*  3.2*   CHLORIDE 89* 97*  --  97*   CO2 30* 27  --  25   BUN 18.0 23.1*  --  17.4   CR 0.95 0.98*  --  1.00*   ANIONGAP 13 14  --  18*   GILL 9.0 8.6  --  8.4   * 134*  --  114*     Most Recent 2 LFT's:No lab results found.      Component  Ref Range & Units  5:54 AM     Vitamin D, Total (25-Hydroxy)  20 - 50 ng/mL 66 High      Assessment/Plan:  Mechanical fall  Acute C2 type II odontoid fracture:  - Continue scheduled Tylenol.  As needed oxycodone available though no usage yet  - Vitamin D level today elevated.  Discontinue additional supplement vitamin D, continue multivitamin  - Cervical collar at all times  - PT/OT  -Follow-up with neurosurgery 8/6    Acute hypoxic respiratory failure, resolved  Acute HFrEF  Severe MR  Pulmonary hypertension  Hypertension: TTE with declining EF 20-25%.  Had started Lasix just a few days prior to the fall for lower extremity edema and shortness of breath.  -Has remained off oxygen since TCU admission.  Discontinue oxygen  - Continue Lasix 40 mg daily and KCl.  Edema is improving  - Monitor weights 2 times weekly  - Compression socks to LE  - Has follow-up with cardiology to establish care 7/3    Mild hyponatremia: Sodium today 132  - Repeat BMP 7/1    CKD:  Estimated Creatinine Clearance: 39.6 mL/min (A) (based on SCr of 0.98 mg/dL (H)).  -BMP today stable.  - Monitor as above    Trigeminal neuralgia  -Continue Tegretol    Orders:  As above      Electronically signed by: Mercedes Pelayo PA-C

## 2024-06-24 NOTE — LETTER
" 6/24/2024      Maya Cullen  [address]  [facility] [address]          Chief Complaint   Patient presents with     Nursing Home Acute       HPI:  Maya Cullen is a 89 year old  (2/13/1934), who is being seen today for an episodic care visit at: [facility] ([facility]) [[id number]].     Brief summary: Maya Cullen is an exceptionally pleasant 89-year-old female with a past medical history of previous CVA, glaucoma, hypertension who was recently hospitalized at [facility].  Presented for evaluation of a fall.  Found to have a nondisplaced odontoid fracture.  Neurosurgery consulted who recommended conservative management.  Additionally, noted to be hypoxic on presentation with worsening lower extremity edema.  TTE obtained which showed declining EF of 20-25%.  Diuresed with IV Lasix and transition to oral Lasix at discharge.  She had been taking oral Lasix as outpatient but only for a few days prior to admission.  Assessed by therapies and discharged to U    Today's concern is: Minimal pain.  Has had some loose stools in the morning..  Scheduled stool softener.  Denies shortness of breath.  Remains off oxygen.  Edema is improving.    Review of nursing home EMR:-134, Wt 128,      Allergies, and PMH/PSH reviewed in [email or web address] today.    REVIEW OF SYSTEMS:  4 point ROS including Respiratory, CV, GI and , other than that noted in the HPI,  is negative    Objective:   BP (!) 134/92   Pulse 85   Temp 97.6  F (36.4  C)   Resp 16   Ht 1.499 m (4' 11\")   Wt 58.1 kg (128 lb)   SpO2 96%   BMI 25.85 kg/m      Physical Exam  Vitals (Facility EMR) reviewed.   Constitutional:       General: She is not in acute distress.  HENT:      Head: Normocephalic and atraumatic.   Eyes:      General: No scleral icterus.  Neck:      Comments: Cervical collar in place  Cardiovascular:      Rate and Rhythm: Normal rate and regular rhythm.      Heart sounds: No murmur heard.  Pulmonary: "      Effort: Pulmonary effort is normal.      Breath sounds: No wheezing.   Musculoskeletal:      Comments: Trace LE edema   Skin:     General: Skin is warm and dry.      Findings: No rash.   Neurological:      Mental Status: She is alert. Mental status is at baseline.   Psychiatric:         Behavior: Behavior normal.          MED REC REQUIRED  Post Medication Reconciliation Status: discharge medications reconciled and changed, per note/orders      Recent labs in [facility] reviewed by me today.  and Most Recent 3 CBC's:  Recent Labs   Lab Test 06/19/24  0609 06/17/24  0748 06/16/24  2147   WBC 6.0 7.1 10.3   HGB 14.7 14.7 13.9   MCV 97 98 97    201 210     Most Recent 3 BMP's:  Recent Labs   Lab Test 06/24/24  0554 06/19/24  0609 06/19/24  0047 06/18/24  0610   * 138  --  140   POTASSIUM 3.5 3.8 3.8 3.2*  3.2*   CHLORIDE 89* 97*  --  97*   CO2 30* 27  --  25   BUN 18.0 23.1*  --  17.4   CR 0.95 0.98*  --  1.00*   ANIONGAP 13 14  --  18*   GILL 9.0 8.6  --  8.4   * 134*  --  114*     Most Recent 2 LFT's:No lab results found.      Component  Ref Range & Units  5:54 AM     Vitamin D, Total (25-Hydroxy)  20 - 50 ng/mL 66 High      Assessment/Plan:  Mechanical fall  Acute C2 type II odontoid fracture:  - Continue scheduled Tylenol.  As needed oxycodone available though no usage yet  - Vitamin D level today elevated.  Discontinue additional supplement vitamin D, continue multivitamin  - Cervical collar at all times  - PT/OT  -Follow-up with neurosurgery 8/6    Acute hypoxic respiratory failure, resolved  Acute HFrEF  Severe MR  Pulmonary hypertension  Hypertension: TTE with declining EF 20-25%.  Had started Lasix just a few days prior to the fall for lower extremity edema and shortness of breath.  -Has remained off oxygen since TCU admission.  Discontinue oxygen  - Continue Lasix 40 mg daily and KCl.  Edema is improving  - Monitor weights 2 times weekly  - Compression socks to LE  - Has follow-up with  cardiology to establish care 7/3    Mild hyponatremia: Sodium today 132  - Repeat BMP 7/1    CKD: Estimated Creatinine Clearance: 39.6 mL/min (A) (based on SCr of 0.98 mg/dL (H)).  -BMP today stable.  - Monitor as above    Trigeminal neuralgia  -Continue Tegretol    Orders:  As above      Electronically signed by: Mercedes Pelayo PA-C       Sincerely,        Mrecedes Pelayo PA-C

## 2024-06-26 NOTE — DISCHARGE SUMMARY
[facility]    Discharge Summary  Hospitalist    Date of Admission:  6/16/2024  Date of Discharge:  6/19/2024  1:43 PM  Discharging Provider: Clarissa Morales MD, MD  Date of Service (when I saw the patient): 6/19/2024    Discharge Diagnoses   Mechanical fall  Acute traumatic C2 type II odontoid fracture  Scalp hematoma  Chronic right shoulder pain    Acute hypoxemic respiratory failure  Acute on chronic systolic CHF  Severe mitral regurgitation  Mild to moderate tricuspid regurgitation  Pulmonary hypertension  Hypertension     Hypokalemia     Mild MARICRUZ     History CVA     Glaucoma     Mild cognitive impairment     Impaired glucose tolerance  Trigeminal neuralgia    Code Status: No CPR- Do NOT Intubate     History of Present Illness   Maya Cullen is an 89 year old female who presented with mechanical fall. Please see hospital course for details.     Hospital Course   Mechanical fall  Acute traumatic C2 type II odontoid fracture  Scalp hematoma  Chronic right shoulder pain: She was ambulating with her walker when she reached out for something that was too far away causing her to fall forward hitting her head on the wall on the way down.  She is reporting some pain in her head and neck.  She has chronic right shoulder pain reported with a bone chip a year ago after a fall.  She denies any new focal weakness, numbness, tingling in her extremities and she has normal strength and light sensation on exam.  She has a visible scalp hematoma, but noncontrast head CT is negative for acute pathology.  I do not believe she is on anticoagulation or aspirin.  Cervical spine CT shows a nondisplaced type II odontoid process fracture and C5-6 moderate to severe bilateral foraminal stenosis.  X-ray of the right shoulder does not show any acute pathology.  Chest x-ray shows acute CHF, but no rib fractures.  -Consult neurosurgery.  They were contacted in the ER and recommended admission at [facility]  with noncontrast MRI of the cervical spine, placement in a hard collar which has been done, neurochecks every 4 hours, and they will formally consult in the morning.  MRI will be done this morning as it is not currently available overnight  -Strict bedrest until seen by neurosurgery and then defer activity level to their expertise.   -Fall precautions  -Consult PT/OT/SW  -Acetaminophen scheduled 975 mg 3 times daily, oxycodone 2.5 mg for moderate and 5 mg for severe pain every 4 hours as needed, IV Dilaudid 0.2 mg every 3 hours as needed for severe pain if cannot take p.o.     Acute hypoxemic respiratory failure  Acute on chronic systolic CHF  Severe mitral regurgitation  Mild to moderate tricuspid regurgitation  Pulmonary hypertension  HTN: For the last 2 weeks she has had increasing lower extremity edema bilaterally and dyspnea on exertion.  Prescribed 3 days of furosemide 20 mg daily a few days ago when she saw her clinic physician and at that time her weight was 138 pounds.  Bed weight here is 144 pounds.  Has not had much improvement in the shortness of breath or edema with furosemide.  She is hypoxic in the ER down to the 70s which is improved on 3 L of oxygen via facemask.  She did have a TTE completed in August 2023 when she fell and was found to have a L4 fracture.  At that time her EF was reduced to 40-45%, she had severe 3-4+ mitral vegetation, 1-2+ tricuspid regurgitation, pulmonary HTN with PA pressure 47 mmHg, LVH, dilated LV, and severe inferior and inferior lateral wall hypokinesis.  She has not had any ischemic workup, but certainly may be the etiology for her TTE findings then.  She denies any chest pain or pressure sensation recently.  Her NT proBNP [place] elevated at 8[address].  Her troponin T is up slightly at 40 downtrending to 36 on repeat check.  EKG shows NSR with no ST elevation or depression.  I suspect the troponin elevation is secondary to demand ischemia in the setting of hypoxia an acute CHF.   Her chest x-ray shows mild cardiomegaly, small bilateral pleural effusions, and pulmonary edema consistent with acute CHF.  -Received 20 mg IV Lasix in the ER, continued increased dose of Lasix at 40 mg twice daily.   -Cardiac monitoring  -Strict intake and output and daily weights  -Hold PTA amlodipine that was recently reduced to 5 mg daily  -Potassium and magnesium replacement protocols  -Continue supplemental oxygen, wean as able  -Echocardiogram showed reduced left ventricular systolic function with visual ejection fraction is 20-25%.  -Will likely need  ischemic cardiac workup with potential stress test or  potential coronary angiogram, but I think we should wait until she is not in acute CHF with hypoxia and also further out from her acute C2 fracture. Discussed with family at bedside and family agreed.  -Discharged on Lasix 40 mg daily  -Amlodipine discontinued due to low BP and to allow room for diuretic.     Hypokalemia  -Replaced per protocol     Mild MARICRUZ: Creatinine up to 1.06 with baseline probably closer to 0.7-0.9.  This may be secondary to diuretics for the past few days, but she still has ongoing acute CHF.  Cardiorenal syndrome would also be in the differential.  -Trend creatinine/BMP with diuresis     History CVA: She was found to have a cerebellar CVA on previous MRI a year ago after a fall.  I am not certain that she saw a neurologist at this time and she does not appear to be on either statin or aspirin/anticoagulation.  She does have frequent falls.    Glaucoma: Resumed PTA timolol, latanoprost, and prednisolone drops.     Mild cognitive impairment: Patient family reports some mild short-term memory loss at baseline.     Impaired glucose tolerance: Not currently on any medications.  Glucose elevated at 196 in the ER.  -Add on A1c     Trigeminal neuralgia: Resumed PTA carbamazepine 200 mg 3 times daily.    Code Status: No CPR- Do NOT Intubate       Significant Results and Procedures   Results  for orders placed or performed during the hospital encounter of 06/16/24   Head CT w/o contrast    Narrative    EXAM: CT HEAD W/O CONTRAST  LOCATION: [facility]  DATE: 6/16/2024    INDICATION: Head injury. Headache.  COMPARISON: CT 8/11/2023  TECHNIQUE: Routine CT Head without IV contrast. Multiplanar reformats. Dose reduction techniques were used.    FINDINGS:  INTRACRANIAL CONTENTS: No intracranial hemorrhage, extraaxial collection, or mass effect.  No CT evidence of acute infarct. Moderate presumed chronic small vessel ischemic changes. Moderate generalized volume loss. No hydrocephalus.     VISUALIZED ORBITS/SINUSES/MASTOIDS: No intraorbital abnormality. No paranasal sinus mucosal disease. No middle ear or mastoid effusion.    BONES/SOFT TISSUES: No skull fracture. Moderate left frontal scalp swelling.      Impression    IMPRESSION:  1.  No acute intracranial findings.   CT Cervical Spine w/o Contrast    Addendum: 6/16/2024    COMMUNICATION ADDENDUM:  Findings were discussed with Dr. Betancourt on 6/16/2024 11:28 PM CDT.    END ADDENDUM      Narrative    EXAM: CT CERVICAL SPINE W/O CONTRAST  LOCATION: [facility]  DATE: 6/16/2024    INDICATION: Fall, head injury. Neck pain.  COMPARISON: None.  TECHNIQUE: Routine CT Cervical Spine without IV contrast. Multiplanar reformats. Dose reduction techniques were used.    FINDINGS:  VERTEBRA: Nondisplaced type II odontoid process fracture. Normal vertebral body heights and alignment.      CANAL/FORAMINA: At C5-6, moderate severe bilateral foraminal stenosis    PARASPINAL: Bilateral pleural effusions      Impression    IMPRESSION:  1.  Nondisplaced acute type II odontoid process fracture.   XR Chest 1 View    Narrative    EXAM: XR CHEST 1 VIEW  LOCATION: [facility]  DATE: 6/16/2024    INDICATION: sob  COMPARISON: 8/11/2023      Impression    IMPRESSION: Mild cardiomegaly. Small bilateral pleural effusions. Mild  vascular congestion and subtle interstitial edema compatible with congestive heart failure. No pneumothorax. Old fracture or for is suggested in the left humeral head.   XR Shoulder Right 2 Views    Narrative    EXAM: XR SHOULDER RIGHT 2 VIEWS  LOCATION: [facility]  DATE: 6/16/2024    INDICATION: pain, fall  COMPARISON: None.      Impression    IMPRESSION: Osteopenia. No acute, displaced fracture or dislocation. Flattening of the contour of the lateral aspect of the humeral head could relate to a Hill-Sachs deformity from prior dislocation. Arthritic acromioclavicular joint without AC   separation.   MR Cervical Spine w/o Contrast    Narrative    MRI CERVICAL SPINE WITHOUT CONTRAST 6/17/2024 11:36 AM     HISTORY: Type II odontoid fracture.    TECHNIQUE: Multiplanar, multisequence MRI of the cervical spine  without contrast.     COMPARISON: None.    FINDINGS: Nondisplaced type II odontoid fracture is better  demonstrated on prior CT. Minimal prevertebral edema. T2 hyperintense  signal within the posterior soft tissues along the C1-C4 spinous  processes, likely mild ligamentous injury.    Alignment is significant for multilevel grade 1 spondylolisthesis.  Bone marrow also demonstrates background of scattered mild  degenerative endplate changes. No abnormal cord signal. Bilateral  pleural effusions.    Level by level as follows:    C2-C3:  Disc height maintained. No herniation. Moderate bilateral  facet arthropathy. Mild right foraminal stenosis. No left foraminal  stenosis. No spinal canal stenosis.     C3-C4:  Mild disc height loss. Disc osteophyte complex. Moderate to  severe bilateral facet arthropathy. Mild bilateral foraminal stenosis.  No spinal canal stenosis.     C4-C5:  Moderate disc height loss. Disc osteophyte complex. Moderate  bilateral facet arthropathy. Moderate bilateral foraminal stenosis.  Mild spinal canal stenosis.     C5-C6:  Moderate disc height loss. Disc osteophyte  complex. Moderate  bilateral facet arthropathy. Moderate right and severe left foraminal  stenosis. Mild spinal canal stenosis.    C6-C7:  Moderate disc height loss. Disc osteophyte complex. Moderate  bilateral facet arthropathy. Mild right and moderate to severe left  foraminal stenosis. Mild spinal canal stenosis.    C7-T1: Disc height maintained. No herniation. Moderate to severe  bilateral facet arthropathy. No right foraminal stenosis. Moderate  left foraminal stenosis. No spinal canal stenosis.      Impression    IMPRESSION:    1. Nondisplaced type II dens fracture better appreciated on CT.  2. Minimal prevertebral edema.  3. Mild posterior paraspinal edema at C1-C5, likely mild ligamentous  injury.  4. Multilevel degenerative change.     DIANA HOANG MD         SYSTEM ID:  [id number]   XR Cervical Spine 2/3 Views    Narrative    EXAM: XR CERVICAL SPINE 2/3 VIEWS  LOCATION: [facility]  DATE: 2024    INDICATION: Upright cervical spine x ray:  fall, neck pain, odontoid process fracture  COMPARISON: CT performed earlier today.      Impression    IMPRESSION: Diffuse osseous demineralization, suboptimal patient positioning, and overlying cervical collar limiting the assessment of fine osseous detail. Subtle lucency on the lateral radiograph corresponding to the known acute nondisplaced odontoid   fracture, better assessed on recent CT. Unchanged cervical spine alignment with straightening of the normal lordosis and 1-2 mm likely degenerative anterolisthesis at C3-C4. Maintained vertebral body heights. Multilevel spondylosis, better evaluated on   recent CT. Prevertebral soft tissues within normal limits.   Echocardiogram Complete     Value    LVEF  20-25%    Narrative    [id number]  [id number]  [id number]  [id number]^PAM^CHRIS^[facility]  Echocardiography Laboratory  [address] [place] [address]     Name: CARLOS WEBBER  MRN: [id number]  :  02/13/1934  Study Date: 06/17/2024 03:11 PM  Age: 89 yrs  Gender: Female  Patient Location: [facility]  Reason For Study: Heart Failure  Ordering Physician: CHRIS OROZCO  Referring Physician: Jordyn Jon  Performed By: Paul Henriquez RDCS     BSA: 1.6 m2  Height: 59 in  Weight: 144 lb  HR: 71  BP: 135/87 mmHg  ______________________________________________________________________________  Procedure  Complete Portable Echo Adult. Optison (NDC #[id number]) given intravenously.  ______________________________________________________________________________  Interpretation Summary     The visual ejection fraction is 20-25%.  Left ventricular systolic function is severely reduced.  The right ventricular systolic function is severely reduced.  There is moderate (2+) mitral regurgitation.  There is mild to moderate (1-2+) tricuspid regurgitation.  Right ventricular systolic pressure is elevated, consistent with moderate to  severe pulmonary hypertension.  The overall left ventricular systolic function appears to be worse on the  study. The degree of tricuspid regurgitation appears to be similar but the  degree of mitral regurgitation appears less.  ______________________________________________________________________________  Left Ventricle  The left ventricle is borderline dilated. There is normal left ventricular  wall thickness. Diastolic Doppler findings (E/E' ratio and/or other  parameters) suggest left ventricular filling pressures are increased. The  visual ejection fraction is 20-25%. Left ventricular systolic function is  severely reduced. Grade III or advanced diastolic dysfunction. There is severe  global hypokinesia of the left ventricle. The inferior and inferolateral walls  appear to contract least well and appear akinetic. A false chord is noted  (normal variant).     Right Ventricle  The right ventricle is normal size. The right ventricular systolic function is  severely reduced.     Atria  The left  atrium is moderately dilated. The right atrium is borderline dilated.  There is no color Doppler evidence of an atrial shunt.     Mitral Valve  There is moderate mitral annular calcification. There is moderate (2+) mitral  regurgitation.     Tricuspid Valve  There is mild to moderate (1-2+) tricuspid regurgitation. The right  ventricular systolic pressure is approximated at 52mmHg plus the right atrial  pressure. IVC diameter >2.1 cm collapsing <50% with sniff suggests a high RA  pressure estimated at 15 mmHg or greater. Right ventricular systolic pressure  is elevated, consistent with moderate to severe pulmonary hypertension.     Aortic Valve  The aortic valve is trileaflet. No aortic regurgitation is present. No  hemodynamically significant valvular aortic stenosis.     Pulmonic Valve  There is mild to moderate (1-2+) pulmonic valvular regurgitation. Right  ventricular diastolic pressure is approximated at 7mmHg plus the right atrial  pressure. Normal pulmonic valve velocity.     Vessels  The aortic root is normal size. Normal size ascending aorta. IVC diameter >2.1  cm collapsing <50% with sniff suggests a high RA pressure estimated at 15 mmHg  or greater.     Pericardium  There is no pericardial effusion. Moderate left pleural effusion.     Rhythm  The rhythm was sinus with wide QRS.  ______________________________________________________________________________  MMode/2D Measurements & Calculations  IVSd: 0.79 cm     LVIDd: 5.7 cm  LVIDs: 5.0 cm  LVPWd: 0.89 cm  IVC diam: 2.6 cm  FS: 12.2 %  LV mass(C)d: 182.7 grams  LV mass(C)dI: 113.9 grams/m2  Ao root diam: 3.3 cm  asc Aorta Diam: 3.1 cm  LVOT diam: 1.8 cm  LVOT area: 2.4 cm2  Ao root diam index Ht(cm/m): 2.2  Ao root diam index BSA (cm/m2): 2.1  Asc Ao diam index BSA (cm/m2): 1.9  Asc Ao diam index Ht(cm/m): 2.1  EF Biplane: 10.5 %  LA Volume (BP): 67.0 ml     LA Volume Index (BP): 41.9 ml/m2  RWT: 0.31  TAPSE: 1.7 cm     Time Measurements  Aortic HR: 69.0  BPM     Doppler Measurements & Calculations  MV E max edwin: 155.0 cm/sec  MV A max edwin: 59.2 cm/sec  MV E/A: 2.6  MV max P.3 mmHg  MV mean P.9 mmHg  MV V2 VTI: 33.5 cm  MVA(VTI): 1.0 cm2  MV dec time: 0.16 sec  LV V1 max P.7 mmHg  LV V1 max: 82.8 cm/sec  LV V1 VTI: 14.1 cm  MR PISA: 3.0 cm2  MR ERO: 0.27 cm2  MR volume: 38.2 ml  CO(LVOT): 2.3 l/min  CI(LVOT): 1.5 l/min/m2  SV(LVOT): 34.0 ml  SI(LVOT): 21.2 ml/m2     PA acc time: 0.07 sec  PI end-d edwin: 135.7 cm/sec  TR max edwin: 350.9 cm/sec  TR max P.4 mmHg  E/E' av.9  Lateral E/e': 24.1  Medial E/e': 35.6  RV S Edwin: 10.0 cm/sec     ______________________________________________________________________________  Report approved by: Ramy Jefferson 2024 04:18 PM               Pending Results   None    Code Status   Full Code       Primary Care Physician   Jordyn Jon        Discharge Disposition   Discharged to short-term care facility  Condition at discharge: Stable    Consultations This Hospital Stay   CORE CLINIC EVALUATION IP CONSULT  OCCUPATIONAL THERAPY ADULT IP CONSULT  NUTRITION SERVICES ADULT IP CONSULT  CARE MANAGEMENT / SOCIAL WORK IP CONSULT  NEUROSURGERY IP CONSULT  PHYSICAL THERAPY ADULT IP CONSULT  CARE MANAGEMENT / SOCIAL WORK IP CONSULT  PHARMACY LIAISON FOR MEDICATION COVERAGE CONSULT  PHYSICAL THERAPY ADULT IP CONSULT  OCCUPATIONAL THERAPY ADULT IP CONSULT    Time Spent on this Encounter       Discharge Orders      X-ray Cervical spine 2-3 vws     Adult Cardiology Eval  Referral      Follow-up and recommended labs and tests     Please follow up at [facility] in 6 weeks with xray prior to appointment .  You may call the clinic with any scheduling questions or concerns.    [facility]  Tel [phone]     Activity    Your activity upon discharge:   Continue to wear collar when upright and out of bed.   Avoid heavy lifting, bending, twisting.   Avoid  strenuous, jostling, jarring activities.     General info for SNF    Length of Stay Estimate: Short Term Care: Estimated # of Days <30  Condition at Discharge: Stable  Level of care:skilled   Rehabilitation Potential: Good  Admission H&P remains valid and up-to-date: Yes  Recent Chemotherapy: N/A  Use Nursing Home Standing Orders: Yes     Mantoux instructions    Give two-step Mantoux (PPD) Per Facility Policy Yes     Follow Up and recommended labs and tests    Follow up with Nursing home physician.     Reason for your hospital stay    Fall     Activity - Up ad michell     Physical Therapy Adult Consult    Evaluate and treat as clinically indicated.    Reason: Fall. C2 odontoid fracture     Occupational Therapy Adult Consult    Evaluate and treat as clinically indicated.    Reason:  Fall. C2 odontoid fracture     Oxygen (SNF/TCU) Discharge     Diet    Follow this diet upon discharge: Orders Placed This Encounter      Fluid restriction 1800 ML FLUID      Snacks/Supplements Adult: Gelatein Plus; Between Meals      Combination Diet 2 gm NA Diet     Discharge Medications   Discharge Medication List as of 6/19/2024  1:25 PM        START taking these medications    Details   ondansetron (ZOFRAN ODT) 4 MG ODT tab Take 1 tablet (4 mg) by mouth every 6 hours as needed for nausea, Transitional      oxyCODONE (ROXICODONE) 5 MG tablet Take 0.5 tablets (2.5 mg) by mouth every 4 hours as needed for moderate to severe pain, Disp-3 tablet, R-0, Local Print      potassium chloride nick ER (KLOR-CON M15) 15 MEQ CR tablet Take 1 tablet (15 mEq) by mouth daily, Transitional           CONTINUE these medications which have CHANGED    Details   furosemide (LASIX) 40 MG tablet Take 1 tablet (40 mg) by mouth daily, Transitional           CONTINUE these medications which have NOT CHANGED    Details   carBAMazepine (TEGRETOL) 100 MG chewable tablet Take 200 mg by mouth 3 times daily, Historical      latanoprost (XALATAN) 0.005 % ophthalmic  solution Place 1 drop into both eyes At Bedtime, Historical      multivitamin w/minerals (THERA-VIT-M) tablet Take 1 tablet by mouth daily, Historical      timolol maleate (TIMOPTIC) 0.5 % ophthalmic solution Place 1 drop into both eyes 2 times daily, Historical      acetaminophen (TYLENOL) 325 MG tablet Take 3 tablets (975 mg) by mouth every 8 hours as needed for mild pain, Transitional           STOP taking these medications       amLODIPine (NORVASC) 5 MG tablet Comments:   Reason for Stopping:         prednisoLONE acetate (PRED FORTE) 1 % ophthalmic suspension Comments:   Reason for Stopping:               Allergies   Allergies   Allergen Reactions    Brimonidine Other (See Comments)     Follicular conjunctivitis    Dorzolamide Other (See Comments)     Follicular conjunctivitis    Benzalkonium Chloride Other (See Comments)     Eye irritation.  Does better with preservative free but can tolerate preserved drops    Fluorescein Itching     fluress    Penicillins     Sulfa Antibiotics Other (See Comments)    Codeine Unknown and Rash    Fd&C Yellow #5 (Tartrazine) Rash     Data   Most Recent 3 CBC's:  Recent Labs   Lab Test 06/19/24  0609 06/17/24  0748 06/16/24  2147   WBC 6.0 7.1 10.3   HGB 14.7 14.7 13.9   MCV 97 98 97    201 210      Most Recent 3 BMP's:  Recent Labs   Lab Test 06/24/24  0554 06/19/24  0609 06/19/24  0047 06/18/24  0610   * 138  --  140   POTASSIUM 3.5 3.8 3.8 3.2*  3.2*   CHLORIDE 89* 97*  --  97*   CO2 30* 27  --  25   BUN 18.0 23.1*  --  17.4   CR 0.95 0.98*  --  1.00*   ANIONGAP 13 14  --  18*   GILL 9.0 8.6  --  8.4   * 134*  --  114*     Most Recent 2 LFT's:No lab results found.  Most Recent INR's and Anticoagulation Dosing History:  Anticoagulation Dose History           No data to display              Most Recent 3 Troponin's:No lab results found.  Most Recent Cholesterol Panel:  Recent Labs   Lab Test 08/11/23  1544   CHOL 198   *   HDL 51   TRIG 132     Most  Recent 6 Bacteria Isolates From Any Culture (See EPIC Reports for Culture Details):No lab results found.  Most Recent TSH, T4 and A1c Labs:  Recent Labs   Lab Test 06/16/24  2147   A1C 5.9*

## 2024-06-27 ENCOUNTER — LAB REQUISITION (OUTPATIENT)
Dept: LAB | Facility: CLINIC | Age: 89
End: 2024-06-27
Payer: MEDICARE

## 2024-06-27 DIAGNOSIS — I50.23 ACUTE ON CHRONIC SYSTOLIC (CONGESTIVE) HEART FAILURE (H): ICD-10-CM

## 2024-07-01 ENCOUNTER — TRANSITIONAL CARE UNIT VISIT (OUTPATIENT)
Dept: GERIATRICS | Facility: CLINIC | Age: 89
End: 2024-07-01
Payer: MEDICARE

## 2024-07-01 VITALS
OXYGEN SATURATION: 94 % | WEIGHT: 126.6 LBS | DIASTOLIC BLOOD PRESSURE: 88 MMHG | SYSTOLIC BLOOD PRESSURE: 125 MMHG | BODY MASS INDEX: 25.52 KG/M2 | RESPIRATION RATE: 18 BRPM | HEIGHT: 59 IN | HEART RATE: 82 BPM | TEMPERATURE: 94.7 F

## 2024-07-01 DIAGNOSIS — S12.110A: Primary | ICD-10-CM

## 2024-07-01 DIAGNOSIS — I50.23 ACUTE ON CHRONIC SYSTOLIC HEART FAILURE (H): ICD-10-CM

## 2024-07-01 DIAGNOSIS — N18.32 STAGE 3B CHRONIC KIDNEY DISEASE (H): ICD-10-CM

## 2024-07-01 DIAGNOSIS — G50.0 TRIGEMINAL NEURALGIA: ICD-10-CM

## 2024-07-01 DIAGNOSIS — E87.1 HYPONATREMIA: ICD-10-CM

## 2024-07-01 LAB
ANION GAP SERPL CALCULATED.3IONS-SCNC: 12 MMOL/L (ref 7–15)
BUN SERPL-MCNC: 16.9 MG/DL (ref 8–23)
CALCIUM SERPL-MCNC: 8.9 MG/DL (ref 8.2–9.6)
CHLORIDE SERPL-SCNC: 98 MMOL/L (ref 98–107)
CREAT SERPL-MCNC: 0.83 MG/DL (ref 0.51–0.95)
DEPRECATED HCO3 PLAS-SCNC: 26 MMOL/L (ref 22–29)
EGFRCR SERPLBLD CKD-EPI 2021: 67 ML/MIN/1.73M2
GLUCOSE SERPL-MCNC: 104 MG/DL (ref 70–99)
NT-PROBNP SERPL-MCNC: 7863 PG/ML (ref 0–1800)
POTASSIUM SERPL-SCNC: 3.8 MMOL/L (ref 3.4–5.3)
SODIUM SERPL-SCNC: 136 MMOL/L (ref 135–145)
TSH SERPL DL<=0.005 MIU/L-ACNC: 3.19 UIU/ML (ref 0.3–4.2)

## 2024-07-01 PROCEDURE — 99309 SBSQ NF CARE MODERATE MDM 30: CPT | Performed by: PHYSICIAN ASSISTANT

## 2024-07-01 PROCEDURE — 80048 BASIC METABOLIC PNL TOTAL CA: CPT | Performed by: INTERNAL MEDICINE

## 2024-07-01 PROCEDURE — P9604 ONE-WAY ALLOW PRORATED TRIP: HCPCS | Performed by: INTERNAL MEDICINE

## 2024-07-01 PROCEDURE — 36415 COLL VENOUS BLD VENIPUNCTURE: CPT | Performed by: INTERNAL MEDICINE

## 2024-07-01 PROCEDURE — 84443 ASSAY THYROID STIM HORMONE: CPT | Performed by: INTERNAL MEDICINE

## 2024-07-01 PROCEDURE — 83880 ASSAY OF NATRIURETIC PEPTIDE: CPT | Performed by: INTERNAL MEDICINE

## 2024-07-01 RX ORDER — CARBAMAZEPINE 100 MG/1
200 TABLET, CHEWABLE ORAL 2 TIMES DAILY
COMMUNITY
Start: 2024-07-01

## 2024-07-01 NOTE — LETTER
" 7/1/2024      Maya Cullen  [address]  [facility] [address]          Chief Complaint   Patient presents with     Nursing Home Acute       HPI:  Maya Cullen is a 89 year old  (2/13/1934), who is being seen today for an episodic care visit at: [facility] ([facility]) [[id number]].     Brief summary: Maya Cullen is an exceptionally pleasant 89-year-old female with a past medical history of previous CVA, glaucoma, hypertension who was recently hospitalized at [facility].  Presented for evaluation of a fall.  Found to have a nondisplaced odontoid fracture.  Neurosurgery consulted who recommended conservative management.  Additionally, noted to be hypoxic on presentation with worsening lower extremity edema.  TTE obtained which showed declining EF of 20-25%.  Diuresed with IV Lasix and transition to oral Lasix at discharge.  She had been taking oral Lasix as outpatient but only for a few days prior to admission.  Assessed by therapies and discharged to [facility]    Today's concern is: Doing well.  Trying to be more independent.  Denies pain.  No use of oxycodone so we will discontinue.  Denies constipation.  Edema much improved.  Discussion has cardiology appointment later this week to discuss new declined ejection fraction.  Denies shortness of breath.  Notes she takes Tegretol only twice a day at home and would like this adjusted    Review of nursing home EMR:-132, Wt 126    Wt Readings from Last 4 Encounters:   07/01/24 57.4 kg (126 lb 9.6 oz)   06/24/24 58.1 kg (128 lb)   06/20/24 65.7 kg (144 lb 12.8 oz)   06/19/24 58.5 kg (128 lb 14.4 oz)            Allergies, and PMH/PSH reviewed in EPIC today.    REVIEW OF SYSTEMS:  4 point ROS including Respiratory, CV, GI and , other than that noted in the HPI,  is negative    Objective:   /88   Pulse 82   Temp (!) 94.7  F (34.8  C)   Resp 18   Ht 1.499 m (4' 11\")   Wt 57.4 kg (126 lb 9.6 oz)   SpO2 94%   BMI " 25.57 kg/m      Physical Exam  Vitals (Facility EMR) reviewed.   Constitutional:       General: She is not in acute distress.  HENT:      Head: Normocephalic and atraumatic.   Eyes:      General: No scleral icterus.  Neck:      Comments: Cervical collar in place  Cardiovascular:      Rate and Rhythm: Normal rate and regular rhythm.      Heart sounds: No murmur heard.  Pulmonary:      Effort: Pulmonary effort is normal.   Musculoskeletal:      Comments: Trace to 1+ edema   Skin:     General: Skin is warm and dry.      Findings: No rash.   Neurological:      Mental Status: She is alert. Mental status is at baseline.   Psychiatric:         Behavior: Behavior normal.          MED REC REQUIRED  Post Medication Reconciliation Status: discharge medications reconciled and changed, per note/orders      Recent labs in [employer] reviewed by me today.  and Most Recent 3 CBC's:  Recent Labs   Lab Test 06/19/24  0609 06/17/24  0748 06/16/24  2147   WBC 6.0 7.1 10.3   HGB 14.7 14.7 13.9   MCV 97 98 97    201 210     Most Recent 3 BMP's:  Recent Labs   Lab Test 07/01/24  0626 06/24/24  0554 06/19/24  0609    132* 138   POTASSIUM 3.8 3.5 3.8   CHLORIDE 98 89* 97*   CO2 26 30* 27   BUN 16.9 18.0 23.1*   CR 0.83 0.95 0.98*   ANIONGAP 12 13 14   GILL 8.9 9.0 8.6   * 115* 134*     Most Recent 2 LFT's:No lab results found.    Assessment/Plan:  Mechanical fall  Acute C2 type II odontoid fracture:  - Continue scheduled Tylenol  -Discontinue oxycodone due to lack of utilization.   - Cervical collar at all times  - PT/OT  -Follow-up with neurosurgery 8/6    Acute hypoxic respiratory failure, resolved  Acute HFrEF  Severe MR  Pulmonary hypertension  Hypertension: TTE with declining EF 20-25%.  Had started Lasix just a few days prior to the fall for lower extremity edema and shortness of breath.  -Has remained off oxygen since TCU admission.   - Continue Lasix 40 mg daily and KCl.  Edema is improving  - Monitor weights 2 times  weekly  - Has follow-up with cardiology to establish care 7/3    Mild hyponatremia, resolved: Sodium today stable at 136    CKDIIIb:Estimated Creatinine Clearance: 40.8 mL/min (based on SCr of 0.83 mg/dL).  -BMP today stable.    Trigeminal neuralgia  - Reduce Tegretol to 200 mg twice daily per patient request to reflect what she was utilizing at home    Orders:  As above      Electronically signed by: Mercedes Pelayo PA-C       Sincerely,        Mercedes Pelayo PA-C

## 2024-07-01 NOTE — PROGRESS NOTES
"  Chief Complaint   Patient presents with    Nursing Home Acute       HPI:  Maya Cullen is a 89 year old  (2/13/1934), who is being seen today for an episodic care visit at: [facility] ([facility]) [[id number]].     Brief summary: Maya Cullen is an exceptionally pleasant 89-year-old female with a past medical history of previous CVA, glaucoma, hypertension who was recently hospitalized at [facility].  Presented for evaluation of a fall.  Found to have a nondisplaced odontoid fracture.  Neurosurgery consulted who recommended conservative management.  Additionally, noted to be hypoxic on presentation with worsening lower extremity edema.  TTE obtained which showed declining EF of 20-25%.  Diuresed with IV Lasix and transition to oral Lasix at discharge.  She had been taking oral Lasix as outpatient but only for a few days prior to admission.  Assessed by therapies and discharged to [facility]    Today's concern is: Doing well.  Trying to be more independent.  Denies pain.  No use of oxycodone so we will discontinue.  Denies constipation.  Edema much improved.  Discussion has cardiology appointment later this week to discuss new declined ejection fraction.  Denies shortness of breath.  Notes she takes Tegretol only twice a day at home and would like this adjusted    Review of nursing home EMR:-132, Wt 126    Wt Readings from Last 4 Encounters:   07/01/24 57.4 kg (126 lb 9.6 oz)   06/24/24 58.1 kg (128 lb)   06/20/24 65.7 kg (144 lb 12.8 oz)   06/19/24 58.5 kg (128 lb 14.4 oz)            Allergies, and PMH/PSH reviewed in EPIC today.    REVIEW OF SYSTEMS:  4 point ROS including Respiratory, CV, GI and , other than that noted in the HPI,  is negative    Objective:   /88   Pulse 82   Temp (!) 94.7  F (34.8  C)   Resp 18   Ht 1.499 m (4' 11\")   Wt 57.4 kg (126 lb 9.6 oz)   SpO2 94%   BMI 25.57 kg/m      Physical Exam  Vitals (Facility EMR) reviewed.   Constitutional:       " General: She is not in acute distress.  HENT:      Head: Normocephalic and atraumatic.   Eyes:      General: No scleral icterus.  Neck:      Comments: Cervical collar in place  Cardiovascular:      Rate and Rhythm: Normal rate and regular rhythm.      Heart sounds: No murmur heard.  Pulmonary:      Effort: Pulmonary effort is normal.   Musculoskeletal:      Comments: Trace to 1+ edema   Skin:     General: Skin is warm and dry.      Findings: No rash.   Neurological:      Mental Status: She is alert. Mental status is at baseline.   Psychiatric:         Behavior: Behavior normal.          MED REC REQUIRED  Post Medication Reconciliation Status: discharge medications reconciled and changed, per note/orders      Recent labs in EPIC reviewed by me today.  and Most Recent 3 CBC's:  Recent Labs   Lab Test 06/19/24  0609 06/17/24  0748 06/16/24  2147   WBC 6.0 7.1 10.3   HGB 14.7 14.7 13.9   MCV 97 98 97    201 210     Most Recent 3 BMP's:  Recent Labs   Lab Test 07/01/24  0626 06/24/24  0554 06/19/24  0609    132* 138   POTASSIUM 3.8 3.5 3.8   CHLORIDE 98 89* 97*   CO2 26 30* 27   BUN 16.9 18.0 23.1*   CR 0.83 0.95 0.98*   ANIONGAP 12 13 14   GILL 8.9 9.0 8.6   * 115* 134*     Most Recent 2 LFT's:No lab results found.    Assessment/Plan:  Mechanical fall  Acute C2 type II odontoid fracture:  - Continue scheduled Tylenol  -Discontinue oxycodone due to lack of utilization.   - Cervical collar at all times  - PT/OT  -Follow-up with neurosurgery 8/6    Acute hypoxic respiratory failure, resolved  Acute HFrEF  Severe MR  Pulmonary hypertension  Hypertension: TTE with declining EF 20-25%.  Had started Lasix just a few days prior to the fall for lower extremity edema and shortness of breath.  -Has remained off oxygen since TCU admission.   - Continue Lasix 40 mg daily and KCl.  Edema is improving  - Monitor weights 2 times weekly  - Has follow-up with cardiology to establish care 7/3    Mild hyponatremia,  resolved: Sodium today stable at 136    CKDIIIb:Estimated Creatinine Clearance: 40.8 mL/min (based on SCr of 0.83 mg/dL).  -BMP today stable.    Trigeminal neuralgia  - Reduce Tegretol to 200 mg twice daily per patient request to reflect what she was utilizing at home    Orders:  As above      Electronically signed by: Mercedes Pelayo PA-C

## 2024-07-03 ENCOUNTER — TELEPHONE (OUTPATIENT)
Dept: CARDIOLOGY | Facility: CLINIC | Age: 89
End: 2024-07-03

## 2024-07-03 ENCOUNTER — OFFICE VISIT (OUTPATIENT)
Dept: CARDIOLOGY | Facility: CLINIC | Age: 89
End: 2024-07-03
Payer: MEDICARE

## 2024-07-03 VITALS
HEIGHT: 59 IN | SYSTOLIC BLOOD PRESSURE: 118 MMHG | HEART RATE: 82 BPM | DIASTOLIC BLOOD PRESSURE: 74 MMHG | WEIGHT: 137 LBS | BODY MASS INDEX: 27.62 KG/M2 | OXYGEN SATURATION: 95 %

## 2024-07-03 DIAGNOSIS — I50.21 ACUTE SYSTOLIC HEART FAILURE (H): ICD-10-CM

## 2024-07-03 PROCEDURE — 99205 OFFICE O/P NEW HI 60 MIN: CPT | Performed by: INTERNAL MEDICINE

## 2024-07-03 RX ORDER — LISINOPRIL 5 MG/1
5 TABLET ORAL DAILY
Qty: 90 TABLET | Refills: 3 | Status: SHIPPED | OUTPATIENT
Start: 2024-07-03 | End: 2024-07-11

## 2024-07-03 RX ORDER — METOPROLOL SUCCINATE 25 MG/1
12.5 TABLET, EXTENDED RELEASE ORAL DAILY
Qty: 30 TABLET | Refills: 3 | Status: SHIPPED | OUTPATIENT
Start: 2024-07-03

## 2024-07-03 NOTE — TELEPHONE ENCOUNTER
[facility] Call Center    Phone Message    May a detailed message be left on voicemail: yes     Reason for Call: Medication Question or concern regarding medication   Prescription Clarification     What on the order needs clarification? [facility] care facility is requesting orders for new medications added in 7/3/24 provider appt with pt. Please fax orders to [phone]       Action Taken: Other: cardiology     Travel Screening: Not Applicable          Thank you!  [facility]        Date of Service:

## 2024-07-03 NOTE — LETTER
7/3/2024    Jordyn Jon  [address] [address] [address]    RE: Maya Cullen       Dear Colleague,     I had the pleasure of seeing Ana Cristinabharath THEA Cullen in the [facility].  CARDIOLOGY CLINIC CONSULTATION    PRIMARY CARE PHYSICIAN:  Jordyn Jon    HISTORY OF PRESENT ILLNESS:  This is a very pleasant 89-year-old female who is here with her son-in-law.  The patient denies any prior known cardiovascular issues however she has a history of stroke and hypertension.  In 2023 she had an echocardiogram done when she presented to the hospital for noncardiac reasons but was noted to have an EF of 40 to 45% with severe MR.  Subsequently I do not see any cardiology follow-up.  She is not on any cardiac medications.  She presented with a fall recently to [facility] when she was trying to get up to get her walker.  She injured and fractured her cervical vertebrae.  She is in a neck collar right now.  During the hospital stay, she was noted to have severe heart failure with reduced ejection fraction and echocardiogram revealed 3+ MR and EF of 10 to 20%.  I have personally reviewed the echocardiogram.  She was referred to outpatient cardiology.    The patient has NYHA class IIIb heart failure symptoms.  Minimal exertion is causing her to have shortness of breath per her son-in-law but the patient is mainly wheelchair-bound.  She has a neck collar.  Appears quite elderly and frail.  She is on no cardiac medications at this time.    PAST MEDICAL HISTORY:  Past Medical History:   Diagnosis Date    Cerebrovascular accident (H)     Cerebellar CVA seen on MRI 2023    Glaucoma     HTN (hypertension)     L4 vertebral fracture (H)     Mild cognitive impairment     Pulmonary hypertension (H)     Severe mitral regurgitation     Systolic CHF, chronic (H)     Tricuspid regurgitation     Trigeminal neuralgia        MEDICATIONS:  Current Outpatient Medications   Medication Sig Dispense  Refill    acetaminophen (TYLENOL) 325 MG tablet Take 3 tablets (975 mg) by mouth 3 times daily      carBAMazepine (TEGRETOL) 100 MG chewable tablet Take 2 tablets (200 mg) by mouth 2 times daily      furosemide (LASIX) 40 MG tablet Take 1 tablet (40 mg) by mouth daily      latanoprost (XALATAN) 0.005 % ophthalmic solution Place 1 drop into both eyes At Bedtime      lisinopril (ZESTRIL) 5 MG tablet Take 1 tablet (5 mg) by mouth daily 90 tablet 3    metoprolol succinate ER (TOPROL XL) 25 MG 24 hr tablet Take 0.5 tablets (12.5 mg) by mouth daily 30 tablet 3    multivitamin w/minerals (THERA-VIT-M) tablet Take 1 tablet by mouth daily      ondansetron (ZOFRAN ODT) 4 MG ODT tab Take 1 tablet (4 mg) by mouth every 6 hours as needed for nausea      potassium chloride nick ER (KLOR-CON M15) 15 MEQ CR tablet Take 1 tablet (15 mEq) by mouth daily      timolol maleate (TIMOPTIC) 0.5 % ophthalmic solution Place 1 drop into both eyes 2 times daily       No current facility-administered medications for this visit.       SOCIAL HISTORY:  I have reviewed this patient's social history and updated it with pertinent information if needed. Ana Cristinabharath THEA Cullen  reports that she has never smoked. She has never used smokeless tobacco. She reports that she does not drink alcohol and does not use drugs.    PHYSICAL EXAM:  Pulse:  [82] 82  BP: (118)/(74) 118/74  SpO2:  [95 %] 95 %  137 lbs 0 oz    Constitutional: alert, no distress  Respiratory: Good bilateral air entry  Cardiovascular: Normal regular heart sounds 1+ bilateral pitting edema  GI: nondistended  Neuropsychiatric: appropriate affact    ASSESSMENT: Pertinent issues addressed/ reviewed during this cardiology visit  Severe heart failure with reduced ejection fraction with end-stage features NYHA class IIIb  Moderate to severe mitral regurgitation  Age-related functional disability and frailty    RECOMMENDATIONS:  This patient has severe heart failure with reduced ejection  fraction.  Although she appears compensated from a volume standpoint, but echocardiogram reveals end-stage features.  She is short of breath with minimal activity.  Her creatinine is normal.  She is currently on 40 mg daily of Lasix.  Given her age frailty medical issues including fracture neck collar etc. patient is not a candidate for cardiovascular interventions at this time.  Etiology of this is very likely underlying occult coronary artery disease.  Patient however is DNR/DNI.  After extensive discussion we have decided to stay conservative with medical approach at this time.  Recommend palliative care consultation for goals of care evaluation and possible consideration of hospice.  Recommend starting low-dose 12.5 mg daily of metoprolol succinate with 5 mg daily of lisinopril.  Follow-up with VICKY in 3 months in the core cardiology clinic    Poor long-term prognosis.    It was a pleasure seeing this patient in clinic today. Please do not hesitate to contact me with any future questions.     JEANINE Taveras, [facility]  Cardiology - [facility]  July 3, 2024    Review of the result(s) of each unique test - Last CBC BMP lipids echocardiogram.  Echo personally reviewed showing EF of 15 to 20%     The level of medical decision making during this visit was of high complexity. Condition for which patient was treated that is considered severe and would require intensive monitoring or urgent treatment to facilitate care -end-stage heart failure    This note was completed in part using dictation via the Dragon voice recognition software. Some word and grammatical errors may occur and must be interpreted in the appropriate clinical context.  If there are any questions pertaining to this issue, please contact me for further clarification.      Thank you for allowing me to participate in the care of your patient.      Sincerely,     Shefali Ochoa MD     [facility]  cc:    Clarissa Morales MD  [address] [place] [address]

## 2024-07-03 NOTE — PROGRESS NOTES
CARDIOLOGY CLINIC CONSULTATION    PRIMARY CARE PHYSICIAN:  Jordyn Jon    HISTORY OF PRESENT ILLNESS:  This is a very pleasant 89-year-old female who is here with her son-in-law.  The patient denies any prior known cardiovascular issues however she has a history of stroke and hypertension.  In 2023 she had an echocardiogram done when she presented to the hospital for noncardiac reasons but was noted to have an EF of 40 to 45% with severe MR.  Subsequently I do not see any cardiology follow-up.  She is not on any cardiac medications.  She presented with a fall recently to [facility] when she was trying to get up to get her walker.  She injured and fractured her cervical vertebrae.  She is in a neck collar right now.  During the hospital stay, she was noted to have severe heart failure with reduced ejection fraction and echocardiogram revealed 3+ MR and EF of 10 to 20%.  I have personally reviewed the echocardiogram.  She was referred to outpatient cardiology.    The patient has NYHA class IIIb heart failure symptoms.  Minimal exertion is causing her to have shortness of breath per her son-in-law but the patient is mainly wheelchair-bound.  She has a neck collar.  Appears quite elderly and frail.  She is on no cardiac medications at this time.    PAST MEDICAL HISTORY:  Past Medical History:   Diagnosis Date    Cerebrovascular accident (H)     Cerebellar CVA seen on MRI 2023    Glaucoma     HTN (hypertension)     L4 vertebral fracture (H)     Mild cognitive impairment     Pulmonary hypertension (H)     Severe mitral regurgitation     Systolic CHF, chronic (H)     Tricuspid regurgitation     Trigeminal neuralgia        MEDICATIONS:  Current Outpatient Medications   Medication Sig Dispense Refill    acetaminophen (TYLENOL) 325 MG tablet Take 3 tablets (975 mg) by mouth 3 times daily      carBAMazepine (TEGRETOL) 100 MG chewable tablet Take 2 tablets (200 mg) by mouth 2 times daily      furosemide  (LASIX) 40 MG tablet Take 1 tablet (40 mg) by mouth daily      latanoprost (XALATAN) 0.005 % ophthalmic solution Place 1 drop into both eyes At Bedtime      lisinopril (ZESTRIL) 5 MG tablet Take 1 tablet (5 mg) by mouth daily 90 tablet 3    metoprolol succinate ER (TOPROL XL) 25 MG 24 hr tablet Take 0.5 tablets (12.5 mg) by mouth daily 30 tablet 3    multivitamin w/minerals (THERA-VIT-M) tablet Take 1 tablet by mouth daily      ondansetron (ZOFRAN ODT) 4 MG ODT tab Take 1 tablet (4 mg) by mouth every 6 hours as needed for nausea      potassium chloride nick ER (KLOR-CON M15) 15 MEQ CR tablet Take 1 tablet (15 mEq) by mouth daily      timolol maleate (TIMOPTIC) 0.5 % ophthalmic solution Place 1 drop into both eyes 2 times daily       No current facility-administered medications for this visit.       SOCIAL HISTORY:  I have reviewed this patient's social history and updated it with pertinent information if needed. Maya Cullen  reports that she has never smoked. She has never used smokeless tobacco. She reports that she does not drink alcohol and does not use drugs.    PHYSICAL EXAM:  Pulse:  [82] 82  BP: (118)/(74) 118/74  SpO2:  [95 %] 95 %  137 lbs 0 oz    Constitutional: alert, no distress  Respiratory: Good bilateral air entry  Cardiovascular: Normal regular heart sounds 1+ bilateral pitting edema  GI: nondistended  Neuropsychiatric: appropriate affact    ASSESSMENT: Pertinent issues addressed/ reviewed during this cardiology visit  Severe heart failure with reduced ejection fraction with end-stage features NYHA class IIIb  Moderate to severe mitral regurgitation  Age-related functional disability and frailty    RECOMMENDATIONS:  This patient has severe heart failure with reduced ejection fraction.  Although she appears compensated from a volume standpoint, but echocardiogram reveals end-stage features.  She is short of breath with minimal activity.  Her creatinine is normal.  She is currently on 40 mg  daily of Lasix.  Given her age frailty medical issues including fracture neck collar etc. patient is not a candidate for cardiovascular interventions at this time.  Etiology of this is very likely underlying occult coronary artery disease.  Patient however is DNR/DNI.  After extensive discussion we have decided to stay conservative with medical approach at this time.  Recommend palliative care consultation for goals of care evaluation and possible consideration of hospice.  Recommend starting low-dose 12.5 mg daily of metoprolol succinate with 5 mg daily of lisinopril.  Follow-up with VICKY in 3 months in the core cardiology clinic    Poor long-term prognosis.    It was a pleasure seeing this patient in clinic today. Please do not hesitate to contact me with any future questions.     JEANINE Taveras, [facility]  Cardiology - [facility]  July 3, 2024    Review of the result(s) of each unique test - Last CBC BMP lipids echocardiogram.  Echo personally reviewed showing EF of 15 to 20%     The level of medical decision making during this visit was of high complexity. Condition for which patient was treated that is considered severe and would require intensive monitoring or urgent treatment to facilitate care -end-stage heart failure    This note was completed in part using dictation via the Dragon voice recognition software. Some word and grammatical errors may occur and must be interpreted in the appropriate clinical context.  If there are any questions pertaining to this issue, please contact me for further clarification.

## 2024-07-05 ENCOUNTER — TELEPHONE (OUTPATIENT)
Dept: CARDIOLOGY | Facility: CLINIC | Age: 89
End: 2024-07-05
Payer: MEDICARE

## 2024-07-05 NOTE — TELEPHONE ENCOUNTER
Reviewed pt's 7/3 office note from . 's instructions were to:  Start metoprolol succinate 12.5 mg daily  Start lisinopril 5 mg daily  3 month CORE VICKY follow up. Pt has been scheduled for 8/5 lab @ 9:00 AM, and 10:00 AM office visit with VICKY Funk at our [place] clinic    I will message our clinic nurse to fax orders to [facility] at [phone].    Dario HSU July 5, 2024, 9:06 AM

## 2024-07-05 NOTE — TELEPHONE ENCOUNTER
Lorenar received Team 7 nurse Shelia request to fax orders to [facility].    Writer has faxed medication orders; included notification about upcoming appointments.  Requested them to call if anything else is needed.    Shanelle Magallanes RN on 7/5/2024 at 10:11 AM

## 2024-07-09 ENCOUNTER — TRANSITIONAL CARE UNIT VISIT (OUTPATIENT)
Dept: GERIATRICS | Facility: CLINIC | Age: 89
End: 2024-07-09
Payer: MEDICARE

## 2024-07-09 ENCOUNTER — LAB REQUISITION (OUTPATIENT)
Dept: LAB | Facility: CLINIC | Age: 89
End: 2024-07-09
Payer: MEDICARE

## 2024-07-09 VITALS
BODY MASS INDEX: 25.2 KG/M2 | HEIGHT: 59 IN | OXYGEN SATURATION: 93 % | SYSTOLIC BLOOD PRESSURE: 113 MMHG | WEIGHT: 125 LBS | TEMPERATURE: 95.4 F | DIASTOLIC BLOOD PRESSURE: 77 MMHG | HEART RATE: 80 BPM | RESPIRATION RATE: 17 BRPM

## 2024-07-09 DIAGNOSIS — I50.23 ACUTE ON CHRONIC SYSTOLIC HEART FAILURE (H): ICD-10-CM

## 2024-07-09 DIAGNOSIS — E87.1 HYPONATREMIA: ICD-10-CM

## 2024-07-09 DIAGNOSIS — I11.0 HYPERTENSIVE HEART DISEASE WITH HEART FAILURE (H): ICD-10-CM

## 2024-07-09 DIAGNOSIS — N18.32 STAGE 3B CHRONIC KIDNEY DISEASE (H): ICD-10-CM

## 2024-07-09 DIAGNOSIS — U07.1 INFECTION DUE TO 2019 NOVEL CORONAVIRUS: Primary | ICD-10-CM

## 2024-07-09 DIAGNOSIS — S12.110A: ICD-10-CM

## 2024-07-09 PROCEDURE — 99309 SBSQ NF CARE MODERATE MDM 30: CPT | Performed by: PHYSICIAN ASSISTANT

## 2024-07-09 NOTE — PROGRESS NOTES
"  Chief Complaint   Patient presents with    Nursing Home Acute       HPI:  Maya Cullen is a 89 year old  (2/13/1934), who is being seen today for an episodic care visit at: [facility] ([facility]) [[id number]].     Brief summary: Maya Cullen is an exceptionally pleasant 89-year-old female with a past medical history of previous CVA, glaucoma, hypertension who was recently hospitalized at [facility].  Presented for evaluation of a fall.  Found to have a nondisplaced odontoid fracture.  Neurosurgery consulted who recommended conservative management.  Additionally, noted to be hypoxic on presentation with worsening lower extremity edema.  TTE obtained which showed declining EF of 20-25%.  Diuresed with IV Lasix and transition to oral Lasix at discharge.  She had been taking oral Lasix as outpatient but only for a few days prior to admission.  Assessed by therapies and discharged to [facility]    Today's concern is: Cardiology appointment 7/3 reviewed.  Lisinopril and metoprolol added.  Conservative management for CHF recommended.  Spoke with RN.  Tested positive for COVID-19.  Spoke with , had plan for discharge to [facility] tomorrow but given COVID status this will be delayed    Barbie is evaluated in her room.  Denies headache, malaise, URI symptoms.  Discussed currently asymptomatic regarding COVID so we will hold on antiviral therapy.  Notes some occasional dizziness.  No chest pain.  No neck pain.  Edema is improving.            Review of nursing home EMR:SBP , Wt 125,       Allergies, and PMH/PSH reviewed in [employer] today.    REVIEW OF SYSTEMS:  4 point ROS including Respiratory, CV, GI and , other than that noted in the HPI,  is negative    Objective:   /77   Pulse 80   Temp (!) 95.4  F (35.2  C)   Resp 17   Ht 1.499 m (4' 11\")   Wt 56.7 kg (125 lb)   SpO2 93%   BMI 25.25 kg/m      Physical Exam  Vitals (Facility EMR) reviewed.   Constitutional:       General: " She is not in acute distress.  HENT:      Head: Normocephalic and atraumatic.   Eyes:      General: No scleral icterus.  Neck:      Comments: Cervical collar in place  Cardiovascular:      Rate and Rhythm: Normal rate and regular rhythm.      Heart sounds: No murmur heard.  Pulmonary:      Effort: Pulmonary effort is normal.      Breath sounds: No wheezing or rales.   Musculoskeletal:      Comments: 1-2+ edema   Skin:     General: Skin is warm and dry.      Findings: No rash.   Neurological:      Mental Status: She is alert. Mental status is at baseline.   Psychiatric:         Behavior: Behavior normal.          MED REC REQUIRED  Post Medication Reconciliation Status: discharge medications reconciled and changed, per note/orders      Recent labs in [facility] reviewed by me today.     Assessment/Plan:  COVD-19: Diagnosed in the setting of facility wide outbreak  - Currently asymptomatic.  Hold on antiviral therapy.  Takes Tegretol for trigeminal neuralgia also not a candidate for Paxlovid.  Will need to consider molnupiravir if symptoms develop  - Frequent vital signs  - Contact precautions per facility protocol    Mechanical fall  Acute C2 type II odontoid fracture:  - Continue scheduled Tylenol  - Cervical collar at all times  - PT/OT  -Follow-up with neurosurgery 8/6    Acute hypoxic respiratory failure, resolved  Acute HFrEF  Severe MR  Pulmonary hypertension  Hypertension: TTE with declining EF 20-25%.  Had started Lasix just a few days prior to the fall for lower extremity edema and shortness of breath.  -Has remained off oxygen since TCU admission.  - Had cardiology follow-up 7/3.  Lisinopril 5 mg and Toprol XL 12.5 mg added to her regimen.  Some SBP's less than 100 and does complain of mild dizziness.  Reduce lisinopril to 2.5 mg daily to limit hypotension  - Continue Lasix 40 mg daily and KCl.  Edema is improving  - Monitor weights 2 times weekly  - BMP 7/11 to assess K/creatinine with initiation of  lisinopril    Mild hyponatremia, resolved:  - BMP 7/11    CKDIIIb:Estimated Creatinine Clearance: 40.8 mL/min (based on SCr of 0.83 mg/dL).  -BMP 7/11    Trigeminal neuralgia  - Continue Tegretol      Orders:  As above      Electronically signed by: Mercedes Pelayo PA-C

## 2024-07-09 NOTE — LETTER
" 7/9/2024      Maya Cullen  [address]  [place] [address]          Chief Complaint   Patient presents with     Nursing Home Acute       HPI:  Maya Cullen is a 89 year old  (2/13/1934), who is being seen today for an episodic care visit at: [facility] ([facility]) [[id number]].     Brief summary: Maya Cullen is an exceptionally pleasant 89-year-old female with a past medical history of previous CVA, glaucoma, hypertension who was recently hospitalized at [facility].  Presented for evaluation of a fall.  Found to have a nondisplaced odontoid fracture.  Neurosurgery consulted who recommended conservative management.  Additionally, noted to be hypoxic on presentation with worsening lower extremity edema.  TTE obtained which showed declining EF of 20-25%.  Diuresed with IV Lasix and transition to oral Lasix at discharge.  She had been taking oral Lasix as outpatient but only for a few days prior to admission.  Assessed by therapies and discharged to [facility]    Today's concern is: Cardiology appointment 7/3 reviewed.  Lisinopril and metoprolol added.  Conservative management for CHF recommended.  Spoke with RN.  Tested positive for COVID-19.  Spoke with , had plan for discharge to [facility] tomorrow but given COVID status this will be delayed    Barbie is evaluated in her room.  Denies headache, malaise, URI symptoms.  Discussed currently asymptomatic regarding COVID so we will hold on antiviral therapy.  Notes some occasional dizziness.  No chest pain.  No neck pain.  Edema is improving.            Review of nursing home EMR:SBP , Wt 125,       Allergies, and PMH/PSH reviewed in [facility] today.    REVIEW OF SYSTEMS:  4 point ROS including Respiratory, CV, GI and , other than that noted in the HPI,  is negative    Objective:   /77   Pulse 80   Temp (!) 95.4  F (35.2  C)   Resp 17   Ht 1.499 m (4' 11\")   Wt 56.7 kg (125 lb)   SpO2 93%   BMI 25.25 " kg/m      Physical Exam  Vitals (Facility EMR) reviewed.   Constitutional:       General: She is not in acute distress.  HENT:      Head: Normocephalic and atraumatic.   Eyes:      General: No scleral icterus.  Neck:      Comments: Cervical collar in place  Cardiovascular:      Rate and Rhythm: Normal rate and regular rhythm.      Heart sounds: No murmur heard.  Pulmonary:      Effort: Pulmonary effort is normal.      Breath sounds: No wheezing or rales.   Musculoskeletal:      Comments: 1-2+ edema   Skin:     General: Skin is warm and dry.      Findings: No rash.   Neurological:      Mental Status: She is alert. Mental status is at baseline.   Psychiatric:         Behavior: Behavior normal.          MED REC REQUIRED  Post Medication Reconciliation Status: discharge medications reconciled and changed, per note/orders      Recent labs in [facility] reviewed by me today.     Assessment/Plan:  COVD-19: Diagnosed in the setting of facility wide outbreak  - Currently asymptomatic.  Hold on antiviral therapy.  Takes Tegretol for trigeminal neuralgia also not a candidate for Paxlovid.  Will need to consider molnupiravir if symptoms develop  - Frequent vital signs  - Contact precautions per facility protocol    Mechanical fall  Acute C2 type II odontoid fracture:  - Continue scheduled Tylenol  - Cervical collar at all times  - PT/OT  -Follow-up with neurosurgery 8/6    Acute hypoxic respiratory failure, resolved  Acute HFrEF  Severe MR  Pulmonary hypertension  Hypertension: TTE with declining EF 20-25%.  Had started Lasix just a few days prior to the fall for lower extremity edema and shortness of breath.  -Has remained off oxygen since TCU admission.  - Had cardiology follow-up 7/3.  Lisinopril 5 mg and Toprol XL 12.5 mg added to her regimen.  Some SBP's less than 100 and does complain of mild dizziness.  Reduce lisinopril to 2.5 mg daily to limit hypotension  - Continue Lasix 40 mg daily and KCl.  Edema is improving  - Monitor  weights 2 times weekly  - [facility] 7/11 to assess K/creatinine with initiation of lisinopril    Mild hyponatremia, resolved:  - [facility] 7/11    CKDIIIb:Estimated Creatinine Clearance: 40.8 mL/min (based on SCr of 0.83 mg/dL).  -[facility] 7/11    Trigeminal neuralgia  - Continue Tegretol      Orders:  As above      Electronically signed by: Mercedes Pelayo PA-C       Sincerely,        Mercedes Pelayo PA-C

## 2024-07-11 ENCOUNTER — DISCHARGE SUMMARY NURSING HOME (OUTPATIENT)
Dept: GERIATRICS | Facility: CLINIC | Age: 89
End: 2024-07-11
Payer: MEDICARE

## 2024-07-11 VITALS
HEIGHT: 59 IN | OXYGEN SATURATION: 96 % | TEMPERATURE: 96.5 F | WEIGHT: 125 LBS | DIASTOLIC BLOOD PRESSURE: 73 MMHG | BODY MASS INDEX: 25.2 KG/M2 | SYSTOLIC BLOOD PRESSURE: 115 MMHG | HEART RATE: 76 BPM | RESPIRATION RATE: 18 BRPM

## 2024-07-11 DIAGNOSIS — I50.23 ACUTE ON CHRONIC SYSTOLIC HEART FAILURE (H): ICD-10-CM

## 2024-07-11 DIAGNOSIS — N18.32 STAGE 3B CHRONIC KIDNEY DISEASE (H): ICD-10-CM

## 2024-07-11 DIAGNOSIS — G50.0 TRIGEMINAL NEURALGIA: ICD-10-CM

## 2024-07-11 DIAGNOSIS — S12.110A: ICD-10-CM

## 2024-07-11 DIAGNOSIS — I27.20 PULMONARY HYPERTENSION (H): ICD-10-CM

## 2024-07-11 DIAGNOSIS — U07.1 INFECTION DUE TO 2019 NOVEL CORONAVIRUS: Primary | ICD-10-CM

## 2024-07-11 LAB
ANION GAP SERPL CALCULATED.3IONS-SCNC: 12 MMOL/L (ref 7–15)
BUN SERPL-MCNC: 16.1 MG/DL (ref 8–23)
CALCIUM SERPL-MCNC: 9.3 MG/DL (ref 8.2–9.6)
CHLORIDE SERPL-SCNC: 99 MMOL/L (ref 98–107)
CREAT SERPL-MCNC: 0.91 MG/DL (ref 0.51–0.95)
DEPRECATED HCO3 PLAS-SCNC: 24 MMOL/L (ref 22–29)
EGFRCR SERPLBLD CKD-EPI 2021: 60 ML/MIN/1.73M2
GLUCOSE SERPL-MCNC: 103 MG/DL (ref 70–99)
POTASSIUM SERPL-SCNC: 4.3 MMOL/L (ref 3.4–5.3)
SODIUM SERPL-SCNC: 135 MMOL/L (ref 135–145)

## 2024-07-11 PROCEDURE — 82565 ASSAY OF CREATININE: CPT | Performed by: PHYSICIAN ASSISTANT

## 2024-07-11 PROCEDURE — 36415 COLL VENOUS BLD VENIPUNCTURE: CPT | Performed by: PHYSICIAN ASSISTANT

## 2024-07-11 PROCEDURE — 99316 NF DSCHRG MGMT 30 MIN+: CPT | Performed by: PHYSICIAN ASSISTANT

## 2024-07-11 PROCEDURE — P9604 ONE-WAY ALLOW PRORATED TRIP: HCPCS | Performed by: PHYSICIAN ASSISTANT

## 2024-07-11 RX ORDER — LISINOPRIL 5 MG/1
2.5 TABLET ORAL DAILY
Qty: 90 TABLET | Refills: 3 | Status: SHIPPED | OUTPATIENT
Start: 2024-07-11 | End: 2024-10-04

## 2024-07-11 NOTE — LETTER
7/11/2024      Maya Cullen  [address]  [place] [place] [address]        [facility] GERIATRICS DISCHARGE SUMMARY  PATIENT'S NAME: Maya Cullen  YOB: 1934  MEDICAL RECORD NUMBER:  [id number]  Place of Service where encounter took place:  [facility] ([facility]) [[id number]]    PRIMARY CARE PROVIDER AND CLINIC RESPONSIBLE AFTER TRANSFER:   Jordyn NADIA [place], [address] [facility] [address]    Assisted Living: [facility]      Transferring providers: Mercedes Pelayo PA-C, Delia Aceves DO  Recent Hospitalization/ED:  [facility] stay 6/16/24 to 6/19/24.  Date of SNF Admission: June 19, 2024  Date of SNF (anticipated) Discharge:  July 15th, 2024  Discharged to: previous assisted living  Cognitive Scores: SLUMS: 14/30 and CPT: 3.9/5.6  Physical Function: Ambulating 400 ft with 4WW SBA/supervision  DME: No new DME needed    CODE STATUS/ADVANCE DIRECTIVES DISCUSSION:  No CPR- Do NOT Intubate   ALLERGIES: Brimonidine, Dorzolamide, Benzalkonium chloride, Fluorescein, Penicillins, Sulfa antibiotics, Codeine, and Fd&c yellow #5 (tartrazine)    NURSING FACILITY COURSE   Maya Cullen is an exceptionally pleasant 89-year-old female with a past medical history of previous CVA, glaucoma, hypertension who was recently hospitalized at [facility].  Presented for evaluation of a fall.  Found to have a nondisplaced odontoid fracture.  Neurosurgery consulted who recommended conservative management.  Additionally, noted to be hypoxic on presentation with worsening lower extremity edema.  TTE obtained which showed declining EF of 20-25%.  Diuresed with IV Lasix and transition to oral Lasix at discharge.  She had been taking oral Lasix as outpatient but only for a few days prior to admission.  Assessed by therapies and discharged to [facility]    Barbie is evaluated in her room.  Diagnosed with COVID 2 days ago and discharge was delayed.   In discussion with  she can discharge back to her nursing home next week as long as she is clinically stable.  Has had some mild headache as well as malaise.  No sinus congestion runny nose or sore throat.  Discussed option of initiation of molnupiravir given it does sound as though she has some mild COVID symptoms but she prefers to avoid for now.    COVD-19: Diagnosed in the setting of facility wide outbreak  - Possibly mild symptoms of headache and fatigue.  Not a candidate for Paxlovid given Tegretol she takes for trigeminal neuralgia.  Discussed option of molnupiravir but she prefers to go without antiviral therapy  - Supportive care  - Frequent vital signs  - Contact precautions per facility protocol    Mechanical fall  Acute C2 type II odontoid fracture:  - Continue scheduled Tylenol  - Cervical collar at all times  - PT/OT  -Follow-up with neurosurgery 8/6    Acute hypoxic respiratory failure, resolved  Acute HFrEF  Severe MR  Pulmonary hypertension  Hypertension: TTE with declining EF 20-25%.  Had started Lasix just a few days prior to the fall for lower extremity edema and shortness of breath.  -Has remained off oxygen since TCU admission.  - Had cardiology follow-up 7/3.  Lisinopril 5 mg and Toprol XL 12.5 mg added to her regimen.  Lisinopril reduced to 2.5 mg at TCU due to some soft BPs.  Blood pressure improved.  Will continue on reduced dose lisinopril  - Continue Lasix 40 mg daily and KCl.  Edema is improving  - Monitor weights 2 times weekly  - BMP today stable      CKDIIIb:Estimated Creatinine Clearance: 36.8 mL/min (based on SCr of 0.91 mg/dL).  -BMP  today stable    Trigeminal neuralgia  - Continue Tegretol    Discharge Medications:  MED REC REQUIRED  Post Medication Reconciliation Status: discharge medications reconciled, continue medications without change       Current Outpatient Medications   Medication Sig Dispense Refill     acetaminophen (TYLENOL) 325 MG tablet Take 3 tablets (975 mg)  "by mouth 3 times daily       carBAMazepine (TEGRETOL) 100 MG chewable tablet Take 2 tablets (200 mg) by mouth 2 times daily       furosemide (LASIX) 40 MG tablet Take 1 tablet (40 mg) by mouth daily       latanoprost (XALATAN) 0.005 % ophthalmic solution Place 1 drop into both eyes At Bedtime       lisinopril (ZESTRIL) 5 MG tablet Take 1 tablet (5 mg) by mouth daily 90 tablet 3     metoprolol succinate ER (TOPROL XL) 25 MG 24 hr tablet Take 0.5 tablets (12.5 mg) by mouth daily 30 tablet 3     multivitamin w/minerals (THERA-VIT-M) tablet Take 1 tablet by mouth daily       ondansetron (ZOFRAN ODT) 4 MG ODT tab Take 1 tablet (4 mg) by mouth every 6 hours as needed for nausea       potassium chloride nick ER (KLOR-CON M15) 15 MEQ CR tablet Take 1 tablet (15 mEq) by mouth daily       timolol maleate (TIMOPTIC) 0.5 % ophthalmic solution Place 1 drop into both eyes 2 times daily            Controlled medications:   not applicable/none     Past Medical History:   Past Medical History:   Diagnosis Date     Cerebrovascular accident (H)     Cerebellar CVA seen on MRI 2023     Glaucoma      HTN (hypertension)      L4 vertebral fracture (H)      Mild cognitive impairment      Pulmonary hypertension (H)      Severe mitral regurgitation      Systolic CHF, chronic (H)      Tricuspid regurgitation      Trigeminal neuralgia      Physical Exam:   Vitals: /73   Pulse 76   Temp (!) 96.5  F (35.8  C)   Resp 18   Ht 1.499 m (4' 11\")   Wt 56.7 kg (125 lb)   SpO2 96%   BMI 25.25 kg/m    BMI: Body mass index is 25.25 kg/m .  Physical Exam  Vitals (Facility EMR) reviewed.   Constitutional:       General: She is not in acute distress.  HENT:      Head: Normocephalic and atraumatic.   Eyes:      General: No scleral icterus.  Neck:      Comments: Cervical collar in place  Cardiovascular:      Rate and Rhythm: Normal rate and regular rhythm.      Heart sounds: No murmur heard.  Pulmonary:      Effort: Pulmonary effort is normal.      " Breath sounds: No wheezing or rales.   Musculoskeletal:      Comments: 1-2+ edema   Skin:     General: Skin is warm and dry.      Findings: No rash.   Neurological:      Mental Status: She is alert. Mental status is at baseline.   Psychiatric:         Behavior: Behavior normal.           SNF labs: Recent labs in [facility] reviewed by me today.  and Most Recent 3 CBC's:  Recent Labs   Lab Test 06/19/24  0609 06/17/24  0748 06/16/24  2147   WBC 6.0 7.1 10.3   HGB 14.7 14.7 13.9   MCV 97 98 97    201 210     Most Recent 3 BMP's:  Recent Labs   Lab Test 07/11/24  0901 07/01/24  0626 06/24/24  0554    136 132*   POTASSIUM 4.3 3.8 3.5   CHLORIDE 99 98 89*   CO2 24 26 30*   BUN 16.1 16.9 18.0   CR 0.91 0.83 0.95   ANIONGAP 12 12 13   GILL 9.3 8.9 9.0   * 104* 115*       DISCHARGE PLAN:  Follow up labs: No labs orders/due  Medical Follow Up:      Follow up with primary care provider in 1-2 weeks  [facility] scheduled appointments:  Appointments in Next Year      Jul 11, 2024 7:00 AM  Discharge Summary with Mercedes Pelayo PA-C  [facility] ([facility] ) [phone]     Aug 05, 2024 9:00 AM  LAB with RU LAB  [facility] ([facility] ) [phone]     Aug 05, 2024 10:00 AM  (Arrive by 9:55 AM)  ENROLLMENT CORE with Prasanna Moe NP  [facility] ([facility] ) [phone]     Aug 06, 2024 1:00 PM  (Arrive by 12:45 PM)  XR CERVICAL SPINE 2/3 VIEWS with [id number]  [facility] [place] ([facility] ) [phone]     Aug 06, 2024 1:30 PM  Return Adult Neurosurg with Sam Rodriguez PA-C  [facility] [place] ([facility] ) [phone]     Aug 07, 2024 12:30 PM  (Arrive by 12:15 PM)  New  Palliative with Snow Yadav DO  [facility] [place] [facility] ([facility] ) [phone]           Discharge Services: Home Care:  Occupational Therapy, Physical Therapy, Registered Nurse, and Home Health Aide  Discharge Instructions Verbalized to Patient at Discharge:   Weight bearing restrictions:  Weight bearing as tolerated. Collar at all times.     TOTAL DISCHARGE TIME:   Greater than 30 minutes  Electronically signed by:  Mercedes Pelayo PA-C     Documentation of Face to Face and Certification for Home Health Services    I certify that services are/were furnished while this patient was under the care of a physician and that a physician or an allowed non-physician practitioner (NPP), had a face-to-face encounter that meets the physician face-to-face encounter requirements. The encounter was in whole, or in part, related to the primary reason for home health. The patient is confined to his/her home and needs intermittent skilled nursing, physical therapy, speech-language pathology, or the continued need for occupational therapy. A plan of care has been established by a physician and is periodically reviewed by a physician.  Date of Face-to-Face Encounter: 7/11/2024.    I certify that, based on my findings, the following services are medically necessary home health services: Nursing, Occupational Therapy, Physical Therapy, and HHA2 .    My clinical findings support the need for the above skilled services because: Requires assistance of another person or specialized equipment to access medical services because patient: Requires supervision of another for safe transfer...    Patient to re-establish plan of care with their PCP within 7-10 days after leaving the facility to reestablish care.  Medicare certified PECOS provider: Mercedes Pelayo PA-C  Date: July 11, 2024              Sincerely,        Mercedes Pelayo PA-C

## 2024-07-11 NOTE — PROGRESS NOTES
[facility] GERIATRICS DISCHARGE SUMMARY  PATIENT'S NAME: Maya Cullen  YOB: 1934  MEDICAL RECORD NUMBER:  [id number]  Place of Service where encounter took place:  [facility] ([facility]) [[id number]]    PRIMARY CARE PROVIDER AND CLINIC RESPONSIBLE AFTER TRANSFER:   Jordyn Jon, [id number] [facility] / [facility] [address]    Assisted Living: [facility]      Transferring providers: Mercedes Pelayo PA-C, Delia Aceves DO  Recent Hospitalization/ED:  [facility] Hospital stay 6/16/24 to 6/19/24.  Date of SNF Admission: June 19, 2024  Date of SNF (anticipated) Discharge:  July 15th, 2024  Discharged to: previous assisted living  Cognitive Scores: SLUMS: 14/30 and CPT: 3.9/5.6  Physical Function: Ambulating 400 ft with 4WW SBA/supervision  DME: No new DME needed    CODE STATUS/ADVANCE DIRECTIVES DISCUSSION:  No CPR- Do NOT Intubate   ALLERGIES: Brimonidine, Dorzolamide, Benzalkonium chloride, Fluorescein, Penicillins, Sulfa antibiotics, Codeine, and Fd&c yellow #5 (tartrazine)    NURSING FACILITY COURSE   Maya Cullen is an exceptionally pleasant 89-year-old female with a past medical history of previous CVA, glaucoma, hypertension who was recently hospitalized at [facility].  Presented for evaluation of a fall.  Found to have a nondisplaced odontoid fracture.  Neurosurgery consulted who recommended conservative management.  Additionally, noted to be hypoxic on presentation with worsening lower extremity edema.  TTE obtained which showed declining EF of 20-25%.  Diuresed with IV Lasix and transition to oral Lasix at discharge.  She had been taking oral Lasix as outpatient but only for a few days prior to admission.  Assessed by therapies and discharged to TCU    Barbie is evaluated in her room.  Diagnosed with COVID 2 days ago and discharge was delayed.  In discussion with  she can discharge back to her [facility] next week as long as  she is clinically stable.  Has had some mild headache as well as malaise.  No sinus congestion runny nose or sore throat.  Discussed option of initiation of molnupiravir given it does sound as though she has some mild COVID symptoms but she prefers to avoid for now.    COVD-19: Diagnosed in the setting of facility wide outbreak  - Possibly mild symptoms of headache and fatigue.  Not a candidate for Paxlovid given Tegretol she takes for trigeminal neuralgia.  Discussed option of molnupiravir but she prefers to go without antiviral therapy  - Supportive care  - Frequent vital signs  - Contact precautions per facility protocol    Mechanical fall  Acute C2 type II odontoid fracture:  - Continue scheduled Tylenol  - Cervical collar at all times  - PT/OT  -Follow-up with neurosurgery 8/6    Acute hypoxic respiratory failure, resolved  Acute HFrEF  Severe MR  Pulmonary hypertension  Hypertension: TTE with declining EF 20-25%.  Had started Lasix just a few days prior to the fall for lower extremity edema and shortness of breath.  -Has remained off oxygen since TCU admission.  - Had cardiology follow-up 7/3.  Lisinopril 5 mg and Toprol XL 12.5 mg added to her regimen.  Lisinopril reduced to 2.5 mg at TCU due to some soft BPs.  Blood pressure improved.  Will continue on reduced dose lisinopril  - Continue Lasix 40 mg daily and KCl.  Edema is improving  - Monitor weights 2 times weekly  - BMP today stable      CKDIIIb:Estimated Creatinine Clearance: 36.8 mL/min (based on SCr of 0.91 mg/dL).  -BMP  today stable    Trigeminal neuralgia  - Continue Tegretol    Discharge Medications:  MED REC REQUIRED  Post Medication Reconciliation Status: discharge medications reconciled, continue medications without change       Current Outpatient Medications   Medication Sig Dispense Refill    acetaminophen (TYLENOL) 325 MG tablet Take 3 tablets (975 mg) by mouth 3 times daily      carBAMazepine (TEGRETOL) 100 MG chewable tablet Take 2 tablets  "(200 mg) by mouth 2 times daily      furosemide (LASIX) 40 MG tablet Take 1 tablet (40 mg) by mouth daily      latanoprost (XALATAN) 0.005 % ophthalmic solution Place 1 drop into both eyes At Bedtime      lisinopril (ZESTRIL) 5 MG tablet Take 1 tablet (5 mg) by mouth daily 90 tablet 3    metoprolol succinate ER (TOPROL XL) 25 MG 24 hr tablet Take 0.5 tablets (12.5 mg) by mouth daily 30 tablet 3    multivitamin w/minerals (THERA-VIT-M) tablet Take 1 tablet by mouth daily      ondansetron (ZOFRAN ODT) 4 MG ODT tab Take 1 tablet (4 mg) by mouth every 6 hours as needed for nausea      potassium chloride nick ER (KLOR-CON M15) 15 MEQ CR tablet Take 1 tablet (15 mEq) by mouth daily      timolol maleate (TIMOPTIC) 0.5 % ophthalmic solution Place 1 drop into both eyes 2 times daily            Controlled medications:   not applicable/none     Past Medical History:   Past Medical History:   Diagnosis Date    Cerebrovascular accident (H)     Cerebellar CVA seen on MRI 2023    Glaucoma     HTN (hypertension)     L4 vertebral fracture (H)     Mild cognitive impairment     Pulmonary hypertension (H)     Severe mitral regurgitation     Systolic CHF, chronic (H)     Tricuspid regurgitation     Trigeminal neuralgia      Physical Exam:   Vitals: /73   Pulse 76   Temp (!) 96.5  F (35.8  C)   Resp 18   Ht 1.499 m (4' 11\")   Wt 56.7 kg (125 lb)   SpO2 96%   BMI 25.25 kg/m    BMI: Body mass index is 25.25 kg/m .  Physical Exam  Vitals (Facility EMR) reviewed.   Constitutional:       General: She is not in acute distress.  HENT:      Head: Normocephalic and atraumatic.   Eyes:      General: No scleral icterus.  Neck:      Comments: Cervical collar in place  Cardiovascular:      Rate and Rhythm: Normal rate and regular rhythm.      Heart sounds: No murmur heard.  Pulmonary:      Effort: Pulmonary effort is normal.      Breath sounds: No wheezing or rales.   Musculoskeletal:      Comments: 1-2+ edema   Skin:     General: Skin " is warm and dry.      Findings: No rash.   Neurological:      Mental Status: She is alert. Mental status is at baseline.   Psychiatric:         Behavior: Behavior normal.           SNF labs: Recent labs in EPIC reviewed by me today.  and Most Recent 3 CBC's:  Recent Labs   Lab Test 06/19/24  0609 06/17/24  0748 06/16/24  2147   WBC 6.0 7.1 10.3   HGB 14.7 14.7 13.9   MCV 97 98 97    201 210     Most Recent 3 BMP's:  Recent Labs   Lab Test 07/11/24  0901 07/01/24  0626 06/24/24  0554    136 132*   POTASSIUM 4.3 3.8 3.5   CHLORIDE 99 98 89*   CO2 24 26 30*   BUN 16.1 16.9 18.0   CR 0.91 0.83 0.95   ANIONGAP 12 12 13   GILL 9.3 8.9 9.0   * 104* 115*       DISCHARGE PLAN:  Follow up labs: No labs orders/due  Medical Follow Up:      Follow up with primary care provider in 1-2 weeks  [facility] scheduled appointments:  Appointments in Next Year      Jul 11, 2024 7:00 AM  Discharge Summary with Mercedes Pelayo PA-C  [facility] ([facility] ) [phone]     Aug 05, 2024 9:00 AM  LAB with RU LAB  [facility] ([facility] ) [phone]     Aug 05, 2024 10:00 AM  (Arrive by 9:55 AM)  ENROLLMENT CORE with Prasanna Moe NP  [facility] ([facility] ) [phone]     Aug 06, 2024 1:00 PM  (Arrive by 12:45 PM)  XR CERVICAL SPINE 2/3 VIEWS with [id number]  [facility] [place] ([facility] ) [phone]     Aug 06, 2024 1:30 PM  Return Adult Neurosurg with Sam Rodriguez PA-C  [facility] [place] ([facility] ) [phone]     Aug 07, 2024 12:30 PM  (Arrive by 12:15 PM)  New Palliative with DO NADIA Patel [facility]  [facility] ) [phone]           Discharge Services: Home Care:  Occupational Therapy, Physical Therapy, Registered Nurse, and Home Health Aide  Discharge Instructions Verbalized to Patient at Discharge:   Weight bearing restrictions:  Weight bearing as tolerated. Collar at all times.     TOTAL DISCHARGE TIME:   Greater than 30 minutes  Electronically signed by:  Mercedes Pelayo PA-C     Documentation of Face to Face and Certification for Home Health Services    I certify that services are/were furnished while this patient was under the care of a physician and that a physician or an allowed non-physician practitioner (NPP), had a face-to-face encounter that meets the physician face-to-face encounter requirements. The encounter was in whole, or in part, related to the primary reason for home health. The patient is confined to his/her home and needs intermittent skilled nursing, physical therapy, speech-language pathology, or the continued need for occupational therapy. A plan of care has been established by a physician and is periodically reviewed by a physician.  Date of Face-to-Face Encounter: 7/11/2024.    I certify that, based on my findings, the following services are medically necessary home health services: Nursing, Occupational Therapy, Physical Therapy, and HHA2 .    My clinical findings support the need for the above skilled services because: Requires assistance of another person or specialized equipment to access medical services because patient: Requires supervision of another for safe transfer...    Patient to re-establish plan of care with their PCP within 7-10 days after leaving the facility to reestablish care.  Medicare certified PECOS provider: Mercedes Pelayo PA-C  Date: July 11, 2024

## 2024-08-05 ENCOUNTER — LAB (OUTPATIENT)
Dept: LAB | Facility: CLINIC | Age: 89
End: 2024-08-05
Payer: MEDICARE

## 2024-08-05 ENCOUNTER — OFFICE VISIT (OUTPATIENT)
Dept: CARDIOLOGY | Facility: CLINIC | Age: 89
End: 2024-08-05
Payer: MEDICARE

## 2024-08-05 VITALS
BODY MASS INDEX: 23.51 KG/M2 | HEIGHT: 59 IN | WEIGHT: 116.6 LBS | DIASTOLIC BLOOD PRESSURE: 70 MMHG | HEART RATE: 56 BPM | SYSTOLIC BLOOD PRESSURE: 108 MMHG

## 2024-08-05 DIAGNOSIS — I10 ESSENTIAL HYPERTENSION: ICD-10-CM

## 2024-08-05 DIAGNOSIS — I42.9 CARDIOMYOPATHY, UNSPECIFIED TYPE (H): ICD-10-CM

## 2024-08-05 DIAGNOSIS — I50.22 CHRONIC HFREF (HEART FAILURE WITH REDUCED EJECTION FRACTION) (H): Primary | ICD-10-CM

## 2024-08-05 DIAGNOSIS — I50.21 ACUTE SYSTOLIC CONGESTIVE HEART FAILURE (H): ICD-10-CM

## 2024-08-05 DIAGNOSIS — Z86.73 HISTORY OF CVA (CEREBROVASCULAR ACCIDENT): ICD-10-CM

## 2024-08-05 LAB
ANION GAP SERPL CALCULATED.3IONS-SCNC: 12 MMOL/L (ref 7–15)
BUN SERPL-MCNC: 24.1 MG/DL (ref 8–23)
CALCIUM SERPL-MCNC: 8.8 MG/DL (ref 8.8–10.4)
CHLORIDE SERPL-SCNC: 102 MMOL/L (ref 98–107)
CREAT SERPL-MCNC: 1.04 MG/DL (ref 0.51–0.95)
EGFRCR SERPLBLD CKD-EPI 2021: 51 ML/MIN/1.73M2
GLUCOSE SERPL-MCNC: 108 MG/DL (ref 70–99)
HCO3 SERPL-SCNC: 24 MMOL/L (ref 22–29)
NT-PROBNP SERPL-MCNC: 8513 PG/ML (ref 0–1800)
POTASSIUM SERPL-SCNC: 4.4 MMOL/L (ref 3.4–5.3)
SODIUM SERPL-SCNC: 138 MMOL/L (ref 135–145)
T4 FREE SERPL-MCNC: 1 NG/DL (ref 0.9–1.7)
TSH SERPL DL<=0.005 MIU/L-ACNC: 4.24 UIU/ML (ref 0.3–4.2)

## 2024-08-05 PROCEDURE — 80048 BASIC METABOLIC PNL TOTAL CA: CPT | Performed by: INTERNAL MEDICINE

## 2024-08-05 PROCEDURE — 99215 OFFICE O/P EST HI 40 MIN: CPT | Performed by: NURSE PRACTITIONER

## 2024-08-05 PROCEDURE — 84443 ASSAY THYROID STIM HORMONE: CPT | Performed by: INTERNAL MEDICINE

## 2024-08-05 PROCEDURE — 84439 ASSAY OF FREE THYROXINE: CPT | Performed by: INTERNAL MEDICINE

## 2024-08-05 PROCEDURE — 83880 ASSAY OF NATRIURETIC PEPTIDE: CPT | Performed by: INTERNAL MEDICINE

## 2024-08-05 PROCEDURE — 36415 COLL VENOUS BLD VENIPUNCTURE: CPT | Performed by: INTERNAL MEDICINE

## 2024-08-05 NOTE — PROGRESS NOTES
Cardiology Clinic Progress Note    [facility] Clinic Visit (Heart Failure Specialty Clinic)    Service Date: August 5, 2024  Primary Cardiology Team: Dr. Ochoa    HPI:   I had the pleasure of meeting Ms. Maya Cullen in the clinic today. She is a very pleasant 89 year old female with a past medical history notable for history of stroke and hypertension.  In 2023 she had an echocardiogram done when she presented to the hospital for noncardiac reasons but was noted to have an EF of 40 to 45% with severe MR.  Subsequently I do not see any cardiology follow-up.  She is not on any cardiac medications.  She presented with a fall recently to [facility] when she was trying to get up to get her walker.  She injured and fractured her cervical vertebrae.  She is in a neck collar right now.  During the hospital stay, she was noted to have severe heart failure with reduced ejection fraction and echocardiogram revealed 2+ MR and EF of 10 to 20%.    She was referred to outpatient cardiology and met with Dr. Ochoa in consultation about a month ago. At that time, she was reporting NYHA class IIIb heart failure symptoms with shortness of breath with minimal exertion and was functionally quite limited and predominantly wheelchair-bound at that time. Etiology of her cardiomyopathy was felt very likely secondary to underlying occult coronary artery disease. However, she is DNR/DNI and after extensive discussion conservative medical management was agreed upon. Palliative care referral was also placed for further goals of care discussion and possible consideration of hospice.  She was continued on Lasix 40 mg once daily along with low-dose lisinopril 2.5 mg daily and metoprolol succinate 12.5 mg once daily.    Today, Ms. Cullen presents to the clinic accompanied by her son-in-law in routine follow-up and for enrollment in the [facility] for continued management of her cardiomyopathy and congestive heart failure.  She tells me that she has been feeling a bit better over the past few weeks. She lives in an assisted living facility. She notes that she has been able to walk the halls at her apartment and down to the dining ascencio there with a wheeled walker over the last few weeks and feels that she has been getting a bit stronger.  She has been tolerating this without significant exertional dyspnea or limitations from a cardiac standpoint. The patient and her son-in-law note that they have not met with the palliative care team yet but they are open to doing this. We again reviewed options for further evaluation and management of her cardiomyopathy and they again express preference to avoid any further invasive testing or procedures and would prefer to continue with conservative medical management.     Labs were checked prior to the visit today which I personally reviewed with the patient and her son-in-law during the visit.  Basic metabolic panel shows overall stable electrolytes and renal function with a potassium level 4.4 and creatinine level at 1.04. BUN is minimally elevated at 24.1. NT pro BNP level is elevated at just over 8,500, which is up slightly from just over 7,800 about a month ago.  TSH level was also checked and is minimally elevated at 4.24 (upper limits of normal of 4.2).  Free T4 level was also checked, but results are currently pending at the time of the visit.  I do not see documentation regarding any known prior history of hypothyroidism and it does not look like she is currently on levothyroxine.    ASSESSMENT:  1. 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% by TTE 6/17/24  - LVEF: 10-20%  - NYHA class II-III, ACC/AHA stage C  - Etiology: Unclear, with suspected ischemic secondary to underlying occult CAD  - Fluid status: Appears well compensated on exam today without signs or symptoms concerning for acute CHF; suspected dry weight: 115-120 pounds  -  Diuretic regimen: Lasix 40 mg once daily  - Ischemic evaluation: Declined due to advanced age with preference for conservative medical management    Guideline directed medical therapy (GDMT):  - Beta blocker: Metoprolol succinate 12.5 mg once daily lisinopril  - ACEI/ARB/ARNI: 2.5 mg once daily  - MRA: None currently due to borderline blood pressures and fall risk  - SGLT2 inhibitor: None currently due to report on blood pressures fall risk    2.  Moderate mitral regurgitation  3.  Mild to moderate tricuspid regurgitation  4.  Essential hypertension  5.  History of stroke  6.  Recent fall in June 2024 with fracture of cervical vertebrae, currently in a cervical collar    PLAN:  - Continue current cardiac regimen as outlined above.  - Counseled on checking daily weights and trying to stick to a low-sodium diet. Reviewed signs/symptoms concerning for fluid retention and when to contact the clinic.  - Can continue to monitor thyroid function given TSH level borderline elevated with free T4 level still currently pending.  - Referral to palliative care placed again today for further discussions regarding goals of care given her advanced age and severe cardiomyopathy with concern for poor prognosis.   - Follow-up in the [facility] in about 3 months with labs beforehand for a repeat basic metabolic panel and NT pro BNP level.  We did discuss considering a repeat echocardiogram before the visit as well for reassessment of her ejection fraction, though given her preference for conservative medical management this would be unlikely to change her management so we will defer for the time being.  If the patient would elect to pursue hospice or comfort focused care in the interim, further follow-up with the [facility] could be canceled depending on her preference.    Thank you for the opportunity to participate in this patient's care. We would be happy to see her sooner if needed for any concerns in the meantime.      40 total  minutes was spent today including chart review, precharting, history and exam, post visit documentation, and reviewing studies as outlined above.     BENNETT Funk, CNP   Nurse Practitioner  [facility]  Pager: [phone]  Text Page  (8am - 5pm, M-F)    Orders this Visit:  Orders Placed This Encounter   Procedures    Basic metabolic panel    N terminal pro BNP outpatient    Adult Palliative Care  Referral    Follow-Up with Cardiology- [facility]     No orders of the defined types were placed in this encounter.    There are no discontinued medications.  Encounter Diagnoses   Name Primary?    Chronic HFrEF (heart failure with reduced ejection fraction) (H) Yes    Cardiomyopathy, unspecified type (H)     Essential hypertension     History of CVA (cerebrovascular accident)        CURRENT MEDICATIONS:  Current Outpatient Medications   Medication Sig Dispense Refill    acetaminophen (TYLENOL) 325 MG tablet Take 3 tablets (975 mg) by mouth 3 times daily      carBAMazepine (TEGRETOL) 100 MG chewable tablet Take 2 tablets (200 mg) by mouth 2 times daily      furosemide (LASIX) 40 MG tablet Take 1 tablet (40 mg) by mouth daily      latanoprost (XALATAN) 0.005 % ophthalmic solution Place 1 drop into both eyes 2 times daily      lisinopril (ZESTRIL) 5 MG tablet Take 0.5 tablets (2.5 mg) by mouth daily 90 tablet 3    metoprolol succinate ER (TOPROL XL) 25 MG 24 hr tablet Take 0.5 tablets (12.5 mg) by mouth daily 30 tablet 3    multivitamin w/minerals (THERA-VIT-M) tablet Take 1 tablet by mouth daily      ondansetron (ZOFRAN ODT) 4 MG ODT tab Take 1 tablet (4 mg) by mouth every 6 hours as needed for nausea      potassium chloride nick ER (KLOR-CON M15) 15 MEQ CR tablet Take 1 tablet (15 mEq) by mouth daily      timolol maleate (TIMOPTIC) 0.5 % ophthalmic solution Place 1 drop into both eyes 2 times daily (Patient not taking: Reported on 8/5/2024)       ALLERGIES  Allergies   Allergen Reactions     "Brimonidine Other (See Comments)     Follicular conjunctivitis    Dorzolamide Other (See Comments)     Follicular conjunctivitis    Benzalkonium Chloride Other (See Comments)     Eye irritation.  Does better with preservative free but can tolerate preserved drops    Fluorescein Itching     fluress    Penicillins     Sulfa Antibiotics Other (See Comments)    Codeine Unknown and Rash    Fd&C Yellow #5 (Tartrazine) Rash     PAST MEDICAL, SURGICAL, FAMILY HISTORY:  History was reviewed and updated as needed, see medical record.    SOCIAL HISTORY:  Social History     Socioeconomic History    Marital status:      Spouse name: Not on file    Number of children: Not on file    Years of education: Not on file    Highest education level: Not on file   Occupational History    Not on file   Tobacco Use    Smoking status: Never    Smokeless tobacco: Never   Substance and Sexual Activity    Alcohol use: No    Drug use: No    Sexual activity: Never   Other Topics Concern    Not on file   Social History Narrative    Not on file     Social Determinants of Health     Financial Resource Strain: Not on file   Food Insecurity: Not on file   Transportation Needs: Not on file   Physical Activity: Not on file   Stress: Not on file   Social Connections: Not on file   Interpersonal Safety: Not on file   Housing Stability: Not on file     Review of Systems:  Focused cardiovascular and respiratory review of systems is negative other than the symptoms noted above in the HPI.     Physical Exam:  Vitals: /70 (BP Location: Right arm, Patient Position: Sitting, Cuff Size: Adult Small)   Pulse 56   Ht 1.499 m (4' 11\")   Wt 52.9 kg (116 lb 9.6 oz)   BMI 23.55 kg/m     Wt Readings from Last 4 Encounters:   08/05/24 52.9 kg (116 lb 9.6 oz)   07/11/24 56.7 kg (125 lb)   07/09/24 56.7 kg (125 lb)   07/09/24 56.7 kg (125 lb)     CONSTITUTIONAL: Alert and in no acute distress.  NECK: Unable to visualize JVP due to cervical collar in " "place.  CARDIOVASCULAR:  Regular rate and rhythm. No murmur, rub or gallop.   RESPIRATORY: Breathing non-labored. Lungs are clear to auscultation with no wheezes or crackles bilaterally.  GASTROINTESTINAL: Abdomen non-distended.  EXTREMITIES: No pitting lower extremity edema bilaterally.   NEUROPSYCHIATRIC: No gross focal deficits. Affect appropriate. Mentation normal.     Recent Lab Results:  LIPID RESULTS:  Lab Results   Component Value Date    CHOL 198 08/11/2023    HDL 51 08/11/2023     (H) 08/11/2023    TRIG 132 08/11/2023     LIVER ENZYME RESULTS:  No results found for: \"AST\", \"ALT\"  CBC RESULTS:  Lab Results   Component Value Date    WBC 6.0 06/19/2024    WBC 6.6 07/13/2013    RBC 4.68 06/19/2024    RBC 5.39 (H) 07/13/2013    HGB 14.7 06/19/2024    HGB 16.2 (H) 07/13/2013    HCT 45.5 06/19/2024    HCT 49.6 (H) 07/13/2013    MCV 97 06/19/2024    MCV 92 07/13/2013    MCH 31.4 06/19/2024    MCH 30.1 07/13/2013    MCHC 32.3 06/19/2024    MCHC 32.7 07/13/2013    RDW 14.3 06/19/2024    RDW 13.3 07/13/2013     06/19/2024     07/13/2013     BMP RESULTS:  Lab Results   Component Value Date     08/05/2024     07/13/2013    POTASSIUM 4.4 08/05/2024    POTASSIUM 4.1 07/13/2013    CHLORIDE 102 08/05/2024    CHLORIDE 102 07/13/2013    CO2 24 08/05/2024    CO2 25 07/13/2013    ANIONGAP 12 08/05/2024    ANIONGAP 13 07/13/2013     (H) 08/05/2024     (H) 08/12/2023     (H) 07/13/2013    BUN 24.1 (H) 08/05/2024    BUN 13 07/13/2013    CR 1.04 (H) 08/05/2024    CR 0.89 07/13/2013    GFRESTIMATED 51 (L) 08/05/2024    GFRESTIMATED 61 07/13/2013    GFRESTBLACK 74 07/13/2013    GILL 8.8 08/05/2024    GILL 9.3 07/13/2013      A1C RESULTS:  Lab Results   Component Value Date    A1C 5.9 (H) 06/16/2024     CC  Shefali Ochoa MD  [id number] [place] [id number]  [place] [id number]    Please kindly note that this document was completed in part using Dragon voice recognition software. Although " reviewed after completion, some word substitutions and typographical errors may occur. Please contact me if clarification is needed.

## 2024-08-05 NOTE — PATIENT INSTRUCTIONS
If you have questions or concerns please call the [facility] nurse team at [phone] or send a [employer] message (Mon-Fri 8am-4pm).  If you have concerns after hours, please call [phone], option 2 to speak with an on call Cardiologist.    Medication changes and/or recommendations from today:   - Continue your current medications without changes.  - Continue to check your weights daily and try to stick to a low sodium diet (under 2,000 mg/day).  - Call the [facility] nurse team at the number listed above if you develop signs concerning for fluid retention, including weight gain of over 2 pounds in 1 day or over 5 pounds in 1 week, increasing shortness of breath, or increasing swelling in the legs or abdomen.    Follow up plan:   - Follow up with Noah in the [facility] in about 3 months with labs beforehand.  - Meet with the Palliative Care team to discuss goals of care further.    It was a pleasure seeing you today!     BENNETT Funk, CNP  Nurse Practitioner  [facility]    Thank you for your visit with the [facility] today. [facility] stands for Cardiomyopathy Optimization Rehabilitation and Education.    The [facility] is a heart failure specialty clinic within the [facility] where you will work with specialized nurse practitioners, physician assistants, doctors, and registered nurses. They are dedicated to helping patients with heart failure to carefully adjust medications, receive education, and learn who and when to call if symptoms develop. They specialize in helping you better understand your condition, slow the progression of your disease, improve the length and quality of your life, help you detect future heart problems before they become life threatening, and avoid hospitalizations.

## 2024-08-05 NOTE — LETTER
8/5/2024    Jordyn Jon  [address]  [facility] [address]    RE: Maya SIDHU Subhash       Dear Colleague,     I had the pleasure of seeing Maya Cullen in the [facility].    Cardiology Clinic Progress Note    C.O.R.E. Clinic Visit (Heart Failure Specialty Clinic)    Service Date: August 5, 2024  Primary Cardiology Team: Dr. Ochoa    HPI:   I had the pleasure of meeting Ms. Maya Cullen in the clinic today. She is a very pleasant 89 year old female with a past medical history notable for history of stroke and hypertension.  In 2023 she had an echocardiogram done when she presented to the hospital for noncardiac reasons but was noted to have an EF of 40 to 45% with severe MR.  Subsequently I do not see any cardiology follow-up.  She is not on any cardiac medications.  She presented with a fall recently to [facility] when she was trying to get up to get her walker.  She injured and fractured her cervical vertebrae.  She is in a neck collar right now.  During the hospital stay, she was noted to have severe heart failure with reduced ejection fraction and echocardiogram revealed 2+ MR and EF of 10 to 20%.    She was referred to outpatient cardiology and met with Dr. Ochoa in consultation about a month ago. At that time, she was reporting NYHA class IIIb heart failure symptoms with shortness of breath with minimal exertion and was functionally quite limited and predominantly wheelchair-bound at that time. Etiology of her cardiomyopathy was felt very likely secondary to underlying occult coronary artery disease. However, she is DNR/DNI and after extensive discussion conservative medical management was agreed upon. Palliative care referral was also placed for further goals of care discussion and possible consideration of hospice.  She was continued on Lasix 40 mg once daily along with low-dose lisinopril 2.5 mg daily and metoprolol succinate 12.5 mg once  daily.    Today, Ms. Cullen presents to the clinic accompanied by her son-in-law in routine follow-up and for enrollment in the [facility] for continued management of her cardiomyopathy and congestive heart failure. She tells me that she has been feeling a bit better over the past few weeks. She lives in an assisted living facility. She notes that she has been able to walk the halls at her apartment and down to the dining ascencio there with a wheeled walker over the last few weeks and feels that she has been getting a bit stronger.  She has been tolerating this without significant exertional dyspnea or limitations from a cardiac standpoint. The patient and her son-in-law note that they have not met with the palliative care team yet but they are open to doing this. We again reviewed options for further evaluation and management of her cardiomyopathy and they again express preference to avoid any further invasive testing or procedures and would prefer to continue with conservative medical management.     Labs were checked prior to the visit today which I personally reviewed with the patient and her son-in-law during the visit.  Basic metabolic panel shows overall stable electrolytes and renal function with a potassium level 4.4 and creatinine level at 1.04. BUN is minimally elevated at 24.1. NT pro BNP level is elevated at just over 8,500, which is up slightly from just over 7,800 about a month ago.  TSH level was also checked and is minimally elevated at 4.24 (upper limits of normal of 4.2).  Free T4 level was also checked, but results are currently pending at the time of the visit.  I do not see documentation regarding any known prior history of hypothyroidism and it does not look like she is currently on levothyroxine.    ASSESSMENT:  1. 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% by TTE 6/17/24  - LVEF: 10-20%  - NYHA class II-III, ACC/AHA stage C  - Etiology:  Unclear, with suspected ischemic secondary to underlying occult CAD  - Fluid status: Appears well compensated on exam today without signs or symptoms concerning for acute CHF; suspected dry weight: 115-120 pounds  - Diuretic regimen: Lasix 40 mg once daily  - Ischemic evaluation: Declined due to advanced age with preference for conservative medical management    Guideline directed medical therapy (GDMT):  - Beta blocker: Metoprolol succinate 12.5 mg once daily lisinopril  - ACEI/ARB/ARNI: 2.5 mg once daily  - MRA: None currently due to borderline blood pressures and fall risk  - SGLT2 inhibitor: None currently due to report on blood pressures fall risk    2.  Moderate mitral regurgitation  3.  Mild to moderate tricuspid regurgitation  4.  Essential hypertension  5.  History of stroke  6.  Recent fall in June 2024 with fracture of cervical vertebrae, currently in a cervical collar    PLAN:  - Continue current cardiac regimen as outlined above.  - Counseled on checking daily weights and trying to stick to a low-sodium diet. Reviewed signs/symptoms concerning for fluid retention and when to contact the clinic.  - Can continue to monitor thyroid function given TSH level borderline elevated with free T4 level still currently pending.  - Referral to palliative care placed again today for further discussions regarding goals of care given her advanced age and severe cardiomyopathy with concern for poor prognosis.   - Follow-up in the [facility] in about 3 months with labs beforehand for a repeat basic metabolic panel and NT pro BNP level.  We did discuss considering a repeat echocardiogram before the visit as well for reassessment of her ejection fraction, though given her preference for conservative medical management this would be unlikely to change her management so we will defer for the time being.  If the patient would elect to pursue hospice or comfort focused care in the interim, further follow-up with the [facility] could be canceled depending on her preference.    Thank you for the opportunity to participate in this patient's care. We would be happy to see her sooner if needed for any concerns in the meantime.      40 total minutes was spent today including chart review, precharting, history and exam, post visit documentation, and reviewing studies as outlined above.     BENNETT Funk, CNP   Nurse Practitioner  [facility]  Pager: [phone]  Text Page  (8am - 5pm, M-F)    Orders this Visit:  Orders Placed This Encounter   Procedures     Basic metabolic panel     N terminal pro BNP outpatient     Adult Palliative Care  Referral     Follow-Up with Cardiology- [facility]     No orders of the defined types were placed in this encounter.    There are no discontinued medications.  Encounter Diagnoses   Name Primary?     Chronic HFrEF (heart failure with reduced ejection fraction) (H) Yes     Cardiomyopathy, unspecified type (H)      Essential hypertension      History of CVA (cerebrovascular accident)        CURRENT MEDICATIONS:  Current Outpatient Medications   Medication Sig Dispense Refill     acetaminophen (TYLENOL) 325 MG tablet Take 3 tablets (975 mg) by mouth 3 times daily       carBAMazepine (TEGRETOL) 100 MG chewable tablet Take 2 tablets (200 mg) by mouth 2 times daily       furosemide (LASIX) 40 MG tablet Take 1 tablet (40 mg) by mouth daily       latanoprost (XALATAN) 0.005 % ophthalmic solution Place 1 drop into both eyes 2 times daily       lisinopril (ZESTRIL) 5 MG tablet Take 0.5 tablets (2.5 mg) by mouth daily 90 tablet 3     metoprolol succinate ER (TOPROL XL) 25 MG 24 hr tablet Take 0.5 tablets (12.5 mg) by mouth daily 30 tablet 3     multivitamin w/minerals (THERA-VIT-M) tablet Take 1 tablet by mouth daily       ondansetron (ZOFRAN ODT) 4 MG ODT tab Take 1 tablet (4 mg) by mouth every 6 hours as needed for nausea       potassium chloride nick ER (KLOR-CON M15) 15 MEQ CR tablet  "Take 1 tablet (15 mEq) by mouth daily       timolol maleate (TIMOPTIC) 0.5 % ophthalmic solution Place 1 drop into both eyes 2 times daily (Patient not taking: Reported on 8/5/2024)       ALLERGIES  Allergies   Allergen Reactions     Brimonidine Other (See Comments)     Follicular conjunctivitis     Dorzolamide Other (See Comments)     Follicular conjunctivitis     Benzalkonium Chloride Other (See Comments)     Eye irritation.  Does better with preservative free but can tolerate preserved drops     Fluorescein Itching     fluress     Penicillins      Sulfa Antibiotics Other (See Comments)     Codeine Unknown and Rash     Fd&C Yellow #5 (Tartrazine) Rash     PAST MEDICAL, SURGICAL, FAMILY HISTORY:  History was reviewed and updated as needed, see medical record.    SOCIAL HISTORY:  Social History     Socioeconomic History     Marital status:      Spouse name: Not on file     Number of children: Not on file     Years of education: Not on file     Highest education level: Not on file   Occupational History     Not on file   Tobacco Use     Smoking status: Never     Smokeless tobacco: Never   Substance and Sexual Activity     Alcohol use: No     Drug use: No     Sexual activity: Never   Other Topics Concern     Not on file   Social History Narrative     Not on file     Social Determinants of Health     Financial Resource Strain: Not on file   Food Insecurity: Not on file   Transportation Needs: Not on file   Physical Activity: Not on file   Stress: Not on file   Social Connections: Not on file   Interpersonal Safety: Not on file   Housing Stability: Not on file     Review of Systems:  Focused cardiovascular and respiratory review of systems is negative other than the symptoms noted above in the HPI.     Physical Exam:  Vitals: /70 (BP Location: Right arm, Patient Position: Sitting, Cuff Size: Adult Small)   Pulse 56   Ht 1.499 m (4' 11\")   Wt 52.9 kg (116 lb 9.6 oz)   BMI 23.55 kg/m     Wt Readings from " "Last 4 Encounters:   08/05/24 52.9 kg (116 lb 9.6 oz)   07/11/24 56.7 kg (125 lb)   07/09/24 56.7 kg (125 lb)   07/09/24 56.7 kg (125 lb)     CONSTITUTIONAL: Alert and in no acute distress.  NECK: Unable to visualize JVP due to cervical collar in place.  CARDIOVASCULAR:  Regular rate and rhythm. No murmur, rub or gallop.   RESPIRATORY: Breathing non-labored. Lungs are clear to auscultation with no wheezes or crackles bilaterally.  GASTROINTESTINAL: Abdomen non-distended.  EXTREMITIES: No pitting lower extremity edema bilaterally.   NEUROPSYCHIATRIC: No gross focal deficits. Affect appropriate. Mentation normal.     Recent Lab Results:  LIPID RESULTS:  Lab Results   Component Value Date    CHOL 198 08/11/2023    HDL 51 08/11/2023     (H) 08/11/2023    TRIG 132 08/11/2023     LIVER ENZYME RESULTS:  No results found for: \"AST\", \"ALT\"  CBC RESULTS:  Lab Results   Component Value Date    WBC 6.0 06/19/2024    WBC 6.6 07/13/2013    RBC 4.68 06/19/2024    RBC 5.39 (H) 07/13/2013    HGB 14.7 06/19/2024    HGB 16.2 (H) 07/13/2013    HCT 45.5 06/19/2024    HCT 49.6 (H) 07/13/2013    MCV 97 06/19/2024    MCV 92 07/13/2013    MCH 31.4 06/19/2024    MCH 30.1 07/13/2013    MCHC 32.3 06/19/2024    MCHC 32.7 07/13/2013    RDW 14.3 06/19/2024    RDW 13.3 07/13/2013     06/19/2024     07/13/2013     BMP RESULTS:  Lab Results   Component Value Date     08/05/2024     07/13/2013    POTASSIUM 4.4 08/05/2024    POTASSIUM 4.1 07/13/2013    CHLORIDE 102 08/05/2024    CHLORIDE 102 07/13/2013    CO2 24 08/05/2024    CO2 25 07/13/2013    ANIONGAP 12 08/05/2024    ANIONGAP 13 07/13/2013     (H) 08/05/2024     (H) 08/12/2023     (H) 07/13/2013    BUN 24.1 (H) 08/05/2024    BUN 13 07/13/2013    CR 1.04 (H) 08/05/2024    CR 0.89 07/13/2013    GFRESTIMATED 51 (L) 08/05/2024    GFRESTIMATED 61 07/13/2013    GFRESTBLACK 74 07/13/2013    GILL 8.8 08/05/2024    GILL 9.3 07/13/2013      A1C " RESULTS:  Lab Results   Component Value Date    A1C 5.9 (H) 06/16/2024     CC  Shefali Ochoa MD  [address]  [place] [address]    Please kindly note that this document was completed in part using Dragon voice recognition software. Although reviewed after completion, some word substitutions and typographical errors may occur. Please contact me if clarification is needed.       Thank you for allowing me to participate in the care of your patient.      Sincerely,     Prasanna Moe NP     [facility]  cc:   Shefali Ochoa MD  [address]  [place] [address]

## 2024-08-06 ENCOUNTER — ANCILLARY PROCEDURE (OUTPATIENT)
Dept: GENERAL RADIOLOGY | Facility: CLINIC | Age: 89
End: 2024-08-06
Attending: NURSE PRACTITIONER
Payer: MEDICARE

## 2024-08-06 ENCOUNTER — OFFICE VISIT (OUTPATIENT)
Dept: NEUROSURGERY | Facility: CLINIC | Age: 89
End: 2024-08-06
Attending: PHYSICIAN ASSISTANT
Payer: MEDICARE

## 2024-08-06 VITALS
DIASTOLIC BLOOD PRESSURE: 70 MMHG | HEIGHT: 59 IN | BODY MASS INDEX: 23.39 KG/M2 | WEIGHT: 116 LBS | HEART RATE: 74 BPM | OXYGEN SATURATION: 97 % | SYSTOLIC BLOOD PRESSURE: 108 MMHG
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
## 2024-08-06 DIAGNOSIS — S12.110A: Primary | ICD-10-CM

## 2024-08-06 PROCEDURE — 99213 OFFICE O/P EST LOW 20 MIN: CPT | Performed by: PHYSICIAN ASSISTANT

## 2024-08-06 PROCEDURE — 72040 X-RAY EXAM NECK SPINE 2-3 VW: CPT | Mod: TC | Performed by: INTERNAL MEDICINE

## 2024-08-06 PROCEDURE — G0463 HOSPITAL OUTPT CLINIC VISIT: HCPCS | Performed by: PHYSICIAN ASSISTANT

## 2024-08-06 ASSESSMENT — PAIN SCALES - GENERAL: PAINLEVEL: NO PAIN (0)

## 2024-08-06 NOTE — PROGRESS NOTES
NEUROSURGERY CLINIC PROGRESS NOTE    DATE OF VISIT: 8/6/2024    HPI:     Maya Cullen is a pleasant 89 year old female who we initially evaluated on 06/17/2024 for a nondisplaced acute type II odontoid process fracture as a result of a fall. Subsequently she was placed into a hard cervical collar and instructed to wear the brace at all times. She has remained compliant with her collar and voices no concern or complaints today. She rates her pain at 0/10.       Current Outpatient Medications   Medication Sig Dispense Refill    acetaminophen (TYLENOL) 325 MG tablet Take 3 tablets (975 mg) by mouth 3 times daily      carBAMazepine (TEGRETOL) 100 MG chewable tablet Take 2 tablets (200 mg) by mouth 2 times daily      furosemide (LASIX) 40 MG tablet Take 1 tablet (40 mg) by mouth daily      latanoprost (XALATAN) 0.005 % ophthalmic solution Place 1 drop into both eyes 2 times daily      lisinopril (ZESTRIL) 5 MG tablet Take 0.5 tablets (2.5 mg) by mouth daily 90 tablet 3    metoprolol succinate ER (TOPROL XL) 25 MG 24 hr tablet Take 0.5 tablets (12.5 mg) by mouth daily 30 tablet 3    multivitamin w/minerals (THERA-VIT-M) tablet Take 1 tablet by mouth daily      ondansetron (ZOFRAN ODT) 4 MG ODT tab Take 1 tablet (4 mg) by mouth every 6 hours as needed for nausea      potassium chloride nick ER (KLOR-CON M15) 15 MEQ CR tablet Take 1 tablet (15 mEq) by mouth daily      timolol maleate (TIMOPTIC) 0.5 % ophthalmic solution Place 1 drop into both eyes 2 times daily (Patient not taking: Reported on 8/5/2024)       No current facility-administered medications for this visit.       Allergies   Allergen Reactions    Brimonidine Other (See Comments)     Follicular conjunctivitis    Dorzolamide Other (See Comments)     Follicular conjunctivitis    Benzalkonium Chloride Other (See Comments)     Eye irritation.  Does better with preservative free but can tolerate preserved drops    Fluorescein Itching     fluress     "Penicillins     Sulfa Antibiotics Other (See Comments)    Codeine Unknown and Rash    Fd&C Yellow #5 (Tartrazine) Rash       Past Medical History:   Diagnosis Date    Cerebrovascular accident (H)     Cerebellar CVA seen on MRI 2023    Glaucoma     HTN (hypertension)     L4 vertebral fracture (H)     Mild cognitive impairment     Pulmonary hypertension (H)     Severe mitral regurgitation     Systolic CHF, chronic (H)     Tricuspid regurgitation     Trigeminal neuralgia        Review Of Systems    Skin: negative  Eyes: negative  Ears/Nose/Throat: negative  Respiratory: No shortness of breath, dyspnea on exertion, cough, or hemoptysis  Cardiovascular: negative  Gastrointestinal: negative  Musculoskeletal: negative  Neurologic: negative  Psychiatric: negative  Hematologic/Lymphatic/Immunologic: negative  Endocrine: negative    OBJECTIVE:    /70   Pulse 74   Ht 1.499 m (4' 11\")   Wt 52.6 kg (116 lb)   SpO2 97%   BMI 23.43 kg/m      Imaging:    XR CERVICAL SPINE FLEX/EXT 2/3 VIEWS 8/6/2024 1:22 PM      Redemonstrated type II dens fracture. With flexion, there  is 7 mm anterior translation of the dens relative to the rest of the  C2 vertebral body, which corrects with extension. No other dynamic  instability between flexion and extension. Vertebral body heights are  maintained. Multilevel disc space height loss with associated endplate  spurring and facet arthropathy, similar to prior imaging.     Radiographic Findings: Full radiological report in chart. I personally reviewed the images with the patient today.    Exam:    Patient appears comfortable and in no apparent distress. Moving all extremities.  CN II-XII grossly intact, alert and appropriate with conversation and following  commands  Bilateral upper extremities with full strength including hand intrinsics and grasp.  Sensation intact throughout.  Bilateral lower extremities 5/5 strength including plantar and dorsiflexion.  Normal sensation throughout " bilaterally.    PLAN:    Maya Cullen is six weeks out from a fall resulting in a nondisplaced acute type II odontoid process fracture and subsequently placed into a hard cervical collar. Today the patient reports that she has remained compliant with her collar and voices no concern or complaints. She rates her pain at 0/10.     Imaging was reviewed today and show redemonstrated type II dens fracture. With flexion, there is 7 mm anterior translation of the dens relative to the rest of the C2 vertebral body, which corrects with extension.    The patient has remained compliant with the restrictions which included not lifting anything greater than 5-10 lbs. Today we discussed increasing activity from 10 lbs by 2-5 lbs per week, but encouraged continuing to avoid excessive bending, twisting, and turning at the neck and to avoid jostling and jarring activities.     She can start to remove her hard cervical collar for hygiene, eating and sleeping.     Ms. Cullen will return to the clinic in six weeks with repeat imaging.    The patient gave verbal understanding and is in agreement with the above plan. She  will call or return to the clinic for any worsening or changes in symptoms.      Respectfully,     EDENILSON Ceo, PAMarycruzC

## 2024-08-06 NOTE — LETTER
August 6, 2024      Maya Cullen  [address]   [place] [address]        To Whom It May Concern,     Ms. Cullen was evaluated in clinic today for her type II odontoid process. She has remained compliant with her cervical collar.     She may now remove her collar for hygiene, eating and sleeping.     She will return to clinic in six more weeks with updated imaging.        Sincerely,        Sam Rodriguez PA-C

## 2024-08-06 NOTE — LETTER
8/6/2024      Maya Cullen  [address]  [facility] [address]      Dear Colleague,    Thank you for referring your patient, Maya Cullen, to the [facility] [place]. Please see a copy of my visit note below.    NEUROSURGERY CLINIC PROGRESS NOTE    DATE OF VISIT: 8/6/2024    HPI:     Maya Cullen is a pleasant 89 year old female who we initially evaluated on 06/17/2024 for a nondisplaced acute type II odontoid process fracture as a result of a fall. Subsequently she was placed into a hard cervical collar and instructed to wear the brace at all times. She has remained compliant with her collar and voices no concern or complaints today. She rates her pain at 0/10.       Current Outpatient Medications   Medication Sig Dispense Refill     acetaminophen (TYLENOL) 325 MG tablet Take 3 tablets (975 mg) by mouth 3 times daily       carBAMazepine (TEGRETOL) 100 MG chewable tablet Take 2 tablets (200 mg) by mouth 2 times daily       furosemide (LASIX) 40 MG tablet Take 1 tablet (40 mg) by mouth daily       latanoprost (XALATAN) 0.005 % ophthalmic solution Place 1 drop into both eyes 2 times daily       lisinopril (ZESTRIL) 5 MG tablet Take 0.5 tablets (2.5 mg) by mouth daily 90 tablet 3     metoprolol succinate ER (TOPROL XL) 25 MG 24 hr tablet Take 0.5 tablets (12.5 mg) by mouth daily 30 tablet 3     multivitamin w/minerals (THERA-VIT-M) tablet Take 1 tablet by mouth daily       ondansetron (ZOFRAN ODT) 4 MG ODT tab Take 1 tablet (4 mg) by mouth every 6 hours as needed for nausea       potassium chloride nick ER (KLOR-CON M15) 15 MEQ CR tablet Take 1 tablet (15 mEq) by mouth daily       timolol maleate (TIMOPTIC) 0.5 % ophthalmic solution Place 1 drop into both eyes 2 times daily (Patient not taking: Reported on 8/5/2024)       No current facility-administered medications for this visit.       Allergies   Allergen Reactions     Brimonidine Other (See  "Comments)     Follicular conjunctivitis     Dorzolamide Other (See Comments)     Follicular conjunctivitis     Benzalkonium Chloride Other (See Comments)     Eye irritation.  Does better with preservative free but can tolerate preserved drops     Fluorescein Itching     fluress     Penicillins      Sulfa Antibiotics Other (See Comments)     Codeine Unknown and Rash     Fd&C Yellow #5 (Tartrazine) Rash       Past Medical History:   Diagnosis Date     Cerebrovascular accident (H)     Cerebellar CVA seen on MRI 2023     Glaucoma      HTN (hypertension)      L4 vertebral fracture (H)      Mild cognitive impairment      Pulmonary hypertension (H)      Severe mitral regurgitation      Systolic CHF, chronic (H)      Tricuspid regurgitation      Trigeminal neuralgia        Review Of Systems    Skin: negative  Eyes: negative  Ears/Nose/Throat: negative  Respiratory: No shortness of breath, dyspnea on exertion, cough, or hemoptysis  Cardiovascular: negative  Gastrointestinal: negative  Musculoskeletal: negative  Neurologic: negative  Psychiatric: negative  Hematologic/Lymphatic/Immunologic: negative  Endocrine: negative    OBJECTIVE:    /70   Pulse 74   Ht 1.499 m (4' 11\")   Wt 52.6 kg (116 lb)   SpO2 97%   BMI 23.43 kg/m      Imaging:    XR CERVICAL SPINE FLEX/EXT 2/3 VIEWS 8/6/2024 1:22 PM      Redemonstrated type II dens fracture. With flexion, there  is 7 mm anterior translation of the dens relative to the rest of the  C2 vertebral body, which corrects with extension. No other dynamic  instability between flexion and extension. Vertebral body heights are  maintained. Multilevel disc space height loss with associated endplate  spurring and facet arthropathy, similar to prior imaging.     Radiographic Findings: Full radiological report in chart. I personally reviewed the images with the patient today.    Exam:    Patient appears comfortable and in no apparent distress. Moving all extremities.  CN II-XII grossly " intact, alert and appropriate with conversation and following  commands  Bilateral upper extremities with full strength including hand intrinsics and grasp.  Sensation intact throughout.  Bilateral lower extremities 5/5 strength including plantar and dorsiflexion.  Normal sensation throughout bilaterally.    PLAN:    Maya Cullen is six weeks out from a fall resulting in a nondisplaced acute type II odontoid process fracture and subsequently placed into a hard cervical collar. Today the patient reports that she has remained compliant with her collar and voices no concern or complaints. She rates her pain at 0/10.     Imaging was reviewed today and show redemonstrated type II dens fracture. With flexion, there is 7 mm anterior translation of the dens relative to the rest of the C2 vertebral body, which corrects with extension.    The patient has remained compliant with the restrictions which included not lifting anything greater than 5-10 lbs. Today we discussed increasing activity from 10 lbs by 2-5 lbs per week, but encouraged continuing to avoid excessive bending, twisting, and turning at the neck and to avoid jostling and jarring activities.     She can start to remove her hard cervical collar for hygiene, eating and sleeping.     Ms. Cullen will return to the clinic in six weeks with repeat imaging.    The patient gave verbal understanding and is in agreement with the above plan. She  will call or return to the clinic for any worsening or changes in symptoms.      Respectfully,     EDENILSON Coe PA-C    Again, thank you for allowing me to participate in the care of your patient.        Sincerely,        Sam Rodriguez PA-C

## 2024-08-06 NOTE — NURSING NOTE
"Maya Cullen is a 89 year old female who presents for:  Chief Complaint   Patient presents with    Hospital F/U     Cervical 6 week hospital follow up. Patient reports no pain, mostly tenderness and soreness.        Initial Vitals:  /70   Pulse 74   Ht 4' 11\" (1.499 m)   Wt 116 lb (52.6 kg)   SpO2 97%   BMI 23.43 kg/m   Estimated body mass index is 23.43 kg/m  as calculated from the following:    Height as of this encounter: 4' 11\" (1.499 m).    Weight as of this encounter: 116 lb (52.6 kg). Body surface area is 1.48 meters squared. BP completed using cuff size: regular  No Pain (0)        Dl Newman      "

## 2024-09-07 ENCOUNTER — HOSPITAL ENCOUNTER (EMERGENCY)
Facility: CLINIC | Age: 89
Discharge: HOME | End: 2024-09-07
Attending: EMERGENCY MEDICINE | Admitting: EMERGENCY MEDICINE
Payer: MEDICARE

## 2024-09-07 ENCOUNTER — APPOINTMENT (OUTPATIENT)
Dept: CT IMAGING | Facility: CLINIC | Age: 89
End: 2024-09-07
Payer: MEDICARE

## 2024-09-07 ENCOUNTER — MEDICAL CORRESPONDENCE (OUTPATIENT)
Dept: HEALTH INFORMATION MANAGEMENT | Facility: CLINIC | Age: 89
End: 2024-09-07

## 2024-09-07 VITALS
OXYGEN SATURATION: 91 % | HEIGHT: 57 IN | BODY MASS INDEX: 24.81 KG/M2 | TEMPERATURE: 97.7 F | WEIGHT: 115 LBS | SYSTOLIC BLOOD PRESSURE: 121 MMHG | DIASTOLIC BLOOD PRESSURE: 78 MMHG | RESPIRATION RATE: 12 BRPM

## 2024-09-07 DIAGNOSIS — N39.0 URINARY TRACT INFECTION: ICD-10-CM
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
## 2024-09-07 DIAGNOSIS — S32.10XA SACRAL FRACTURE (H): ICD-10-CM
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
## 2024-09-07 LAB
ALBUMIN UR-MCNC: 30 MG/DL
ANION GAP SERPL CALCULATED.3IONS-SCNC: 14 MMOL/L (ref 7–15)
APPEARANCE UR: ABNORMAL
BACTERIA #/AREA URNS HPF: ABNORMAL /HPF
BASOPHILS # BLD AUTO: 0.1 10E3/UL (ref 0–0.2)
BASOPHILS NFR BLD AUTO: 1 %
BILIRUB UR QL STRIP: NEGATIVE
BUN SERPL-MCNC: 30.4 MG/DL (ref 8–23)
CALCIUM SERPL-MCNC: 8.8 MG/DL (ref 8.8–10.4)
CHLORIDE SERPL-SCNC: 101 MMOL/L (ref 98–107)
COLOR UR AUTO: YELLOW
CREAT SERPL-MCNC: 1.16 MG/DL (ref 0.51–0.95)
EGFRCR SERPLBLD CKD-EPI 2021: 45 ML/MIN/1.73M2
EOSINOPHIL # BLD AUTO: 0.3 10E3/UL (ref 0–0.7)
EOSINOPHIL NFR BLD AUTO: 4 %
ERYTHROCYTE [DISTWIDTH] IN BLOOD BY AUTOMATED COUNT: 16 % (ref 10–15)
GLUCOSE SERPL-MCNC: 128 MG/DL (ref 70–99)
GLUCOSE UR STRIP-MCNC: NEGATIVE MG/DL
HCO3 SERPL-SCNC: 18 MMOL/L (ref 22–29)
HCT VFR BLD AUTO: 39.5 % (ref 35–47)
HGB BLD-MCNC: 12.6 G/DL (ref 11.7–15.7)
HGB UR QL STRIP: ABNORMAL
HYALINE CASTS: 36 /LPF
IMM GRANULOCYTES # BLD: 0.1 10E3/UL
IMM GRANULOCYTES NFR BLD: 1 %
KETONES UR STRIP-MCNC: NEGATIVE MG/DL
LEUKOCYTE ESTERASE UR QL STRIP: ABNORMAL
LYMPHOCYTES # BLD AUTO: 1.4 10E3/UL (ref 0.8–5.3)
LYMPHOCYTES NFR BLD AUTO: 16 %
MCH RBC QN AUTO: 32 PG (ref 26.5–33)
MCHC RBC AUTO-ENTMCNC: 31.9 G/DL (ref 31.5–36.5)
MCV RBC AUTO: 100 FL (ref 78–100)
MONOCYTES # BLD AUTO: 0.7 10E3/UL (ref 0–1.3)
MONOCYTES NFR BLD AUTO: 8 %
MUCOUS THREADS #/AREA URNS LPF: PRESENT /LPF
NEUTROPHILS # BLD AUTO: 6.2 10E3/UL (ref 1.6–8.3)
NEUTROPHILS NFR BLD AUTO: 70 %
NITRATE UR QL: POSITIVE
NRBC # BLD AUTO: 0 10E3/UL
NRBC BLD AUTO-RTO: 0 /100
PH UR STRIP: 5 [PH] (ref 5–7)
PLATELET # BLD AUTO: 320 10E3/UL (ref 150–450)
POTASSIUM SERPL-SCNC: 4.8 MMOL/L (ref 3.4–5.3)
RBC # BLD AUTO: 3.94 10E6/UL (ref 3.8–5.2)
RBC URINE: 27 /HPF
SODIUM SERPL-SCNC: 133 MMOL/L (ref 135–145)
SP GR UR STRIP: 1.02 (ref 1–1.03)
SQUAMOUS EPITHELIAL: 13 /HPF
TRANSITIONAL EPI: 1 /HPF
UROBILINOGEN UR STRIP-MCNC: NORMAL MG/DL
WBC # BLD AUTO: 8.8 10E3/UL (ref 4–11)
WBC URINE: 150 /HPF

## 2024-09-07 PROCEDURE — 85025 COMPLETE CBC W/AUTO DIFF WBC: CPT

## 2024-09-07 PROCEDURE — 99285 EMERGENCY DEPT VISIT HI MDM: CPT | Mod: 25

## 2024-09-07 PROCEDURE — 70450 CT HEAD/BRAIN W/O DYE: CPT | Mod: ME

## 2024-09-07 PROCEDURE — 81001 URINALYSIS AUTO W/SCOPE: CPT

## 2024-09-07 PROCEDURE — 72125 CT NECK SPINE W/O DYE: CPT | Mod: ME

## 2024-09-07 PROCEDURE — 93005 ELECTROCARDIOGRAM TRACING: CPT

## 2024-09-07 PROCEDURE — 87186 SC STD MICRODIL/AGAR DIL: CPT | Mod: 59

## 2024-09-07 PROCEDURE — 72192 CT PELVIS W/O DYE: CPT | Mod: MG

## 2024-09-07 PROCEDURE — 36415 COLL VENOUS BLD VENIPUNCTURE: CPT

## 2024-09-07 PROCEDURE — 87086 URINE CULTURE/COLONY COUNT: CPT

## 2024-09-07 PROCEDURE — 80048 BASIC METABOLIC PNL TOTAL CA: CPT

## 2024-09-07 RX ORDER — CEPHALEXIN 500 MG/1
500 CAPSULE ORAL 2 TIMES DAILY
Qty: 10 CAPSULE | Refills: 0 | Status: SHIPPED | OUTPATIENT
Start: 2024-09-07 | End: 2024-09-12

## 2024-09-07 ASSESSMENT — ACTIVITIES OF DAILY LIVING (ADL)
ADLS_ACUITY_SCORE: 38

## 2024-09-07 ASSESSMENT — COLUMBIA-SUICIDE SEVERITY RATING SCALE - C-SSRS
2. HAVE YOU ACTUALLY HAD ANY THOUGHTS OF KILLING YOURSELF IN THE PAST MONTH?: NO
6. HAVE YOU EVER DONE ANYTHING, STARTED TO DO ANYTHING, OR PREPARED TO DO ANYTHING TO END YOUR LIFE?: NO
1. IN THE PAST MONTH, HAVE YOU WISHED YOU WERE DEAD OR WISHED YOU COULD GO TO SLEEP AND NOT WAKE UP?: NO

## 2024-09-07 NOTE — ED PROVIDER NOTES
Emergency Department Note      History of Present Illness     Chief Complaint   Fall      HPI   Maya Cullen is a 89 year old female who presents to the emergency department further evaluation of a mechanical fall.  Past medical history includes but is not limited to recurrent falls, compression fracture of the L4 vertebra, close type II on donning toyed fracture, CHF.  Patient reports that she was in the bathroom using the toilet when her legs gave out from under her causing her to fall backwards landing on her coccyx and hitting her posterior head on the wall.  Patient denies any loss of consciousness.  Patient also reports that she had a previous odontoid fracture and was in her Vanduser collar at the time of the fall.    Independent Historian   None    Review of External Notes   None    Past Medical History     Medical History and Problem List   Past Medical History:   Diagnosis Date    Cerebrovascular accident (H)     Glaucoma     HTN (hypertension)     L4 vertebral fracture (H)     Mild cognitive impairment     Pulmonary hypertension (H)     Severe mitral regurgitation     Systolic CHF, chronic (H)     Tricuspid regurgitation     Trigeminal neuralgia        Medications   cephALEXin (KEFLEX) 500 MG capsule  acetaminophen (TYLENOL) 325 MG tablet  carBAMazepine (TEGRETOL) 100 MG chewable tablet  furosemide (LASIX) 40 MG tablet  latanoprost (XALATAN) 0.005 % ophthalmic solution  lisinopril (ZESTRIL) 5 MG tablet  metoprolol succinate ER (TOPROL XL) 25 MG 24 hr tablet  multivitamin w/minerals (THERA-VIT-M) tablet  ondansetron (ZOFRAN ODT) 4 MG ODT tab  potassium chloride nick ER (KLOR-CON M15) 15 MEQ CR tablet  timolol maleate (TIMOPTIC) 0.5 % ophthalmic solution        Surgical History   Past Surgical History:   Procedure Laterality Date    EYE SURGERY         Physical Exam     Patient Vitals for the past 24 hrs:   BP Temp Temp src Pulse Resp SpO2 Height Weight   09/07/24 1110 (P) 115/83 -- -- (P) 74 -- --  "-- --   09/07/24 1105 -- 97.7  F (36.5  C) Temporal -- 12 95 % 1.448 m (4' 9\") 52.2 kg (115 lb)     Physical Exam  General: Awake, alert, non-toxic.  Head:  Superficial swelling noted to the occiput of the scalp.  Pain or tenderness noted to the area during palpation.  Eyes:  Conjunctiva normal, PERRL  ENT:  The external nose and ears are normal.     Oropharynx clear, uvula midline.  Neck:  Normal range of motion without rigidity.  CV:  Regular rate and rhythm    No pathologic murmur, rubs, or gallops.  Resp:  Breath sounds are clear bilaterally    Non-labored, no retractions or accessory muscle use  Abdomen: Abdomen is soft, no distension, no tenderness, no masses. No CVA tenderness.  MS:  No lower extremity edema/swelling. No midline cervical, thoracic, or lumbar tenderness.  Extremities without joint swelling or redness.  Skin:  Warm and dry, No rash or lesions noted.  Neuro:  Alert and oriented.  GCS 15 Moves all extremities normal.  No facial asymmetry. Gait normal.  Psych:  Awake. Alert. Normal affect. Appropriate interactions.     Diagnostics     Lab Results   Labs Ordered and Resulted from Time of ED Arrival to Time of ED Departure   ROUTINE UA WITH MICROSCOPIC REFLEX TO CULTURE - Abnormal       Result Value    Color Urine Yellow      Appearance Urine Cloudy (*)     Glucose Urine Negative      Bilirubin Urine Negative      Ketones Urine Negative      Specific Gravity Urine 1.020      Blood Urine Trace (*)     pH Urine 5.0      Protein Albumin Urine 30 (*)     Urobilinogen Urine Normal      Nitrite Urine Positive (*)     Leukocyte Esterase Urine Large (*)     Bacteria Urine Many (*)     Mucus Urine Present (*)     RBC Urine 27 (*)     WBC Urine 150 (*)     Squamous Epithelials Urine 13 (*)     Transitional Epithelials Urine 1      Hyaline Casts Urine 36 (*)    BASIC METABOLIC PANEL - Abnormal    Sodium 133 (*)     Potassium 4.8      Chloride 101      Carbon Dioxide (CO2) 18 (*)     Anion Gap 14      Urea " Nitrogen 30.4 (*)     Creatinine 1.16 (*)     GFR Estimate 45 (*)     Calcium 8.8      Glucose 128 (*)    CBC WITH PLATELETS AND DIFFERENTIAL - Abnormal    WBC Count 8.8      RBC Count 3.94      Hemoglobin 12.6      Hematocrit 39.5            MCH 32.0      MCHC 31.9      RDW 16.0 (*)     Platelet Count 320      % Neutrophils 70      % Lymphocytes 16      % Monocytes 8      % Eosinophils 4      % Basophils 1      % Immature Granulocytes 1      NRBCs per 100 WBC 0      Absolute Neutrophils 6.2      Absolute Lymphocytes 1.4      Absolute Monocytes 0.7      Absolute Eosinophils 0.3      Absolute Basophils 0.1      Absolute Immature Granulocytes 0.1      Absolute NRBCs 0.0     URINE CULTURE       Imaging   CT Pelvis Bone wo Contrast   Final Result   IMPRESSION:   1.  Nondisplaced sacral fracture at the level of S3.   2.  Mild compression deformity of L5, which is age indeterminate but new since 08/11/2023. Stable mild compression deformity of L4.      CT Cervical Spine w/o Contrast   Final Result   IMPRESSION:   1.  Old nonunited type II dens fracture with interval remodeling.   2.  Old right occipital condyle fracture with interval remodeling.   3.  Old right C3 and C4 transverse process fractures. These involve the transverse foramina. CTA could be performed to evaluate for vascular injury.   4.  No acute fracture identified.   5.  Multilevel degenerative change.      CT Head w/o Contrast   Final Result   IMPRESSION:   1.  No acute intracranial abnormality.                EKG   ECG results from 09/07/24   EKG 12-lead, tracing only     Value    Systolic Blood Pressure     Diastolic Blood Pressure     Ventricular Rate 68    Atrial Rate 68    MS Interval 166    QRS Duration 114        QTc 440    P Axis 39    R AXIS -5    T Axis 52    Interpretation ECG      Sinus rhythm  Minimal voltage criteria for LVH, may be normal variant ( [place] product )  Nonspecific T wave abnormality  Abnormal ECG  When compared  with ECG of 16-Jun-2024 22:25,  QT has shortened          Independent Interpretation   None    ED Course      Medications Administered   Medications - No data to display    Procedures   Procedures     Discussion of Management   None    ED Course        Additional Documentation  None    Medical Decision Making / Diagnosis     CMS Diagnoses: None    MIPS       None    MDM   Maya Cullen is a 89 year old female who presents to the emergency department via EMS for the evaluation of a mechanical fall.  Upon initial examination the patient is awake and alert.  Patient denies any loss of consciousness there are no neurological deficits found.  Patient reports that she fell while wearing her Aspen collar.  Patient does endorse cervical tenderness, however she states that this is always there since her previous spine injury.  Patient also reports pain and tenderness to her sacrum.  Given these findings I elected to perform CT scans of head, neck, and pelvis.  Head CT was negative for any intracranial findings or injury.  Cervical neck CT did reveal old nonunited type II dens fracture, old right occipital condyle fracture, old right C3 and C4 transverse process fracture.  Pelvic CT did reveal a nondisplaced sacral fracture at the level of S3.  Due to the patient's concern about urinary frequency and feeling weak I did elect to perform a urinalysis which was highly suspicious for urinary tract infection.  I did send a prescription for Keflex twice daily for 5 days.  All radiological findings were discussed with the patient and patient verbalized understanding.  Patient was able to ambulate with assistance and reported adequate pain control.  Given these findings I discussed this case with my attending we both feel the patient is safe for discharge at this time.      Disposition   The patient was discharged.     Diagnosis     ICD-10-CM    1. Sacral fracture (H)  S32.10XA       2. Fall at home, initial encounter   W19.XXXA     Y92.009       3. Urinary tract infection  N39.0            Discharge Medications   New Prescriptions    CEPHALEXIN (KEFLEX) 500 MG CAPSULE    Take 1 capsule (500 mg) by mouth 2 times daily for 5 days.         BENNETT Murphy CNP, [place], APRN CNP  09/07/24 0845

## 2024-09-07 NOTE — DISCHARGE INSTRUCTIONS
Please use over the counter Tylenol for pain control.   2. Please follow up with your primary care provider as needed or if symptoms worsen.   3. Please continue to wear your cervical collar.     Discharge Instructions  Trauma    You were seen today for an injury due to some kind of trauma (crash, fall, etc.). At this time, your provider has not found any dangerous injuries that require further care in the hospital today. However, some injuries may not show up until after you leave the Emergency Department.    Generally, every Emergency Department visit should have a follow-up clinic visit with either a primary or a specialty clinic/provider. Please follow-up as instructed by your emergency provider today.    Return to the Emergency Department right away if:  You have abdominal (belly) pain or bruises, chest pain, pain in a new area, or pain that is getting worse.  You get short of breath.  You develop a fever over 101 F.   You have weakness in your arms or legs.  You faint or you are very lightheaded.  You have any new symptoms, you are feeling weak or unusually ill, or something worries you.   Injuries to the brain are possible with any accident.  Return right away if you have confusion, vomiting (throwing up) more than once, difficulty walking or a headache that is getting worse. If it is a child or person who cannot normally talk, bring him or her back if they seem to be behaving in an abnormal way.      MORE INFORMATION:    General Injuries:  Aches and pains are usually worse the day after your accident, but should not be severe, and should start getting better after that. Aches and pains are common in the neck and back.  Injuries from your accident may prevent you from working.  Follow-up with your regular provider to get a work note and to find out how long you will not be able to work.  Pain medications for your injuries may make it unsafe for you to drive or operate machinery.  Use ice to injured areas for  the first one or two days. Apply a bag of ice wrapped in a cloth for about 15 minutes at a time. You can do this as often as once an hour. Do not sleep with an ice pack, since it can burn you.   You can use non-prescription pain medicine such as acetaminophen (Tylenol ) or ibuprofen (Advil , Motrin , Nuprin ) if your emergency provider or your own provider told you this is okay. Tylenol  (acetaminophen) is in many prescription medicines and non-prescription medicines--check all of your medicines to be sure you aren t taking more than 3000 mg per day.  Limit your activity for at least 1-2 days. Avoid doing things that hurt.  You need to see your provider if any injured area is not back to normal in 1 week.    Fractures, Sprains, and Strains:  Return to the Emergency Department right away if your injured area gets more painful, if the splint or dressing seems to be too tight, if it gets numb or tingly past the injury, or if the area past the injury gets pale, blue, or cold.  Use your crutches if you were given them today. Do not put weight on the injured area until the pain is gone.  Keep the injured area above the level of your heart while laying or sitting down.  This well help lessen the swelling and the pain.  You may use an elastic bandage (Ace  Wrap) if it makes you more comfortable. Wrap it just tight enough to provide mild compression, and loosen it if you get swelling below the bandage.    Splints:  A splint put on in the Emergency Department is temporary. Your regular provider or orthopedic provider will remove it, and replace it as needed.  Keep the splint dry. Cover it with a plastic bag when you wash. Even with a plastic bag, water can leak in, so do your best to keep the bag dry. If your splint does get wet, you should come back or see your provider to have it replaced.  Do not put objects inside the splint to scratch.  If there is an elastic bandage (Ace  Wrap) holding the splint on this may be loosened  a little to relieve pressure or pain.  If pain continues return to the Emergency Department right away.  Return if the splint starts cutting into your skin.  Do not remove your splint by yourself unless told to by your provider.    Wounds:  Infections can follow many injuries. Watch for fevers, redness spreading from the wound, pus or stitches that open up. Return here or see your provider if these happen.  There can always be glass, wood, dirt or other things in any wound.  They will not always show up even on x-rays.  If a wound does not heal, this may be why, and it is important to follow-up with your regular provider. Small pieces of glass or other materials may work their way out on their own.  Cuts or scrapes may start to bleed after leaving the Emergency Department.  If this happens, hold pressure on the bleeding area with a clean cloth or put pressure over the bandage.  If the bleeding does not stop after you use constant pressure for 30 minutes, you should return to the Emergency Department for further treatment.  Any bandage or dressing put on here should be removed in 12-24 hours, or as your provider instructs. Remove the dressing sooner if it seems too tight or painful, or if it is getting numb, tingly, or pale past the dressing.  After you take off the dressing, wash the cut or scrape with soap and water once or twice a day.  Apply an antibiotic ointment like Bacitracin (polypeptide antibiotic) to scrapes or cuts, and keep them covered with a Band-Aid  or gauze if possible, until they heal up or until your stitches are taken out.  Dermabond  or Steri-Strips  should be left alone and will come off by themselves.  You do not need to apply an antibiotic ointment to these. Dissolving stitches should go away or fall out within about a week.  Regular stitches (or stitches which have not dissolved) need to be taken out by your provider in clinic.  Call today and schedule an appointment.  Leave your stitches in  for as long as you were told today.    Most injuries are preventable!  As your local emergency providers, we encourage you to:  Wear your seat belt.  Do not talk on your cell phone while driving.  Do not read or send text messages while driving.  Wear a bike or motorcycle helmet.  Wear a helmet while skiing and snowboarding.  Wear personal flotation devices at all times while on the water.  Always have your child in a car seat.  Do not allow children less than 12 years old to ride in the front seat.  Go to the CDC website to find more information on preventing injures:  http://www.cdc.gov/injury/index.html    If you were given a prescription for medicine here today, be sure to read all of the information (including the package insert) that comes with your prescription.  This will include important information about the medicine, its side effects, and any warnings that you need to know about.  The pharmacist who fills the prescription can provide more information and answer questions you may have about the medicine.  If you have questions or concerns that the pharmacist cannot address, please call or return to the Emergency Department.     Remember that you can always come back to the Emergency Department if you are not able to see your regular provider in the amount of time listed above, if you get any new symptoms, or if there is anything that worries you.

## 2024-09-07 NOTE — ED TRIAGE NOTES
"BIBA, hx multiple falls and arrived holding aspen collar from previous fall. Pt in restroom when R knee \"Gave out.\" Pt fell backwards landing on buttock and hitting posterior head. Hematoma posterior head. No active bleeding, LOC, or blood thinner use. ABC in tact.      Triage Assessment (Adult)       Row Name 09/07/24 1106          Triage Assessment    Airway WDL WDL        Respiratory WDL    Respiratory WDL WDL        Skin Circulation/Temperature WDL    Skin Circulation/Temperature WDL WDL        Cardiac WDL    Cardiac WDL WDL        Peripheral/Neurovascular WDL    Peripheral Neurovascular WDL WDL        Cognitive/Neuro/Behavioral WDL    Cognitive/Neuro/Behavioral WDL WDL                     "

## 2024-09-07 NOTE — ED PROVIDER NOTES
"Emergency Department Attending Supervisory Note  9/7/2024  11:24 AM      I evaluated this patient with ALEKSANDR Murphy.       Briefly, the patient presented with presumed mechanical fall in the setting of feeling mildly general weakness and some urinary urgency.  She does have a history of known odontoid fracture arrives in an McGrath collar.      On my exam:  BP (P) 115/83   Pulse (P) 74   Temp 97.7  F (36.5  C) (Temporal)   Resp 12   Ht 1.448 m (4' 9\")   Wt 52.2 kg (115 lb)   SpO2 95%   BMI 24.89 kg/m      Constitutional: Alert, attentive, GCS 15   HENT:    Head some occipital scalp hematoma  Neck: : Aspen collar in place no overt midline tenderness  Eyes: EOM are normal, anicteric, conjugate gaze  CV: distal extremities warm, well perfused  Chest: Non-labored breathing on RA  GI:  non tender. No distension. No guarding or rebound.    MSK: Upper lower extremities without tenderness  Neurological: Alert, attentive, moving all extremities equally.   Skin: Skin is warm and dry.      Workup is notable for:  CT Pelvis Bone wo Contrast   Final Result   IMPRESSION:   1.  Nondisplaced sacral fracture at the level of S3.   2.  Mild compression deformity of L5, which is age indeterminate but new since 08/11/2023. Stable mild compression deformity of L4.      CT Cervical Spine w/o Contrast   Final Result   IMPRESSION:   1.  Old nonunited type II dens fracture with interval remodeling.   2.  Old right occipital condyle fracture with interval remodeling.   3.  Old right C3 and C4 transverse process fractures. These involve the transverse foramina. CTA could be performed to evaluate for vascular injury.   4.  No acute fracture identified.   5.  Multilevel degenerative change.      CT Head w/o Contrast   Final Result   IMPRESSION:   1.  No acute intracranial abnormality.              Labs Ordered and Resulted from Time of ED Arrival to Time of ED Departure   ROUTINE UA WITH MICROSCOPIC REFLEX TO CULTURE - Abnormal      "  Result Value    Color Urine Yellow      Appearance Urine Cloudy (*)     Glucose Urine Negative      Bilirubin Urine Negative      Ketones Urine Negative      Specific Gravity Urine 1.020      Blood Urine Trace (*)     pH Urine 5.0      Protein Albumin Urine 30 (*)     Urobilinogen Urine Normal      Nitrite Urine Positive (*)     Leukocyte Esterase Urine Large (*)     Bacteria Urine Many (*)     Mucus Urine Present (*)     RBC Urine 27 (*)     WBC Urine 150 (*)     Squamous Epithelials Urine 13 (*)     Transitional Epithelials Urine 1      Hyaline Casts Urine 36 (*)    BASIC METABOLIC PANEL - Abnormal    Sodium 133 (*)     Potassium 4.8      Chloride 101      Carbon Dioxide (CO2) 18 (*)     Anion Gap 14      Urea Nitrogen 30.4 (*)     Creatinine 1.16 (*)     GFR Estimate 45 (*)     Calcium 8.8      Glucose 128 (*)    CBC WITH PLATELETS AND DIFFERENTIAL - Abnormal    WBC Count 8.8      RBC Count 3.94      Hemoglobin 12.6      Hematocrit 39.5            MCH 32.0      MCHC 31.9      RDW 16.0 (*)     Platelet Count 320      % Neutrophils 70      % Lymphocytes 16      % Monocytes 8      % Eosinophils 4      % Basophils 1      % Immature Granulocytes 1      NRBCs per 100 WBC 0      Absolute Neutrophils 6.2      Absolute Lymphocytes 1.4      Absolute Monocytes 0.7      Absolute Eosinophils 0.3      Absolute Basophils 0.1      Absolute Immature Granulocytes 0.1      Absolute NRBCs 0.0     URINE CULTURE         In summary, my impression is presume mechanical fall on a 9-year-old with normal screening EKG as well as labs other than UA suggestive of UTI however it is contaminated with epithelial cells.  We will send urine culture but place her on Keflex.  CT imaging of her head, C-spine and pelvis showed nondisplaced sacral fracture as well as minimally compressed lumbar fracture she was able to ambulate here without pain medications or issue.  Cervical spine CT shows stable old appearing fractures.  Will continue her  aspirin.  Given her ability ambulate here without additional pain control, we will plan for discharge on Tylenol and recommended clinic follow-up.      ICD-10-CM    1. Sacral fracture (H)  S32.10XA       2. Fall at home, initial encounter  W19.XXXA     Y92.009       3. Urinary tract infection  N39.0               Disposition:  Discharge     Geoffrey Cordova MD  [facility]  [facility] Emergency [facility]    11:24 AM 09/07/24           Geoffrey Cordova MD  09/07/24 1457

## 2024-09-09 LAB
ATRIAL RATE - MUSE: 68 BPM
DIASTOLIC BLOOD PRESSURE - MUSE: NORMAL MMHG
INTERPRETATION ECG - MUSE: NORMAL
P AXIS - MUSE: 39 DEGREES
PR INTERVAL - MUSE: 166 MS
QRS DURATION - MUSE: 114 MS
QT - MUSE: 414 MS
QTC - MUSE: 440 MS
R AXIS - MUSE: -5 DEGREES
SYSTOLIC BLOOD PRESSURE - MUSE: NORMAL MMHG
T AXIS - MUSE: 52 DEGREES
VENTRICULAR RATE- MUSE: 68 BPM

## 2024-09-11 ENCOUNTER — TELEPHONE (OUTPATIENT)
Dept: NURSING | Facility: CLINIC | Age: 89
End: 2024-09-11
Payer: MEDICARE

## 2024-09-11 LAB
BACTERIA UR CULT: ABNORMAL
BACTERIA UR CULT: ABNORMAL

## 2024-09-11 NOTE — TELEPHONE ENCOUNTER
[facility]    Reason for call: Lab Result Notification     Lab Result (including Rx patient on, if applicable).  If culture, copy of lab report at bottom.  Lab Result: Urine Culture  24 Prescribed CephALEXin (KEFLEX) 500 MG capsule Take 1 capsule (500 mg) by mouth 2 times daily for 5 days. - Oral (RESISTANT)    Creatinine Level (mg/dl)   Creatinine   Date Value Ref Range Status   2024 1.16 (H) 0.51 - 0.95 mg/dL Final   2013 0.89 0.52 - 1.04 mg/dL Final    Creatinine clearance (ml/min), if applicable    Serum creatinine: 1.16 mg/dL (H) 24 1133  Estimated creatinine clearance: 26.6 mL/min (A)     RN Recommendations/Instructions per [place] ED lab result protocol:   [facility] ED lab result protocol utilized: Urine Culture      Patient's current Symptoms:   Unable to assess.  Pt resides at [facility], [phone].  Spoke with Nursing Team and results faxed to [phone]    Patient/care giver notified to contact your PCP clinic or return to the Emergency department if your:  Symptoms return.  Symptoms do not improve after 3 days on antibiotic.  Symptoms do not resolve after completing antibiotic.  Symptoms worsen or other concerning symptoms.       Ora Villeda RN  Patient name: Maya Cullen  : 1934  Reason for fax:  Urine Culture FINAL Report  Attention:  [facility]    If any questions or concerns with this fax, please contact:   Ora FERRARI RN  [facility] [place]  Emergency Dept Lab Result RN  Ph# [phone]

## 2024-09-12 NOTE — TELEPHONE ENCOUNTER
Freddy [facility] calling back that 9/7/24 Urine Culture FINAL report was not received.  Urine Culture report refaxed to [phone].  Ora Villeda RN  Emergency Department Results Team

## 2024-09-16 ENCOUNTER — APPOINTMENT (OUTPATIENT)
Dept: GENERAL RADIOLOGY | Facility: CLINIC | Age: 89
DRG: 291 | End: 2024-09-16
Attending: EMERGENCY MEDICINE
Payer: MEDICARE

## 2024-09-16 ENCOUNTER — HOSPITAL ENCOUNTER (INPATIENT)
Facility: CLINIC | Age: 89
LOS: 1 days | Discharge: HOME HEALTH | DRG: 291 | End: 2024-09-18
Attending: EMERGENCY MEDICINE | Admitting: INTERNAL MEDICINE
Payer: MEDICARE

## 2024-09-16 ENCOUNTER — MEDICAL CORRESPONDENCE (OUTPATIENT)
Dept: HEALTH INFORMATION MANAGEMENT | Facility: CLINIC | Age: 89
End: 2024-09-16

## 2024-09-16 DIAGNOSIS — M79.89 LEG SWELLING: ICD-10-CM

## 2024-09-16 DIAGNOSIS — S90.31XA CONTUSION OF RIGHT FOOT, INITIAL ENCOUNTER: ICD-10-CM

## 2024-09-16 DIAGNOSIS — I50.23 ACUTE ON CHRONIC SYSTOLIC CONGESTIVE HEART FAILURE (H): ICD-10-CM

## 2024-09-16 DIAGNOSIS — I50.21 ACUTE SYSTOLIC HEART FAILURE (H): ICD-10-CM

## 2024-09-16 DIAGNOSIS — I50.9 ACUTE ON CHRONIC CONGESTIVE HEART FAILURE, UNSPECIFIED HEART FAILURE TYPE (H): Primary | ICD-10-CM

## 2024-09-16 LAB
ANION GAP SERPL CALCULATED.3IONS-SCNC: 11 MMOL/L (ref 7–15)
BASOPHILS # BLD AUTO: 0 10E3/UL (ref 0–0.2)
BASOPHILS NFR BLD AUTO: 1 %
BUN SERPL-MCNC: 24.9 MG/DL (ref 8–23)
CALCIUM SERPL-MCNC: 9.1 MG/DL (ref 8.8–10.4)
CHLORIDE SERPL-SCNC: 98 MMOL/L (ref 98–107)
CREAT SERPL-MCNC: 1.09 MG/DL (ref 0.51–0.95)
EGFRCR SERPLBLD CKD-EPI 2021: 48 ML/MIN/1.73M2
EOSINOPHIL # BLD AUTO: 0.2 10E3/UL (ref 0–0.7)
EOSINOPHIL NFR BLD AUTO: 3 %
ERYTHROCYTE [DISTWIDTH] IN BLOOD BY AUTOMATED COUNT: 17.1 % (ref 10–15)
GLUCOSE SERPL-MCNC: 121 MG/DL (ref 70–99)
HCO3 SERPL-SCNC: 22 MMOL/L (ref 22–29)
HCT VFR BLD AUTO: 41.1 % (ref 35–47)
HGB BLD-MCNC: 12.9 G/DL (ref 11.7–15.7)
HOLD SPECIMEN: NORMAL
HOLD SPECIMEN: NORMAL
IMM GRANULOCYTES # BLD: 0.1 10E3/UL
IMM GRANULOCYTES NFR BLD: 1 %
LYMPHOCYTES # BLD AUTO: 1.4 10E3/UL (ref 0.8–5.3)
LYMPHOCYTES NFR BLD AUTO: 16 %
MCH RBC QN AUTO: 32 PG (ref 26.5–33)
MCHC RBC AUTO-ENTMCNC: 31.4 G/DL (ref 31.5–36.5)
MCV RBC AUTO: 102 FL (ref 78–100)
MONOCYTES # BLD AUTO: 0.7 10E3/UL (ref 0–1.3)
MONOCYTES NFR BLD AUTO: 8 %
NEUTROPHILS # BLD AUTO: 6.4 10E3/UL (ref 1.6–8.3)
NEUTROPHILS NFR BLD AUTO: 73 %
NRBC # BLD AUTO: 0 10E3/UL
NRBC BLD AUTO-RTO: 0 /100
NT-PROBNP SERPL-MCNC: ABNORMAL PG/ML (ref 0–1800)
PLATELET # BLD AUTO: 310 10E3/UL (ref 150–450)
POTASSIUM SERPL-SCNC: 4.6 MMOL/L (ref 3.4–5.3)
RBC # BLD AUTO: 4.03 10E6/UL (ref 3.8–5.2)
SODIUM SERPL-SCNC: 131 MMOL/L (ref 135–145)
WBC # BLD AUTO: 8.7 10E3/UL (ref 4–11)

## 2024-09-16 PROCEDURE — 82248 BILIRUBIN DIRECT: CPT | Performed by: EMERGENCY MEDICINE

## 2024-09-16 PROCEDURE — 71046 X-RAY EXAM CHEST 2 VIEWS: CPT

## 2024-09-16 PROCEDURE — 99223 1ST HOSP IP/OBS HIGH 75: CPT | Mod: AI | Performed by: INTERNAL MEDICINE

## 2024-09-16 PROCEDURE — 99285 EMERGENCY DEPT VISIT HI MDM: CPT | Mod: 25

## 2024-09-16 PROCEDURE — 80053 COMPREHEN METABOLIC PANEL: CPT | Performed by: EMERGENCY MEDICINE

## 2024-09-16 PROCEDURE — 73630 X-RAY EXAM OF FOOT: CPT | Mod: RT

## 2024-09-16 PROCEDURE — 80048 BASIC METABOLIC PNL TOTAL CA: CPT | Performed by: EMERGENCY MEDICINE

## 2024-09-16 PROCEDURE — 85025 COMPLETE CBC W/AUTO DIFF WBC: CPT | Performed by: EMERGENCY MEDICINE

## 2024-09-16 PROCEDURE — 83735 ASSAY OF MAGNESIUM: CPT | Performed by: INTERNAL MEDICINE

## 2024-09-16 PROCEDURE — 83880 ASSAY OF NATRIURETIC PEPTIDE: CPT | Performed by: EMERGENCY MEDICINE

## 2024-09-16 PROCEDURE — 93005 ELECTROCARDIOGRAM TRACING: CPT

## 2024-09-16 PROCEDURE — 36415 COLL VENOUS BLD VENIPUNCTURE: CPT | Performed by: EMERGENCY MEDICINE

## 2024-09-16 PROCEDURE — 250N000013 HC RX MED GY IP 250 OP 250 PS 637: Performed by: EMERGENCY MEDICINE

## 2024-09-16 RX ORDER — FUROSEMIDE 10 MG/ML
40 INJECTION INTRAMUSCULAR; INTRAVENOUS ONCE
Status: COMPLETED | OUTPATIENT
Start: 2024-09-16 | End: 2024-09-17

## 2024-09-16 RX ORDER — ACETAMINOPHEN 500 MG
1000 TABLET ORAL ONCE
Status: COMPLETED | OUTPATIENT
Start: 2024-09-16 | End: 2024-09-16

## 2024-09-16 RX ADMIN — ACETAMINOPHEN 1000 MG: 500 TABLET, FILM COATED ORAL at 22:55

## 2024-09-16 ASSESSMENT — COLUMBIA-SUICIDE SEVERITY RATING SCALE - C-SSRS
6. HAVE YOU EVER DONE ANYTHING, STARTED TO DO ANYTHING, OR PREPARED TO DO ANYTHING TO END YOUR LIFE?: NO
2. HAVE YOU ACTUALLY HAD ANY THOUGHTS OF KILLING YOURSELF IN THE PAST MONTH?: NO
1. IN THE PAST MONTH, HAVE YOU WISHED YOU WERE DEAD OR WISHED YOU COULD GO TO SLEEP AND NOT WAKE UP?: NO

## 2024-09-16 ASSESSMENT — ACTIVITIES OF DAILY LIVING (ADL): ADLS_ACUITY_SCORE: 38

## 2024-09-16 NOTE — ED TRIAGE NOTES
Pt here for BLE edema. Pt has swelling at baseline, however has become progressively worse. Pt takes lasix daily. Hx of CHF. Denies SOB, or cough

## 2024-09-17 LAB
ALBUMIN SERPL BCG-MCNC: 3.6 G/DL (ref 3.5–5.2)
ALBUMIN UR-MCNC: NEGATIVE MG/DL
ALP SERPL-CCNC: 124 U/L (ref 40–150)
ALT SERPL W P-5'-P-CCNC: 32 U/L (ref 0–50)
ANION GAP SERPL CALCULATED.3IONS-SCNC: 14 MMOL/L (ref 7–15)
APPEARANCE UR: ABNORMAL
AST SERPL W P-5'-P-CCNC: 32 U/L (ref 0–45)
ATRIAL RATE - MUSE: 76 BPM
BILIRUB DIRECT SERPL-MCNC: 0.25 MG/DL (ref 0–0.3)
BILIRUB SERPL-MCNC: 0.7 MG/DL
BILIRUB UR QL STRIP: NEGATIVE
BUN SERPL-MCNC: 20.7 MG/DL (ref 8–23)
CALCIUM SERPL-MCNC: 8.6 MG/DL (ref 8.8–10.4)
CHLORIDE SERPL-SCNC: 100 MMOL/L (ref 98–107)
COLOR UR AUTO: ABNORMAL
CREAT SERPL-MCNC: 0.92 MG/DL (ref 0.51–0.95)
DIASTOLIC BLOOD PRESSURE - MUSE: NORMAL MMHG
EGFRCR SERPLBLD CKD-EPI 2021: 59 ML/MIN/1.73M2
ERYTHROCYTE [DISTWIDTH] IN BLOOD BY AUTOMATED COUNT: 17.2 % (ref 10–15)
GLUCOSE SERPL-MCNC: 103 MG/DL (ref 70–99)
GLUCOSE UR STRIP-MCNC: NEGATIVE MG/DL
HCO3 SERPL-SCNC: 22 MMOL/L (ref 22–29)
HCT VFR BLD AUTO: 43 % (ref 35–47)
HGB BLD-MCNC: 13.7 G/DL (ref 11.7–15.7)
HGB UR QL STRIP: NEGATIVE
HYALINE CASTS: 5 /LPF
INTERPRETATION ECG - MUSE: NORMAL
KETONES UR STRIP-MCNC: NEGATIVE MG/DL
LEUKOCYTE ESTERASE UR QL STRIP: ABNORMAL
MAGNESIUM SERPL-MCNC: 2.1 MG/DL (ref 1.7–2.3)
MCH RBC QN AUTO: 32.4 PG (ref 26.5–33)
MCHC RBC AUTO-ENTMCNC: 31.9 G/DL (ref 31.5–36.5)
MCV RBC AUTO: 102 FL (ref 78–100)
MUCOUS THREADS #/AREA URNS LPF: PRESENT /LPF
NITRATE UR QL: NEGATIVE
P AXIS - MUSE: 65 DEGREES
PH UR STRIP: 5 [PH] (ref 5–7)
PLATELET # BLD AUTO: 270 10E3/UL (ref 150–450)
POTASSIUM SERPL-SCNC: 4.6 MMOL/L (ref 3.4–5.3)
POTASSIUM SERPL-SCNC: 5 MMOL/L (ref 3.4–5.3)
PR INTERVAL - MUSE: 156 MS
PROT SERPL-MCNC: 7.2 G/DL (ref 6.4–8.3)
QRS DURATION - MUSE: 116 MS
QT - MUSE: 412 MS
QTC - MUSE: 463 MS
R AXIS - MUSE: -13 DEGREES
RBC # BLD AUTO: 4.23 10E6/UL (ref 3.8–5.2)
RBC URINE: 4 /HPF
SODIUM SERPL-SCNC: 136 MMOL/L (ref 135–145)
SP GR UR STRIP: 1.01 (ref 1–1.03)
SQUAMOUS EPITHELIAL: 2 /HPF
SYSTOLIC BLOOD PRESSURE - MUSE: NORMAL MMHG
T AXIS - MUSE: 67 DEGREES
UROBILINOGEN UR STRIP-MCNC: NORMAL MG/DL
VENTRICULAR RATE- MUSE: 76 BPM
WBC # BLD AUTO: 9.3 10E3/UL (ref 4–11)
WBC URINE: 114 /HPF

## 2024-09-17 PROCEDURE — 250N000009 HC RX 250: Performed by: INTERNAL MEDICINE

## 2024-09-17 PROCEDURE — 81001 URINALYSIS AUTO W/SCOPE: CPT | Performed by: INTERNAL MEDICINE

## 2024-09-17 PROCEDURE — G0378 HOSPITAL OBSERVATION PER HR: HCPCS

## 2024-09-17 PROCEDURE — 250N000011 HC RX IP 250 OP 636: Performed by: INTERNAL MEDICINE

## 2024-09-17 PROCEDURE — 99232 SBSQ HOSP IP/OBS MODERATE 35: CPT | Performed by: INTERNAL MEDICINE

## 2024-09-17 PROCEDURE — 250N000013 HC RX MED GY IP 250 OP 250 PS 637: Performed by: INTERNAL MEDICINE

## 2024-09-17 PROCEDURE — 80048 BASIC METABOLIC PNL TOTAL CA: CPT | Performed by: INTERNAL MEDICINE

## 2024-09-17 PROCEDURE — 96374 THER/PROPH/DIAG INJ IV PUSH: CPT

## 2024-09-17 PROCEDURE — 87186 SC STD MICRODIL/AGAR DIL: CPT | Performed by: INTERNAL MEDICINE

## 2024-09-17 PROCEDURE — 85027 COMPLETE CBC AUTOMATED: CPT | Performed by: INTERNAL MEDICINE

## 2024-09-17 PROCEDURE — 250N000011 HC RX IP 250 OP 636: Performed by: EMERGENCY MEDICINE

## 2024-09-17 PROCEDURE — 120N000001 HC R&B MED SURG/OB

## 2024-09-17 PROCEDURE — 87086 URINE CULTURE/COLONY COUNT: CPT | Performed by: INTERNAL MEDICINE

## 2024-09-17 PROCEDURE — 36415 COLL VENOUS BLD VENIPUNCTURE: CPT | Performed by: INTERNAL MEDICINE

## 2024-09-17 RX ORDER — POTASSIUM CHLORIDE 1500 MG/1
20 TABLET, EXTENDED RELEASE ORAL DAILY
Status: DISCONTINUED | OUTPATIENT
Start: 2024-09-17 | End: 2024-09-18 | Stop reason: HOSPADM

## 2024-09-17 RX ORDER — FUROSEMIDE 10 MG/ML
20 INJECTION INTRAMUSCULAR; INTRAVENOUS EVERY 12 HOURS
Status: DISCONTINUED | OUTPATIENT
Start: 2024-09-17 | End: 2024-09-18 | Stop reason: HOSPADM

## 2024-09-17 RX ORDER — LATANOPROST 50 UG/ML
1 SOLUTION/ DROPS OPHTHALMIC AT BEDTIME
Status: DISCONTINUED | OUTPATIENT
Start: 2024-09-17 | End: 2024-09-18 | Stop reason: HOSPADM

## 2024-09-17 RX ORDER — LISINOPRIL 2.5 MG/1
2.5 TABLET ORAL DAILY
Status: DISCONTINUED | OUTPATIENT
Start: 2024-09-17 | End: 2024-09-18 | Stop reason: HOSPADM

## 2024-09-17 RX ORDER — LATANOPROST 50 UG/ML
1 SOLUTION/ DROPS OPHTHALMIC AT BEDTIME
Status: DISCONTINUED | OUTPATIENT
Start: 2024-09-17 | End: 2024-09-17

## 2024-09-17 RX ORDER — ONDANSETRON 4 MG/1
4 TABLET, ORALLY DISINTEGRATING ORAL EVERY 6 HOURS PRN
Status: DISCONTINUED | OUTPATIENT
Start: 2024-09-17 | End: 2024-09-17

## 2024-09-17 RX ORDER — LATANOPROST 50 UG/ML
1 SOLUTION/ DROPS OPHTHALMIC AT BEDTIME
COMMUNITY

## 2024-09-17 RX ORDER — CARBAMAZEPINE 100 MG/1
200 TABLET, CHEWABLE ORAL 2 TIMES DAILY
Status: DISCONTINUED | OUTPATIENT
Start: 2024-09-17 | End: 2024-09-18 | Stop reason: HOSPADM

## 2024-09-17 RX ORDER — ACETAMINOPHEN 325 MG/1
975 TABLET ORAL 3 TIMES DAILY
Status: DISCONTINUED | OUTPATIENT
Start: 2024-09-17 | End: 2024-09-18 | Stop reason: HOSPADM

## 2024-09-17 RX ORDER — ONDANSETRON 2 MG/ML
4 INJECTION INTRAMUSCULAR; INTRAVENOUS EVERY 6 HOURS PRN
Status: DISCONTINUED | OUTPATIENT
Start: 2024-09-17 | End: 2024-09-18 | Stop reason: HOSPADM

## 2024-09-17 RX ORDER — ONDANSETRON 4 MG/1
4 TABLET, ORALLY DISINTEGRATING ORAL EVERY 6 HOURS PRN
Status: DISCONTINUED | OUTPATIENT
Start: 2024-09-17 | End: 2024-09-18 | Stop reason: HOSPADM

## 2024-09-17 RX ORDER — NITROFURANTOIN 25; 75 MG/1; MG/1
100 CAPSULE ORAL 2 TIMES DAILY
Status: ON HOLD | COMMUNITY
Start: 2024-09-12 | End: 2024-09-18

## 2024-09-17 RX ORDER — BUPIVACAINE HYDROCHLORIDE 5 MG/ML
INJECTION, SOLUTION EPIDURAL; INTRACAUDAL
Status: DISCONTINUED
Start: 2024-09-17 | End: 2024-09-17 | Stop reason: HOSPADM

## 2024-09-17 RX ORDER — FUROSEMIDE 10 MG/ML
40 INJECTION INTRAMUSCULAR; INTRAVENOUS
Status: DISCONTINUED | OUTPATIENT
Start: 2024-09-17 | End: 2024-09-17

## 2024-09-17 RX ORDER — TIMOLOL MALEATE 5 MG/ML
1 SOLUTION/ DROPS OPHTHALMIC 2 TIMES DAILY
Status: DISCONTINUED | OUTPATIENT
Start: 2024-09-17 | End: 2024-09-18 | Stop reason: HOSPADM

## 2024-09-17 RX ADMIN — ACETAMINOPHEN 325MG 975 MG: 325 TABLET ORAL at 21:34

## 2024-09-17 RX ADMIN — FUROSEMIDE 40 MG: 10 INJECTION, SOLUTION INTRAMUSCULAR; INTRAVENOUS at 00:47

## 2024-09-17 RX ADMIN — FUROSEMIDE 20 MG: 10 INJECTION, SOLUTION INTRAMUSCULAR; INTRAVENOUS at 21:35

## 2024-09-17 RX ADMIN — FUROSEMIDE 40 MG: 10 INJECTION, SOLUTION INTRAMUSCULAR; INTRAVENOUS at 10:56

## 2024-09-17 RX ADMIN — POTASSIUM CHLORIDE 20 MEQ: 1500 TABLET, EXTENDED RELEASE ORAL at 11:25

## 2024-09-17 RX ADMIN — CARBAMAZEPINE 200 MG: 100 TABLET, CHEWABLE ORAL at 21:39

## 2024-09-17 RX ADMIN — TIMOLOL MALEATE 1 DROP: 5 SOLUTION/ DROPS OPHTHALMIC at 11:26

## 2024-09-17 RX ADMIN — CARBAMAZEPINE 200 MG: 100 TABLET, CHEWABLE ORAL at 11:25

## 2024-09-17 RX ADMIN — ACETAMINOPHEN 325MG 975 MG: 325 TABLET ORAL at 11:25

## 2024-09-17 RX ADMIN — LATANOPROST 1 DROP: 50 SOLUTION/ DROPS OPHTHALMIC at 21:38

## 2024-09-17 RX ADMIN — ACETAMINOPHEN 325MG 975 MG: 325 TABLET ORAL at 14:33

## 2024-09-17 ASSESSMENT — ACTIVITIES OF DAILY LIVING (ADL)
ADLS_ACUITY_SCORE: 38
ADLS_ACUITY_SCORE: 42
ADLS_ACUITY_SCORE: 47
ADLS_ACUITY_SCORE: 44
ADLS_ACUITY_SCORE: 47
ADLS_ACUITY_SCORE: 38
ADLS_ACUITY_SCORE: 47
ADLS_ACUITY_SCORE: 46
ADLS_ACUITY_SCORE: 37
ADLS_ACUITY_SCORE: 38
ADLS_ACUITY_SCORE: 38
ADLS_ACUITY_SCORE: 47
ADLS_ACUITY_SCORE: 38
ADLS_ACUITY_SCORE: 44

## 2024-09-17 NOTE — PROGRESS NOTES
[facility]  Hospitalist Progress Note  Ronal Parnell MD 09/17/2024    Reason for Stay (Diagnosis): CHF exacerbation         Assessment and Plan:      Summary of Stay: Maya Cullen is a 89 year old female with PMH including systolic CHF, severe mitral regurgitation, pulmonary hypertension, LVH, HTN, glaucoma, trigeminal neuralgia, impaired glucose tolerance, mild cognitive impairment, and falls who presents with worsening leg swelling and foot pain.  Evaluation in the emergency room suggestive of acute on chronic systolic heart failure with chest x-ray suggestive of mild pulmonary edema BNP 17,000'.  Patient maintaining oxygenation on room air.  Admitted  for IV diuresis.    Patient is responding well to IV diuresis.  No shortness of breath or hypoxia.    Plans today:  -Decrease IV Lasix dose from 40 mg every 12 hours to 20 mg every 12 hours  -Anticipate transition from IV Lasix to oral Lasix tomorrow  -Repeat BMP in the morning  -Probable discharge back to her care facility tomorrow  -Check urinalysis.  Recently diagnosed with UTI and was receiving Macrobid  - I updated patient's friend at bedside today     Acute on chronic systolic CHF exacerbation  Severe mitral regurgitation  Mild to moderate tricuspid regurgitation  Pulmonary hypertension  HTN:  -CHF   -continue with IV Lasix; transition to PO Lasix tomorrow  -No hypoxia  -Cardiac monitoring  -Strict intake and output and daily weights  -Potassium and magnesium replacement protocols  -Recent TTE 6/24 notable for ejection fraction 20 to 25%     Right foot pain  -Patient says she has been falling and may have injured her foot.  -Bruise noted on the dorsal aspect of right foot  -X-rays in the emergency room without acute fracture  -As needed pain meds     History CVA:   -History of frequent falls     Glaucoma:   -Resume PTA timolol, latanoprost,     Cognitive impairment     Trigeminal neuralgia: Resume PTA carbamazepine 200 mg 3 times  daily.           Clinically Significant Risk Factors Present on Admission                  # Hypertension: Home medication list includes antihypertensive(s)  # Chronic heart failure with reduced ejection fraction: last echo with EF <40%                  DVT Prophylaxis: Pneumatic Compression Devices  Code Status: DNR / DNI     Disposition: Expected discharge back to LTC facility tomorrow           Interval History (Subjective):      Poor historian given cognitive dysfunction.  No shortness of breath.  Patient's friend arrived while I was seeing the patient.  She reported the patient recently been diagnosed with UTI, though unclear if this was eradicated as apparently primary care provider changed antibiotics.                  Physical Exam:      Last Vital Signs:  /83 (BP Location: Right arm, Patient Position: Semi-Figueroa's, Cuff Size: Adult Regular)   Pulse 75   Temp 97.3  F (36.3  C) (Axillary)   Resp 18   Ht 1.524 m (5')   Wt 52.2 kg (115 lb)   SpO2 96%   BMI 22.46 kg/m        Intake/Output Summary (Last 24 hours) at 9/17/2024 1258  Last data filed at 9/17/2024 1200  Gross per 24 hour   Intake 100 ml   Output 850 ml   Net -750 ml       Constitutional: Awake, alert, cooperative, no apparent distress     Respiratory: Clear to auscultation bilaterally, no crackles or wheezing   Cardiovascular: Regular rate and rhythm, normal S1 and S2, and no murmur noted   Abdomen: Normal bowel sounds, soft, non-distended, non-tender   Skin: No rashes, no cyanosis, dry to touch   Neuro: Alert and awake, no weakness, numbness, +memory loss   Extremities: Non-pitting edema BLE, normal range of motion   Other(s):        All other systems: Negative          Medications:      All current medications were reviewed with changes reflected in problem list.         Data:      All new lab and imaging data was reviewed.   Labs:       Lab Results   Component Value Date     09/17/2024     09/16/2024     09/07/2024      07/13/2013    Lab Results   Component Value Date    CHLORIDE 100 09/17/2024    CHLORIDE 98 09/16/2024    CHLORIDE 101 09/07/2024    CHLORIDE 102 07/13/2013    Lab Results   Component Value Date    BUN 20.7 09/17/2024    BUN 24.9 09/16/2024    BUN 30.4 09/07/2024    BUN 13 07/13/2013      Lab Results   Component Value Date    POTASSIUM 4.6 09/17/2024    POTASSIUM 4.6 09/16/2024    POTASSIUM 5.0 09/16/2024    POTASSIUM 4.1 07/13/2013    Lab Results   Component Value Date    CO2 22 09/17/2024    CO2 22 09/16/2024    CO2 18 09/07/2024    CO2 25 07/13/2013    Lab Results   Component Value Date    CR 0.92 09/17/2024    CR 1.09 09/16/2024    CR 1.16 09/07/2024    CR 0.89 07/13/2013        Recent Labs   Lab 09/17/24  0827   WBC 9.3   HGB 13.7   HCT 43.0   *         Imaging:   Recent Results (from the past 24 hour(s))   XR Chest 2 Views    Narrative    EXAM: XR CHEST 2 VIEWS  LOCATION: [facility]  DATE: 9/16/2024    INDICATION: swelling  COMPARISON: 6/16/2024.      Impression    IMPRESSION: Stable cardiomegaly. Interstitial prominence suggestive of mild edema. Small bilateral pleural effusions and mild bibasilar atelectasis. No pneumothorax. Chronic fracture deformity of the proximal left humerus.   Foot  XR, G/E 3 views, right    Narrative    EXAM: XR FOOT RIGHT G/E 3 VIEWS  LOCATION: [facility]  DATE: 9/16/2024    INDICATION: r foot pain  COMPARISON: None.      Impression    IMPRESSION: Normal joint spaces and alignment. No acute fracture or dislocation. Mild hallux valgus.

## 2024-09-17 NOTE — PLAN OF CARE
"  Problem: Adult Inpatient Plan of Care  Goal: Plan of Care Review  Description: The Plan of Care Review/Shift note should be completed every shift.  The Outcome Evaluation is a brief statement about your assessment that the patient is improving, declining, or no change.  This information will be displayed automatically on your shift  note.  Outcome: Progressing  Flowsheets (Taken 9/17/2024 0548)  Outcome Evaluation: Pt denies any pain. External catheter patent.  Plan of Care Reviewed With: patient  Overall Patient Progress: no change  Goal: Patient-Specific Goal (Individualized)  Description: You can add care plan individualizations to a care plan. Examples of Individualization might be:  \"Parent requests to be called daily at 9am for status\", \"I have a hard time hearing out of my right ear\", or \"Do not touch me to wake me up as it startles  me\".  Outcome: Progressing  Goal: Absence of Hospital-Acquired Illness or Injury  Outcome: Progressing  Intervention: Identify and Manage Fall Risk  Recent Flowsheet Documentation  Taken 9/17/2024 0500 by Julian Florence RN  Safety Promotion/Fall Prevention: safety round/check completed  Taken 9/17/2024 0400 by Julian Florence RN  Safety Promotion/Fall Prevention: safety round/check completed  Taken 9/17/2024 0300 by Julian Florence RN  Safety Promotion/Fall Prevention: safety round/check completed  Taken 9/17/2024 0247 by Julian Florence RN  Safety Promotion/Fall Prevention:   safety round/check completed   patient and family education   nonskid shoes/slippers when out of bed   assistive device/personal items within reach   activity supervised   lighting adjusted  Intervention: Prevent Skin Injury  Recent Flowsheet Documentation  Taken 9/17/2024 0247 by Julian Florence RN  Body Position: weight shifting  Intervention: Prevent and Manage VTE (Venous Thromboembolism) Risk  Recent Flowsheet Documentation  Taken 9/17/2024 0247 by Julian Florence RN  VTE " Prevention/Management: SCDs off (sequential compression devices)  Intervention: Prevent Infection  Recent Flowsheet Documentation  Taken 9/17/2024 0247 by Julian Florence RN  Infection Prevention:   single patient room provided   rest/sleep promoted   personal protective equipment utilized  Goal: Optimal Comfort and Wellbeing  Outcome: Progressing  Goal: Readiness for Transition of Care  Outcome: Progressing     Problem: Comorbidity Management  Goal: Blood Pressure in Desired Range  Outcome: Progressing  Intervention: Maintain Blood Pressure Management  Recent Flowsheet Documentation  Taken 9/17/2024 0247 by Julian Florence RN  Medication Review/Management: medications reviewed   Goal Outcome Evaluation:      Plan of Care Reviewed With: patient    Overall Patient Progress: no changeOverall Patient Progress: no change    Outcome Evaluation: Pt denies any pain. External catheter patent.

## 2024-09-17 NOTE — PHARMACY-ADMISSION MEDICATION HISTORY
Pharmacist Admission Medication History    Admission medication history is complete. The information provided in this note is only as accurate as the sources available at the time of the update.    Information Source(s): Facility medication list/MAR ([facility] [phone]) and [facility]     Pertinent Information: Patient started taking Nitrofurantoin on 9/13/24, missed last nights dose, Nitrofurantoin is technically contraindicated due to patient's CrCl of 28.3mL/min. Please evaluate if continuing this medication is appropriate.     Changes made to PTA medication list:  Added: Macrobid  Deleted: None  Changed: None    Allergies reviewed with patient and updates made in EHR: yes    Medication History Completed By: Pasha Vickers [facility] 9/17/2024 9:18 AM    PTA Med List   Medication Sig Last Dose    acetaminophen (TYLENOL) 325 MG tablet Take 3 tablets (975 mg) by mouth 3 times daily 9/16/2024 at 1400    carBAMazepine (TEGRETOL) 100 MG chewable tablet Take 2 tablets (200 mg) by mouth 2 times daily 9/16/2024 at 0800    furosemide (LASIX) 40 MG tablet Take 1 tablet (40 mg) by mouth daily 9/16/2024 at 0800    latanoprost (XALATAN) 0.005 % ophthalmic solution Place 1 drop into both eyes at bedtime. 9/15/2024 at 2000    lisinopril (ZESTRIL) 5 MG tablet Take 0.5 tablets (2.5 mg) by mouth daily 9/16/2024 at 0800    metoprolol succinate ER (TOPROL XL) 25 MG 24 hr tablet Take 0.5 tablets (12.5 mg) by mouth daily 9/16/2024 at 0800    multivitamin w/minerals (THERA-VIT-M) tablet Take 1 tablet by mouth daily 9/16/2024 at 0800    nitroFURantoin macrocrystal-monohydrate (MACROBID) 100 MG capsule Take 100 mg by mouth 2 times daily. 9/16/2024 at 0800    ondansetron (ZOFRAN ODT) 4 MG ODT tab Take 1 tablet (4 mg) by mouth every 6 hours as needed for nausea Unknown    potassium chloride nick ER (KLOR-CON M15) 15 MEQ CR tablet Take 1 tablet (15 mEq) by mouth daily 9/16/2024 at 0800    timolol maleate (TIMOPTIC)  0.5 % ophthalmic solution Place 1 drop into both eyes 2 times daily. 9/16/2024 at 0800

## 2024-09-17 NOTE — H&P
[facility]    Hospitalist History and Physical    Name: Maya Cullen    MRN: [id number]  YOB: 1934    Age: 89 year old  Date of Admission:  9/16/2024  Date of Service (when I saw the patient): 09/16/24    Assessment & Plan   Maya Cullen is a 89 year old female with PMH including systolic CHF, severe mitral regurgitation, pulmonary hypertension, LVH, HTN, glaucoma, trigeminal neuralgia, impaired glucose tolerance, mild cognitive impairment, and falls who presents with worsening leg swelling and foot pain.  Evaluation in the emergency room suggestive of acute on chronic systolic heart failure with chest x-ray suggestive of mild pulmonary edema BNP 17,000'.  Patient maintaining oxygenation on room air.  Admitted to observation unit for IV diuresis.    Acute on chronic systolic CHF  Severe mitral regurgitation  Mild to moderate tricuspid regurgitation  Pulmonary hypertension  HTN:  -CHF   -Received 40 mg IV Lasix in the ER,   -continue with IV Lasix 40 mg twice daily  -No significant hypoxia at this time.  Will try to transition to oral Lasix  -Cardiac monitoring  -Strict intake and output and daily weights  -Potassium and magnesium replacement protocols    Right foot pain  -Patient says she has been falling and may have injured her foot.  -Bruise noted on the dorsal aspect of right foot  -X-rays in the emergency room without acute fracture  -As needed pain meds    History CVA:   -Not on aspirin?  -History of frequent falls     Glaucoma:   -Resume PTA timolol, latanoprost,  -Will need to review and reorder meds once reconciled him     Mild cognitive impairment:   -Patient family reports some mild short-term memory loss at baseline.    Trigeminal neuralgia: Resume PTA carbamazepine 200 mg 3 times daily.       Miscellaneous: Will review and reorder meds once reconciled and      Clinically Significant Risk Factors Present on Admission                  # Hypertension:  Home medication list includes antihypertensive(s)  # Chronic heart failure with reduced ejection fraction: last echo with EF <40%                          DVT Prophylaxis: Pneumatic Compression Devices  Code Status: DNR / DNI    Disposition: Expected discharge in 1- 2 days    Primary Care Physician   Jordyn Jon    Chief Complaint   Sent from her facility due to swelling and bruising involving right lower leg and worsening swelling of lower extremity.  History is obtained from the patient    History of Present Illness   Maya Cullen is a 89 year old female with PMH including systolic CHF, severe mitral regurgitation, pulmonary hypertension, LVH, HTN, glaucoma, trigeminal neuralgia, impaired glucose tolerance, mild cognitive impairment, and falls who presents with worsening leg swelling and foot pain.   Patient notes that her nursing home staff are concerned about worsening leg swelling.  They were also concerned about visible bruising over the right foot.  Patient has been falling lately and may have injured her foot.  However was able to use walker and bear weight.  Denies any other injury.  No loss of consciousness no head trauma.  Denies chest pain pressure heaviness or tightness.  No trouble breathing.  No orthopnea, occasional dyspnea.  More than 10 point review of system was carried out was otherwise negative.     Evaluation in the emergency room suggestive of acute on chronic systolic heart failure with chest x-ray suggestive of mild pulmonary edema BNP 17,000'.  Patient maintaining oxygenation on room air.  Admitted to observation unit for IV diuresis.    Past Medical History    Past Medical History:   Diagnosis Date    Cerebrovascular accident (H)     Cerebellar CVA seen on MRI 2023    Glaucoma     HTN (hypertension)     L4 vertebral fracture (H)     Mild cognitive impairment     Pulmonary hypertension (H)     Severe mitral regurgitation     Systolic CHF, chronic (H)     Tricuspid regurgitation      Trigeminal neuralgia          Past Surgical History   Past Surgical History:   Procedure Laterality Date    EYE SURGERY         Prior to Admission Medications   Prior to Admission Medications   Prescriptions Last Dose Informant Patient Reported? Taking?   acetaminophen (TYLENOL) 325 MG tablet   No No   Sig: Take 3 tablets (975 mg) by mouth 3 times daily   carBAMazepine (TEGRETOL) 100 MG chewable tablet   Yes No   Sig: Take 2 tablets (200 mg) by mouth 2 times daily   furosemide (LASIX) 40 MG tablet   No No   Sig: Take 1 tablet (40 mg) by mouth daily   latanoprost (XALATAN) 0.005 % ophthalmic solution   Yes No   Sig: Place 1 drop into both eyes 2 times daily   lisinopril (ZESTRIL) 5 MG tablet   No No   Sig: Take 0.5 tablets (2.5 mg) by mouth daily   metoprolol succinate ER (TOPROL XL) 25 MG 24 hr tablet   No No   Sig: Take 0.5 tablets (12.5 mg) by mouth daily   multivitamin w/minerals (THERA-VIT-M) tablet   Yes No   Sig: Take 1 tablet by mouth daily   ondansetron (ZOFRAN ODT) 4 MG ODT tab   No No   Sig: Take 1 tablet (4 mg) by mouth every 6 hours as needed for nausea   potassium chloride nick ER (KLOR-CON M15) 15 MEQ CR tablet   No No   Sig: Take 1 tablet (15 mEq) by mouth daily   timolol maleate (TIMOPTIC) 0.5 % ophthalmic solution   Yes No   Sig: Place 1 drop into both eyes 2 times daily   Patient not taking: Reported on 8/5/2024      Facility-Administered Medications: None     Allergies   Allergies   Allergen Reactions    Brimonidine Other (See Comments)     Follicular conjunctivitis    Dorzolamide Other (See Comments)     Follicular conjunctivitis    Benzalkonium Chloride Other (See Comments)     Eye irritation.  Does better with preservative free but can tolerate preserved drops    Fluorescein Itching     fluress    Penicillins     Sulfa Antibiotics Other (See Comments)    Codeine Unknown and Rash    Fd&C Yellow #5 (Tartrazine) Rash       Social History   Social History     Tobacco Use    Smoking status:  Never    Smokeless tobacco: Never   Substance Use Topics    Alcohol use: No     Social History     Social History Narrative    Not on file     Lives in assisted living denies smoking no use of alcohol    Family History   I have reviewed this patient's family history and updated it with pertinent information if needed.   No family history on file.      Review of Systems   A Comprehensive greater than 10 system review of systems was carried out.  Pertinent positives and negatives are noted above.  Otherwise negative for contributory information.    Physical Exam   Temp: 98.9  F (37.2  C) Temp src: Temporal BP: 130/83 Pulse: 77   Resp: 18 SpO2: 98 % O2 Device: None (Room air)    Vital Signs with Ranges  Temp:  [98.9  F (37.2  C)] 98.9  F (37.2  C)  Pulse:  [64-77] 77  Resp:  [18] 18  BP: (113-130)/(70-83) 130/83  SpO2:  [98 %-99 %] 98 %  115 lbs 0 oz    GEN:  Alert, oriented x 3, appears comfortable, no overt distress  HEENT:  Normocephalic/atraumatic, no scleral icterus, no nasal discharge, mouth dry  CV:  Regular rate and rhythm, no murmur or JVD.  S1 + S2 noted, no S3 or S4.  LUNGS: Bibasilar crackles right more than left decreased breath sounds both bases.  Bilateral air entry.  Symmetric chest rise on inhalation noted.  ABD:  Active bowel sounds, soft, non-tender/non-distended.  No rebound/guarding/rigidity.  EXT:  +3 edema bilaterally.  No cyanosis.  No joint synovitis noted.  SKIN:  Dry to touch, bruise over dorsal aspect of right foot  NEURO:  Symmetric muscle strength,   No new focal deficits appreciated.    Data   Data reviewed today:  I personally reviewed the EKG tracing showing sinus rhythm with Q-wave in inferior leads incomplete right bundle branch .  Anterolateral ST-T changes    Recent Labs   Lab 09/16/24  1625   WBC 8.7   HGB 12.9   HCT 41.1   *        Recent Labs   Lab 09/16/24  1625   *   POTASSIUM 4.6   CHLORIDE 98   CO2 22   ANIONGAP 11   *   BUN 24.9*   CR 1.09*  "  GFRESTIMATED 48*   GILL 9.1     Recent Labs   Lab 09/16/24  1625   NTBNPI 17,202*     No results for input(s): \"SED\", \"CRP\" in the last 168 hours.  Recent Labs   Lab 09/16/24  1625   HGB 12.9     No results for input(s): \"AST\", \"ALT\", \"GGT\", \"ALKPHOS\", \"BILITOTAL\", \"BILICONJ\", \"BILIDIRECT\", \"CASSY\" in the last 168 hours.    Invalid input(s): \"BILIRUBININDIRECT\"  No results for input(s): \"INR\" in the last 168 hours.  No results for input(s): \"LACT\" in the last 168 hours.  No results for input(s): \"LIPASE\" in the last 168 hours.  No results for input(s): \"TSH\" in the last 168 hours.  No results for input(s): \"TROPONIN\", \"TROPI\", \"TROPR\", \"TROPONINIS\" in the last 168 hours.    Invalid input(s): \"TROPT\", \"TROP\", \"TROPONINIES\", \"TNIH\"  No results for input(s): \"COLOR\", \"APPEARANCE\", \"URINEGLC\", \"URINEBILI\", \"URINEKETONE\", \"SG\", \"UBLD\", \"URINEPH\", \"PROTEIN\", \"UROBILINOGEN\", \"NITRITE\", \"LEUKEST\", \"RBCU\", \"WBCU\" in the last 168 hours.    Recent Results (from the past 24 hour(s))   XR Chest 2 Views    Narrative    EXAM: XR CHEST 2 VIEWS  LOCATION: [facility]  DATE: 9/16/2024    INDICATION: swelling  COMPARISON: 6/16/2024.      Impression    IMPRESSION: Stable cardiomegaly. Interstitial prominence suggestive of mild edema. Small bilateral pleural effusions and mild bibasilar atelectasis. No pneumothorax. Chronic fracture deformity of the proximal left humerus.   Foot  XR, G/E 3 views, right    Narrative    EXAM: XR FOOT RIGHT G/E 3 VIEWS  LOCATION: [facility]  DATE: 9/16/2024    INDICATION: r foot pain  COMPARISON: None.      Impression    IMPRESSION: Normal joint spaces and alignment. No acute fracture or dislocation. Mild hallux valgus.        "

## 2024-09-17 NOTE — PLAN OF CARE
"Goal Outcome Evaluation:             Pt A&O, one assist for mobility with gait belt, walker, denied pain, on 2000 ml fluids restriction, on no caffeine, low saturated fat diet, less than 2400 sodium. Pt has redness on her bottom, mepilex in place, skin check was done with 2 nurses Evelyn MOORE, Pt. Refused to get up, pure-wick in place.    /76 (BP Location: Right arm)   Pulse 67   Temp 97.4  F (36.3  C) (Rectal)   Resp 16   Ht 1.524 m (5')   Wt 52.2 kg (115 lb)   SpO2 99%   BMI 22.46 kg/m     Problem: Adult Inpatient Plan of Care  Goal: Plan of Care Review  Description: The Plan of Care Review/Shift note should be completed every shift.  The Outcome Evaluation is a brief statement about your assessment that the patient is improving, declining, or no change.  This information will be displayed automatically on your shift  note.  Outcome: Not Progressing  Flowsheets (Taken 9/17/2024 1254)  Outcome Evaluation: Pt A&O, just got on Flour from ED, from [facility],  Goal: Patient-Specific Goal (Individualized)  Description: You can add care plan individualizations to a care plan. Examples of Individualization might be:  \"Parent requests to be called daily at 9am for status\", \"I have a hard time hearing out of my right ear\", or \"Do not touch me to wake me up as it startles  me\".  Outcome: Not Progressing  Goal: Absence of Hospital-Acquired Illness or Injury  Outcome: Not Progressing  Intervention: Identify and Manage Fall Risk  Recent Flowsheet Documentation  Taken 9/17/2024 1244 by Evelyn Garsia RN  Safety Promotion/Fall Prevention: safety round/check completed  Intervention: Prevent Skin Injury  Recent Flowsheet Documentation  Taken 9/17/2024 1244 by Evelyn Garsia RN  Body Position: weight shifting  Intervention: Prevent and Manage VTE (Venous Thromboembolism) Risk  Recent Flowsheet Documentation  Taken 9/17/2024 1244 by Evelyn Garsia RN  VTE Prevention/Management: SCDs off " (sequential compression devices)  Intervention: Prevent Infection  Recent Flowsheet Documentation  Taken 9/17/2024 1244 by Evelyn Garsia RN  Infection Prevention:   single patient room provided   rest/sleep promoted   personal protective equipment utilized  Goal: Optimal Comfort and Wellbeing  Outcome: Not Progressing  Goal: Readiness for Transition of Care  Outcome: Not Progressing  Flowsheets (Taken 9/17/2024 1254)  Anticipated Changes Related to Illness: inability to care for self  Transportation Anticipated: family or friend will provide  Concerns to be Addressed:   decision making   home safety   medication  Barriers to Discharge: Edema in BLE  Intervention: Mutually Develop Transition Plan  Recent Flowsheet Documentation  Taken 9/17/2024 1254 by Evelyn Garsia RN  Anticipated Changes Related to Illness: inability to care for self  Transportation Anticipated: family or friend will provide  Concerns to be Addressed:   decision making   home safety   medication     Problem: Comorbidity Management  Goal: Blood Pressure in Desired Range  Outcome: Not Progressing  Intervention: Maintain Blood Pressure Management  Recent Flowsheet Documentation  Taken 9/17/2024 1244 by Evelyn Garsia RN  Medication Review/Management: medications reviewed  Goal: Maintenance of Heart Failure Symptom Control  Outcome: Not Progressing  Intervention: Maintain Heart Failure Management  Recent Flowsheet Documentation  Taken 9/17/2024 1244 by Evelyn Garsia RN  Medication Review/Management: medications reviewed     Problem: Fall Injury Risk  Goal: Absence of Fall and Fall-Related Injury  Outcome: Not Progressing  Intervention: Identify and Manage Contributors  Recent Flowsheet Documentation  Taken 9/17/2024 1244 by Evelyn Garsia RN  Medication Review/Management: medications reviewed  Intervention: Promote Injury-Free Environment  Recent Flowsheet Documentation  Taken 9/17/2024 1244 by Evelyn Garsia RN  Safety  Promotion/Fall Prevention: safety round/check completed     Problem: Oral Intake Inadequate  Goal: Improved Oral Intake  Outcome: Not Progressing       Outcome Evaluation: Pt A&O, just got on Flour from ED, from [facility],

## 2024-09-17 NOTE — PLAN OF CARE
[facility]    ED Boarding Nurse Handoff Addendum Report:    Date/time: 9/17/2024, 1:18 PM    Activity Level: assist of 1, pivot gait belt and walker to commode     Fall Risk: Yes:  nonskid shoes/slippers when out of bed, arm band in place, patient and family education, assistive device/personal items within reach, and activity supervised    Active Infusions: none    Current Meds Due: none    Current care needs: n/a    Oxygen requirements (liters/min and/or FiO2): none    Respiratory status: Room air    Vital signs (within last 30 minutes):    Vitals:    09/17/24 0644 09/17/24 0828 09/17/24 1102 09/17/24 1244   BP: 128/74 127/74 120/83 130/76   BP Location: Left arm Left arm Right arm Right arm   Patient Position:   Semi-Figueroa's    Cuff Size:   Adult Regular    Pulse: 75  75 67   Resp: 18 18 18 16   Temp: 97.5  F (36.4  C) 97.5  F (36.4  C) 97.3  F (36.3  C) 97.4  F (36.3  C)   TempSrc: Oral Oral Axillary Rectal   SpO2: 95%  96% 99%   Weight:       Height:           Focused assessment within last 30 minutes:    Pt a/o x4. C/o intermittent headache, tylenol given. Assist of 1 up to commode, pt voiding, scant BM. New purwick placed.    ED Boarding Nurse name: Sherrie Garcia RN      Plan of Care Reviewed With: patient    Overall Patient Progress: improvingOverall Patient Progress: improving    Outcome Evaluation: Pt a/o x4. C/o intermittent headache, tylenol given. Assist of 1 up to commode, pt voiding, scant BM. New purwick placed.      Problem: Adult Inpatient Plan of Care  Goal: Plan of Care Review  Description: The Plan of Care Review/Shift note should be completed every shift.  The Outcome Evaluation is a brief statement about your assessment that the patient is improving, declining, or no change.  This information will be displayed automatically on your shift  note.  Outcome: Progressing  Flowsheets (Taken 9/17/2024 1317)  Outcome Evaluation: Pt a/o x4. C/o intermittent headache,  "tylenol given. Assist of 1 up to commode, pt voiding, scant BM. New purwick placed.  Plan of Care Reviewed With: patient  Overall Patient Progress: improving  Goal: Patient-Specific Goal (Individualized)  Description: You can add care plan individualizations to a care plan. Examples of Individualization might be:  \"Parent requests to be called daily at 9am for status\", \"I have a hard time hearing out of my right ear\", or \"Do not touch me to wake me up as it startles  me\".  Outcome: Progressing  Goal: Absence of Hospital-Acquired Illness or Injury  Outcome: Progressing  Intervention: Identify and Manage Fall Risk  Recent Flowsheet Documentation  Taken 9/17/2024 1110 by Sherrie Garcia RN  Safety Promotion/Fall Prevention:   clutter free environment maintained   increased rounding and observation   activity supervised   nonskid shoes/slippers when out of bed   safety round/check completed   supervised activity   toileting scheduled   room organization consistent  Intervention: Prevent and Manage VTE (Venous Thromboembolism) Risk  Recent Flowsheet Documentation  Taken 9/17/2024 1110 by Sherrie Garcia RN  VTE Prevention/Management: SCDs off (sequential compression devices)  Intervention: Prevent Infection  Recent Flowsheet Documentation  Taken 9/17/2024 1110 by Sherrie Garcia RN  Infection Prevention:   equipment surfaces disinfected   hand hygiene promoted   personal protective equipment utilized   rest/sleep promoted   single patient room provided  Goal: Optimal Comfort and Wellbeing  Outcome: Progressing  Goal: Readiness for Transition of Care  Outcome: Progressing     Problem: Comorbidity Management  Goal: Blood Pressure in Desired Range  Outcome: Progressing  Intervention: Maintain Blood Pressure Management  Recent Flowsheet Documentation  Taken 9/17/2024 1110 by Sherrie aGrcia RN  Medication Review/Management: medications reviewed  Goal: Maintenance of Heart Failure Symptom Control  Outcome: " Progressing  Intervention: Maintain Heart Failure Management  Recent Flowsheet Documentation  Taken 9/17/2024 1110 by Sherrie Garcia RN  Medication Review/Management: medications reviewed     Problem: Fall Injury Risk  Goal: Absence of Fall and Fall-Related Injury  Outcome: Progressing  Intervention: Identify and Manage Contributors  Recent Flowsheet Documentation  Taken 9/17/2024 1110 by Sherrie Garcia RN  Medication Review/Management: medications reviewed  Intervention: Promote Injury-Free Environment  Recent Flowsheet Documentation  Taken 9/17/2024 1110 by Sherrie Garcia RN  Safety Promotion/Fall Prevention:   clutter free environment maintained   increased rounding and observation   activity supervised   nonskid shoes/slippers when out of bed   safety round/check completed   supervised activity   toileting scheduled   room organization consistent     Problem: Oral Intake Inadequate  Goal: Improved Oral Intake  Outcome: Progressing      Admission

## 2024-09-17 NOTE — ED NOTES
[facility]  ED Nurse Handoff Report    ED Chief complaint: Leg Swelling  . ED Diagnosis:   Final diagnoses:   Acute on chronic systolic congestive heart failure (H)   Leg swelling   Contusion of right foot, initial encounter       Allergies:   Allergies   Allergen Reactions    Brimonidine Other (See Comments)     Follicular conjunctivitis    Dorzolamide Other (See Comments)     Follicular conjunctivitis    Benzalkonium Chloride Other (See Comments)     Eye irritation.  Does better with preservative free but can tolerate preserved drops    Fluorescein Itching     fluress    Penicillins     Sulfa Antibiotics Other (See Comments)    Codeine Unknown and Rash    Fd&C Yellow #5 (Tartrazine) Rash       Code Status: DNR / DNI    Activity level - Baseline/Home:  walker.  Activity Level - Current:   assist of 1 and walker.   Lift room needed: No.   Bariatric: No   Needed: No   Isolation: Yes.   Infection: Not Applicable  ESBL.     Respiratory status: Room air    Vital Signs (within 30 minutes):   Vitals:    09/16/24 1607 09/16/24 2258   BP: 113/70 130/83   Pulse: 64 77   Resp: 18    Temp: 98.9  F (37.2  C)    TempSrc: Temporal    SpO2: 99% 98%   Weight: 52.2 kg (115 lb)    Height: 1.524 m (5')        Cardiac Rhythm:  ,      Pain level:    Patient confused: No.   Patient Falls Risk: nonskid shoes/slippers when out of bed, arm band in place, patient and family education, assistive device/personal items within reach, and activity supervised.   Elimination Status: Has voided     Patient Report - Initial Complaint: leg swelling.   Focused Assessment: 89 year old female with a history as noted below who presents to the emergency department for leg swelling. The patient's son-in-law states that the patient has been experiencing ongoing bilateral ankle swelling and reports that at the patient's living facility, a nurse reported that her right foot was discolored today prompting presentation. The  patient notes that she is experiencing mild pain in this right foot. She has been wearing compression socks with no relief of her leg swelling. She adds that she has been eating more salt. Her son-in-law reports that she fell a few weeks ago and had a sacral fracture as well as striking her head. Denies medication changes and notes that she is still taking her Lasix. Denies fever or chills. Denies chest pain or dyspnea. Denies taking blood thinners. She notes that she sometimes ambulates with her walker but also uses a wheelchair.  Patient denies any known trauma to her foot though states quite possible she has bumped it on something.      Abnormal Results:   Labs Ordered and Resulted from Time of ED Arrival to Time of ED Departure   BASIC METABOLIC PANEL - Abnormal       Result Value    Sodium 131 (*)     Potassium 4.6      Chloride 98      Carbon Dioxide (CO2) 22      Anion Gap 11      Urea Nitrogen 24.9 (*)     Creatinine 1.09 (*)     GFR Estimate 48 (*)     Calcium 9.1      Glucose 121 (*)    CBC WITH PLATELETS AND DIFFERENTIAL - Abnormal    WBC Count 8.7      RBC Count 4.03      Hemoglobin 12.9      Hematocrit 41.1       (*)     MCH 32.0      MCHC 31.4 (*)     RDW 17.1 (*)     Platelet Count 310      % Neutrophils 73      % Lymphocytes 16      % Monocytes 8      % Eosinophils 3      % Basophils 1      % Immature Granulocytes 1      NRBCs per 100 WBC 0      Absolute Neutrophils 6.4      Absolute Lymphocytes 1.4      Absolute Monocytes 0.7      Absolute Eosinophils 0.2      Absolute Basophils 0.0      Absolute Immature Granulocytes 0.1      Absolute NRBCs 0.0     NT PROBNP INPATIENT - Abnormal    N terminal Pro BNP Inpatient 17,202 (*)    HEPATIC FUNCTION PANEL - Normal    Protein Total 7.2      Albumin 3.6      Bilirubin Total 0.7      Alkaline Phosphatase 124      AST 32      ALT 32      Bilirubin Direct 0.25          Foot  XR, G/E 3 views, right   Final Result   IMPRESSION: Normal joint spaces and  alignment. No acute fracture or dislocation. Mild hallux valgus.      XR Chest 2 Views   Final Result   IMPRESSION: Stable cardiomegaly. Interstitial prominence suggestive of mild edema. Small bilateral pleural effusions and mild bibasilar atelectasis. No pneumothorax. Chronic fracture deformity of the proximal left humerus.          Treatments provided: see MAR  Family Comments: N/A  OBS brochure/video discussed/provided to patient:  Yes  ED Medications:   Medications   BUPivacaine (PF) (MARCAINE) 0.5 % injection (has no administration in time range)   acetaminophen (TYLENOL) tablet 1,000 mg (1,000 mg Oral $Given 9/16/24 6624)   furosemide (LASIX) injection 40 mg (40 mg Intravenous $Given 9/17/24 0047)       Drips infusing:  No  For the majority of the shift this patient was Green.   Interventions performed were N/A.    Sepsis treatment initiated: No    Cares/treatment/interventions/medications to be completed following ED care: see notes    ED Nurse Name: Treva Lovelace RN  1:02 AM  RECEIVING UNIT ED HANDOFF REVIEW    Above ED Nurse Handoff Report was reviewed: Yes  Reviewed by: Evelyn Garsia RN on September 17, 2024 at 11:40 AM   I Federico called the ED to inform them the note was read: Yes

## 2024-09-17 NOTE — ED PROVIDER NOTES
Emergency Department Note      History of Present Illness     Chief Complaint   Leg Swelling    HPI   Maya Cullen is a 89 year old female with a history as noted below who presents to the emergency department for leg swelling. The patient's son-in-law states that the patient has been experiencing ongoing bilateral ankle swelling and reports that at the patient's living facility, a nurse reported that her right foot was discolored today prompting presentation. The patient notes that she is experiencing mild pain in this right foot. She has been wearing compression socks with no relief of her leg swelling. She adds that she has been eating more salt. Her son-in-law reports that she fell a few weeks ago and had a sacral fracture as well as striking her head. Denies medication changes and notes that she is still taking her Lasix. Denies fever or chills. Denies chest pain or dyspnea. Denies taking blood thinners. She notes that she sometimes ambulates with her walker but also uses a wheelchair.  Patient denies any known trauma to her foot though states quite possible she has bumped it on something.     Independent Historian   Son-in-law as detailed above.    Review of External Notes   Echo from 06/16/24. Impression below:    Interpretation Summary  The visual ejection fraction is 20-25%.  Left ventricular systolic function is severely reduced.  The right ventricular systolic function is severely reduced.  There is moderate (2+) mitral regurgitation.  There is mild to moderate (1-2+) tricuspid regurgitation.  Right ventricular systolic pressure is elevated, consistent with moderate to  severe pulmonary hypertension.  The overall left ventricular systolic function appears to be worse on the  study. The degree of tricuspid regurgitation appears to be similar but the  degree of mitral regurgitation appears less  Past Medical History     Medical History and Problem List   Cerebellar  infarct  Glaucoma  Hypertension  Vertebral fracture  Pulmonary hypertension  Mitral regurgitation  CHF  Trigeminal neuralgia.    Medications   carBAMazepine (TEGRETOL) 100 MG chewable tablet  furosemide (LASIX) 40 MG tablet  lisinopril (ZESTRIL) 5 MG tablet  metoprolol succinate ER (TOPROL XL) 25 MG 24 hr tablet  ondansetron (ZOFRAN ODT) 4 MG ODT tab    Surgical History   Eye surgery  Appendectomy  Tonsillectomy  TERESA and BSO  Hernia repair  Cataract removal  Trabeculectomy    Physical Exam     Patient Vitals for the past 24 hrs:   BP Temp Temp src Pulse Resp SpO2 Height Weight   09/16/24 2258 130/83 -- -- 77 -- 98 % -- --   09/16/24 1607 113/70 98.9  F (37.2  C) Temporal 64 18 99 % 1.524 m (5') 52.2 kg (115 lb)     Physical Exam  Nursing note and vitals reviewed.  Constitutional: Well nourished.   Eyes: Conjunctiva normal.  Pupils are equal, round, and reactive to light.   ENT: Nose normal. Mucous membranes pink and moist.    Neck: Normal range of motion.  CVS: Normal rate, regular rhythm.  Normal heart sounds.    Pulmonary: Lungs clear to auscultation bilaterally. No wheezes/rales/rhonchi.  GI: Abdomen soft. Nontender, nondistended. No rigidity or guarding.    MSK: No calf tenderness, +2 LE swelling.   R.  Foot Exam:  Palpation: TTP over the dorsal foot, ecchymosis of various staging noted, TTP  Strength:  Normal painless righgt hip and knee flex/ext. No pain with right ankle ROM actively and passively. Able to flex/extend all toes  Sensation: Intact to light touch distally all toes  Cap refill all toes:   < 2 seconds distally.   PT/DP Pulses  Normal left foot/ankle  Neuro: Alert. Follows simple commands.  Skin: Skin is warm and dry. No rash noted.   Psychiatric: Normal affect.       Diagnostics     Lab Results   Labs Ordered and Resulted from Time of ED Arrival to Time of ED Departure   BASIC METABOLIC PANEL - Abnormal       Result Value    Sodium 131 (*)     Potassium 4.6      Chloride 98      Carbon Dioxide  (CO2) 22      Anion Gap 11      Urea Nitrogen 24.9 (*)     Creatinine 1.09 (*)     GFR Estimate 48 (*)     Calcium 9.1      Glucose 121 (*)    CBC WITH PLATELETS AND DIFFERENTIAL - Abnormal    WBC Count 8.7      RBC Count 4.03      Hemoglobin 12.9      Hematocrit 41.1       (*)     MCH 32.0      MCHC 31.4 (*)     RDW 17.1 (*)     Platelet Count 310      % Neutrophils 73      % Lymphocytes 16      % Monocytes 8      % Eosinophils 3      % Basophils 1      % Immature Granulocytes 1      NRBCs per 100 WBC 0      Absolute Neutrophils 6.4      Absolute Lymphocytes 1.4      Absolute Monocytes 0.7      Absolute Eosinophils 0.2      Absolute Basophils 0.0      Absolute Immature Granulocytes 0.1      Absolute NRBCs 0.0     NT PROBNP INPATIENT - Abnormal    N terminal Pro BNP Inpatient 17,202 (*)    HEPATIC FUNCTION PANEL     Imaging   Foot  XR, G/E 3 views, right   Final Result   IMPRESSION: Normal joint spaces and alignment. No acute fracture or dislocation. Mild hallux valgus.      XR Chest 2 Views   Final Result   IMPRESSION: Stable cardiomegaly. Interstitial prominence suggestive of mild edema. Small bilateral pleural effusions and mild bibasilar atelectasis. No pneumothorax. Chronic fracture deformity of the proximal left humerus.        EKG   ECG results from 09/16/24   EKG 12 lead     Value    Systolic Blood Pressure     Diastolic Blood Pressure     Ventricular Rate 76    Atrial Rate 76    OH Interval 156    QRS Duration 116        QTc 463    P Axis 65    R AXIS -13    T Axis 67    Interpretation ECG      Sinus rhythm  Inferior infarct , age undetermined  Abnormal ECG  When compared with ECG of 07-Sep-2024 11:39,  No significant change was found       Independent Interpretation   CXR shows mild edema and small bilateral pleural effusions, no infiltrate or pneumothorax    ED Course      Medications Administered   Medications   furosemide (LASIX) injection 40 mg (has no administration in time range)    acetaminophen (TYLENOL) tablet 1,000 mg (1,000 mg Oral $Given 9/16/24 5214)     Discussion of Management   Admitting Hospitalist, Dr. Beckham    ED Course   ED Course as of 09/16/24 2849   Mon Sep 16, 2024   1059 I obtained history and examined the patient as noted above.      2357 I spoke with admitting hospitalist, Dr. Beckham, regarding the patient. She accepts patient for admission.       Additional Documentation  None    Medical Decision Making / Diagnosis     CMS Diagnoses: None    MIPS   None    MDM   Maya Cullen is a 89 year old female presenting with predominant complaints of leg swelling and right foot discoloration.  On exam she is nontoxic, in no significant distress.  She unfortunately had a prolonged wait time secondary to large patient volumes.  On exam she does appear fluid overloaded though is not requiring supplemental oxygen at this point in time.  EKG without ischemic changes.  She denies any active chest pain.  BNP quite elevated and chest x-ray supports fluid overload.  She was initiated on IV diuresis during her time in the ED.  Regarding her foot complaint, she is neurovascularly intact though does appear to have a contusion on exam.  Fortunately no focal fracture or dislocation identified.  The remainder of her trauma exam is negative.  Patient is to benefit from hospitalization for continued IV diuresis, she is to be placed on med telemetry floor.    Disposition   The patient was admitted to the hospital.     Diagnosis     ICD-10-CM    1. Acute on chronic systolic congestive heart failure (H)  I50.23       2. Leg swelling  M79.89       3. Contusion of right foot, initial encounter  S90.31XA          Scribe Disclosure:  I, Morgan Santos, am serving as a scribe at 11:37 PM on 9/16/2024 to document services personally performed by Linda Rdz DO based on my observations and the provider's statements to me.        Linda Rdz DO  09/17/24 0004

## 2024-09-17 NOTE — PLAN OF CARE
[facility]    ED Boarding Nurse Handoff Addendum Report:    Date/time: 9/17/2024, 5:43 AM    Activity Level:  not oob yet    Fall Risk: Yes:  nonskid shoes/slippers when out of bed, arm band in place, patient and family education, activity supervised, and call bell within reach, bed in lowest position    Active Infusions: N/A    Current Meds Due: See MAR    Current care needs: IV diuresis    Oxygen requirements (liters/min and/or FiO2): N/A    Respiratory status: Room air    Vital signs (within last 30 minutes):    Vitals:    09/16/24 2300 09/17/24 0100 09/17/24 0247 09/17/24 0644   BP: 130/83 122/75 127/71 128/74   BP Location:   Left arm Left arm   Patient Position:   Semi-Figueroa's    Cuff Size:   Adult Regular    Pulse: 77 70 69 75   Resp:   18 18   Temp:    97.5  F (36.4  C)   TempSrc:   Temporal Oral   SpO2: 98% 98% 99% 95%   Weight:       Height:           Focused assessment within last 30 minutes:    Pt is alert and oriented. Pt denies any pain or shortness of breath and on room air.    Tele: SR.    External catheter patent. Ongoing monitoring.    Plan: OT consult.    /74 (BP Location: Left arm)   Pulse 75   Temp 97.5  F (36.4  C) (Oral)   Resp 18   Ht 1.524 m (5')   Wt 52.2 kg (115 lb)   SpO2 95%   BMI 22.46 kg/m         ED Boarding Nurse name: Julian Florence RN

## 2024-09-18 ENCOUNTER — PATIENT OUTREACH (OUTPATIENT)
Dept: CARDIOLOGY | Facility: CLINIC | Age: 89
End: 2024-09-18
Payer: MEDICARE

## 2024-09-18 VITALS
BODY MASS INDEX: 21.73 KG/M2 | TEMPERATURE: 98 F | DIASTOLIC BLOOD PRESSURE: 73 MMHG | RESPIRATION RATE: 16 BRPM | SYSTOLIC BLOOD PRESSURE: 108 MMHG | HEART RATE: 84 BPM | OXYGEN SATURATION: 95 % | HEIGHT: 60 IN | WEIGHT: 110.7 LBS

## 2024-09-18 DIAGNOSIS — I50.22 CHRONIC HFREF (HEART FAILURE WITH REDUCED EJECTION FRACTION) (H): Primary | ICD-10-CM

## 2024-09-18 LAB
ANION GAP SERPL CALCULATED.3IONS-SCNC: 14 MMOL/L (ref 7–15)
BUN SERPL-MCNC: 18.1 MG/DL (ref 8–23)
CALCIUM SERPL-MCNC: 9 MG/DL (ref 8.8–10.4)
CHLORIDE SERPL-SCNC: 96 MMOL/L (ref 98–107)
CREAT SERPL-MCNC: 0.9 MG/DL (ref 0.51–0.95)
EGFRCR SERPLBLD CKD-EPI 2021: 60 ML/MIN/1.73M2
GLUCOSE SERPL-MCNC: 114 MG/DL (ref 70–99)
HCO3 SERPL-SCNC: 25 MMOL/L (ref 22–29)
MAGNESIUM SERPL-MCNC: 1.9 MG/DL (ref 1.7–2.3)
POTASSIUM SERPL-SCNC: 4.1 MMOL/L (ref 3.4–5.3)
SODIUM SERPL-SCNC: 135 MMOL/L (ref 135–145)

## 2024-09-18 PROCEDURE — 80048 BASIC METABOLIC PNL TOTAL CA: CPT | Performed by: INTERNAL MEDICINE

## 2024-09-18 PROCEDURE — 250N000013 HC RX MED GY IP 250 OP 250 PS 637: Performed by: INTERNAL MEDICINE

## 2024-09-18 PROCEDURE — 36415 COLL VENOUS BLD VENIPUNCTURE: CPT | Performed by: INTERNAL MEDICINE

## 2024-09-18 PROCEDURE — 250N000011 HC RX IP 250 OP 636: Performed by: INTERNAL MEDICINE

## 2024-09-18 PROCEDURE — 83735 ASSAY OF MAGNESIUM: CPT | Performed by: INTERNAL MEDICINE

## 2024-09-18 PROCEDURE — 99239 HOSP IP/OBS DSCHRG MGMT >30: CPT | Performed by: HOSPITALIST

## 2024-09-18 RX ORDER — NITROFURANTOIN 25; 75 MG/1; MG/1
100 CAPSULE ORAL 2 TIMES DAILY
COMMUNITY
Start: 2024-09-18 | End: 2024-10-04

## 2024-09-18 RX ORDER — NITROFURANTOIN 25; 75 MG/1; MG/1
100 CAPSULE ORAL 2 TIMES DAILY
Status: DISCONTINUED | OUTPATIENT
Start: 2024-09-18 | End: 2024-09-18 | Stop reason: HOSPADM

## 2024-09-18 RX ORDER — FUROSEMIDE 40 MG
40 TABLET ORAL DAILY
COMMUNITY
Start: 2024-09-23

## 2024-09-18 RX ORDER — FUROSEMIDE 20 MG
40 TABLET ORAL 2 TIMES DAILY
Qty: 20 TABLET | Refills: 0 | Status: SHIPPED | OUTPATIENT
Start: 2024-09-18 | End: 2024-10-04

## 2024-09-18 RX ORDER — FUROSEMIDE 40 MG
TABLET ORAL
COMMUNITY
Start: 2024-09-18 | End: 2024-09-18

## 2024-09-18 RX ADMIN — TIMOLOL MALEATE 1 DROP: 5 SOLUTION/ DROPS OPHTHALMIC at 07:57

## 2024-09-18 RX ADMIN — CARBAMAZEPINE 200 MG: 100 TABLET, CHEWABLE ORAL at 07:57

## 2024-09-18 RX ADMIN — LISINOPRIL 2.5 MG: 2.5 TABLET ORAL at 07:56

## 2024-09-18 RX ADMIN — FUROSEMIDE 20 MG: 10 INJECTION, SOLUTION INTRAMUSCULAR; INTRAVENOUS at 07:56

## 2024-09-18 RX ADMIN — ACETAMINOPHEN 325MG 975 MG: 325 TABLET ORAL at 07:55

## 2024-09-18 RX ADMIN — METOPROLOL SUCCINATE 12.5 MG: 25 TABLET, EXTENDED RELEASE ORAL at 07:55

## 2024-09-18 RX ADMIN — NITROFURANTOIN MONOHYDRATE/MACROCRYSTALS 100 MG: 75; 25 CAPSULE ORAL at 10:17

## 2024-09-18 RX ADMIN — POTASSIUM CHLORIDE 20 MEQ: 1500 TABLET, EXTENDED RELEASE ORAL at 07:56

## 2024-09-18 ASSESSMENT — ACTIVITIES OF DAILY LIVING (ADL)
DEPENDENT_IADLS:: LAUNDRY;COOKING;CLEANING;SHOPPING;MEAL PREPARATION;MEDICATION MANAGEMENT;TRANSPORTATION
ADLS_ACUITY_SCORE: 40
ADLS_ACUITY_SCORE: 46
ADLS_ACUITY_SCORE: 45
ADLS_ACUITY_SCORE: 47
ADLS_ACUITY_SCORE: 40
ADLS_ACUITY_SCORE: 41
ADLS_ACUITY_SCORE: 46
ADLS_ACUITY_SCORE: 40
ADLS_ACUITY_SCORE: 40

## 2024-09-18 NOTE — PLAN OF CARE
"Goal Outcome Evaluation:    INPATIENT NOTE: [id number]      Plan of Care Reviewed With: patient    Overall Patient Progress: improvingOverall Patient Progress: improving       Patient alert and oriented x4. Denies pain/SOB/nausea/dizziness. Tele on, sinus rhythm 60s. Combination diet no caffiene, Na >2400mg diet. Patient ambulated to the bathroom back to bed with assist+1, gait belt, and walker. BLE edema, legs elevated on pillow. Contact precaution for ESBL. Patient is on strict input & output, 2000mls fluid restriction. Peripheral IV saline locked. Expected discharge to LTC facility today.    Problem: Adult Inpatient Plan of Care  Goal: Plan of Care Review  Description: The Plan of Care Review/Shift note should be completed every shift.  The Outcome Evaluation is a brief statement about your assessment that the patient is improving, declining, or no change.  This information will be displayed automatically on your shift  note.  9/18/2024 0432 by Yunier Ortiz RN  Outcome: Progressing  9/18/2024 0432 by Yunier Ortiz RN  Outcome: Progressing  Flowsheets (Taken 9/18/2024 0432)  Plan of Care Reviewed With: patient  Overall Patient Progress: improving  9/18/2024 0431 by Yunier Ortiz RN  Outcome: Progressing  Goal: Patient-Specific Goal (Individualized)  Description: You can add care plan individualizations to a care plan. Examples of Individualization might be:  \"Parent requests to be called daily at 9am for status\", \"I have a hard time hearing out of my right ear\", or \"Do not touch me to wake me up as it startles  me\".  9/18/2024 0432 by Yunier Ortiz RN  Outcome: Progressing  9/18/2024 0432 by Yunier Ortiz RN  Outcome: Progressing  9/18/2024 0431 by Yunier Ortiz RN  Outcome: Progressing  Goal: Absence of Hospital-Acquired Illness or Injury  9/18/2024 0432 by Yunier Ortiz RN  Outcome: Progressing  9/18/2024 0432 by Yunier Ortiz RN  Outcome: " Progressing  9/18/2024 0431 by Yunier Ortiz RN  Outcome: Progressing  Intervention: Identify and Manage Fall Risk  Recent Flowsheet Documentation  Taken 9/17/2024 2330 by Yunier Ortiz RN  Safety Promotion/Fall Prevention:   activity supervised   clutter free environment maintained   nonskid shoes/slippers when out of bed   safety round/check completed  Intervention: Prevent Skin Injury  Recent Flowsheet Documentation  Taken 9/17/2024 2330 by Yunier Ortiz RN  Body Position: position changed independently  Device Skin Pressure Protection: absorbent pad utilized/changed  Intervention: Prevent and Manage VTE (Venous Thromboembolism) Risk  Recent Flowsheet Documentation  Taken 9/17/2024 2330 by Yunier Ortiz RN  VTE Prevention/Management: SCDs off (sequential compression devices)  Intervention: Prevent Infection  Recent Flowsheet Documentation  Taken 9/17/2024 2330 by Yunier Ortiz RN  Infection Prevention: hand hygiene promoted  Goal: Optimal Comfort and Wellbeing  9/18/2024 0432 by Yunier Ortiz RN  Outcome: Progressing  9/18/2024 0432 by Yunier Ortiz RN  Outcome: Progressing  9/18/2024 0431 by Yunier Ortiz RN  Outcome: Progressing  Goal: Readiness for Transition of Care  9/18/2024 0432 by Yunier Ortiz RN  Outcome: Progressing  9/18/2024 0432 by Yunier Ortiz RN  Outcome: Progressing  9/18/2024 0431 by Yunier Ortiz RN  Outcome: Progressing     Problem: Comorbidity Management  Goal: Blood Pressure in Desired Range  9/18/2024 0432 by Yunier Ortiz RN  Outcome: Progressing  9/18/2024 0432 by Yunier Ortiz RN  Outcome: Progressing  9/18/2024 0431 by Yunier Ortiz RN  Outcome: Progressing  Intervention: Maintain Blood Pressure Management  Recent Flowsheet Documentation  Taken 9/17/2024 2330 by Yunier Ortiz RN  Medication Review/Management: medications reviewed  Goal: Maintenance of Heart Failure Symptom  Control  9/18/2024 0432 by Yunier Ortiz RN  Outcome: Progressing  9/18/2024 0432 by Yunier Ortiz RN  Outcome: Progressing  9/18/2024 0431 by Yuneir Ortiz RN  Outcome: Progressing  Intervention: Maintain Heart Failure Management  Recent Flowsheet Documentation  Taken 9/17/2024 2330 by Yunier Ortiz RN  Medication Review/Management: medications reviewed     Problem: Fall Injury Risk  Goal: Absence of Fall and Fall-Related Injury  9/18/2024 0432 by Yunier Ortiz RN  Outcome: Progressing  9/18/2024 0432 by Yunier Ortiz RN  Outcome: Progressing  9/18/2024 0431 by Yunier Ortiz RN  Outcome: Progressing  Intervention: Identify and Manage Contributors  Recent Flowsheet Documentation  Taken 9/17/2024 2330 by Yunier Ortiz RN  Medication Review/Management: medications reviewed  Intervention: Promote Injury-Free Environment  Recent Flowsheet Documentation  Taken 9/17/2024 2330 by Yunier Ortiz RN  Safety Promotion/Fall Prevention:   activity supervised   clutter free environment maintained   nonskid shoes/slippers when out of bed   safety round/check completed     Problem: Oral Intake Inadequate  Goal: Improved Oral Intake  9/18/2024 0432 by Yunier Ortiz RN  Outcome: Progressing  9/18/2024 0432 by Yunier Ortiz RN  Outcome: Progressing  9/18/2024 0431 by Yunier Ortiz RN  Outcome: Progressing

## 2024-09-18 NOTE — CONSULTS
"CLINICAL NUTRITION SERVICES  -  ASSESSMENT NOTE      Recommendations Ordered by Registered Dietitian (RD): ***   MALNUTRITION:  % Weight Loss:  > 7.5% in 3 months (severe malnutrition)  % Intake:  {:834601}  Subcutaneous Fat Loss:  {:242562}  Muscle Loss:  {:609409}  Fluid Retention:  {:623363}    Malnutrition Diagnosis: {:633003}  In Context of:  {In Context of:080679}        REASON FOR ASSESSMENT  Maya Cullen is a 89 year old female seen by Registered Dietitian for Admission Nutrition Risk Screen for positive MST of 2 - weight loss and low intake d/t decreased appetite.     PMH: systolic CHF, severe mitral regurgitation, pulmonary hypertension, LVH, HTN, glaucoma, trigeminal neuralgia, impaired glucose tolerance, mild cognitive impairment, and falls     Per EMR, pt presented to ED on 9/16/2024 with worsening leg swelling and foot pain. Evaluation in the ED suggestive of acute on chronic systolic heart failure chest x-ray suggestive of mild pulmonary edema. Admitted for IV diuresis.      NUTRITION HISTORY  {Nutrition History:277747}  ***    RD will follow ***    CURRENT NUTRITION ORDERS  Diet Order:     Combination Diet  No caffeine   Low Saturated Fat  Na <2400 mg   2000 mL fluid restriction    Current Intake/Tolerance:  Documentation shows % intake  Per [facility] meal ordering system, pt ordered three meals on 9/17 and breakfast this morning - all meals appear well portioned so far, though exact intake is unknown       NUTRITION FOCUSED PHYSICAL ASSESSMENT FOR DIAGNOSING MALNUTRITION)  Yes  No:  {NFPA_no:423143}                Observed:    {Observed:452439}    Obtained from Chart/Interdisciplinary Team:  Mild edema in L&R ankles and feet  No current documentation of ascites, non healing wounds, or pressure injuries      ANTHROPOMETRICS  Height: 5' 0\"  Weight: 110 lbs 11.2 oz  Body mass index is 21.62 kg/m .  Weight Status:  Normal BMI  Weight History:   Wt Readings from Last 10 Encounters: "   09/18/24 50.2 kg (110 lb 11.2 oz)   09/07/24 52.2 kg (115 lb)   08/06/24 52.6 kg (116 lb)   08/05/24 52.9 kg (116 lb 9.6 oz)   07/11/24 56.7 kg (125 lb)   07/09/24 56.7 kg (125 lb)   07/09/24 56.7 kg (125 lb)   07/03/24 62.1 kg (137 lb)   07/01/24 57.4 kg (126 lb 9.6 oz)   06/24/24 58.1 kg (128 lb)   Weight change of -8.88% BW x 3 months (weights used from 6/24/24 and 9/18/24)     LABS  Labs reviewed    MEDICATIONS  Medications reviewed      ASSESSED NUTRITION NEEDS PER APPROVED PRACTICE GUIDELINES:    Dosing Weight ***  Estimated Energy Needs: *** kcals ({:745529})  Justification: {:061239}  Estimated Protein Needs: *** grams protein ({:462793})  Justification: {:989710}  Estimated Fluid Needs: ***  mL ({:725760})  Justification: {:963385}      NUTRITION DIAGNOSIS:  {:543695} related to *** as evidenced by ***      NUTRITION INTERVENTIONS  Recommendations / Nutrition Prescription  ***  .      Implementation  Nutrition education: {:757774}  {Interventions:068460}  .      Nutrition Goals  ***  >75% PO of all meals and snacks    PO intake sufficient for calorie, protein, and micronutrient goals.  .      MONITORING AND EVALUATION:  {:792279}            Tawnya Schilling RD, LD  Clinical Dietitian  3rd Floor/ICU: [phone]  All Other Floors: [phone]  Weekends/Holiday: [phone]  Office: [phone]

## 2024-09-18 NOTE — PLAN OF CARE
"Goal Outcome Evaluation:      Plan of Care Reviewed With: patient      Pt alert &o, one assist for mobility with walker, gait belt, ate 75% of her meal, voided adequately  Problem: Adult Inpatient Plan of Care  Goal: Plan of Care Review  Description: The Plan of Care Review/Shift note should be completed every shift.  The Outcome Evaluation is a brief statement about your assessment that the patient is improving, declining, or no change.  This information will be displayed automatically on your shift  note.  Outcome: Met  Flowsheets (Taken 9/18/2024 1038)  Plan of Care Reviewed With: patient  Goal: Patient-Specific Goal (Individualized)  Description: You can add care plan individualizations to a care plan. Examples of Individualization might be:  \"Parent requests to be called daily at 9am for status\", \"I have a hard time hearing out of my right ear\", or \"Do not touch me to wake me up as it startles  me\".  Outcome: Met  Goal: Absence of Hospital-Acquired Illness or Injury  Outcome: Met  Intervention: Identify and Manage Fall Risk  Recent Flowsheet Documentation  Taken 9/18/2024 1000 by Evelyn Garsia RN  Safety Promotion/Fall Prevention: safety round/check completed  Intervention: Prevent Skin Injury  Recent Flowsheet Documentation  Taken 9/18/2024 1000 by Evelyn Garsia RN  Body Position: position changed independently  Device Skin Pressure Protection: absorbent pad utilized/changed  Taken 9/18/2024 0837 by Evelyn Garsia RN  Body Position: position changed independently  Intervention: Prevent and Manage VTE (Venous Thromboembolism) Risk  Recent Flowsheet Documentation  Taken 9/18/2024 1000 by Evelyn Garsia RN  VTE Prevention/Management: SCDs off (sequential compression devices)  Intervention: Prevent Infection  Recent Flowsheet Documentation  Taken 9/18/2024 1000 by Evelyn Garsia RN  Infection Prevention: hand hygiene promoted  Goal: Optimal Comfort and Wellbeing  Outcome: " Met  Intervention: Monitor Pain and Promote Comfort  Recent Flowsheet Documentation  Taken 9/18/2024 0837 by Evelyn Garsia RN  Pain Management Interventions: medication (see MAR)  Taken 9/18/2024 0755 by Evelyn Garsia RN  Pain Management Interventions: medication (see MAR)  Goal: Readiness for Transition of Care  Outcome: Met     Problem: Comorbidity Management  Goal: Blood Pressure in Desired Range  Outcome: Met  Intervention: Maintain Blood Pressure Management  Recent Flowsheet Documentation  Taken 9/18/2024 1000 by Evelyn Garsia RN  Medication Review/Management: medications reviewed  Goal: Maintenance of Heart Failure Symptom Control  Outcome: Met  Intervention: Maintain Heart Failure Management  Recent Flowsheet Documentation  Taken 9/18/2024 1000 by Evelyn Garsia RN  Medication Review/Management: medications reviewed     Problem: Fall Injury Risk  Goal: Absence of Fall and Fall-Related Injury  Outcome: Met  Intervention: Identify and Manage Contributors  Recent Flowsheet Documentation  Taken 9/18/2024 1000 by Evelyn Garsia RN  Medication Review/Management: medications reviewed  Intervention: Promote Injury-Free Environment  Recent Flowsheet Documentation  Taken 9/18/2024 1000 by Evelyn Garsia RN  Safety Promotion/Fall Prevention: safety round/check completed     Problem: Oral Intake Inadequate  Goal: Improved Oral Intake  Outcome: Met   , had some pain, got tylenol, edema in BLE is better, plan to going back to her place in 20 min.    /73 (BP Location: Right arm, Cuff Size: Adult Small)   Pulse 84   Temp 98  F (36.7  C) (Oral)   Resp 16   Ht 1.524 m (5')   Wt 50.2 kg (110 lb 11.2 oz)   SpO2 95%   BMI 21.62 kg/m       Outcome Evaluation: Pt A&O, just got on Flour from ED, from [facility],

## 2024-09-18 NOTE — PLAN OF CARE
"Goal Outcome Evaluation:      Plan of Care Reviewed With: patient    Overall Patient Progress: improvingOverall Patient Progress: improving    Outcome Evaluation: Pt A&Ox4, calm/cooperative. Denies pain/n/v/d/sob/dizziness. WDL breath/heart sounds. Combo diet, no cafe/low sat, fluid restrict of 2000 mL. Saline locked clean/dry/intact/flushed. Assist x1 gb/walker. Tele SR 80's. No purewick in place wants to walk to bathroom. Scheduled tylenol/lasix/latanoprost given. Takes meds with applesauce. Edema +2 LE, elevated legs. Bed alarm in place. Contact precautions in place. Discharge pending for tomorrow to assisted living.     /64 (BP Location: Right arm)   Pulse 78   Temp 97.3  F (36.3  C) (Oral)   Resp 16   Ht 1.524 m (5')   Wt 52.2 kg (115 lb)   SpO2 96%   BMI 22.46 kg/m       Problem: Adult Inpatient Plan of Care  Goal: Plan of Care Review  Description: The Plan of Care Review/Shift note should be completed every shift.  The Outcome Evaluation is a brief statement about your assessment that the patient is improving, declining, or no change.  This information will be displayed automatically on your shift  note.  Outcome: Progressing  Flowsheets (Taken 9/17/2024 2249)  Outcome Evaluation: Pt A&Ox4, calm/cooperative. Denies pain/n/v/d/sob/dizziness. WDL breath/heart sounds. Combo diet, no cafe/low sat, fluid restrict of 2000 mL. Saline locked clean/dry/intact/flushed. Assist x1 gb/walker. Tele SR 80's. No purewick in place wants to walk to bathroom. Scheduled tylenol/lasix/latanoprost given. Edema +2 LE, elevated legs. Bed alarm in place. Contact precautions in place.  Plan of Care Reviewed With: patient  Overall Patient Progress: improving  Goal: Patient-Specific Goal (Individualized)  Description: You can add care plan individualizations to a care plan. Examples of Individualization might be:  \"Parent requests to be called daily at 9am for status\", \"I have a hard time hearing out of my right ear\", or " "\"Do not touch me to wake me up as it startles  me\".  Outcome: Progressing  Goal: Absence of Hospital-Acquired Illness or Injury  Outcome: Progressing  Intervention: Identify and Manage Fall Risk  Recent Flowsheet Documentation  Taken 9/17/2024 1610 by Kimberli Soria, RN  Safety Promotion/Fall Prevention:   safety round/check completed   room near nurse's station   nonskid shoes/slippers when out of bed   activity supervised   supervised activity   mobility aid in reach   increased rounding and observation   clutter free environment maintained   assistive device/personal items within reach  Intervention: Prevent and Manage VTE (Venous Thromboembolism) Risk  Recent Flowsheet Documentation  Taken 9/17/2024 1610 by Kimberli Soria, RN  VTE Prevention/Management: SCDs off (sequential compression devices)  Goal: Optimal Comfort and Wellbeing  Outcome: Progressing  Goal: Readiness for Transition of Care  Outcome: Progressing     Problem: Comorbidity Management  Goal: Blood Pressure in Desired Range  Outcome: Progressing  Goal: Maintenance of Heart Failure Symptom Control  Outcome: Progressing     Problem: Fall Injury Risk  Goal: Absence of Fall and Fall-Related Injury  Outcome: Progressing  Intervention: Promote Injury-Free Environment  Recent Flowsheet Documentation  Taken 9/17/2024 1610 by Kimberli Soria, RN  Safety Promotion/Fall Prevention:   safety round/check completed   room near nurse's station   nonskid shoes/slippers when out of bed   activity supervised   supervised activity   mobility aid in reach   increased rounding and observation   clutter free environment maintained   assistive device/personal items within reach     Problem: Oral Intake Inadequate  Goal: Improved Oral Intake  Outcome: Progressing     "

## 2024-09-18 NOTE — DISCHARGE SUMMARY
[facility]  Hospitalist Discharge Summary      Date of Admission:  9/16/2024  Date of Discharge:  9/18/2024  Discharging Provider: Meet Calloway MD  Discharge Service: Hospitalist Service    Discharge Diagnoses   Acute systolic CHF exacerbation    Clinically Significant Risk Factors          Follow-ups Needed After Discharge   Follow-up Appointments     Follow-up and recommended labs and tests       Follow up with primary care provider, Jordyn Jon, within 7 days for   hospital follow- up.  The following labs/tests are recommended: chem 7 to   check kidney function and potassium levels.            Unresulted Labs Ordered in the Past 30 Days of this Admission       Date and Time Order Name Status Description    9/17/2024 12:21 PM Urine Culture In process         These results will be followed up by Hospitalist group    Discharge Disposition   Discharged to assisted living  Condition at discharge: Stable    Hospital Course   Maya Cullen is a 89 year old female with PMH including systolic CHF, severe mitral regurgitation, pulmonary hypertension, LVH, HTN, glaucoma, trigeminal neuralgia, impaired glucose tolerance, mild cognitive impairment, and falls who presents with worsening leg swelling and foot pain.   Patient notes that her nursing home staff are concerned about worsening leg swelling.  They were also concerned about visible bruising over the right foot.  Patient has been falling lately and may have injured her foot.  However was able to use walker and bear weight.  Denies any other injury.  No loss of consciousness no head trauma.  Denies chest pain pressure heaviness or tightness.  No trouble breathing.  No orthopnea, occasional dyspnea.  More than 10 point review of system was carried out was otherwise negative.      Evaluation in the emergency room suggestive of acute on chronic systolic heart failure with chest x-ray suggestive of mild pulmonary edema BNP 17,000'.   Patient maintaining oxygenation on room air.  Admitted to observation unit for IV diuresis.     Acute on chronic systolic CHF exacerbation  Severe mitral regurgitation  Mild to moderate tricuspid regurgitation  Pulmonary hypertension  HTN:  -CHF   -continue with IV Lasix; transition to PO Lasix tomorrow  -No hypoxia  -Cardiac monitoring  -Strict intake and output and daily weights  -Potassium and magnesium replacement protocols  -Recent TTE 6/24 notable for ejection fraction 20 to 25%     Right foot pain  -Patient says she has been falling and may have injured her foot.  -Bruise noted on the dorsal aspect of right foot  -X-rays in the emergency room without acute fracture  -As needed pain meds     History CVA:   -History of frequent falls     Glaucoma:   -Resume PTA timolol, latanoprost,     Cognitive impairment     Trigeminal neuralgia: Resume PTA carbamazepine 200 mg 3 times daily.    I assumed care of the patient today.  She is sitting on the side of the bed.  She is eating her breakfast.  She has no complaints.  She would like to discharge home today.  I will switch her back to her Lasix 40 mg daily, but will increase this to twice a day for the next 5 days and then have her resume her once daily dosing.  I did call her son-in-law and update him.  She will need repeat labs as an outpatient to check her renal function and her potassium levels.  She should also complete her course of Macrobid for her urinary tract infection that grew ESBL.  It appears that she was started on this on September 12.  She should have 1 more day in her course.  Patient will discharge back to her assisted living.    Consultations This Hospital Stay   OCCUPATIONAL THERAPY ADULT IP CONSULT  CARE MANAGEMENT / SOCIAL WORK IP CONSULT    Code Status   No CPR- Do NOT Intubate    Time Spent on this Encounter   I, Meet Calloway MD, personally saw the patient today and spent greater than 30 minutes discharging this patient.       Meet Biggs  MD Brodie  [facility] OBSERVATION DEPT  [address]  [facility] [address]  Phone: [phone]  ______________________________________________________________________    Physical Exam   Vital Signs: Temp: 98  F (36.7  C) Temp src: Oral BP: 108/73 Pulse: 84   Resp: 16 SpO2: 95 % O2 Device: None (Room air)    Weight: 110 lbs 11.2 oz  Constitutional: awake, alert, cooperative, no apparent distress, and appears stated age  Eyes: Lids and lashes normal, pupils equal, round and reactive to light, extra ocular muscles intact, sclera clear, conjunctiva normal  ENT: Normocephalic, without obvious abnormality, atraumatic, sinuses nontender on palpation, external ears without lesions, oral pharynx with moist mucous membranes, tonsils without erythema or exudates, gums normal and good dentition.  Respiratory: clear, no crackles or wheezing  Musculoskeletal: minimal edema       Primary Care Physician   Jordyn Jon    Discharge Orders      Home Care Referral      Reason for your hospital stay    Acute CHF exacerbation     Follow-up and recommended labs and tests     Follow up with primary care provider, Jordyn Jon, within 7 days for hospital follow- up.  The following labs/tests are recommended: chem 7 to check kidney function and potassium levels.     Activity    Your activity upon discharge: activity as tolerated     Diet    Follow this diet upon discharge: Regular       Significant Results and Procedures   Most Recent 3 CBC's:  Recent Labs   Lab Test 09/17/24  0827 09/16/24  1625 09/07/24  1133   WBC 9.3 8.7 8.8   HGB 13.7 12.9 12.6   * 102* 100    310 320     Most Recent 3 BMP's:  Recent Labs   Lab Test 09/18/24  0725 09/17/24  0827 09/16/24  1625    136 131*   POTASSIUM 4.1 4.6 5.0  4.6   CHLORIDE 96* 100 98   CO2 25 22 22   BUN 18.1 20.7 24.9*   CR 0.90 0.92 1.09*   ANIONGAP 14 14 11   GILL 9.0 8.6* 9.1   * 103* 121*     Most Recent 2 LFT's:  Recent Labs   Lab  Test 09/16/24  1625   AST 32   ALT 32   ALKPHOS 124   BILITOTAL 0.7     Most Recent 3 BNP's:  Recent Labs   Lab Test 09/16/24  1625 08/05/24  0905 07/01/24  0626 06/16/24  2147 08/11/23  1926   NTBNPI 17,202*  --   --  12,064* 2,050*   NTBNP  --  8,513* 7,863*  --   --    ,   Results for orders placed or performed during the hospital encounter of 09/16/24   Foot  XR, G/E 3 views, right    Narrative    EXAM: XR FOOT RIGHT G/E 3 VIEWS  LOCATION: [facility]  DATE: 9/16/2024    INDICATION: r foot pain  COMPARISON: None.      Impression    IMPRESSION: Normal joint spaces and alignment. No acute fracture or dislocation. Mild hallux valgus.   XR Chest 2 Views    Narrative    EXAM: XR CHEST 2 VIEWS  LOCATION: [facility]  DATE: 9/16/2024    INDICATION: swelling  COMPARISON: 6/16/2024.      Impression    IMPRESSION: Stable cardiomegaly. Interstitial prominence suggestive of mild edema. Small bilateral pleural effusions and mild bibasilar atelectasis. No pneumothorax. Chronic fracture deformity of the proximal left humerus.       Discharge Medications   Current Discharge Medication List        CONTINUE these medications which have CHANGED    Details   furosemide (LASIX) 40 MG tablet Take 1 tablet (40 mg) by mouth 2 times daily, THEN 1 tablet (40 mg) daily.    Associated Diagnoses: Acute systolic heart failure (H)      nitroFURantoin macrocrystal-monohydrate (MACROBID) 100 MG capsule Take 1 capsule (100 mg) by mouth 2 times daily.           CONTINUE these medications which have NOT CHANGED    Details   acetaminophen (TYLENOL) 325 MG tablet Take 3 tablets (975 mg) by mouth 3 times daily      carBAMazepine (TEGRETOL) 100 MG chewable tablet Take 2 tablets (200 mg) by mouth 2 times daily      latanoprost (XALATAN) 0.005 % ophthalmic solution Place 1 drop into both eyes at bedtime.      lisinopril (ZESTRIL) 5 MG tablet Take 0.5 tablets (2.5 mg) by mouth daily  Qty: 90 tablet, Refills: 3       metoprolol succinate ER (TOPROL XL) 25 MG 24 hr tablet Take 0.5 tablets (12.5 mg) by mouth daily  Qty: 30 tablet, Refills: 3    Associated Diagnoses: Acute systolic heart failure (H)      multivitamin w/minerals (THERA-VIT-M) tablet Take 1 tablet by mouth daily      ondansetron (ZOFRAN ODT) 4 MG ODT tab Take 1 tablet (4 mg) by mouth every 6 hours as needed for nausea    Associated Diagnoses: Nausea      potassium chloride nick ER (KLOR-CON M15) 15 MEQ CR tablet Take 1 tablet (15 mEq) by mouth daily    Associated Diagnoses: Hypokalemia      timolol maleate (TIMOPTIC) 0.5 % ophthalmic solution Place 1 drop into both eyes 2 times daily.           Allergies   Allergies   Allergen Reactions    Brimonidine Other (See Comments)     Follicular conjunctivitis    Dorzolamide Other (See Comments)     Follicular conjunctivitis    Benzalkonium Chloride Other (See Comments)     Eye irritation.  Does better with preservative free but can tolerate preserved drops    Fluorescein Itching     fluress    Penicillins     Sulfa Antibiotics Other (See Comments)    Codeine Unknown and Rash    Fd&C Yellow #5 (Tartrazine) Rash

## 2024-09-18 NOTE — PROGRESS NOTES
Recommend continuing Lasix at the higher dose of 40 mg twice daily indefinitely and repeating a BMP and NT pro BNP level in 1-2 weeks.  Would be good to see her for a sooner visit and could offer her one of the slots on 10/15 - 10/18  if this would work for her. Thanks so much!

## 2024-09-18 NOTE — CONSULTS
Care Management Initial Consult    General Information  Assessment completed with: Patient, Children, Caregiver,    Type of CM/SW Visit: Initial Assessment    Primary Care Provider verified and updated as needed: Yes   Readmission within the last 30 days: no previous admission in last 30 days      Reason for Consult: discharge planning    Communication Assessment  Patient's communication style: spoken language (English or Bilingual)    Hearing Difficulty or Deaf: no   Wear Glasses or Blind: yes    Cognitive  Cognitive/Neuro/Behavioral: WDL                      Living Environment:   People in home: facility resident     Current living Arrangements: assisted living  Name of Facility: [facility]   Able to return to prior arrangements: yes     Family/Social Support:  Care provided by: self  Provides care for: no one  Marital Status:   Support system: Children, Facility resident(s)/Staff          Description of Support System: Supportive, Involved    Support Assessment: Adequate family and caregiver support    Current Resources:   Patient receiving home care services: No  Community Resources: None  Equipment currently used at home: walker, standard, wheelchair, manual  Supplies currently used at home: Wound Care Supplies    Lifestyle & Psychosocial Needs:  Social Determinants of Health     Food Insecurity: Low Risk  (9/17/2024)    Food Insecurity     Within the past 12 months, did you worry that your food would run out before you got money to buy more?: No     Within the past 12 months, did the food you bought just not last and you didn t have money to get more?: No   Depression: Not at risk (8/6/2024)    PHQ-2     PHQ-2 Score: 0   Housing Stability: Low Risk  (9/17/2024)    Housing Stability     Do you have housing? : Yes     Are you worried about losing your housing?: No   Tobacco Use: Low Risk  (8/6/2024)    Patient History     Smoking Tobacco Use: Never     Smokeless Tobacco Use: Never     Passive  Exposure: Not on file   Financial Resource Strain: Low Risk  (9/17/2024)    Financial Resource Strain     Within the past 12 months, have you or your family members you live with been unable to get utilities (heat, electricity) when it was really needed?: No   Alcohol Use: Not on file   Transportation Needs: Low Risk  (9/17/2024)    Transportation Needs     Within the past 12 months, has lack of transportation kept you from medical appointments, getting your medicines, non-medical meetings or appointments, work, or from getting things that you need?: No   Physical Activity: Not on file   Interpersonal Safety: Low Risk  (9/17/2024)    Interpersonal Safety     Do you feel physically and emotionally safe where you currently live?: Yes     Within the past 12 months, have you been hit, slapped, kicked or otherwise physically hurt by someone?: No     Within the past 12 months, have you been humiliated or emotionally abused in other ways by your partner or ex-partner?: No   Stress: Not on file   Social Connections: Not on file   Health Literacy: Not on file     Functional Status:  Prior to admission patient needed assistance:   Dependent ADLs:: Ambulation-walker, Bathing, Dressing, Toileting, Transfers  Dependent IADLs:: Laundry, Cooking, Cleaning, Shopping, Meal Preparation, Medication Management, Transportation  Assesssment of Functional Status: At functional baseline    Care Management Discharge Note    Discharge Date: 09/18/2024     Discharge Disposition: Home Care, Assisted Living    Discharge Services: None    Discharge DME: None    Discharge Transportation: family or friend will provide    Patient/family educated on Medicare website which has current facility and service quality ratings: yes    Education Provided on the Discharge Plan: Yes  Persons Notified of Discharge Plans: Pt and son-in-law Christian  Patient/Family in Agreement with the Plan: yes    Handoff Referral Completed: No, handoff not indicated or  clinically appropriate    Additional Information:  CM consulted for discharge planning, met with pt at bedside to discuss. Pt resides at [facility] and is requires assist 1 with [facility] at baseline. They assist with medications, meals, dressing, bathing, toileting. Pt reports that she would like to establish care with [facility], appointment scheduled for 9/25, AVS updated. Son-in-law Christian updated and will pick-up at 1100. Pt and Christian in agreement with HC RN/PT/OT/HHA through [facility] at discharge, pt has used them before and they are [facility]'s preferred facility.    With pt's permission, called and updated [facility] SARABJIT Morrison P: [phone] F: [phone], she is in agreement with return. Orders completed and sent. [facility]. Updated CATHI and bedside RN.     Anaid Oliva RN BSN   Inpatient Care Coordination  [facility]   Phone [phone]

## 2024-09-18 NOTE — PROGRESS NOTES
Called [facility] phone number from inpatient RNCC note and staff requested that I call this number for updates: phone: [phone]. That number led me to VM of staff named Kiersten.    I left a VM for Kiersten requesting a call back to review post hospital update from cardiology team (see note from Noah MOTA below).    Will await return call and make another outreach attempt in next 1-2 days pending response.    Shivani Newell RN BSN   4:07 PM 09/18/24  Nurse line M-F 8a-4p: [phone]

## 2024-09-18 NOTE — PROGRESS NOTES
[facility]: Heart Failure Care Coordination   Transitions of Care Note    Alerted by CORE RN Anaya that Ms. Cullen was admitted to [facility] 9/16-9/18 for leg swelling, foot pain, in setting of recent falls; BNP elevated from previous. She was treated with IV lasix, during admission, and hospitalist recommended she take increased dose of oral lasix for 5 days post admission, then return to typical dosing.     I see that she is discharging back to [facility] (they assist with meds, meals, and ADLs).    She has close follow-up with PCP team next week on 9/25, then CORE follow-up with Noah Moe CNP on 11/5.    8/5/24 was last visit with Noah SWEET, when she was compensated with an estimated dry weight of 115-120 lbs. No changes were made to meds/plan, and she was referred to palliative care.     Will ask Noah MOTA to comment on whether CORE follow-up should be moved up.          Future Appointments   Date Time Provider Department Center   9/25/2024  9:30 AM Kathe Rivero APRN CNP RI RI   10/15/2024  1:30 PM [id number] RHSCXR RSCC   10/15/2024  2:00 PM Sam Rodriguez PA-C RHSBRS RSCC   11/5/2024 12:30 PM RU LAB RHCLB [place] RID   11/5/2024  1:20 PM Prasanna Moe NP [facility] PSA JESSA Newell RN BSN   2:51 PM 09/18/24  Nurse line M-F 8a-4p: [phone]

## 2024-09-19 ENCOUNTER — PATIENT OUTREACH (OUTPATIENT)
Dept: CARE COORDINATION | Facility: CLINIC | Age: 89
End: 2024-09-19
Payer: MEDICARE

## 2024-09-19 LAB
BACTERIA UR CULT: ABNORMAL
BACTERIA UR CULT: ABNORMAL

## 2024-09-19 NOTE — PROGRESS NOTES
[facility]: Connected Care Resource Center    Background: Transitional Care Management program identified per system criteria and reviewed by Connected Care Resource Center team for possible outreach.    Assessment: Upon chart review, CCRC Team member will not proceed with patient outreach related to this episode of Transitional Care Management program due to reason below:    Non-[facility] TCU: CCRC team member noted patient discharged to TCU/ARU/LTACH. Patient is not established with a [facility] currently supported by Primary Care-Care Coordination therefore handoff to Primary Care-Care Coordination is not appropriate at this time.    Plan: Transitional Care Management episode addressed appropriately per reason noted above.      MAYDA Martinez  [facility] Care Resource [place], [facility]    *Connected Care Resource Team does NOT follow patient ongoing. Referrals are identified based on internal discharge reports and the outreach is to ensure patient has an understanding of their discharge instructions.

## 2024-09-19 NOTE — PROGRESS NOTES
[facility] -      Spoke with Mary at [facility]. Faxed orders to [phone]. Per Mary, son in law, Christian makes all her appointments.     Spoke with Christian. Appt arranged for 10/18 at 9:20 am in [place].     Future Appointments   Date Time Provider Department Center   9/25/2024  9:30 AM Kathe Rivero APRN CNP RIIM RI   10/15/2024  1:30 PM [id number] RHSCXR RSCC   10/15/2024  2:00 PM Sam Rodriguez PA-C RHSBRS RSCC   10/18/2024  9:20 AM Prasanna Moe NP RUUMHT P PSA CLIN   11/5/2024 12:30 PM RU LAB RHCLB [place] RID   11/5/2024  1:20 PM Prasanna Moe NP RUUM UMP PSA CLIN     Noy Leija RN BAN   1:15 PM 9/19/2024    Nurse line NADIA-THEA 8a-4p: [phone]

## 2024-09-19 NOTE — PROGRESS NOTES
[facility] -     Received VM from Mary at [facility]. Called her back and left a VM requesting a return call.     Noy Leija RN BAN   9:13 AM 9/19/2024    Nurse line M-F 8a-4p: [phone]

## 2024-09-25 ENCOUNTER — LAB REQUISITION (OUTPATIENT)
Dept: LAB | Facility: CLINIC | Age: 89
End: 2024-09-25
Payer: MEDICARE

## 2024-09-25 DIAGNOSIS — I50.22 CHRONIC SYSTOLIC (CONGESTIVE) HEART FAILURE (H): ICD-10-CM

## 2024-09-28 ENCOUNTER — LAB REQUISITION (OUTPATIENT)
Dept: LAB | Facility: CLINIC | Age: 89
End: 2024-09-28
Payer: MEDICARE

## 2024-09-28 DIAGNOSIS — N30.00 ACUTE CYSTITIS WITHOUT HEMATURIA: ICD-10-CM

## 2024-09-28 LAB
ALBUMIN UR-MCNC: NEGATIVE MG/DL
APPEARANCE UR: ABNORMAL
BACTERIA #/AREA URNS HPF: ABNORMAL /HPF
BILIRUB UR QL STRIP: NEGATIVE
COLOR UR AUTO: YELLOW
GLUCOSE UR STRIP-MCNC: NEGATIVE MG/DL
HGB UR QL STRIP: NEGATIVE
HYALINE CASTS: 7 /LPF
KETONES UR STRIP-MCNC: NEGATIVE MG/DL
LEUKOCYTE ESTERASE UR QL STRIP: ABNORMAL
MUCOUS THREADS #/AREA URNS LPF: PRESENT /LPF
NITRATE UR QL: POSITIVE
PH UR STRIP: 5.5 [PH] (ref 5–7)
RBC URINE: 0 /HPF
SP GR UR STRIP: 1.01 (ref 1–1.03)
SQUAMOUS EPITHELIAL: 1 /HPF
UROBILINOGEN UR STRIP-MCNC: NORMAL MG/DL
WBC URINE: 25 /HPF

## 2024-09-28 PROCEDURE — 81001 URINALYSIS AUTO W/SCOPE: CPT | Mod: ORL | Performed by: PHYSICIAN ASSISTANT

## 2024-09-28 PROCEDURE — 87086 URINE CULTURE/COLONY COUNT: CPT | Mod: ORL | Performed by: PHYSICIAN ASSISTANT

## 2024-09-29 LAB — BACTERIA UR CULT: NORMAL

## 2024-09-30 LAB
ANION GAP SERPL CALCULATED.3IONS-SCNC: 11 MMOL/L (ref 7–15)
BUN SERPL-MCNC: 28.6 MG/DL (ref 8–23)
CALCIUM SERPL-MCNC: 9 MG/DL (ref 8.8–10.4)
CHLORIDE SERPL-SCNC: 98 MMOL/L (ref 98–107)
CREAT SERPL-MCNC: 1.39 MG/DL (ref 0.51–0.95)
EGFRCR SERPLBLD CKD-EPI 2021: 36 ML/MIN/1.73M2
GLUCOSE SERPL-MCNC: 148 MG/DL (ref 70–99)
HCO3 SERPL-SCNC: 27 MMOL/L (ref 22–29)
POTASSIUM SERPL-SCNC: 4.4 MMOL/L (ref 3.4–5.3)
SODIUM SERPL-SCNC: 136 MMOL/L (ref 135–145)

## 2024-09-30 PROCEDURE — 80048 BASIC METABOLIC PNL TOTAL CA: CPT | Mod: ORL | Performed by: NURSE PRACTITIONER

## 2024-09-30 PROCEDURE — 36415 COLL VENOUS BLD VENIPUNCTURE: CPT | Mod: ORL | Performed by: NURSE PRACTITIONER

## 2024-09-30 PROCEDURE — P9603 ONE-WAY ALLOW PRORATED MILES: HCPCS | Mod: ORL | Performed by: NURSE PRACTITIONER

## 2024-10-01 ENCOUNTER — PATIENT OUTREACH (OUTPATIENT)
Dept: CARDIOLOGY | Facility: CLINIC | Age: 89
End: 2024-10-01
Payer: MEDICARE

## 2024-10-01 DIAGNOSIS — I50.22 CHRONIC HFREF (HEART FAILURE WITH REDUCED EJECTION FRACTION) (H): Primary | ICD-10-CM

## 2024-10-01 NOTE — PROGRESS NOTES
Left  for nursing to call back and discuss weights and symptoms. Pt does has a history of short term memory loss.     [facility]: Heart Failure Care Coordination   Follow-Up Outreach     Situation/Background:      Chief Complaint   Patient presents with    CORE    Clinic Care Coordination - Follow-up       Current diuretic:   --Lasix 40 mg daily     Current potassium chloride dose:   --KCL 15 mEq once daily    Other pertinent information:   --9/18-9/23 Lasix 40 mg BID    Assessment:      Recent home weights: 115 lbs      Baseline weight: 115-120 lbs        Remote monitoring: No    CHF assessment:  Respiratory  Shortness of breath:: No  Able to lie flat?: Yes  Is the patient on Oxygen?: No  Wheezing or noisy breathing?: No  Cough: No  Increased sputum: No    Fever  Fever: No    Cardiovascular  Chest pain: : No  Does patient have an implanted device?: No  Is patient on remote monitoring?: No  Swelling:: No  Bloating:: None  Fatigue:: Yes, at baseline  History of Anemia?: No  Weakness (Heaviness in limbs):: Yes (R leg, pt states this is due to nerve issues.)    Diet/Education/Medication  Does patient have access to a digital scale?: Yes  Checking weight daily? : Yes  Weight?: Unchanged  Today's Weight?: 52.2 kg (115 lb)  Diet:: 2000 mg of sodium    GI/  Appetite:: Normal  Urination:: Normal    Cognitive/Emotional  Cognitive:: None noted    Patient Perception of Heart Failure  What Heart Failure zone are you currently in?: Green  Overall your CHF symptoms are (GOAL):: Stable          Intervention/Plan:      Update to be routed to Noah Moe NP once I hear back from facility.         Future Appointments   Date Time Provider Department Center   10/4/2024 12:30 PM Kathe Rivero APRN CNP RIIM RI   10/15/2024  1:30 PM [id number] RHSCXR RSCC   10/15/2024  2:00 PM Sam Rodriguez PA-C RHSBRS RSCC   10/18/2024  9:20 AM Prasanna Moe NP [facility] PSA CLIN   11/5/2024 12:30 PM RU LAB RHCLB  [place] RID   11/5/2024  1:20 PM Prasanna Moe NP RUUMHT [facility] PSA CLIN

## 2024-10-04 ENCOUNTER — OFFICE VISIT (OUTPATIENT)
Dept: INTERNAL MEDICINE | Facility: CLINIC | Age: 89
End: 2024-10-04
Payer: MEDICARE

## 2024-10-04 VITALS
DIASTOLIC BLOOD PRESSURE: 62 MMHG | SYSTOLIC BLOOD PRESSURE: 107 MMHG | TEMPERATURE: 98.3 F | BODY MASS INDEX: 22.44 KG/M2 | OXYGEN SATURATION: 99 % | RESPIRATION RATE: 16 BRPM | WEIGHT: 114.9 LBS | HEART RATE: 71 BPM

## 2024-10-04 DIAGNOSIS — I50.9 CONGESTIVE HEART FAILURE, UNSPECIFIED HF CHRONICITY, UNSPECIFIED HEART FAILURE TYPE (H): Primary | ICD-10-CM

## 2024-10-04 DIAGNOSIS — M79.661 PAIN OF RIGHT LOWER LEG: ICD-10-CM

## 2024-10-04 DIAGNOSIS — R79.89 ELEVATED SERUM CREATININE: ICD-10-CM

## 2024-10-04 PROBLEM — J96.01 ACUTE RESPIRATORY FAILURE WITH HYPOXIA (H): Status: RESOLVED | Noted: 2024-06-16 | Resolved: 2024-10-04

## 2024-10-04 PROCEDURE — 99203 OFFICE O/P NEW LOW 30 MIN: CPT | Performed by: NURSE PRACTITIONER

## 2024-10-04 PROCEDURE — 80048 BASIC METABOLIC PNL TOTAL CA: CPT | Performed by: NURSE PRACTITIONER

## 2024-10-04 PROCEDURE — 36415 COLL VENOUS BLD VENIPUNCTURE: CPT | Performed by: NURSE PRACTITIONER

## 2024-10-04 RX ORDER — LISINOPRIL 5 MG/1
5 TABLET ORAL DAILY
Qty: 90 TABLET | Refills: 3 | Status: SHIPPED | OUTPATIENT
Start: 2024-10-04 | End: 2024-10-04

## 2024-10-04 RX ORDER — LISINOPRIL 5 MG/1
2.5 TABLET ORAL DAILY
Qty: 90 TABLET | Refills: 3 | Status: SHIPPED | OUTPATIENT
Start: 2024-10-04

## 2024-10-04 NOTE — PROGRESS NOTES
Assessment & Plan     Congestive heart failure, unspecified HF chronicity, unspecified heart failure type (H)  She feels well blood -pressure is in good range-has mild fullness in her legs which is not new to her  Usually wears compression socks but they are off for today  Oxygen level is 99%  Wt Readings from Last 4 Encounters:   10/04/24 52.1 kg (114 lb 14.4 oz)   09/18/24 50.2 kg (110 lb 11.2 oz)   09/07/24 52.2 kg (115 lb)   08/06/24 52.6 kg (116 lb)     Weight is stable -her kidney function was decreased on her lab earlier this week  .  We will recheck it today and make sure it is not drifting farther down    - Basic metabolic panel  (Ca, Cl, CO2, Creat, Gluc, K, Na, BUN); Future  - Basic metabolic panel  (Ca, Cl, CO2, Creat, Gluc, K, Na, BUN)    Pain of right lower leg  Has some pain in her anterior lower right leg intermittently   - diclofenac (VOLTAREN) 1 % topical gel; Apply 2 g topically 4 times daily. To right lower leg    Elevated serum creatinine  recheck to make sure it is not decreasing more over time   - Basic metabolic panel  (Ca, Cl, CO2, Creat, Gluc, K, Na, BUN); Future  - Basic metabolic panel  (Ca, Cl, CO2, Creat, Gluc, K, Na, BUN)        MED REC REQUIRED  Post Medication Reconciliation Status:  Unable to reconcile discharge medications    Patient Instructions   Lab in [address]    May use voltaren gel to lower right leg 4 times a day as needed     Wearing compression sock     Elevate feet         Randi Trejo is a 89 year old, presenting for the following health issues:  Hospital F/U    [facility]         Hospital Follow-up Visit:    Hospital/Nursing Home/IP Rehab Facility: [facility]  Date of Admission: 9/16/2024   Date of Discharge: 9/18/2024  Reason(s) for Admission: Acute systolic CHF exacerbation   Was the patient in the ICU or did the patient experience delirium during hospitalization?  No  Do you have any other stressors you would like to discuss with your  provider? Health Concerns    Problems taking medications regularly:  None  Medication changes since discharge: None  Problems adhering to non-medication therapy:  None    Summary of hospitalization:  [facility] discharge summary reviewed  Diagnostic Tests/Treatments reviewed.  Follow up needed: [facility]  Other Healthcare Providers Involved in Patient s Care:         Specialist appointment - [facility]   Update since discharge: improved.         Plan of care communicated with patient and son     she normally sees [facility] but they are farther away from her and being in [place] may be closer for her to get her care when talking about changing her care to this facility.  she says she wants to still go to [facility].  Her son and her will decide what next steps will be as far as who her primary will be      She does not give her own medication at her facility.  She has a medication .  Her son is not aware of her medication list.  Patient says she is taking different amounts of the medications that are listed in our system.  I did print out a med list of what we think she is taking and asked her son to clarify so that we can update our system if someone has changed her doses.      Her creatinine was elevated on Monday when they rechecked it which was after she took  Extra Lasix for a few days -we will recheck it today just to make sure that he is getting back into  A reasonable range    Review of Systems  Constitutional, neuro, ENT, endocrine, pulmonary, cardiac, gastrointestinal, genitourinary, musculoskeletal, integument and psychiatric systems are negative, except as otherwise noted.      Objective    /62 (BP Location: Right arm, Patient Position: Sitting, Cuff Size: Adult Regular)   Pulse 71   Temp 98.3  F (36.8  C) (Oral)   Resp 16   Wt 52.1 kg (114 lb 14.4 oz)   SpO2 99%   BMI 22.44 kg/m    Body mass index is 22.44 kg/m .  Physical Exam   GENERAL: alert and no  distress-here with son  RESP: lungs clear to auscultation - no rales, rhonchi or wheezes  CV: regular rate and rhythm  ABDOMEN: soft, nontender, and bowel sounds normal  MS: no gross musculoskeletal defects noted, legs are full feeling but no pitting edema  NEURO: Normal strength and tone, mentation intact and speech normal  PSYCH: mentation appears normal, affect normal/bright    Lab        Signed Electronically by: BENNETT Chao CNP

## 2024-10-04 NOTE — PATIENT INSTRUCTIONS
Lab in [address]    May use voltaren gel to lower right leg 4 times a day as needed     Wearing compression sock     Elevate feet

## 2024-10-05 LAB
ANION GAP SERPL CALCULATED.3IONS-SCNC: 10 MMOL/L (ref 7–15)
BUN SERPL-MCNC: 31.2 MG/DL (ref 8–23)
CALCIUM SERPL-MCNC: 9.4 MG/DL (ref 8.8–10.4)
CHLORIDE SERPL-SCNC: 100 MMOL/L (ref 98–107)
CREAT SERPL-MCNC: 1.24 MG/DL (ref 0.51–0.95)
EGFRCR SERPLBLD CKD-EPI 2021: 41 ML/MIN/1.73M2
GLUCOSE SERPL-MCNC: 105 MG/DL (ref 70–99)
HCO3 SERPL-SCNC: 26 MMOL/L (ref 22–29)
POTASSIUM SERPL-SCNC: 4.8 MMOL/L (ref 3.4–5.3)
SODIUM SERPL-SCNC: 136 MMOL/L (ref 135–145)

## 2024-10-07 ENCOUNTER — TELEPHONE (OUTPATIENT)
Dept: INTERNAL MEDICINE | Facility: CLINIC | Age: 89
End: 2024-10-07
Payer: MEDICARE

## 2024-10-07 NOTE — TELEPHONE ENCOUNTER
Attempt # 1  Called Phone # [phone]      Was Call answered? No.      Non-detailed voicemail left on October 7, 2024 2:14 PM to call clinic at: [phone] regarding lab results.     On Call Back:     Please relay labs/provider's message below.     Thank you,  Pedro Mcbride, Triage RN [facility]  2:14 PM 10/7/2024

## 2024-10-14 NOTE — PROGRESS NOTES
[facility]   Prasanna Moe NP Moskowitz, Stacy J RN  That would be great if we could check a BMP and NT pro BNP level before the upcoming visit!    Thank you!    Noah          Previous Messages       ----- Message -----  From: Noy Leija RN  Sent: 10/10/2024   3:45 PM CDT  To: Prasanna Moe NP  Subject: do you want repeat labs?                        Noah,    We had tried reaching out to this pt's CATHI to get updates. Looks like she saw PCP and they decreased her lasix. Do you want repeat labs before her appt next week?    SHADI Villafuerte       Orders for BMP and NtproBNP placed. Messaged [facility] CMA to call to arrange prior to 10/18 if possible.     Future Appointments   Date Time Provider Department Center   10/15/2024  1:30 PM [id number] [facility]   10/15/2024  2:00 PM Sam Rodriguez PA-C [facility]   10/18/2024  9:20 AM Prasanna Moe NP [facility]   11/5/2024 12:30 PM RU LAB [facility]   11/5/2024  1:20 PM Prasanna Moe NP [facility]      Christina Baker RN, BSN  10/14/24 at 12:45 PM

## 2024-10-18 ENCOUNTER — PATIENT OUTREACH (OUTPATIENT)
Dept: CARDIOLOGY | Facility: CLINIC | Age: 89
End: 2024-10-18

## 2024-10-18 ENCOUNTER — OFFICE VISIT (OUTPATIENT)
Dept: CARDIOLOGY | Facility: CLINIC | Age: 89
End: 2024-10-18
Payer: MEDICARE

## 2024-10-18 ENCOUNTER — LAB (OUTPATIENT)
Dept: LAB | Facility: CLINIC | Age: 89
End: 2024-10-18
Payer: MEDICARE

## 2024-10-18 VITALS
DIASTOLIC BLOOD PRESSURE: 61 MMHG | BODY MASS INDEX: 22.8 KG/M2 | HEART RATE: 73 BPM | OXYGEN SATURATION: 100 % | HEIGHT: 59 IN | SYSTOLIC BLOOD PRESSURE: 95 MMHG | WEIGHT: 113.1 LBS

## 2024-10-18 DIAGNOSIS — I50.22 CHRONIC HFREF (HEART FAILURE WITH REDUCED EJECTION FRACTION) (H): ICD-10-CM

## 2024-10-18 DIAGNOSIS — I50.21 ACUTE SYSTOLIC HEART FAILURE (H): ICD-10-CM

## 2024-10-18 LAB
ANION GAP SERPL CALCULATED.3IONS-SCNC: 16 MMOL/L (ref 7–15)
BUN SERPL-MCNC: 26.6 MG/DL (ref 8–23)
CALCIUM SERPL-MCNC: 8.7 MG/DL (ref 8.8–10.4)
CHLORIDE SERPL-SCNC: 98 MMOL/L (ref 98–107)
CREAT SERPL-MCNC: 1.38 MG/DL (ref 0.51–0.95)
EGFRCR SERPLBLD CKD-EPI 2021: 36 ML/MIN/1.73M2
GLUCOSE SERPL-MCNC: 177 MG/DL (ref 70–99)
HCO3 SERPL-SCNC: 24 MMOL/L (ref 22–29)
NT-PROBNP SERPL-MCNC: ABNORMAL PG/ML (ref 0–1800)
POTASSIUM SERPL-SCNC: 4.2 MMOL/L (ref 3.4–5.3)
SODIUM SERPL-SCNC: 138 MMOL/L (ref 135–145)

## 2024-10-18 PROCEDURE — 99215 OFFICE O/P EST HI 40 MIN: CPT | Performed by: NURSE PRACTITIONER

## 2024-10-18 PROCEDURE — G2211 COMPLEX E/M VISIT ADD ON: HCPCS | Performed by: NURSE PRACTITIONER

## 2024-10-18 PROCEDURE — 83880 ASSAY OF NATRIURETIC PEPTIDE: CPT | Performed by: NURSE PRACTITIONER

## 2024-10-18 PROCEDURE — 36415 COLL VENOUS BLD VENIPUNCTURE: CPT | Performed by: NURSE PRACTITIONER

## 2024-10-18 PROCEDURE — 80048 BASIC METABOLIC PNL TOTAL CA: CPT | Performed by: NURSE PRACTITIONER

## 2024-10-18 RX ORDER — METOPROLOL SUCCINATE 25 MG/1
12.5 TABLET, EXTENDED RELEASE ORAL DAILY
COMMUNITY
Start: 2024-10-18

## 2024-10-18 NOTE — PROGRESS NOTES
Cardiology Clinic Progress Note    [facility] Clinic Visit (Heart Failure Specialty Clinic)    Service Date: October 18, 2024  Primary Cardiology Team: Dr. Ochoa    HPI:   I had the pleasure of seeing Ms. Maya Cullen in the clinic today. She is a very pleasant 89 year old female with a past medical history notable for history of stroke, mild cognitive impairment, and hypertension.  In 2023, she had an echocardiogram done when she presented to the hospital for noncardiac reasons but was noted to have an EF of 40-45% with severe MR.  Subsequently, I do not see any cardiology follow-up. She is not on any cardiac medications. She presented with a fall recently to [facility] when she was trying to get up to get her walker.  She injured and fractured her cervical vertebrae.  She is in a neck collar right now.  During the hospital stay, she was noted to have severe heart failure with reduced ejection fraction and echocardiogram revealed 2+ MR and EF of 10-20%.    She was referred to outpatient cardiology and met with Dr. Ochoa in consultation about a month ago. At that time, she was reporting NYHA class IIIb heart failure symptoms with shortness of breath with minimal exertion and was functionally quite limited and predominantly wheelchair-bound at that time. Etiology of her cardiomyopathy was felt very likely secondary to underlying occult coronary artery disease. However, she is DNR/DNI and after extensive discussion conservative medical management was agreed upon. Palliative care referral was also placed for further goals of care discussion and possible consideration of hospice. She was continued on Lasix 40 mg once daily along with low-dose lisinopril 2.5 mg daily and metoprolol succinate 12.5 mg once daily.    I met the patient in follow-up in the clinic on 8/5/2024. She was doing well from a cardiac standpoint at that time. We again reviewed her cardiomyopathy diagnosis and options for further  evaluation and management of this. The patient and her son-in-law expressed preference for conservative management. We discussed referral to the palliative care team again for continued discussions regarding goals of care given concerns for poor prognosis with her severe cardiomyopathy and advanced age. They were open to this.    In the interim, the patient was admitted at [facility] on 9/16/2024 after presenting with worsening leg swelling and foot pain.  She was found to have acute CHF and was diuresed with IV Lasix.  Her weight at discharge trended down to 110 pounds.  She was discharged on Lasix at a higher dose of 40 mg once daily.    Today, Ms. Cullen presents to the clinic accompanied by her son-in-law, Christian, in follow-up for recent hospitalization. She tells me that she has been feeling okay over the past few weeks. She lives in [facility] in [place]. She notes that she has been able to walk the halls at her apartment and down to the dining ascencio there with a wheeled walker. She has been tolerating this without significant exertional dyspnea or limitations from a cardiac standpoint. She has had continued issues with lower extremity edema, which she feels has been a bit worse over past few days.  She has been having her weight checked daily at the [facility] and notes that most recent readings have been around 115 pounds.  Her weight in the clinic today is 113 pounds.  Blood pressure has been on the low end of normal and is at 95/61 today.  The patient denies recent issues with dizziness or lightheadedness . the patient and her son-in-law note that they have not met with the palliative care team yet but they are open to doing this.  Christian notes that he spoke with someone from their team and thought that they have made an appointment for virtual visit at some point, but he thinks they may have missed the time.     Labs were checked prior to the visit today which I  personally reviewed with the patient and her son-in-law during the visit.  Basic metabolic panel shows stable electrolytes with sodium level at 138 and potassium at 4.2, but creatinine up slightly at 1.38 from her usual baseline of closer to 1.0.  Creatinine has been running higher recently closer to the 1.2-1.3 range, and I suspect there may be a component of cardiorenal syndrome in the setting of mild volume overload.  NT pro BNP is significantly elevated at 13,174, though this is downtrending from over 16,000 during her recent hospitalization at [facility] in September.    ASSESSMENT:  1. 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% by TTE 6/17/24  - LVEF: 10-20%  - NYHA class II-III, ACC/AHA stage C  - Etiology: Unclear, with suspected ischemic secondary to underlying occult CAD  - Fluid status: Appears mildly volume overloaded with ankle edema bilaterally; suspected dry weight: 110-115 lbs  - Diuretic regimen: Lasix 40 mg once daily  - Ischemic evaluation: Declined due to advanced age with preference for conservative medical management    Guideline directed medical therapy (GDMT):  - Beta blocker: Metoprolol succinate 12.5 mg once daily  - ACEI/ARB/ARNI: 2.5 mg once daily  - MRA: None currently due to borderline blood pressures and fall risk  - SGLT2 inhibitor: None currently due to report on blood pressures fall risk    2.  Moderate mitral regurgitation  3.  Mild to moderate tricuspid regurgitation  4.  Essential hypertension  5.  History of stroke  6.  Recurrent falls  7.  Sub-clinical hypothyroidism    PLAN:  - Recommend continuing Lasix at 40 mg once daily and adding an extra 20 mg of Lasix once daily on a PRN basis if her weight is at or above 115 lbs given rising NT pro BNP and reports of increased lower extremity edema when her weight has been at or above this range.   - Will continue the remainder of her current cardiac regimen, will add hold parameters  to hold metoprolol for SBP less than 100 given intermittent low blood pressures and increased fall risk.  - Counseled on checking daily weights and trying to stick to a low-sodium diet. Reviewed signs/symptoms concerning for fluid retention and when to contact the clinic.  - Can continue to monitor thyroid function given recent TSH level borderline elevated with free T4 consistent with subclinical hypothyroidism  - Referral to palliative care placed for further discussions regarding goals of care given her advanced age and severe cardiomyopathy with concern for poor prognosis. The patient and her son-in-law are open to meeting for this. Patient's son requested that he be called to schedule the appointment.  - Follow-up in the [facility] in about 1 month with non-fasting labs beforehand for a repeat basic metabolic panel and NT pro BNP level. Repeating an echocardiogram for reassessment of her ejection fraction was previously discussed, though given her preference for conservative medical management this would be unlikely to change her management so we will defer for the time being. If the patient would elect to pursue hospice or comfort focused care in the interim, further follow-up with the [facility] could be canceled depending on her preference.    Thank you for the opportunity to participate in this patient's care. We would be happy to see her sooner if needed for any concerns in the meantime.      45 total minutes was spent today including chart review, precharting, history and exam, post visit documentation, and reviewing studies as outlined above.     The longitudinal plan of care for the diagnosis(es)/condition(s) as documented were addressed during this visit. Due to the added complexity in care, I will continue to support Maya in the subsequent management and with ongoing continuity of care.    BENNETT Funk, CNP   Nurse Practitioner  [facility]  Pager: [phone]  Text Page   (8am - 5pm, M-F)    Orders this Visit:  Orders Placed This Encounter   Procedures    Adult Palliative Care [facility] Referral     Orders Placed This Encounter   Medications    metoprolol succinate ER (TOPROL XL) 25 MG 24 hr tablet     Sig: Take 0.5 tablets (12.5 mg) by mouth daily.     Hold for SBP<100 mmHg     Medications Discontinued During This Encounter   Medication Reason    metoprolol succinate ER (TOPROL XL) 25 MG 24 hr tablet      Encounter Diagnoses   Name Primary?    Chronic HFrEF (heart failure with reduced ejection fraction) (H)     Acute systolic heart failure (H)          CURRENT MEDICATIONS:  Current Outpatient Medications   Medication Sig Dispense Refill    acetaminophen (TYLENOL) 325 MG tablet Take 3 tablets (975 mg) by mouth 3 times daily      carBAMazepine (TEGRETOL) 100 MG chewable tablet Take 2 tablets (200 mg) by mouth 2 times daily      diclofenac (VOLTAREN) 1 % topical gel Apply 2 g topically 4 times daily. To right lower leg 350 g 1    furosemide (LASIX) 40 MG tablet Take 40 mg by mouth daily. You can take an extra half tablet (20 mg) by mouth daily as needed for a weight at or above 115 lbs or for worsening shortness of breath or leg swelling.      latanoprost (XALATAN) 0.005 % ophthalmic solution Place 1 drop into both eyes at bedtime.      lisinopril (ZESTRIL) 5 MG tablet Take 0.5 tablets (2.5 mg) by mouth daily. 90 tablet 3    metoprolol succinate ER (TOPROL XL) 25 MG 24 hr tablet Take 0.5 tablets (12.5 mg) by mouth daily.      multivitamin w/minerals (THERA-VIT-M) tablet Take 1 tablet by mouth daily      ondansetron (ZOFRAN ODT) 4 MG ODT tab Take 1 tablet (4 mg) by mouth every 6 hours as needed for nausea      potassium chloride nick ER (KLOR-CON M15) 15 MEQ CR tablet Take 1 tablet (15 mEq) by mouth daily      timolol maleate (TIMOPTIC) 0.5 % ophthalmic solution Place 1 drop into both eyes 2 times daily.       ALLERGIES  Allergies   Allergen Reactions    Brimonidine Other (See  Comments)     Follicular conjunctivitis    Dorzolamide Other (See Comments)     Follicular conjunctivitis    Benzalkonium Chloride Other (See Comments)     Eye irritation.  Does better with preservative free but can tolerate preserved drops    Codeine Unknown and Rash    Fd&C Yellow #5 (Tartrazine) Rash    Fluorescein Itching     fluress    Penicillins      Rash      Sulfa Antibiotics Other (See Comments)     rash     PAST MEDICAL, SURGICAL, FAMILY HISTORY:  History was reviewed and updated as needed, see medical record.    SOCIAL HISTORY:  Social History     Socioeconomic History    Marital status:      Spouse name: Not on file    Number of children: Not on file    Years of education: Not on file    Highest education level: Not on file   Occupational History    Not on file   Tobacco Use    Smoking status: Never    Smokeless tobacco: Never   Substance and Sexual Activity    Alcohol use: No    Drug use: No    Sexual activity: Never   Other Topics Concern    Not on file   Social History Narrative    Not on file     Social Determinants of Health     Financial Resource Strain: Low Risk  (10/4/2024)    Financial Resource Strain     Within the past 12 months, have you or your family members you live with been unable to get utilities (heat, electricity) when it was really needed?: No   Food Insecurity: Low Risk  (10/4/2024)    Food Insecurity     Within the past 12 months, did you worry that your food would run out before you got money to buy more?: No     Within the past 12 months, did the food you bought just not last and you didn t have money to get more?: No   Transportation Needs: Low Risk  (10/4/2024)    Transportation Needs     Within the past 12 months, has lack of transportation kept you from medical appointments, getting your medicines, non-medical meetings or appointments, work, or from getting things that you need?: No   Physical Activity: Not on file   Stress: Not on file   Social Connections: Not on  "file   Interpersonal Safety: Low Risk  (9/17/2024)    Interpersonal Safety     Do you feel physically and emotionally safe where you currently live?: Yes     Within the past 12 months, have you been hit, slapped, kicked or otherwise physically hurt by someone?: No     Within the past 12 months, have you been humiliated or emotionally abused in other ways by your partner or ex-partner?: No   Housing Stability: Low Risk  (10/4/2024)    Housing Stability     Do you have housing? : Yes     Are you worried about losing your housing?: No     Review of Systems:  Focused cardiovascular and respiratory review of systems is negative other than the symptoms noted above in the HPI.     Physical Exam:  Vitals: BP 95/61 (BP Location: Right arm, Patient Position: Sitting, Cuff Size: Adult Small)   Pulse 73   Ht 1.499 m (4' 11\")   Wt 51.3 kg (113 lb 1.6 oz)   SpO2 100%   BMI 22.84 kg/m     Wt Readings from Last 4 Encounters:   10/18/24 51.3 kg (113 lb 1.6 oz)   10/04/24 52.1 kg (114 lb 14.4 oz)   09/18/24 50.2 kg (110 lb 11.2 oz)   09/07/24 52.2 kg (115 lb)     CONSTITUTIONAL: Frail elderly woman in no acute distress.  NECK: No JVD.  CARDIOVASCULAR:  Regular rate and rhythm. No murmur, rub or gallop.   RESPIRATORY: Breathing non-labored. Lungs are clear to auscultation with no wheezes or crackles bilaterally.  GASTROINTESTINAL: Abdomen non-distended.  EXTREMITIES: Trace to 1+ lower extremity edema in the ankles bilaterally, L slightly > than R.   NEUROPSYCHIATRIC: No gross focal deficits. Affect appropriate. Forgetful.     Recent Lab Results:  LIPID RESULTS:  Lab Results   Component Value Date    CHOL 198 08/11/2023    HDL 51 08/11/2023     (H) 08/11/2023    TRIG 132 08/11/2023     LIVER ENZYME RESULTS:  Lab Results   Component Value Date    AST 32 09/16/2024    ALT 32 09/16/2024     CBC RESULTS:  Lab Results   Component Value Date    WBC 9.3 09/17/2024    WBC 6.6 07/13/2013    RBC 4.23 09/17/2024    RBC 5.39 (H) " 07/13/2013    HGB 13.7 09/17/2024    HGB 16.2 (H) 07/13/2013    HCT 43.0 09/17/2024    HCT 49.6 (H) 07/13/2013     (H) 09/17/2024    MCV 92 07/13/2013    MCH 32.4 09/17/2024    MCH 30.1 07/13/2013    MCHC 31.9 09/17/2024    MCHC 32.7 07/13/2013    RDW 17.2 (H) 09/17/2024    RDW 13.3 07/13/2013     09/17/2024     07/13/2013     BMP RESULTS:  Lab Results   Component Value Date     10/18/2024     07/13/2013    POTASSIUM 4.2 10/18/2024    POTASSIUM 4.1 07/13/2013    CHLORIDE 98 10/18/2024    CHLORIDE 102 07/13/2013    CO2 24 10/18/2024    CO2 25 07/13/2013    ANIONGAP 16 (H) 10/18/2024    ANIONGAP 13 07/13/2013     (H) 10/18/2024     (H) 08/12/2023     (H) 07/13/2013    BUN 26.6 (H) 10/18/2024    BUN 13 07/13/2013    CR 1.38 (H) 10/18/2024    CR 0.89 07/13/2013    GFRESTIMATED 36 (L) 10/18/2024    GFRESTIMATED 61 07/13/2013    GFRESTBLACK 74 07/13/2013    GILL 8.7 (L) 10/18/2024    GILL 9.3 07/13/2013      A1C RESULTS:  Lab Results   Component Value Date    A1C 5.9 (H) 06/16/2024     CC  Shefali Ochoa MD  [id number] [place] [id number]  [place] [id number]    Please kindly note that this document was completed in part using Dragon voice recognition software. Although reviewed after completion, some word substitutions and typographical errors may occur. Please contact me if clarification is needed.

## 2024-10-18 NOTE — PATIENT INSTRUCTIONS
If you have questions or concerns please call the [facility] nurse team at [phone] or send a Link_A_ Media message (Mon-Fri 8am-4pm).  If you have concerns after hours, please call [phone], option 2 to speak with an on call Cardiologist.    Medication changes and/or recommendations from today:   - We can try giving an extra 20 mg of lasix as needed for weight at or above 115 lbs, otherwise continue lasix at 40 mg once daily and the rest of your current medications without changes.  - Continue to check your weights daily and try to stick to a low sodium diet (under 2,000 mg/day).  - Call the [facility] nurse team at the number listed above if you develop signs concerning for fluid retention, including weight gain of over 2 pounds in 1 day or over 5 pounds in 1 week, increasing shortness of breath, or increasing swelling in the legs or abdomen.    Follow up plan:   - Follow up with Noah in the clinic in about 1 month with labs beforehand. We can push back the currently scheduled appt on 11/5 a few weeks later if you'd like.  - Meet with the palliative care team for continued discussions regarding goals of care with the worsening pumping function of the heart.    Your lab results today overall look stable in comparison to the most recent checks. The BNP level is high and kidney function has looked a bit worse recently suggesting some mild fluid retention.   Component      Latest Ref Rng 9/16/2024  4:25 PM 10/4/2024  1:09 PM 10/18/2024  8:37 AM   Sodium      135 - 145 mmol/L  136  138    Potassium      3.4 - 5.3 mmol/L  4.8  4.2    Chloride      98 - 107 mmol/L  100  98    Carbon Dioxide (CO2)      22 - 29 mmol/L  26  24    Anion Gap      7 - 15 mmol/L  10  16 (H)    Urea Nitrogen      8.0 - 23.0 mg/dL  31.2 (H)  26.6 (H)    Creatinine      0.51 - 0.95 mg/dL  1.24 (H)  1.38 (H)    GFR Estimate      >60 mL/min/1.73m2  41 (L)  36 (L)    Calcium      8.8 - 10.4 mg/dL  9.4  8.7 (L)    Glucose      70 - 99 mg/dL  105 (H)   177 (H)    N-Terminal Pro BNP Inpatient      0 - 1,800 pg/mL 17,202 (H)      N-Terminal Pro Bnp      0 - 1,800 pg/mL   13,174 (H)      It was a pleasure seeing you today!     BENNETT Funk, CNP  Nurse Practitioner  [facility]    Thank you for your visit with the [facility] today. [facility] stands for Cardiomy[facility].    The [facility] is a heart failure specialty clinic within the [facility] where you will work with specialized nurse practitioners, physician assistants, doctors, and registered nurses. They are dedicated to helping patients with heart failure to carefully adjust medications, receive education, and learn who and when to call if symptoms develop. They specialize in helping you better understand your condition, slow the progression of your disease, improve the length and quality of your life, help you detect future heart problems before they become life threatening, and avoid hospitalizations.

## 2024-10-18 NOTE — TELEPHONE ENCOUNTER
[facility] - [facility]   Call placed to [facility] to request:  Medication list  Recent VS  Do they administer medications?  Do they do weights and check BPs?  Requesting direct phone and fax number  Able to draw labs? If so, what days    Message left for nursing.    Order faxed to [phone]  Attn: Nursing  [facility]    Pt: Maya Cullen   1934  ICD: I50.23    Continue Lasix 40 mg once daily. Can give an extra 20 mg once daily for weight 115 lbs or greater.  Hold metoprolol for SBP less than 100.  [facility] follow-up in 1 month with BMP and NT pro BNP prior (24)    Per Noah Moe CNP        Future Appointments   Date Time Provider Department Center   2024  9:45 AM RU LAB [facility]   2024 10:40 AM Prasanna Moe, NP [facility]         HANNAH Raya, RN 10:13 AM 10/18/24

## 2024-10-18 NOTE — LETTER
10/18/2024    ALEKSANDR Palacios  [address] [address]    RE: Maya Cullen       Dear Colleague,     I had the [facility].    Cardiology Clinic Progress Note    [facility].R.E. Clinic Visit (Heart Failure Specialty Clinic)    Service Date: October 18, 2024  Primary Cardiology Team: Dr. Ochoa    HPI:   I had the pleasure of seeing Ms. Maya Cullen in the clinic today. She is a very pleasant 89 year old female with a past medical history notable for history of stroke, mild cognitive impairment, and hypertension.  In 2023, she had an echocardiogram done when she presented to the hospital for noncardiac reasons but was noted to have an EF of 40-45% with severe MR.  Subsequently, I do not see any cardiology follow-up. She is not on any cardiac medications. She presented with a fall recently to [facility] when she was trying to get up to get her walker.  She injured and fractured her cervical vertebrae.  She is in a neck collar right now.  During the hospital stay, she was noted to have severe heart failure with reduced ejection fraction and echocardiogram revealed 2+ MR and EF of 10-20%.    She was referred to outpatient cardiology and met with Dr. Ochoa in consultation about a month ago. At that time, she was reporting NYHA class IIIb heart failure symptoms with shortness of breath with minimal exertion and was functionally quite limited and predominantly wheelchair-bound at that time. Etiology of her cardiomyopathy was felt very likely secondary to underlying occult coronary artery disease. However, she is DNR/DNI and after extensive discussion conservative medical management was agreed upon. Palliative care referral was also placed for further goals of care discussion and possible consideration of hospice. She was continued on Lasix 40 mg once daily along with low-dose lisinopril 2.5 mg daily and metoprolol  succinate 12.5 mg once daily.    I met the patient in follow-up in the clinic on 8/5/2024. She was doing well from a cardiac standpoint at that time. We again reviewed her cardiomyopathy diagnosis and options for further evaluation and management of this. The patient and her son-in-law expressed preference for conservative management. We discussed referral to the palliative care team again for continued discussions regarding goals of care given concerns for poor prognosis with her severe cardiomyopathy and advanced age. They were open to this.    In the interim, the patient was admitted at [facility] on 9/16/2024 after presenting with worsening leg swelling and foot pain.  She was found to have acute CHF and was diuresed with IV Lasix.  Her weight at discharge trended down to 110 pounds.  She was discharged on Lasix at a higher dose of 40 mg once daily.    Today, Ms. Cullen presents to the clinic accompanied by her son-in-law, Christian, in follow-up for recent hospitalization. She tells me that she has been feeling okay over the past few weeks. She lives in [facility] in [place]. She notes that she has been able to walk the halls at her apartment and down to the dining ascencio there with a wheeled walker. She has been tolerating this without significant exertional dyspnea or limitations from a cardiac standpoint. She has had continued issues with lower extremity edema, which she feels has been a bit worse over past few days.  She has been having her weight checked daily at the [facility] and notes that most recent readings have been around 115 pounds.  Her weight in the clinic today is 113 pounds.  Blood pressure has been on the low end of normal and is at 95/61 today.  The patient denies recent issues with dizziness or lightheadedness . the patient and her son-in-law note that they have not met with the palliative care team yet but they are open to doing this.  Christian notes that  he spoke with someone from their team and thought that they have made an appointment for virtual visit at some point, but he thinks they may have missed the time.     Labs were checked prior to the visit today which I personally reviewed with the patient and her son-in-law during the visit.  Basic metabolic panel shows stable electrolytes with sodium level at 138 and potassium at 4.2, but creatinine up slightly at 1.38 from her usual baseline of closer to 1.0.  Creatinine has been running higher recently closer to the 1.2-1.3 range, and I suspect there may be a component of cardiorenal syndrome in the setting of mild volume overload.  NT pro BNP is significantly elevated at 13,174, though this is downtrending from over 16,000 during her recent hospitalization at [facility] in September.    ASSESSMENT:  1. 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% by TTE 6/17/24  - LVEF: 10-20%  - NYHA class II-III, ACC/AHA stage C  - Etiology: Unclear, with suspected ischemic secondary to underlying occult CAD  - Fluid status: Appears mildly volume overloaded with ankle edema bilaterally; suspected dry weight: 110-115 lbs  - Diuretic regimen: Lasix 40 mg once daily  - Ischemic evaluation: Declined due to advanced age with preference for conservative medical management    Guideline directed medical therapy (GDMT):  - Beta blocker: Metoprolol succinate 12.5 mg once daily  - ACEI/ARB/ARNI: 2.5 mg once daily  - MRA: None currently due to borderline blood pressures and fall risk  - SGLT2 inhibitor: None currently due to report on blood pressures fall risk    2.  Moderate mitral regurgitation  3.  Mild to moderate tricuspid regurgitation  4.  Essential hypertension  5.  History of stroke  6.  Recurrent falls  7.  Sub-clinical hypothyroidism    PLAN:  - Recommend continuing Lasix at 40 mg once daily and adding an extra 20 mg of Lasix once daily on a PRN basis if her weight is at or above  115 lbs given rising NT pro BNP and reports of increased lower extremity edema when her weight has been at or above this range.   - Will continue the remainder of her current cardiac regimen, will add hold parameters to hold metoprolol for SBP less than 100 given intermittent low blood pressures and increased fall risk.  - Counseled on checking daily weights and trying to stick to a low-sodium diet. Reviewed signs/symptoms concerning for fluid retention and when to contact the clinic.  - Can continue to monitor thyroid function given recent TSH level borderline elevated with free T4 consistent with subclinical hypothyroidism  - Referral to palliative care placed for further discussions regarding goals of care given her advanced age and severe cardiomyopathy with concern for poor prognosis. The patient and her son-in-law are open to meeting for this. Patient's son requested that he be called to schedule the appointment.  - Follow-up in the [facility] in about 1 month with non-fasting labs beforehand for a repeat basic metabolic panel and NT pro BNP level. Repeating an echocardiogram for reassessment of her ejection fraction was previously discussed, though given her preference for conservative medical management this would be unlikely to change her management so we will defer for the time being. If the patient would elect to pursue hospice or comfort focused care in the interim, further follow-up with the [facility] could be canceled depending on her preference.    Thank you for the opportunity to participate in this patient's care. We would be happy to see her sooner if needed for any concerns in the meantime.      45 total minutes was spent today including chart review, precharting, history and exam, post visit documentation, and reviewing studies as outlined above.     The longitudinal plan of care for the diagnosis(es)/condition(s) as documented were addressed during this visit. Due to the added complexity in  care, I will continue to support Maya in the subsequent management and with ongoing continuity of care.    BENNETT Funk, CNP   Nurse Practitioner  [facility]  Pager: [phone]  Text Page  (8am - 5pm, M-F)    Orders this Visit:  Orders Placed This Encounter   Procedures     Adult Palliative Care  Referral     Orders Placed This Encounter   Medications     metoprolol succinate ER (TOPROL XL) 25 MG 24 hr tablet     Sig: Take 0.5 tablets (12.5 mg) by mouth daily.     Hold for SBP<100 mmHg     Medications Discontinued During This Encounter   Medication Reason     metoprolol succinate ER (TOPROL XL) 25 MG 24 hr tablet      Encounter Diagnoses   Name Primary?     Chronic HFrEF (heart failure with reduced ejection fraction) (H)      Acute systolic heart failure (H)          CURRENT MEDICATIONS:  Current Outpatient Medications   Medication Sig Dispense Refill     acetaminophen (TYLENOL) 325 MG tablet Take 3 tablets (975 mg) by mouth 3 times daily       carBAMazepine (TEGRETOL) 100 MG chewable tablet Take 2 tablets (200 mg) by mouth 2 times daily       diclofenac (VOLTAREN) 1 % topical gel Apply 2 g topically 4 times daily. To right lower leg 350 g 1     furosemide (LASIX) 40 MG tablet Take 40 mg by mouth daily. You can take an extra half tablet (20 mg) by mouth daily as needed for a weight at or above 115 lbs or for worsening shortness of breath or leg swelling.       latanoprost (XALATAN) 0.005 % ophthalmic solution Place 1 drop into both eyes at bedtime.       lisinopril (ZESTRIL) 5 MG tablet Take 0.5 tablets (2.5 mg) by mouth daily. 90 tablet 3     metoprolol succinate ER (TOPROL XL) 25 MG 24 hr tablet Take 0.5 tablets (12.5 mg) by mouth daily.       multivitamin w/minerals (THERA-VIT-M) tablet Take 1 tablet by mouth daily       ondansetron (ZOFRAN ODT) 4 MG ODT tab Take 1 tablet (4 mg) by mouth every 6 hours as needed for nausea       potassium chloride nick ER (KLOR-CON M15) 15  MEQ CR tablet Take 1 tablet (15 mEq) by mouth daily       timolol maleate (TIMOPTIC) 0.5 % ophthalmic solution Place 1 drop into both eyes 2 times daily.       ALLERGIES  Allergies   Allergen Reactions     Brimonidine Other (See Comments)     Follicular conjunctivitis     Dorzolamide Other (See Comments)     Follicular conjunctivitis     Benzalkonium Chloride Other (See Comments)     Eye irritation.  Does better with preservative free but can tolerate preserved drops     Codeine Unknown and Rash     Fd&C Yellow #5 (Tartrazine) Rash     Fluorescein Itching     fluress     Penicillins      Rash       Sulfa Antibiotics Other (See Comments)     rash     PAST MEDICAL, SURGICAL, FAMILY HISTORY:  History was reviewed and updated as needed, see medical record.    SOCIAL HISTORY:  Social History     Socioeconomic History     Marital status:      Spouse name: Not on file     Number of children: Not on file     Years of education: Not on file     Highest education level: Not on file   Occupational History     Not on file   Tobacco Use     Smoking status: Never     Smokeless tobacco: Never   Substance and Sexual Activity     Alcohol use: No     Drug use: No     Sexual activity: Never   Other Topics Concern     Not on file   Social History Narrative     Not on file     Social Determinants of Health     Financial Resource Strain: Low Risk  (10/4/2024)    Financial Resource Strain      Within the past 12 months, have you or your family members you live with been unable to get utilities (heat, electricity) when it was really needed?: No   Food Insecurity: Low Risk  (10/4/2024)    Food Insecurity      Within the past 12 months, did you worry that your food would run out before you got money to buy more?: No      Within the past 12 months, did the food you bought just not last and you didn t have money to get more?: No   Transportation Needs: Low Risk  (10/4/2024)    Transportation Needs      Within the past 12 months, has  "lack of transportation kept you from medical appointments, getting your medicines, non-medical meetings or appointments, work, or from getting things that you need?: No   Physical Activity: Not on file   Stress: Not on file   Social Connections: Not on file   Interpersonal Safety: Low Risk  (9/17/2024)    Interpersonal Safety      Do you feel physically and emotionally safe where you currently live?: Yes      Within the past 12 months, have you been hit, slapped, kicked or otherwise physically hurt by someone?: No      Within the past 12 months, have you been humiliated or emotionally abused in other ways by your partner or ex-partner?: No   Housing Stability: Low Risk  (10/4/2024)    Housing Stability      Do you have housing? : Yes      Are you worried about losing your housing?: No     Review of Systems:  Focused cardiovascular and respiratory review of systems is negative other than the symptoms noted above in the HPI.     Physical Exam:  Vitals: BP 95/61 (BP Location: Right arm, Patient Position: Sitting, Cuff Size: Adult Small)   Pulse 73   Ht 1.499 m (4' 11\")   Wt 51.3 kg (113 lb 1.6 oz)   SpO2 100%   BMI 22.84 kg/m     Wt Readings from Last 4 Encounters:   10/18/24 51.3 kg (113 lb 1.6 oz)   10/04/24 52.1 kg (114 lb 14.4 oz)   09/18/24 50.2 kg (110 lb 11.2 oz)   09/07/24 52.2 kg (115 lb)     CONSTITUTIONAL: Frail elderly woman in no acute distress.  NECK: No JVD.  CARDIOVASCULAR:  Regular rate and rhythm. No murmur, rub or gallop.   RESPIRATORY: Breathing non-labored. Lungs are clear to auscultation with no wheezes or crackles bilaterally.  GASTROINTESTINAL: Abdomen non-distended.  EXTREMITIES: Trace to 1+ lower extremity edema in the ankles bilaterally, L slightly > than R.   NEUROPSYCHIATRIC: No gross focal deficits. Affect appropriate. Forgetful.     Recent Lab Results:  LIPID RESULTS:  Lab Results   Component Value Date    CHOL 198 08/11/2023    HDL 51 08/11/2023     (H) 08/11/2023    TRIG 132 " 08/11/2023     LIVER ENZYME RESULTS:  Lab Results   Component Value Date    AST 32 09/16/2024    ALT 32 09/16/2024     CBC RESULTS:  Lab Results   Component Value Date    WBC 9.3 09/17/2024    WBC 6.6 07/13/2013    RBC 4.23 09/17/2024    RBC 5.39 (H) 07/13/2013    HGB 13.7 09/17/2024    HGB 16.2 (H) 07/13/2013    HCT 43.0 09/17/2024    HCT 49.6 (H) 07/13/2013     (H) 09/17/2024    MCV 92 07/13/2013    MCH 32.4 09/17/2024    MCH 30.1 07/13/2013    MCHC 31.9 09/17/2024    MCHC 32.7 07/13/2013    RDW 17.2 (H) 09/17/2024    RDW 13.3 07/13/2013     09/17/2024     07/13/2013     BMP RESULTS:  Lab Results   Component Value Date     10/18/2024     07/13/2013    POTASSIUM 4.2 10/18/2024    POTASSIUM 4.1 07/13/2013    CHLORIDE 98 10/18/2024    CHLORIDE 102 07/13/2013    CO2 24 10/18/2024    CO2 25 07/13/2013    ANIONGAP 16 (H) 10/18/2024    ANIONGAP 13 07/13/2013     (H) 10/18/2024     (H) 08/12/2023     (H) 07/13/2013    BUN 26.6 (H) 10/18/2024    BUN 13 07/13/2013    CR 1.38 (H) 10/18/2024    CR 0.89 07/13/2013    GFRESTIMATED 36 (L) 10/18/2024    GFRESTIMATED 61 07/13/2013    GFRESTBLACK 74 07/13/2013    GILL 8.7 (L) 10/18/2024    GILL 9.3 07/13/2013      A1C RESULTS:  Lab Results   Component Value Date    A1C 5.9 (H) 06/16/2024     CC  Shefali Ochoa MD  [address] [id number]  [place] [id number]    Please kindly note that this document was completed in part using Dragon voice recognition software. Although reviewed after completion, some word substitutions and typographical errors may occur. Please contact me if clarification is needed.       Thank you for allowing me to participate in the care of your patient.      Sincerely,     Prasanna Moe NP     [facility]  cc:   Prasanna Moe NP  [address] [place] [id number]

## 2024-11-19 ENCOUNTER — VIRTUAL VISIT (OUTPATIENT)
Dept: PALLIATIVE CARE | Facility: CLINIC | Age: 89
End: 2024-11-19
Attending: NURSE PRACTITIONER
Payer: MEDICARE

## 2024-11-19 VITALS — WEIGHT: 115 LBS | HEIGHT: 59 IN | BODY MASS INDEX: 23.18 KG/M2

## 2024-11-19 DIAGNOSIS — R29.6 RECURRENT FALLS: ICD-10-CM

## 2024-11-19 DIAGNOSIS — M79.89 LEG SWELLING: ICD-10-CM

## 2024-11-19 DIAGNOSIS — R06.02 SHORTNESS OF BREATH: ICD-10-CM

## 2024-11-19 DIAGNOSIS — R53.81 PHYSICAL DECONDITIONING: ICD-10-CM

## 2024-11-19 DIAGNOSIS — Z71.89 GOALS OF CARE, COUNSELING/DISCUSSION: ICD-10-CM

## 2024-11-19 DIAGNOSIS — Z71.89 ADVANCED CARE PLANNING/COUNSELING DISCUSSION: ICD-10-CM

## 2024-11-19 DIAGNOSIS — I50.22 CHRONIC HFREF (HEART FAILURE WITH REDUCED EJECTION FRACTION) (H): Primary | ICD-10-CM

## 2024-11-19 DIAGNOSIS — Z51.5 ENCOUNTER FOR PALLIATIVE CARE: ICD-10-CM

## 2024-11-19 PROCEDURE — 99417 PROLNG OP E/M EACH 15 MIN: CPT | Performed by: STUDENT IN AN ORGANIZED HEALTH CARE EDUCATION/TRAINING PROGRAM

## 2024-11-19 PROCEDURE — 99497 ADVNCD CARE PLAN 30 MIN: CPT | Mod: 25 | Performed by: STUDENT IN AN ORGANIZED HEALTH CARE EDUCATION/TRAINING PROGRAM

## 2024-11-19 PROCEDURE — 99205 OFFICE O/P NEW HI 60 MIN: CPT | Mod: 25 | Performed by: STUDENT IN AN ORGANIZED HEALTH CARE EDUCATION/TRAINING PROGRAM

## 2024-11-19 NOTE — PROGRESS NOTES
"Palliative Care Clinic Consult Note    Patient Name: Maya Cullen  Primary Provider: Jordyn Jon    Chief Complaint/Patient ID: Maya Cullen is a 89 year old female with PMHx of CVA, severe HFrEF of 10-20%, mod to severe mitral regurg, mild cognitive impairment, and HTN.  -She has also had several falls which have resulted in MSK injuries.     Reviewed: Yes    Social History: . She lives in [facility] in [place]. Had one son and one daughter but both now . Son-in-law is main support person, followed by a couple friends and her granddaughters. For many years she served as the [employer] \"\" for her Christian.     Advanced Care Planning: Has completed and HCD but not available to us. KEVEN Gonzales is primary HCA followed by granddaughters.      History of Present Illness:  Maya Cullen is a 89 year old female who is seen for a new consult via Video visit today with Palliative Care. KEVEN Gonzales is also present and provides additional history.     Reviewed cardiology note from 10/18. Reviewed patient s course over the last year, including that she was hospitalized for a cervical fracture after a fall in Spring. He was noted to be 40 to 45% at that time. After hospitalization in 2024, EF was noted to be 10 to 20% at that time, and she also was found to have mitral regurg. She has since had some exacerbation of her CHF that did require hospitalization. At clinic visit on 10/18, they discussed ongoing desire for conservative management. Labs possibly showing cardio renal syndrome. Plans to continue with Lasix 40 mg daily with 20 mg available as needed if her weight rises above 115 pounds as well as metoprolol and lisinopril. Discussed palliative care consult for goals of care discussion.    Reports that more recently, she has been feeling better. Since her last fall and injury to her shoulder, she does still at times have pain that " radiates down her side. She manages this with ice packs. She has exercises from physical therapy, but admits that she s not doing them on a regular basis.    Overall says that she has been  feeling fine . Denies shortness of breath, even with activity. She uses a metal walker in her apartment, and when she leaves, she uses a rolling walker. She is able to dress herself and walk to breakfast and dinner unassisted. She prepares lunch herself with a microwavable meal every day. She does have assistance with medication administration, showering, as well as putting on her compression stockings.    Reports some issues overnight with sleeping, as she has to wake up to go to the bathroom. She feels this is due to the diuretic and doesn t fully understand why she is on this medication. She said at one point  I m not on any medicines for my heart, am I?  . She also said a couple of times that she had just learned recently that she had heart disease.    She did have an initial decline in her weight between  and , however, this has stopped and weight has been stable the last few months.    When I asked about additional conversations she s had regarding her overall wishes she states  whatever has to be done, has to be done I guess . She does confirm a desire for less invasive measures. I asked about code status, and she confirmed that she would not want to go through CPR and would not want to be intubated and on a ventilator. She would still be open to hospitalization for reversible conditions at this point. Neither she nor Christian recollect, completing a POLST form. She does have a healthcare directive naming Christian as her primary HCA followed by her granddaughters.    She was her primary caregiver for the last few years of his life (he had some form of parkinsonism). She knows how difficult that was. She also has experience with hospice for her , as well as her daughter, who  in 2019 from colon  cancer.    Physical Exam:   Constitutional: Alert, pleasant, no apparent distress. Sitting up in chair.  Eyes: Sclera non-icteric, no eye discharge.  ENT: No nasal discharge. Ears grossly normal.  Respiratory: Unlabored respirations. Speaking in full sentences.  Musculoskeletal: Extremities appear normal- no gross deformities noted. No edema noted on upper body.   Skin: No suspicious lesions or rashes on visible skin.  Neurologic: Clear speech, no aphasia. No facial droop.  Psychiatric: Mentation appears normal, appropriate attention. Affect normal/bright. Does not appear anxious or depressed.    Key Data Reviewed:  LABS:   Lab Results   Component Value Date    WBC 9.3 09/17/2024    HGB 13.7 09/17/2024    HCT 43.0 09/17/2024     09/17/2024     10/18/2024    POTASSIUM 4.2 10/18/2024    CHLORIDE 98 10/18/2024    CO2 24 10/18/2024    BUN 26.6 (H) 10/18/2024    CR 1.38 (H) 10/18/2024     (H) 10/18/2024    SED 8 07/13/2013    NTBNPI 17,202 (H) 09/16/2024    NTBNP 13,174 (H) 10/18/2024    AST 32 09/16/2024    ALT 32 09/16/2024    ALKPHOS 124 09/16/2024    BILITOTAL 0.7 09/16/2024     10/18/24 - GFR 36.       Impression & Recommendations & Counseling:  Maya Cullen is a 89 year old female with history of CVA, severe HFrEF of 10-20%, mod to severe mitral regurg, mild cognitive impairment, and HTN.    Introduced the role of palliative care as an interdisciplinary team that cares for patients with serious illness to help support symptom management, communication, coping for patients and their families as well as support with medical decision making.    From a symptomatic perspective, she s actually doing fairly well right now. It does not seem like she is in an exacerbation of heart failure right now. She reports swelling has improved, and she is currently denying shortness of breath. We did discuss the importance of continuing with the medication prescribed by cardiology, as these either help to  decrease the work the heart is doing or help to remove access fluid that can build up and cause symptoms. She has a follow-up visit with cardiology later this week.    I discussed the role of opioids in the future for dyspnea. She appreciated knowing this information, but we agreed she was not quite at that point yet.    ACP - Advance Care Planning Discussion 11/19/2024. I, Snow Yadav, DO met with Patient and their Health Care Agent(s) today during a clinic visit to discuss Advance Care Planning. Maya Cullen  has partial capacity for complex medical decision-making  and was present for this discussion.  Those present were informed of the voluntary nature of this discussion and wished to proceed.     Reviewed her healthcare directive. This was just completed about a year ago, and she agrees that it is accurate and up-to-date. Christian is her primary healthcare agent. Suggested sending a copy of it to us through my chart, and we could get it scanned into her chart.    Regarding status, she agrees that she would not want to go through CPR or intubation. At this time, she would be open to hospitalization for treatment of reversible conditions. Based on this, we did complete a post form today stating DNR/DNI-Selective Treatment. We will scan this into her chart and send a copy through my chart.    Finally, we did discuss the philosophy of hospice care and what this might look like in the future.  We discussed that there are two pieces that go along with qualifying for hospice- one being prognosis of less than 6 months if the disease followed its presumed/natural course and second a desire to focus exclusively on comfort measures and to forego further restorative types of medical interventions.  Right now, she feels comfortable with current plan of care and overall feels really good.  We talked about doing an informational meeting with hospice, which I would strongly recommend, as an opportunity to  ask questions about that plan of care without the commitment of having to sign on. She knows this can be ordered at any time.      Recommendations:  -Add back in PT exercises three times per week to help maintain strength and stamina.  -Continue medications per cardiology.  -Educated on role of opioids for dyspnea in the future.  -Completed post form today stating DNR/DNI-Selective treatment. also updated code status in the computer.  -Provided education on hospice information visits. Discussed this referral can be placed at any point in time.      Follow up: As needed. She declined scheduled follow up at this time but knows it's always an open door if she thinks we can be helpful in the future.      Video-Visit Details  Video Start Time: 1PM  Video End Time: 1:56PM    Originating Location (pt. Location): Home     Distant Location (provider location):  Offsite- Personal Home      Platform used for Video Visit: Aristeo     Total time spent on day of encounter is 93 mins, including reviewing record, review of above studies, above visit with patient, 20 minutes spent exclusively on advanced care planning and goals of care discussion regarding prognosis with heart failure, discussion about enrollment in hospice including support and symptom management in the future, conversation about healthcare directive, and code status discussion including completing POLST form, and documentation.     Snow Yadav, DO  Palliative Medicine       Some chart documentation performed using Dragon Voice recognition Software. Although reviewed after completion, some words and grammatical errors may remain.

## 2024-11-19 NOTE — PATIENT INSTRUCTIONS
Recommendations:  -Try to keep up with your exercises at least 3 times a week. This will maintain strength and stamina and also help combat some fatigue.    -We discussed how low-dose opioid medications can be used to help with shortness of breath.  While they do not change the levels of oxygen in your blood, they hit certain receptors in your body to help take away the feeling of being short of breath. We generally do low doses of a medicine such as morphine, doses a few times daily. The goal would be for it to feel just a little bit easier to go about your day-to-day tasks and not have to be as focused on your breathing.     -We discussed code status today, which has to do with your thoughts on resuscitation. Based on this conversation, we have that you would not want CPR performed on you if your heart were to stop and you  (DNR). We also discussed that you would be open to hospitalization and medical treatment for reversible conditions but would not want to consider going on a breathing machine/ventilator (do not intubate). We verbally completed the POLST form today to reflect these wishes, and we will send this to you through [email or web address]. If you agree, sign the form and place on your refrigerator. I would also give a copy to your living community to have on hand. It would be helpful if you could bring a copy of the form you sign to the clinic to be scanned in your chart, but this is not absolutely necessary.  -If you are able to scan and send your healthcare directive/living will to me through a [email or web address] message, we can get that scanned into your [place] chart.  We talked about hospice care and what this might look like in the future.  We discussed that there are two pieces that go along with qualifying for hospice- one being prognosis of less than 6 months if the disease followed its presumed/natural course and second a desire to focus exclusively on comfort measures and to forego further restorative types of  medical interventions.  We talked about doing an informational meeting with hospice, which I would strongly recommend as an opportunity to ask questions about that plan of care without the commitment of having to sign on.  Please let us know if you would like to have one of these meetings, and we can send the referral.      Follow up: As needed.      Reasons to Call    If you are having worsening/uncontrolled symptoms we want you to call!    You or your other physicians make any changes to medications we have prescribed.  -Please call for refills 4-5 days before you will run out of medication.    Important Phone Numbers, including: Refills, scheduling, and general questions     Palliative Care RN: Cori Carpio : [phone]  *For scheduling needs/follow up visits : [phone]  *After hours or on weekends- Will connect you with on call MD : [phone].

## 2024-11-19 NOTE — LETTER
"2024       RE: Maya Cullen  [address]  [facility] [address]     Dear Colleague,    [facility] you [facility] referring your [facility], Maya Cullen[facility]. Please see a copy of my visit note below.    Palliative Care Clinic Consult Note    Patient Name: Maya Cullen  Primary Provider: Jordyn Jon    Chief Complaint/Patient ID: Maya Cullen is a 89 year old female with PMHx of CVA, severe HFrEF of 10-20%, mod to severe mitral regurg, mild cognitive impairment, and HTN.  -She has also had several falls which have resulted in MSK injuries.     Reviewed: Yes    Social History: . She lives in [facility] in [place]. Had one son and one daughter but both now . Son-in-law is main support person, followed by a couple friends and her granddaughters. For many years she served as the [employer] \"\" for her [employer].     Advanced Care Planning: Has completed and HCD but not available to us. KEVEN Gonzales is primary HCA followed by granddaughters.      History of Present Illness:  Maya Cullen is a 89 year old female who is seen for a new consult via Video visit today with Palliative Care. KEVEN Gonzales is also present and provides additional history.     Reviewed cardiology note from 10/18. Reviewed patient s course over the last year, including that she was hospitalized for a cervical fracture after a fall in Spring. He was noted to be 40 to 45% at that time. After hospitalization in 2024, EF was noted to be 10 to 20% at that time, and she also was found to have mitral regurg. She has since had some exacerbation of her CHF that did require hospitalization. At clinic visit on 10/18, they discussed ongoing desire for conservative management. Labs possibly showing cardio renal syndrome. Plans to continue with Lasix 40 mg " daily with 20 mg available as needed if her weight rises above 115 pounds as well as metoprolol and lisinopril. Discussed palliative care consult for goals of care discussion.    Reports that more recently, she has been feeling better. Since her last fall and injury to her shoulder, she does still at times have pain that radiates down her side. She manages this with ice packs. She has exercises from physical therapy, but admits that she s not doing them on a regular basis.    Overall says that she has been  feeling fine . Denies shortness of breath, even with activity. She uses a metal walker in her apartment, and when she leaves, she uses a rolling walker. She is able to dress herself and walk to breakfast and dinner unassisted. She prepares lunch herself with a microwavable meal every day. She does have assistance with medication administration, showering, as well as putting on her compression stockings.    Reports some issues overnight with sleeping, as she has to wake up to go to the bathroom. She feels this is due to the diuretic and doesn t fully understand why she is on this medication. She said at one point  I m not on any medicines for my heart, am I?  . She also said a couple of times that she had just learned recently that she had heart disease.    She did have an initial decline in her weight between 2023 and 2024, however, this has stopped and weight has been stable the last few months.    When I asked about additional conversations she s had regarding her overall wishes she states  whatever has to be done, has to be done I guess . She does confirm a desire for less invasive measures. I asked about code status, and she confirmed that she would not want to go through CPR and would not want to be intubated and on a ventilator. She would still be open to hospitalization for reversible conditions at this point. Neither she nor Christian recollect, completing a POLST form. She does have a healthcare directive  naming Christian as her primary HCA followed by her granddaughters.    She was her primary caregiver for the last few years of his life (he had some form of parkinsonism). She knows how difficult that was. She also has experience with hospice for her , as well as her daughter, who  in 2019 from colon cancer.    Physical Exam:   Constitutional: Alert, pleasant, no apparent distress. Sitting up in chair.  Eyes: Sclera non-icteric, no eye discharge.  ENT: No nasal discharge. Ears grossly normal.  Respiratory: Unlabored respirations. Speaking in full sentences.  Musculoskeletal: Extremities appear normal- no gross deformities noted. No edema noted on upper body.   Skin: No suspicious lesions or rashes on visible skin.  Neurologic: Clear speech, no aphasia. No facial droop.  Psychiatric: Mentation appears normal, appropriate attention. Affect normal/bright. Does not appear anxious or depressed.    Key Data Reviewed:  LABS:   Lab Results   Component Value Date    WBC 9.3 2024    HGB 13.7 2024    HCT 43.0 2024     2024     10/18/2024    POTASSIUM 4.2 10/18/2024    CHLORIDE 98 10/18/2024    CO2 24 10/18/2024    BUN 26.6 (H) 10/18/2024    CR 1.38 (H) 10/18/2024     (H) 10/18/2024    SED 8 2013    NTBNPI 17,202 (H) 2024    NTBNP 13,174 (H) 10/18/2024    AST 32 2024    ALT 32 2024    ALKPHOS 124 2024    BILITOTAL 0.7 2024     10/18/24 - GFR 36.       Impression & Recommendations & Counseling:  Maya Cullen is a 89 year old female with history of CVA, severe HFrEF of 10-20%, mod to severe mitral regurg, mild cognitive impairment, and HTN.    Introduced the role of palliative care as an interdisciplinary team that cares for patients with serious illness to help support symptom management, communication, coping for patients and their families as well as support with medical decision making.    From a symptomatic perspective, she s  actually doing fairly well right now. It does not seem like she is in an exacerbation of heart failure right now. She reports swelling has improved, and she is currently denying shortness of breath. We did discuss the importance of continuing with the medication prescribed by cardiology, as these either help to decrease the work the heart is doing or help to remove access fluid that can build up and cause symptoms. She has a follow-up visit with cardiology later this week.    I discussed the role of opioids in the future for dyspnea. She appreciated knowing this information, but we agreed she was not quite at that point yet.    ACP - Advance Care Planning Discussion 11/19/2024. I, Snow Yadav, DO met with Patient and their Health Care Agent(s) today during a clinic visit to discuss Advance Care Planning. Yumikovalentinaиванbharath Cullen  has partial capacity for complex medical decision-making  and was present for this discussion.  Those present were informed of the voluntary nature of this discussion and wished to proceed.     Reviewed her healthcare directive. This was just completed about a year ago, and she agrees that it is accurate and up-to-date. Christian is her primary healthcare agent. Suggested sending a copy of it to us through my chart, and we could get it scanned into her chart.    Regarding status, she agrees that she would not want to go through CPR or intubation. At this time, she would be open to hospitalization for treatment of reversible conditions. Based on this, we did complete a post form today stating DNR/DNI-Selective Treatment. We will scan this into her chart and send a copy through my chart.    Finally, we did discuss the philosophy of hospice care and what this might look like in the future.  We discussed that there are two pieces that go along with qualifying for hospice- one being prognosis of less than 6 months if the disease followed its presumed/natural course and second a desire to  focus exclusively on comfort measures and to forego further restorative types of medical interventions.  Right now, she feels comfortable with current plan of care and overall feels really good.  We talked about doing an informational meeting with hospice, which I would strongly recommend, as an opportunity to ask questions about that plan of care without the commitment of having to sign on. She knows this can be ordered at any time.      Recommendations:  -Add back in PT exercises three times per week to help maintain strength and stamina.  -Continue medications per cardiology.  -Educated on role of opioids for dyspnea in the future.  -Completed post form today stating DNR/DNI-Selective treatment. also updated code status in the computer.  -Provided education on hospice information visits. Discussed this referral can be placed at any point in time.      Follow up: As needed. She declined scheduled follow up at this time but knows it's always an open door if she thinks we can be helpful in the future.      Video-Visit Details  Video Start Time: 1PM  Video End Time: 1:56PM    Originating Location (pt. Location): Home     Distant Location (provider location):  Offsite- Personal Home      Platform used for Video Visit: Ariste[employer]     Total time spent on day of encounter is 93 mins, including reviewing record, review of above studies, above visit with patient, 20 minutes spent exclusively on advanced care planning and goals of care discussion regarding prognosis with heart failure, discussion about enrollment in hospice including support and symptom management in the future, conversation about healthcare directive, and code status discussion including completing POLST form, and documentation.     Snow Yadav, DO  Palliative Medicine       Some chart documentation performed using Dragon Voice recognition Software. Although reviewed after completion, some words and grammatical errors may remain.      Again,  thank you for allowing me to participate in the care of your patient.      Sincerely,    Snow Yadav, DO

## 2024-11-19 NOTE — NURSING NOTE
Current patient location: [address] [place] [address]   [facility] [address]    Is the patient currently in the state of [place]? YES    Visit mode:VIDEO    If the visit is dropped, the patient can be reconnected by:TELEPHONE VISIT: Phone number: [phone]    Will anyone else be joining the visit? Yes, son in law Christian is there with her.   (If patient encounters technical issues they should call [phone])    Are changes needed to the allergy or medication list? No    Are refills needed on medications prescribed by this physician? NO    Rooming Documentation:  Questionnaire(s) not pre-assigned    Reason for visit: Consult    Heidi JOHNSON

## 2024-11-20 ENCOUNTER — DOCUMENTATION ONLY (OUTPATIENT)
Dept: OTHER | Facility: CLINIC | Age: 89
End: 2024-11-20
Payer: MEDICARE

## 2025-02-21 ENCOUNTER — HOSPITAL ENCOUNTER (INPATIENT)
Facility: CLINIC | Age: OVER 89
LOS: 4 days | Discharge: NURSING, REHAB OR LONG-TERM CARE | DRG: 280 | End: 2025-02-25
Attending: EMERGENCY MEDICINE | Admitting: INTERNAL MEDICINE
Payer: MEDICARE

## 2025-02-21 ENCOUNTER — APPOINTMENT (OUTPATIENT)
Dept: GENERAL RADIOLOGY | Facility: CLINIC | Age: OVER 89
DRG: 280 | End: 2025-02-21
Attending: EMERGENCY MEDICINE
Payer: MEDICARE

## 2025-02-21 DIAGNOSIS — R79.89 ELEVATED BRAIN NATRIURETIC PEPTIDE (BNP) LEVEL: ICD-10-CM

## 2025-02-21 DIAGNOSIS — I50.23 ACUTE ON CHRONIC SYSTOLIC CONGESTIVE HEART FAILURE (H): ICD-10-CM

## 2025-02-21 DIAGNOSIS — J90 PLEURAL EFFUSION ON LEFT: ICD-10-CM

## 2025-02-21 DIAGNOSIS — N17.9 ACUTE KIDNEY INJURY SUPERIMPOSED ON CKD: ICD-10-CM

## 2025-02-21 DIAGNOSIS — R79.89 ELEVATED TROPONIN: ICD-10-CM

## 2025-02-21 DIAGNOSIS — N18.9 ACUTE KIDNEY INJURY SUPERIMPOSED ON CKD: ICD-10-CM

## 2025-02-21 PROBLEM — I50.9 CHF EXACERBATION (H): Status: ACTIVE | Noted: 2025-02-21

## 2025-02-21 LAB
ANION GAP SERPL CALCULATED.3IONS-SCNC: 11 MMOL/L (ref 7–15)
ATRIAL RATE - MUSE: 66 BPM
BASOPHILS # BLD AUTO: 0 10E3/UL (ref 0–0.2)
BASOPHILS NFR BLD AUTO: 0 %
BUN SERPL-MCNC: 34.5 MG/DL (ref 8–23)
CALCIUM SERPL-MCNC: 9.1 MG/DL (ref 8.8–10.4)
CHLORIDE SERPL-SCNC: 98 MMOL/L (ref 98–107)
CREAT SERPL-MCNC: 1.75 MG/DL (ref 0.51–0.95)
DIASTOLIC BLOOD PRESSURE - MUSE: NORMAL MMHG
EGFRCR SERPLBLD CKD-EPI 2021: 27 ML/MIN/1.73M2
EOSINOPHIL # BLD AUTO: 0.1 10E3/UL (ref 0–0.7)
EOSINOPHIL NFR BLD AUTO: 1 %
ERYTHROCYTE [DISTWIDTH] IN BLOOD BY AUTOMATED COUNT: 15.9 % (ref 10–15)
FLUAV RNA SPEC QL NAA+PROBE: NEGATIVE
FLUBV RNA RESP QL NAA+PROBE: NEGATIVE
GLUCOSE SERPL-MCNC: 127 MG/DL (ref 70–99)
HCO3 SERPL-SCNC: 26 MMOL/L (ref 22–29)
HCT VFR BLD AUTO: 46 % (ref 35–47)
HGB BLD-MCNC: 15.1 G/DL (ref 11.7–15.7)
IMM GRANULOCYTES # BLD: 0 10E3/UL
IMM GRANULOCYTES NFR BLD: 1 %
INTERPRETATION ECG - MUSE: NORMAL
LYMPHOCYTES # BLD AUTO: 0.9 10E3/UL (ref 0.8–5.3)
LYMPHOCYTES NFR BLD AUTO: 15 %
MAGNESIUM SERPL-MCNC: 2.4 MG/DL (ref 1.7–2.3)
MCH RBC QN AUTO: 33.2 PG (ref 26.5–33)
MCHC RBC AUTO-ENTMCNC: 32.8 G/DL (ref 31.5–36.5)
MCV RBC AUTO: 101 FL (ref 78–100)
MONOCYTES # BLD AUTO: 0.6 10E3/UL (ref 0–1.3)
MONOCYTES NFR BLD AUTO: 9 %
NEUTROPHILS # BLD AUTO: 4.4 10E3/UL (ref 1.6–8.3)
NEUTROPHILS NFR BLD AUTO: 74 %
NRBC # BLD AUTO: 0 10E3/UL
NRBC BLD AUTO-RTO: 0 /100
NT-PROBNP SERPL-MCNC: ABNORMAL PG/ML (ref 0–1800)
P AXIS - MUSE: 61 DEGREES
PLATELET # BLD AUTO: 235 10E3/UL (ref 150–450)
POTASSIUM SERPL-SCNC: 4.9 MMOL/L (ref 3.4–5.3)
PR INTERVAL - MUSE: 202 MS
QRS DURATION - MUSE: 98 MS
QT - MUSE: 386 MS
QTC - MUSE: 404 MS
R AXIS - MUSE: -47 DEGREES
RBC # BLD AUTO: 4.55 10E6/UL (ref 3.8–5.2)
RSV RNA SPEC NAA+PROBE: NEGATIVE
SARS-COV-2 RNA RESP QL NAA+PROBE: NEGATIVE
SODIUM SERPL-SCNC: 135 MMOL/L (ref 135–145)
SYSTOLIC BLOOD PRESSURE - MUSE: NORMAL MMHG
T AXIS - MUSE: 18 DEGREES
TROPONIN T SERPL HS-MCNC: 82 NG/L
TROPONIN T SERPL HS-MCNC: 83 NG/L
VENTRICULAR RATE- MUSE: 66 BPM
WBC # BLD AUTO: 5.9 10E3/UL (ref 4–11)

## 2025-02-21 PROCEDURE — 93005 ELECTROCARDIOGRAM TRACING: CPT

## 2025-02-21 PROCEDURE — 71046 X-RAY EXAM CHEST 2 VIEWS: CPT

## 2025-02-21 PROCEDURE — 80048 BASIC METABOLIC PNL TOTAL CA: CPT | Performed by: EMERGENCY MEDICINE

## 2025-02-21 PROCEDURE — 250N000009 HC RX 250: Performed by: INTERNAL MEDICINE

## 2025-02-21 PROCEDURE — 83880 ASSAY OF NATRIURETIC PEPTIDE: CPT | Performed by: EMERGENCY MEDICINE

## 2025-02-21 PROCEDURE — 36415 COLL VENOUS BLD VENIPUNCTURE: CPT | Performed by: EMERGENCY MEDICINE

## 2025-02-21 PROCEDURE — 250N000013 HC RX MED GY IP 250 OP 250 PS 637: Performed by: INTERNAL MEDICINE

## 2025-02-21 PROCEDURE — 85041 AUTOMATED RBC COUNT: CPT | Performed by: EMERGENCY MEDICINE

## 2025-02-21 PROCEDURE — 250N000011 HC RX IP 250 OP 636: Performed by: EMERGENCY MEDICINE

## 2025-02-21 PROCEDURE — 120N000001 HC R&B MED SURG/OB

## 2025-02-21 PROCEDURE — 85018 HEMOGLOBIN: CPT | Performed by: EMERGENCY MEDICINE

## 2025-02-21 PROCEDURE — 99223 1ST HOSP IP/OBS HIGH 75: CPT | Mod: AI | Performed by: INTERNAL MEDICINE

## 2025-02-21 PROCEDURE — 99285 EMERGENCY DEPT VISIT HI MDM: CPT | Mod: 25

## 2025-02-21 PROCEDURE — 83735 ASSAY OF MAGNESIUM: CPT | Performed by: INTERNAL MEDICINE

## 2025-02-21 PROCEDURE — 36415 COLL VENOUS BLD VENIPUNCTURE: CPT | Performed by: INTERNAL MEDICINE

## 2025-02-21 PROCEDURE — 87637 SARSCOV2&INF A&B&RSV AMP PRB: CPT | Performed by: EMERGENCY MEDICINE

## 2025-02-21 PROCEDURE — 84484 ASSAY OF TROPONIN QUANT: CPT | Performed by: EMERGENCY MEDICINE

## 2025-02-21 PROCEDURE — 85025 COMPLETE CBC W/AUTO DIFF WBC: CPT | Performed by: EMERGENCY MEDICINE

## 2025-02-21 PROCEDURE — 82310 ASSAY OF CALCIUM: CPT | Performed by: EMERGENCY MEDICINE

## 2025-02-21 RX ORDER — ONDANSETRON 4 MG/1
4 TABLET, ORALLY DISINTEGRATING ORAL EVERY 6 HOURS PRN
Status: DISCONTINUED | OUTPATIENT
Start: 2025-02-21 | End: 2025-02-25 | Stop reason: HOSPADM

## 2025-02-21 RX ORDER — FUROSEMIDE 10 MG/ML
40 INJECTION INTRAMUSCULAR; INTRAVENOUS ONCE
Status: COMPLETED | OUTPATIENT
Start: 2025-02-21 | End: 2025-02-21

## 2025-02-21 RX ORDER — POTASSIUM CHLORIDE 20MEQ/15ML
15 LIQUID (ML) ORAL DAILY
Status: DISCONTINUED | OUTPATIENT
Start: 2025-02-22 | End: 2025-02-25 | Stop reason: HOSPADM

## 2025-02-21 RX ORDER — LISINOPRIL 2.5 MG/1
2.5 TABLET ORAL DAILY
Status: DISCONTINUED | OUTPATIENT
Start: 2025-02-22 | End: 2025-02-25 | Stop reason: HOSPADM

## 2025-02-21 RX ORDER — CARBAMAZEPINE 100 MG/1
200 TABLET, CHEWABLE ORAL 2 TIMES DAILY
Status: DISCONTINUED | OUTPATIENT
Start: 2025-02-21 | End: 2025-02-25 | Stop reason: HOSPADM

## 2025-02-21 RX ORDER — ACETAMINOPHEN 650 MG/1
650 SUPPOSITORY RECTAL EVERY 4 HOURS PRN
Status: DISCONTINUED | OUTPATIENT
Start: 2025-02-21 | End: 2025-02-25 | Stop reason: HOSPADM

## 2025-02-21 RX ORDER — ONDANSETRON 2 MG/ML
4 INJECTION INTRAMUSCULAR; INTRAVENOUS EVERY 6 HOURS PRN
Status: DISCONTINUED | OUTPATIENT
Start: 2025-02-21 | End: 2025-02-25 | Stop reason: HOSPADM

## 2025-02-21 RX ORDER — FUROSEMIDE 10 MG/ML
40 INJECTION INTRAMUSCULAR; INTRAVENOUS
Status: DISCONTINUED | OUTPATIENT
Start: 2025-02-22 | End: 2025-02-25

## 2025-02-21 RX ORDER — LATANOPROST 50 UG/ML
1 SOLUTION/ DROPS OPHTHALMIC AT BEDTIME
Status: DISCONTINUED | OUTPATIENT
Start: 2025-02-21 | End: 2025-02-25 | Stop reason: HOSPADM

## 2025-02-21 RX ORDER — TIMOLOL MALEATE 5 MG/ML
1 SOLUTION/ DROPS OPHTHALMIC 2 TIMES DAILY
Status: DISCONTINUED | OUTPATIENT
Start: 2025-02-21 | End: 2025-02-25 | Stop reason: HOSPADM

## 2025-02-21 RX ORDER — ACETAMINOPHEN 325 MG/1
650 TABLET ORAL EVERY 4 HOURS PRN
Status: DISCONTINUED | OUTPATIENT
Start: 2025-02-21 | End: 2025-02-25 | Stop reason: HOSPADM

## 2025-02-21 RX ORDER — ACETAMINOPHEN 325 MG/1
975 TABLET ORAL 3 TIMES DAILY
Status: DISCONTINUED | OUTPATIENT
Start: 2025-02-21 | End: 2025-02-25 | Stop reason: HOSPADM

## 2025-02-21 RX ORDER — LIDOCAINE 40 MG/G
CREAM TOPICAL
Status: DISCONTINUED | OUTPATIENT
Start: 2025-02-21 | End: 2025-02-25 | Stop reason: HOSPADM

## 2025-02-21 RX ADMIN — FUROSEMIDE 40 MG: 10 INJECTION, SOLUTION INTRAMUSCULAR; INTRAVENOUS at 16:02

## 2025-02-21 RX ADMIN — CARBAMAZEPINE 200 MG: 100 TABLET, CHEWABLE ORAL at 21:07

## 2025-02-21 RX ADMIN — LATANOPROST 1 DROP: 50 SOLUTION/ DROPS OPHTHALMIC at 21:08

## 2025-02-21 RX ADMIN — TIMOLOL MALEATE 1 DROP: 5 SOLUTION/ DROPS OPHTHALMIC at 21:08

## 2025-02-21 RX ADMIN — ACETAMINOPHEN 975 MG: 325 TABLET, FILM COATED ORAL at 21:04

## 2025-02-21 ASSESSMENT — ACTIVITIES OF DAILY LIVING (ADL)
ADLS_ACUITY_SCORE: 61
ADLS_ACUITY_SCORE: 65
ADLS_ACUITY_SCORE: 65
ADLS_ACUITY_SCORE: 75
ADLS_ACUITY_SCORE: 67
ADLS_ACUITY_SCORE: 65
ADLS_ACUITY_SCORE: 65
ADLS_ACUITY_SCORE: 67
ADLS_ACUITY_SCORE: 65

## 2025-02-21 NOTE — H&P
[facility]  History and Physical  Hospitalist - Ming Quinn DO       Date of Admission:  2/21/2025    Chief Complaint   Shortness of breath    History is obtained from the patient, the patient's son-in-law at bedside, the emergency department physician, as well as electronic medical record.    History of Present Illness   Maya Cullen is a 89 year old female with past medical history of HFrEF with EF 20% in 6/2024, moderate mitral valve regurgitation, mild to moderate tricuspid regurgitation, hypertension, history of CVA, recurrent falls, subclinical hypothyroidism, mild cognitive impairment, pulmonary hypertension, trigeminal neuralgia who presented on 2/21/2025 with chief complaint of shortness of breath.  Family reports that the patient was with a friend at a [employer] gathering and the friend noted her to be fairly short of breath.  She encouraged the patient to come to the emergency department for evaluation.  The patient states she has felt short of breath for the past few days, worse with activity.  She denies any chest pain/tightness/pressure, lightheadedness or dizziness over that time.  She lives in an assisted living facility and mostly uses a wheelchair to get around but sometimes uses a walker.  She has noticed worsening edema in her legs, but does note that she always has edema.  She does not routinely check her weights.  Staff at the facility has been managing her medications.    In the emergency department, the patient was found to have a temperature of 90.1  F, heart rate 72, blood pressure 122/84, respiratory rate 18, SpO2 90% on 3 L nasal cannula.  Initial lab work showed BUNs/creatinine 34.5/1.75, proBNP 26,510, high-sensitivity troponin 83.  COVID-19, influenza, RSV were negative.  Chest x-ray showed new small left pleural effusion, mild prominence of the pulmonary vascularity suggesting vascular congestion.  EKG showed sinus rhythm.  The patient was given Lasix  40 mg IV x 1 and admitted for CHF exacerbation with cardiology consultation.    ASSESSMENT/PLAN    Acute Exacerbation of HFrEF - EF 20%  NSTEMI - Likely Type 2  - Strict I/O  - Daily Weights (Dry weight seems to be around 110-115 lbs)  - Telemetry  - Lasix 40 mg IV BID  - Most recent echocardiogram in 6/2024 showed EF 20-25%, severely reduced RVSF, moderate mitral valve regurgitation, mild to moderate TR, moderate to severe pulmonary hypertension.  Will hold off on repeat echo for now  - Appreciate Cardiology recommendations    Suspected Cardiorenal Syndrome  Chronic Kidney Disease Stage 3a  - Baseline Cr = 1.1-1.3  - Diuresis as above  - Daily BMP    Chronic Medical Problems:  Mild Cognitive Impairment  Hypertension  Moderate MR  Mild to Moderate TR  Hx of CVA  Subclinical Hypothyroidism  Trigeminal Neuralgia  Pulmonary Hypertension     PLAN: Resume home medications as appropriate once confirmed by pharmacy. Pt is confirmed DNR/DNI with patient and POA/Son-in-law at bedside.    I spent 55 minutes in reviewing this patient's labs, imaging, medications, medical history.  In addition time was spent interviewing the patient, communicating with family, and medical decision making.    DVT Prophylaxis: Pneumatic Compression Devices  Code Status: DNR / DNI  Disposition: Medically Ready for Discharge: Anticipated in 2-4 Days    Goals to discharge include: diuresis complete    Ming Quinn DO  Securely message with Vocera (more info)  Text page via [employer] Paging/Directory     Primary Care Physician   Jordyn Jon    -----------------------------------------------------------------------------------------------------------------------------------------------------------------------------------------------------    Past Medical History    I have reviewed this patient's medical history and updated it with pertinent information if needed.   Past Medical History:   Diagnosis Date    Cerebrovascular accident (H)      Cerebellar CVA seen on MRI 2023    Glaucoma     HTN (hypertension)     L4 vertebral fracture (H)     Mild cognitive impairment     Pulmonary hypertension (H)     Severe mitral regurgitation     Systolic CHF, chronic (H)     Tricuspid regurgitation     Trigeminal neuralgia        Past Surgical History   I have reviewed this patient's surgical history and updated it with pertinent information if needed.  Past Surgical History:   Procedure Laterality Date    APPENDECTOMY      CATARACT IOL, RT/LT      EYE SURGERY      HERNIA REPAIR      HYSTERECTOMY, TERESA      bso    TONSILLECTOMY         Prior to Admission Medications   Prior to Admission Medications   Prescriptions Last Dose Informant Patient Reported? Taking?   acetaminophen (TYLENOL) 325 MG tablet   No No   Sig: Take 3 tablets (975 mg) by mouth 3 times daily   carBAMazepine (TEGRETOL) 100 MG chewable tablet   Yes No   Sig: Take 2 tablets (200 mg) by mouth 2 times daily   diclofenac (VOLTAREN) 1 % topical gel   No No   Sig: Apply 2 g topically 4 times daily. To right lower leg   furosemide (LASIX) 40 MG tablet   Yes No   Sig: Take 40 mg by mouth daily. You can take an extra half tablet (20 mg) by mouth daily as needed for a weight at or above 115 lbs or for worsening shortness of breath or leg swelling.   latanoprost (XALATAN) 0.005 % ophthalmic solution   Yes No   Sig: Place 1 drop into both eyes at bedtime.   lisinopril (ZESTRIL) 5 MG tablet   No No   Sig: Take 0.5 tablets (2.5 mg) by mouth daily.   metoprolol succinate ER (TOPROL XL) 25 MG 24 hr tablet   Yes No   Sig: Take 12.5 mg by mouth daily. HOLD Metoprolol for SBP less than 100   multivitamin w/minerals (THERA-VIT-M) tablet   Yes No   Sig: Take 1 tablet by mouth daily   ondansetron (ZOFRAN ODT) 4 MG ODT tab   No No   Sig: Take 1 tablet (4 mg) by mouth every 6 hours as needed for nausea   potassium chloride nick ER (KLOR-CON M15) 15 MEQ CR tablet   No No   Sig: Take 1 tablet (15 mEq) by mouth daily   timolol  maleate (TIMOPTIC) 0.5 % ophthalmic solution   Yes No   Sig: Place 1 drop into both eyes 2 times daily.      Facility-Administered Medications: None     Allergies   Allergies   Allergen Reactions    Brimonidine Other (See Comments)     Follicular conjunctivitis    Dorzolamide Other (See Comments)     Follicular conjunctivitis    Benzalkonium Chloride Other (See Comments)     Eye irritation.  Does better with preservative free but can tolerate preserved drops    Codeine Unknown and Rash    Fd&C Yellow #5 (Tartrazine) Rash    Fluorescein Itching     fluress    Penicillins      Rash      Sulfa Antibiotics Other (See Comments)     rash       Social History   I have reviewed this patient's social history and updated it with pertinent information if needed. Maya Cullen  reports that she has never smoked. She has never used smokeless tobacco. She reports that she does not drink alcohol and does not use drugs.    Family History   I have reviewed this patient's family history and updated it with pertinent information if needed.   No family history on file.    -----------------------------------------------------------------------------------------------------------------------------------------------------------------------------------------------------    Review of Systems   The 10 point Review of Systems is negative other than noted in the HPI or here.     Physical Exam   Temp: 98.1  F (36.7  C) Temp src: Axillary BP: 122/84 Pulse: 68   Resp: 12 SpO2: 99 % O2 Device: Nasal cannula Oxygen Delivery: 3 LPM  Vital Signs with Ranges  Temp:  [98.1  F (36.7  C)] 98.1  F (36.7  C)  Pulse:  [67-73] 68  Resp:  [11-19] 12  BP: (122)/(84) 122/84  SpO2:  [98 %-100 %] 99 %  120 lbs 0 oz    Constitutional: Awake, alert, cooperative, no apparent distress.  Eyes: Conjunctiva and pupils examined and normal.  HEENT: Moist mucous membranes, normal dentition.  Respiratory: Clear to auscultation bilaterally, no crackles or  wheezing.  Cardiovascular: Regular rate and rhythm, normal S1 and S2, and no murmur noted.  GI: Soft, non-distended, non-tender, normal bowel sounds.  Lymph/Hematologic: No anterior cervical or supraclavicular adenopathy.  Skin: No rashes, no cyanosis, BLE edema.  Musculoskeletal: No joint swelling, erythema or tenderness.  Neurologic: Cranial nerves 2-12 intact, normal strength and sensation.  Psychiatric: Alert, oriented to person, place and time, no obvious anxiety or depression.     Data     Recent Labs   Lab 02/21/25  1330   WBC 5.9   HGB 15.1   *         POTASSIUM 4.9   CHLORIDE 98   CO2 26   BUN 34.5*   CR 1.75*   ANIONGAP 11   GILL 9.1   *       Recent Results (from the past 24 hours)   XR Chest 2 Views    Narrative    EXAM: XR CHEST 2 VIEWS  LOCATION: [facility] [place]  DATE: 2/21/2025    INDICATION: Dyspnea. Question CHF exacerbation.  COMPARISON: Chest x-ray 9/16/2024      Impression    IMPRESSION: Stable cardiomegaly. New, small left pleural effusion. Mild prominence of the pulmonary vascularity suggesting vascular congestion. Calcified atherosclerotic changes at the aortic arch. Increased compression of a midthoracic vertebral body   with increased thoracic kyphosis.

## 2025-02-21 NOTE — ED PROVIDER NOTES
"  Emergency Department Note      History of Present Illness     Chief Complaint   Shortness of Breath    History limited as patient is a poor historian.     HPI   Maya Culeln is a 89 year old female with a history of systolic CHF presenting alone via EMS for evaluation of dyspnea.  Report from paramedics is that she was confused at her care facility, unable to state the year.  The patient is oriented now and even recalls that she has a cardiology visit in 4 days.  She does admit to feeling short of breath but does not accurately report when this started.  She has lower extremity edema but cannot tell me if this has changed from baseline.  She does not weigh herself daily reporting current weight of 125 pounds.  She was at 116 pounds at her last [facility] visit but she tells me she has \"never been that low.\"  She denies chest pain.  She endorses cough \"that everyone has now\" without fever, vomiting, or diarrhea.  She does report poor oral intake.    Independent Historian   Nurse report as detailed above.    Review of External Notes   I reviewed Prasanna Moe's 11/22/24 [facility] Cardiology Note. No changes at this visit. Patient supposed to be taking daily weights.  I reviewed bedside paperwork from [place].     Past Medical History     Medical History and Problem List   Cerebrovascular accident  Glaucoma  Hypertension   Mild cognitive impairment  Pulmonary hypertension   Severe mitral regurgitation  Systolic CHF, chronic   Tricuspid regurgitation  Trigeminal neuralgia  Recurrent falls   Cataracts, bilateral   Anterior basement membrane dystrophy  Posterior vitreous detachment, right eye   Hypertrophic cardiomyopathy   Varicella    Medications   Carbamazepine  Furosemide   Lisinopril   Metoprolol succinate   Potassium chloride   Naproxen  Amlodipine     Surgical History   Appendectomy   Cataract IOL, bilateral   Hernia repair  Total abdominal hysterectomy, bilateral salpingo oophorectomy   Bladder " repair   Tonsillectomy   Trabeculectomy, right   Bilateral laser surgery of eye     Physical Exam     Patient Vitals for the past 24 hrs:   BP Temp Temp src Pulse Resp SpO2 Weight   02/21/25 1703 -- -- -- 67 19 100 % --   02/21/25 1700 127/80 -- -- 66 -- -- --   02/21/25 1653 -- -- -- 64 17 100 % --   02/21/25 1638 -- -- -- 68 14 97 % --   02/21/25 1637 123/82 -- -- 68 26 99 % --   02/21/25 1610 -- -- -- -- 14 98 % --   02/21/25 1609 123/86 -- -- 67 -- -- --   02/21/25 1513 -- -- -- 68 12 -- --   02/21/25 1511 -- -- -- 69 11 99 % --   02/21/25 1510 -- -- -- 70 19 99 % --   02/21/25 1509 -- -- -- 73 19 99 % --   02/21/25 1507 -- -- -- 69 12 100 % --   02/21/25 1506 -- -- -- 70 13 98 % --   02/21/25 1505 -- -- -- 67 15 100 % --   02/21/25 1503 -- -- -- 67 11 100 % --   02/21/25 1502 -- -- -- 68 15 -- --   02/21/25 1501 -- -- -- 70 16 100 % --   02/21/25 1500 -- -- -- 70 18 -- --   02/21/25 1458 -- -- -- 70 15 100 % --   02/21/25 1457 -- -- -- 70 14 99 % 54.4 kg (120 lb)   02/21/25 1456 -- -- -- 67 17 100 % --   02/21/25 1301 122/84 98.1  F (36.7  C) Axillary 72 18 98 % 56.7 kg (125 lb)     Physical Exam  General: Well-developed and well-nourished. Well appearing elderly  woman. Cooperative.  Head:  Atraumatic.  Eyes:  Conjunctivae, lids, and sclerae are normal.  ENT:    Normal nose. Moist mucous membranes.  Neck:  Supple. Normal range of motion.  CV:  Regular rate and rhythm. Normal heart sounds with no murmurs, rubs, or gallops detected.  Resp:  No respiratory distress. Clear to auscultation bilaterally without decreased breath sounds, wheezing, rales, or rhonchi.  GI:  Soft. Non-distended. Non-tender.    MS:  Normal ROM. Bilateral lower extremity edema with lymphedema stockings in place.  Skin:  Warm. Non-diaphoretic. No pallor.  Neuro:  Awake. A&Ox3 (unable to state president's name but cannot recall this is his second term and whether or not she voted for him). Normal strength.  Psych: Normal mood and  affect. Normal speech.  Vitals reviewed.     Diagnostics     Lab Results   Labs Ordered and Resulted from Time of ED Arrival to Time of ED Departure   BASIC METABOLIC PANEL - Abnormal       Result Value    Sodium 135      Potassium 4.9      Chloride 98      Carbon Dioxide (CO2) 26      Anion Gap 11      Urea Nitrogen 34.5 (*)     Creatinine 1.75 (*)     GFR Estimate 27 (*)     Calcium 9.1      Glucose 127 (*)    TROPONIN T, HIGH SENSITIVITY - Abnormal    Troponin T, High Sensitivity 83 (*)    NT PROBNP INPATIENT - Abnormal    N terminal Pro BNP Inpatient 26,510 (*)    CBC WITH PLATELETS AND DIFFERENTIAL - Abnormal    WBC Count 5.9      RBC Count 4.55      Hemoglobin 15.1      Hematocrit 46.0       (*)     MCH 33.2 (*)     MCHC 32.8      RDW 15.9 (*)     Platelet Count 235      % Neutrophils 74      % Lymphocytes 15      % Monocytes 9      % Eosinophils 1      % Basophils 0      % Immature Granulocytes 1      NRBCs per 100 WBC 0      Absolute Neutrophils 4.4      Absolute Lymphocytes 0.9      Absolute Monocytes 0.6      Absolute Eosinophils 0.1      Absolute Basophils 0.0      Absolute Immature Granulocytes 0.0      Absolute NRBCs 0.0     TROPONIN T, HIGH SENSITIVITY - Abnormal    Troponin T, High Sensitivity 82 (*)    INFLUENZA A/B, RSV AND SARS-COV2 PCR - Normal    Influenza A PCR Negative      Influenza B PCR Negative      RSV PCR Negative      SARS CoV2 PCR Negative         Imaging   XR Chest 2 Views   Final Result   IMPRESSION: Stable cardiomegaly. New, small left pleural effusion. Mild prominence of the pulmonary vascularity suggesting vascular congestion. Calcified atherosclerotic changes at the aortic arch. Increased compression of a midthoracic vertebral body    with increased thoracic kyphosis.          EKG  Indication: Shortness of breath   Time: Read at 1317  Rate 66 bpm. HI interval 202. QRS duration 98. QT/QTc 386/404.   Normal sinus rhythm  Left axis deviation  Anterior infarct, age undetermined     No acute ST changes.  No significant changes as compared to prior, dated 9/16/24.     Independent Interpretation   I independently reviewed Chest X-Ray, noting a left pleural effusion     ED Course      Medications Administered   Medications   furosemide (LASIX) injection 40 mg (40 mg Intravenous $Given 2/21/25 1602)     Discussion of Management   Admitting Hospitalist, Dr. Quinn    ED Course   ED Course as of 02/21/25 1721   Fri Feb 21, 2025   1253 I obtained history and examined the patient as noted above.    1446 I rechecked and updated patient.    1533 I spoke with Dr. Quinn, Hospitalist, regarding the patient's history and presentation in the emergency department today. Accepting patient for admission.        Additional Documentation  Social Determinants of Health: Supportive son-in-law and longtime friends.    Medical Decision Making / Diagnosis   DUSTY Trejo is a 89 year old woman with systolic CHF presenting with dyspnea.  She has lower extremity edema and weight of 120 pounds increased from her dry weight of 110 to 115 pounds.  She is well-appearing on exam.  EKG is reassuring without acute ST changes or arrhythmias.  However, initial troponin is elevated at 83 with repeat unchanged.  She does not have chest pain and I suspect this is related to demand from volume overload as her BNP is elevated at 26,510 and chest x-ray reveals pulmonary vascularity prominence and a new left pleural effusion. I doubt PE.  There is no evidence of pneumonia and viral swab is negative.  She does have evidence of MARICRUZ with creatinine of 1.75 from 1.11 in November.  She was given 40 mg of Lasix but clearly requires hospitalization for diuresis.  I updated her and her family/friends and answered all their questions.  They verbalized understanding and are agreeable.  I discussed the patient's case with Dr. Quinn, hospitalist, who accepts admission and has no further orders.    Disposition   The patient was admitted to the  hospital.     Diagnosis     ICD-10-CM    1. Acute on chronic systolic congestive heart failure (H)  I50.23       2. Acute kidney injury superimposed on CKD  N17.9     N18.9       3. Elevated troponin  R79.89       4. Elevated brain natriuretic peptide (BNP) level  R79.89       5. Pleural effusion on left  J90            Scribe Disclosure:  ELSIE Annel Banerjeemena, am serving as a scribe at 12:57 PM on 2/21/2025 to document services personally performed by Gracia Martines MD based on my observations and the provider's statements to me.        Gracia Martines MD  02/21/25 2058

## 2025-02-21 NOTE — PHARMACY-ADMISSION MEDICATION HISTORY
Pharmacist Admission Medication History    Admission medication history is complete. The information provided in this note is only as accurate as the sources available at the time of the update.    Information Source(s): Facility ([facility] medication list/MAR from the [facility] [phone]    Pertinent Information: -    Changes made to PTA medication list:  Added: None  Deleted: None  Changed: furosemide 40 mg daily -> BID    Allergies reviewed with patient and updates made in EHR: no    Medication History Completed By: Jean Issa RPH 2/21/2025 5:07 PM    PTA Med List   Medication Sig Last Dose/Taking    acetaminophen (TYLENOL) 325 MG tablet Take 3 tablets (975 mg) by mouth 3 times daily 2/21/2025 Morning    carBAMazepine (TEGRETOL) 100 MG chewable tablet Take 2 tablets (200 mg) by mouth 2 times daily 2/21/2025 Morning    diclofenac (VOLTAREN) 1 % topical gel Apply 2 g topically 4 times daily. To right lower leg 2/21/2025 Morning    furosemide (LASIX) 40 MG tablet Take 40 mg by mouth 2 times daily. 2/21/2025 Morning    latanoprost (XALATAN) 0.005 % ophthalmic solution Place 1 drop into both eyes at bedtime. 2/20/2025 Bedtime    lisinopril (ZESTRIL) 5 MG tablet Take 0.5 tablets (2.5 mg) by mouth daily. 2/21/2025 Morning    metoprolol succinate ER (TOPROL XL) 25 MG 24 hr tablet Take 12.5 mg by mouth daily. HOLD Metoprolol for SBP less than 100 2/21/2025 Morning    multivitamin w/minerals (THERA-VIT-M) tablet Take 1 tablet by mouth daily 2/21/2025 Morning    ondansetron (ZOFRAN ODT) 4 MG ODT tab Take 1 tablet (4 mg) by mouth every 6 hours as needed for nausea Taking As Needed    potassium chloride nick ER (KLOR-CON M15) 15 MEQ CR tablet Take 1 tablet (15 mEq) by mouth daily 2/21/2025 Morning    timolol maleate (TIMOPTIC) 0.5 % ophthalmic solution Place 1 drop into both eyes 2 times daily. 2/21/2025 Morning

## 2025-02-21 NOTE — ED NOTES
[facility]  ED Nurse Handoff Report    ED Chief complaint: Shortness of Breath  . ED Diagnosis:   Final diagnoses:   None       Allergies:   Allergies   Allergen Reactions    Brimonidine Other (See Comments)     Follicular conjunctivitis    Dorzolamide Other (See Comments)     Follicular conjunctivitis    Benzalkonium Chloride Other (See Comments)     Eye irritation.  Does better with preservative free but can tolerate preserved drops    Codeine Unknown and Rash    Fd&C Yellow #5 (Tartrazine) Rash    Fluorescein Itching     fluress    Penicillins      Rash      Sulfa Antibiotics Other (See Comments)     rash       Code Status: DNR / DNI    Activity level - Baseline/Home:  assist of 1.  Activity Level - Current:   in bed.   Lift room needed: No.   Bariatric: No   Needed: No   Isolation: No.   Infection: Not Applicable.     Respiratory status: Room air    Vital Signs (within 30 minutes):   Vitals:    02/21/25 1511 02/21/25 1513 02/21/25 1609 02/21/25 1610   BP:   123/86    Pulse: 69 68 67    Resp: 11 12  14   Temp:       TempSrc:       SpO2: 99%   98%   Weight:           Cardiac Rhythm:  ,      Pain level:    Patient confused: No.   Patient Falls Risk: nonskid shoes/slippers when out of bed, patient and family education, and assistive device/personal items within reach.   Elimination Status: Has voided     Patient Report - Initial Complaint: shortness of breath.   Pt brought in by EMS for concerns of confusion and trouble breathing which had been present for 40 minutes.      Currently pt is communicative and appropriate knowing that she is in the hospital, just had a birthday, her son in laws first and last name and that he is her POA, Pt notes she is uncertain of the year but doesn't feel she is confused otherwise.. Denies recent illness.      Pt notes she felt like she is fine, but she does note some shortness of breath.      Focused Assessment: Maya SIDHU Subhash is a 89 year  "old female with a history of systolic CHF presenting alone via EMS for evaluation of dyspnea.  Report from paramedics is that she was confused at her care facility, unable to state the year.  The patient is oriented now and even recalls that she has a cardiology visit in 4 days.  She does admit to feeling short of breath but does not accurately report when this started.  She has lower extremity edema but cannot tell me if this has changed from baseline.  She does not weigh herself daily reporting current weight of 125 pounds.  She was at 116 pounds at her last [facility] clinic visit but she tells me she has \"never been that low.\"  She denies chest pain.  She endorses cough \"that everyone has now\" without fever, vomiting, or diarrhea.  She does report poor oral intake.      Abnormal Results:   Labs Ordered and Resulted from Time of ED Arrival to Time of ED Departure   BASIC METABOLIC PANEL - Abnormal       Result Value    Sodium 135      Potassium 4.9      Chloride 98      Carbon Dioxide (CO2) 26      Anion Gap 11      Urea Nitrogen 34.5 (*)     Creatinine 1.75 (*)     GFR Estimate 27 (*)     Calcium 9.1      Glucose 127 (*)    TROPONIN T, HIGH SENSITIVITY - Abnormal    Troponin T, High Sensitivity 83 (*)    NT PROBNP INPATIENT - Abnormal    N terminal Pro BNP Inpatient 26,510 (*)    CBC WITH PLATELETS AND DIFFERENTIAL - Abnormal    WBC Count 5.9      RBC Count 4.55      Hemoglobin 15.1      Hematocrit 46.0       (*)     MCH 33.2 (*)     MCHC 32.8      RDW 15.9 (*)     Platelet Count 235      % Neutrophils 74      % Lymphocytes 15      % Monocytes 9      % Eosinophils 1      % Basophils 0      % Immature Granulocytes 1      NRBCs per 100 WBC 0      Absolute Neutrophils 4.4      Absolute Lymphocytes 0.9      Absolute Monocytes 0.6      Absolute Eosinophils 0.1      Absolute Basophils 0.0      Absolute Immature Granulocytes 0.0      Absolute NRBCs 0.0     INFLUENZA A/B, RSV AND SARS-COV2 PCR - Normal    Influenza A " PCR Negative      Influenza B PCR Negative      RSV PCR Negative      SARS CoV2 PCR Negative     TROPONIN T, HIGH SENSITIVITY        XR Chest 2 Views   Final Result   IMPRESSION: Stable cardiomegaly. New, small left pleural effusion. Mild prominence of the pulmonary vascularity suggesting vascular congestion. Calcified atherosclerotic changes at the aortic arch. Increased compression of a midthoracic vertebral body    with increased thoracic kyphosis.          Treatments provided: labs, imaging, see MAR  Family Comments: n/a  OBS brochure/video discussed/provided to patient:  N/A  ED Medications:   Medications   furosemide (LASIX) injection 40 mg (40 mg Intravenous $Given 2/21/25 4942)       Drips infusing:  No  For the majority of the shift this patient was Green.   Interventions performed were n/a.    Sepsis treatment initiated: No    Cares/treatment/interventions/medications to be completed following ED care: n/a    ED Nurse Name: Jie Rachel RN  4:34 PM

## 2025-02-21 NOTE — ED TRIAGE NOTES
Pt brought in by EMS for concerns of confusion and trouble breathing which had been present for 40 minutes.     Currently pt is communicative and appropriate knowing that she is in the hospital, just had a birthday, her son in laws first and last name and that he is her POA, Pt notes she is uncertain of the year but doesn't feel she is confused otherwise.. Denies recent illness.     Pt notes she felt like she is fine, but she does note some shortness of breath.

## 2025-02-22 LAB
ANION GAP SERPL CALCULATED.3IONS-SCNC: 11 MMOL/L (ref 7–15)
BUN SERPL-MCNC: 33.4 MG/DL (ref 8–23)
CALCIUM SERPL-MCNC: 8.9 MG/DL (ref 8.8–10.4)
CHLORIDE SERPL-SCNC: 97 MMOL/L (ref 98–107)
CREAT SERPL-MCNC: 1.62 MG/DL (ref 0.51–0.95)
EGFRCR SERPLBLD CKD-EPI 2021: 30 ML/MIN/1.73M2
ERYTHROCYTE [DISTWIDTH] IN BLOOD BY AUTOMATED COUNT: 15.9 % (ref 10–15)
GLUCOSE SERPL-MCNC: 117 MG/DL (ref 70–99)
HCO3 SERPL-SCNC: 26 MMOL/L (ref 22–29)
HCT VFR BLD AUTO: 47 % (ref 35–47)
HGB BLD-MCNC: 15.4 G/DL (ref 11.7–15.7)
MAGNESIUM SERPL-MCNC: 2.4 MG/DL (ref 1.7–2.3)
MCH RBC QN AUTO: 32.8 PG (ref 26.5–33)
MCHC RBC AUTO-ENTMCNC: 32.8 G/DL (ref 31.5–36.5)
MCV RBC AUTO: 100 FL (ref 78–100)
PLATELET # BLD AUTO: 233 10E3/UL (ref 150–450)
POTASSIUM SERPL-SCNC: 4.6 MMOL/L (ref 3.4–5.3)
RBC # BLD AUTO: 4.69 10E6/UL (ref 3.8–5.2)
SODIUM SERPL-SCNC: 134 MMOL/L (ref 135–145)
WBC # BLD AUTO: 6.3 10E3/UL (ref 4–11)

## 2025-02-22 PROCEDURE — 36415 COLL VENOUS BLD VENIPUNCTURE: CPT | Performed by: INTERNAL MEDICINE

## 2025-02-22 PROCEDURE — 99232 SBSQ HOSP IP/OBS MODERATE 35: CPT | Performed by: INTERNAL MEDICINE

## 2025-02-22 PROCEDURE — 250N000013 HC RX MED GY IP 250 OP 250 PS 637: Performed by: INTERNAL MEDICINE

## 2025-02-22 PROCEDURE — 85027 COMPLETE CBC AUTOMATED: CPT | Performed by: INTERNAL MEDICINE

## 2025-02-22 PROCEDURE — 99222 1ST HOSP IP/OBS MODERATE 55: CPT | Performed by: INTERNAL MEDICINE

## 2025-02-22 PROCEDURE — 82310 ASSAY OF CALCIUM: CPT | Performed by: INTERNAL MEDICINE

## 2025-02-22 PROCEDURE — 120N000001 HC R&B MED SURG/OB

## 2025-02-22 PROCEDURE — 83735 ASSAY OF MAGNESIUM: CPT | Performed by: INTERNAL MEDICINE

## 2025-02-22 PROCEDURE — 250N000011 HC RX IP 250 OP 636: Performed by: INTERNAL MEDICINE

## 2025-02-22 PROCEDURE — 80048 BASIC METABOLIC PNL TOTAL CA: CPT | Performed by: INTERNAL MEDICINE

## 2025-02-22 RX ADMIN — LISINOPRIL 2.5 MG: 2.5 TABLET ORAL at 09:01

## 2025-02-22 RX ADMIN — CARBAMAZEPINE 200 MG: 100 TABLET, CHEWABLE ORAL at 21:05

## 2025-02-22 RX ADMIN — EMPAGLIFLOZIN 10 MG: 10 TABLET, FILM COATED ORAL at 11:31

## 2025-02-22 RX ADMIN — ACETAMINOPHEN 975 MG: 325 TABLET, FILM COATED ORAL at 13:40

## 2025-02-22 RX ADMIN — FUROSEMIDE 40 MG: 10 INJECTION, SOLUTION INTRAVENOUS at 16:23

## 2025-02-22 RX ADMIN — TIMOLOL MALEATE 1 DROP: 5 SOLUTION/ DROPS OPHTHALMIC at 21:05

## 2025-02-22 RX ADMIN — LATANOPROST 1 DROP: 50 SOLUTION/ DROPS OPHTHALMIC at 21:05

## 2025-02-22 RX ADMIN — ACETAMINOPHEN 975 MG: 325 TABLET, FILM COATED ORAL at 09:02

## 2025-02-22 RX ADMIN — POTASSIUM CHLORIDE 15 MEQ: 20 SOLUTION ORAL at 09:04

## 2025-02-22 RX ADMIN — ACETAMINOPHEN 975 MG: 325 TABLET, FILM COATED ORAL at 21:03

## 2025-02-22 RX ADMIN — FUROSEMIDE 40 MG: 10 INJECTION, SOLUTION INTRAVENOUS at 09:05

## 2025-02-22 RX ADMIN — CARBAMAZEPINE 200 MG: 100 TABLET, CHEWABLE ORAL at 09:02

## 2025-02-22 RX ADMIN — TIMOLOL MALEATE 1 DROP: 5 SOLUTION/ DROPS OPHTHALMIC at 09:07

## 2025-02-22 RX ADMIN — METOPROLOL SUCCINATE 12.5 MG: 25 TABLET, EXTENDED RELEASE ORAL at 09:03

## 2025-02-22 ASSESSMENT — ACTIVITIES OF DAILY LIVING (ADL)
ADLS_ACUITY_SCORE: 67
ADLS_ACUITY_SCORE: 74
ADLS_ACUITY_SCORE: 74
ADLS_ACUITY_SCORE: 67
ADLS_ACUITY_SCORE: 67
ADLS_ACUITY_SCORE: 75
ADLS_ACUITY_SCORE: 67
ADLS_ACUITY_SCORE: 75
ADLS_ACUITY_SCORE: 79
ADLS_ACUITY_SCORE: 78
ADLS_ACUITY_SCORE: 79
ADLS_ACUITY_SCORE: 75
ADLS_ACUITY_SCORE: 77
ADLS_ACUITY_SCORE: 75
ADLS_ACUITY_SCORE: 75
ADLS_ACUITY_SCORE: 67
ADLS_ACUITY_SCORE: 75
ADLS_ACUITY_SCORE: 67
ADLS_ACUITY_SCORE: 75
ADLS_ACUITY_SCORE: 75
DEPENDENT_IADLS:: CLEANING;COOKING;LAUNDRY;SHOPPING;MEAL PREPARATION;MEDICATION MANAGEMENT;TRANSPORTATION

## 2025-02-22 NOTE — PROGRESS NOTES
[facility]    Hospitalist Progress Note  Name: Maya Cullen    MRN: [id number]  Provider: Alysha Beckham MD  Date of Service: 02/22/2025    Assessment & Plan   Summary of Stay: Maya Cullen is a 89 year old female who was admitted on 2/21/2025 for acute CHF exacerbation.  Patient's past medical significant for heart failure with reduced EF of 20%, moderate mitral valve regurg, mild to moderate tricuspid regurg, hypertension, history of CVA, recurrent falls, subclinical hypothyroidism, mild cognitive impairment, pulmonary hypertension, trigeminal neuralgia presented with complaints of shortness of breath.    2/22/2025.  Patient seems to be volume overloaded is requiring supplemental O2 will continue  with IV diuresis    Acute Exacerbation of HFrEF - EF 20%  NSTEMI - Likely Type 2  - Strict I/O  - Daily Weights (Dry weight seems to be around 110-115 lbs)  - Telemetry  - Lasix 40 mg IV BID  - Most recent echocardiogram in 6/2024 showed EF 20-25%, severely reduced RVSF, moderate mitral valve regurgitation, mild to moderate TR, moderate to severe pulmonary hypertension.   - Appreciate Cardiology recommendations     Suspected Cardiorenal Syndrome  Chronic Kidney Disease Stage 3a  - Baseline Cr = 1.1-1.3  - Diuresis as above  - Daily BMP     Chronic Medical Problems:  Mild Cognitive Impairment  Hypertension  Moderate MR  Mild to Moderate TR  Hx of CVA: ?not on asa  Subclinical Hypothyroidism: ? No replacement therapy  Trigeminal Neuralgia: on Carbamezaapine  Pulmonary Hypertension       DVT Prophylaxis: Pneumatic Compression Devices  Code Status: No CPR- Do NOT Intubate    Disposition:   Medically Ready for Discharge: Anticipated in 2-4 Days        Interval History   Assumed care reviewed chart.  Patient continues to have shortness of breath.  Complains of generalized malaise and weakness.  More than 10 point review of system was carried out was limited.    Total time spent in direct  patient care and coordination of care is more than 40 minutes    -Data reviewed today: I reviewed all new labs and imaging reports over the last 24 hours. I personally reviewed no images or EKG's today.    Physical Exam   Temp: 98.3  F (36.8  C) Temp src: Oral BP: 115/60 Pulse: 65   Resp: 20 SpO2: 96 % O2 Device: None (Room air) Oxygen Delivery: 3 LPM  Vitals:    02/21/25 1301 02/21/25 1457 02/22/25 0500   Weight: 56.7 kg (125 lb) 54.4 kg (120 lb) 55.5 kg (122 lb 5.7 oz)     Vital Signs with Ranges  Temp:  [97.5  F (36.4  C)-99  F (37.2  C)] 98.3  F (36.8  C)  Pulse:  [60-78] 65  Resp:  [11-26] 20  BP: (104-141)/(60-92) 115/60  SpO2:  [96 %-100 %] 96 %  I/O last 3 completed shifts:  In: 120 [P.O.:120]  Out: 720 [Urine:720]    GEN:  Alert, cooperative, NAD.  HEENT:  Normocephalic/atraumatic, no scleral icterus, no nasal discharge, mouth moist.  CV:  Regular rate and rhythm, no murmur or JVD.  S1 + S2 noted, no S3 or S4.  LUNGS: Bibasilar crackles   Symmetric chest rise on inhalation noted.  ABD:  Active bowel sounds, soft, non-tender/non-distended.  No rebound/guarding/rigidity.  EXT: Lateral lower extremity edema no cyanosis.  No joint synovitis noted.  SKIN:  Dry to touch, no exanthems noted in the visualized areas.    Medications   Current Facility-Administered Medications   Medication Dose Route Frequency Provider Last Rate Last Admin     Current Facility-Administered Medications   Medication Dose Route Frequency Provider Last Rate Last Admin    acetaminophen (TYLENOL) tablet 975 mg  975 mg Oral TID Ming Quinn, DO   975 mg at 02/22/25 0902    carBAMazepine (TEGretol) chewable tablet 200 mg  200 mg Oral BID Ming Quinn DO   200 mg at 02/22/25 0902    empagliflozin (JARDIANCE) tablet 10 mg  10 mg Oral Daily Ip, Anshu Blanchard MD   10 mg at 02/22/25 1131    furosemide (LASIX) injection 40 mg  40 mg Intravenous BID Ming Quinn DO   40 mg at 02/22/25 0905    latanoprost (XALATAN) 0.005 %  "ophthalmic solution 1 drop  1 drop Both Eyes At Bedtime Ming Quinn, DO   1 drop at 02/21/25 2108    lisinopril (ZESTRIL) tablet 2.5 mg  2.5 mg Oral Daily Ming Quinn, DO   2.5 mg at 02/22/25 0901    metoprolol succinate ER (TOPROL-XL) 24 hr half-tab 12.5 mg  12.5 mg Oral Daily Ming Quinn, DO   12.5 mg at 02/22/25 0903    potassium chloride (KAYCIEL) solution 15 mEq  15 mEq Oral Daily Ming Quinn, DO   15 mEq at 02/22/25 0904    sodium chloride (PF) 0.9% PF flush 3 mL  3 mL Intracatheter Q8H Ming Quinn, DO   3 mL at 02/22/25 0905    timolol maleate (TIMOPTIC) 0.5 % ophthalmic solution 1 drop  1 drop Both Eyes BID Ming Quinn, DO   1 drop at 02/22/25 0907     Data     No results for input(s): \"PH\", \"PHV\", \"PO2\", \"PO2V\", \"SAT\", \"PCO2\", \"PCO2V\", \"HCO3\", \"HCO3V\" in the last 168 hours.  Recent Labs   Lab 02/22/25  0753 02/21/25  1330   WBC 6.3 5.9   HGB 15.4 15.1   HCT 47.0 46.0    101*    235     Recent Labs   Lab 02/22/25  0753 02/21/25  1330   * 135   POTASSIUM 4.6 4.9   CHLORIDE 97* 98   CO2 26 26   ANIONGAP 11 11   * 127*   BUN 33.4* 34.5*   CR 1.62* 1.75*   GFRESTIMATED 30* 27*   GILL 8.9 9.1     7-Day Micro Results       Collected Updated Procedure Result Status      02/21/2025 1330 02/21/2025 1423 Influenza A/B, RSV and SARS-CoV2 PCR (COVID-19) Nasopharyngeal [[id number]]    Swab from Nasopharyngeal    Final result Component Value   Influenza A PCR Negative   Influenza B PCR Negative   RSV PCR Negative   SARS CoV2 PCR Negative   NEGATIVE: SARS-CoV-2 (COVID-19) RNA not detected, presumed negative.                  Recent Labs   Lab 02/21/25  1330   NTBNPI 26,510*     Recent Labs   Lab 02/22/25  0753 02/21/25  1330   HGB 15.4 15.1     No results for input(s): \"AST\", \"ALT\", \"GGT\", \"ALKPHOS\", \"BILITOTAL\", \"BILICONJ\", \"BILIDIRECT\", \"CASSY\" in the last 168 hours.    Invalid input(s): \"BILIRUBININDIRECT\"  No results for input(s): \"INR\" " "in the last 168 hours.  No results for input(s): \"LACT\" in the last 168 hours.  No results for input(s): \"LIPASE\" in the last 168 hours.  No results for input(s): \"COLOR\", \"APPEARANCE\", \"URINEGLC\", \"URINEBILI\", \"URINEKETONE\", \"SG\", \"UBLD\", \"URINEPH\", \"PROTEIN\", \"UROBILINOGEN\", \"NITRITE\", \"LEUKEST\", \"RBCU\", \"WBCU\" in the last 168 hours.    Recent Results (from the past 24 hours)   XR Chest 2 Views    Narrative    EXAM: XR CHEST 2 VIEWS  LOCATION: [facility]  DATE: 2/21/2025    INDICATION: Dyspnea. Question CHF exacerbation.  COMPARISON: Chest x-ray 9/16/2024      Impression    IMPRESSION: Stable cardiomegaly. New, small left pleural effusion. Mild prominence of the pulmonary vascularity suggesting vascular congestion. Calcified atherosclerotic changes at the aortic arch. Increased compression of a midthoracic vertebral body   with increased thoracic kyphosis.          "

## 2025-02-22 NOTE — PLAN OF CARE
"Temp: 98.9  F (37.2  C) Temp src: Oral BP: 131/75 Pulse: 68   Resp: 20 SpO2: 97 % O2 Device: None (Room air)      A&Ox4, intermittent confusion. Denies pain. A1 up to bathroom and back 2 bed x2 from 5219-1051. Tele-SR BBB. 2 g Na diet with 1800 FR maintained. K and mag-AM rechecks. IV lasix x1. Follow up bladder scan at 1730 showed 48ml. Family at bedside. Discharge TBD.          Goal Outcome Evaluation:      Plan of Care Reviewed With: patient    Overall Patient Progress: no changeOverall Patient Progress: no change    Outcome Evaluation: No fall risk behavior on shift, had good output after lasix dose. Follow up bladder scan later tonight.      Problem: Adult Inpatient Plan of Care  Goal: Plan of Care Review  Description: The Plan of Care Review/Shift note should be completed every shift.  The Outcome Evaluation is a brief statement about your assessment that the patient is improving, declining, or no change.  This information will be displayed automatically on your shift  note.  Outcome: Progressing  Flowsheets (Taken 2/22/2025 1736)  Outcome Evaluation: No fall risk behavior on shift, had good output after lasix dose. Follow up bladder scan later tonight.  Plan of Care Reviewed With: patient  Overall Patient Progress: no change  Goal: Patient-Specific Goal (Individualized)  Description: You can add care plan individualizations to a care plan. Examples of Individualization might be:  \"Parent requests to be called daily at 9am for status\", \"I have a hard time hearing out of my right ear\", or \"Do not touch me to wake me up as it startles  me\".  Outcome: Progressing  Goal: Absence of Hospital-Acquired Illness or Injury  Outcome: Progressing  Intervention: Identify and Manage Fall Risk  Recent Flowsheet Documentation  Taken 2/22/2025 1630 by Charanjit Thomas RN  Safety Promotion/Fall Prevention: safety round/check completed  Intervention: Prevent Skin Injury  Recent Flowsheet Documentation  Taken 2/22/2025 1630 by " Charanjit Thomas, RN  Body Position: position maintained  Goal: Optimal Comfort and Wellbeing  Outcome: Progressing  Goal: Readiness for Transition of Care  Outcome: Progressing     Problem: Heart Failure  Goal: Optimal Cardiac Output  Outcome: Progressing  Goal: Fluid and Electrolyte Balance  Outcome: Progressing  Goal: Effective Breathing Pattern During Sleep  Outcome: Progressing     Problem: Fall Injury Risk  Goal: Absence of Fall and Fall-Related Injury  Outcome: Progressing  Intervention: Promote Injury-Free Environment  Recent Flowsheet Documentation  Taken 2/22/2025 1630 by Charanjit Thomas, RN  Safety Promotion/Fall Prevention: safety round/check completed

## 2025-02-22 NOTE — PROGRESS NOTES
Pt arrived to floor at 1855, settled patient in bed, tele monitor placed. Quick pain assess and orientation done. A&Ox3, off by a week on date but knew the month/year. Denies pain. Purwick placed at patient request. Family at bedside. Oncoming nurse informed about needing full assessment.

## 2025-02-22 NOTE — CONSULTS
[facility]    Cardiology Consultation     Date of Admission:  2/21/2025    Assessment & Plan   Acute systolic heart failure exacerbation, possibly dietary but at age with with multiple comorbidities is frequently no clear cause is identified.  Cardiomyopathy with severe left ventricular systolic dysfunction, patient has declined further workup.  Mild to both degenerative valvular heart lesions, borderline hemodynamic significance  DNR/DNI  Stage III-IV chronic renal failure  Cognitive impairment  Senile frailty    Essentially this lady has end-stage heart disease.  Hopefully she will obtain some symptomatic relief with intravenous diuresis as she does appear fluid overloaded at this time.  She would probably aim for dry weight of around 110 to 112 pounds.  Her blood pressure is around 130/80 and I think we can continue with metoprolol and low-dose lisinopril for now.  I will start her on low-dose Jardiance though clinical benefit may be marginal at best.  She is followed up in [facility], when she is more improved my colleagues there can consider changing her to Entresto and perhaps an mineral corticoid antagonist.    Guarded prognosis, will follow her in the hospital course with you as needed.  DR KATLIN QUIÑONEZ MD, MD    Primary Care Physician   Jordyn Jon    Reason for Consult   Reason for consult: I was asked by hospitalist service to evaluate this patient for congestive heart failure, LVEF 10%.    History of Present Illness   Maya Cullen is a 89 year old female who presents with shortness of breath    This patient is followed in our clinic and has the following medical issues:    1. 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% by TTE 6/17/24  - LVEF: 10-20%  - NYHA class II-III, ACC/AHA stage C  - Etiology: Unclear, with suspected ischemic secondary to underlying occult CAD  - Fluid status: Appears well compensated;  suspected dry weight: ~110-115 lbs, higher today but with winter clothes on  - Diuretic regimen: Lasix 40 mg once daily  - Ischemic evaluation: Declined due to advanced age with preference for conservative medical management     Guideline directed medical therapy (GDMT):  - Beta blocker: Metoprolol succinate 12.5 mg once daily  - ACEI/ARB/ARNI: Lisinopril 2.5 mg once daily  - MRA: None currently due to borderline blood pressures and fall risk  - SGLT2 inhibitor: None currently due to report on blood pressures fall risk     2.  Moderate mitral regurgitation  3.  Mild to moderate tricuspid regurgitation  4.  Essential hypertension  5.  History of stroke  6.  Recurrent falls  7.  Sub-clinical hypothyroidism    History also obtained from her friend.  Her friends noticed that she is more breathless on exertion.  When she returned to her assisted living facility staff that the oxygen saturation measurement and found that it was only in the 80s.  Patient was sent to the emergency room.  She does say that she will be more short of breath than usual.  It is mainly exertional.  She does not sleep well but denies PND orthopnea.  Ankle swelling may be getting little worse.  She thinks she has gained about 7 to 8 pounds recently.  She has been compliant with her medications, tries to adhere to a low-salt diet is much as possible.  Meals are provided at her facility.  In between meals she does find it difficult to adhere to a low-salt diet    Suspect dry weight is around 110 to 112 pounds    Sodium 134 potassium 4.6 BUN 33.4 creatinine 1.62 magnesium 2.4  NT proBNP 26,510  CBC within normal limits  EKG shows sinus rhythm nonspecific intraventricular conduction delay  Chest x-ray shows small left pleural effusion mild increase in pulmonary vascularity    Past Medical History   Past Medical History:   Diagnosis Date    Cerebrovascular accident (H)     Cerebellar CVA seen on MRI 2023    Glaucoma     HTN (hypertension)     L4  vertebral fracture (H)     Mild cognitive impairment     Pulmonary hypertension (H)     Severe mitral regurgitation     Systolic CHF, chronic (H)     Tricuspid regurgitation     Trigeminal neuralgia          Past Surgical History   Past Surgical History:   Procedure Laterality Date    APPENDECTOMY      CATARACT IOL, RT/LT      EYE SURGERY      HERNIA REPAIR      HYSTERECTOMY, TERESA      bso    TONSILLECTOMY           Prior to Admission Medications   Prior to Admission Medications   Prescriptions Last Dose Informant Patient Reported? Taking?   acetaminophen (TYLENOL) 325 MG tablet 2/21/2025 Morning  No Yes   Sig: Take 3 tablets (975 mg) by mouth 3 times daily   carBAMazepine (TEGRETOL) 100 MG chewable tablet 2/21/2025 Morning  Yes Yes   Sig: Take 2 tablets (200 mg) by mouth 2 times daily   diclofenac (VOLTAREN) 1 % topical gel 2/21/2025 Morning  No Yes   Sig: Apply 2 g topically 4 times daily. To right lower leg   furosemide (LASIX) 40 MG tablet 2/21/2025 Morning  Yes Yes   Sig: Take 40 mg by mouth 2 times daily.   latanoprost (XALATAN) 0.005 % ophthalmic solution 2/20/2025 Bedtime  Yes Yes   Sig: Place 1 drop into both eyes at bedtime.   lisinopril (ZESTRIL) 5 MG tablet 2/21/2025 Morning  No Yes   Sig: Take 0.5 tablets (2.5 mg) by mouth daily.   metoprolol succinate ER (TOPROL XL) 25 MG 24 hr tablet 2/21/2025 Morning  Yes Yes   Sig: Take 12.5 mg by mouth daily. HOLD Metoprolol for SBP less than 100   multivitamin w/minerals (THERA-VIT-M) tablet 2/21/2025 Morning  Yes Yes   Sig: Take 1 tablet by mouth daily   ondansetron (ZOFRAN ODT) 4 MG ODT tab   No Yes   Sig: Take 1 tablet (4 mg) by mouth every 6 hours as needed for nausea   potassium chloride nick ER (KLOR-CON M15) 15 MEQ CR tablet 2/21/2025 Morning  No Yes   Sig: Take 1 tablet (15 mEq) by mouth daily   timolol maleate (TIMOPTIC) 0.5 % ophthalmic solution 2/21/2025 Morning  Yes Yes   Sig: Place 1 drop into both eyes 2 times daily.      Facility-Administered  Medications: None     Current Facility-Administered Medications   Medication Dose Route Frequency Provider Last Rate Last Admin    acetaminophen (TYLENOL) tablet 650 mg  650 mg Oral Q4H PRN Ming Quinn DO        Or    acetaminophen (TYLENOL) Suppository 650 mg  650 mg Rectal Q4H PRN Ming Quinn DO        acetaminophen (TYLENOL) tablet 975 mg  975 mg Oral TID Ming Quinn DO   975 mg at 02/21/25 2104    carBAMazepine (TEGretol) chewable tablet 200 mg  200 mg Oral BID Ming Quinn DO   200 mg at 02/21/25 2107    furosemide (LASIX) injection 40 mg  40 mg Intravenous BID Ming Quinn DO        latanoprost (XALATAN) 0.005 % ophthalmic solution 1 drop  1 drop Both Eyes At Bedtime Ming Quinn DO   1 drop at 02/21/25 2108    lidocaine (LMX4) cream   Topical Q1H PRN Ming Quinn DO        lidocaine 1 % 0.1-1 mL  0.1-1 mL Other Q1H PRN Ming Quinn DO        lisinopril (ZESTRIL) tablet 2.5 mg  2.5 mg Oral Daily Ming Quinn DO        metoprolol succinate ER (TOPROL-XL) 24 hr half-tab 12.5 mg  12.5 mg Oral Daily Ming Quinn DO        ondansetron (ZOFRAN ODT) ODT tab 4 mg  4 mg Oral Q6H PRN Ming Quinn DO        Or    ondansetron (ZOFRAN) injection 4 mg  4 mg Intravenous Q6H PRN Ming Quinn DO        potassium chloride (KAYCIEL) solution 15 mEq  15 mEq Oral Daily Ming Quinn DO        sodium chloride (PF) 0.9% PF flush 3 mL  3 mL Intracatheter Q8H Ming Quinn DO   3 mL at 02/21/25 2108    sodium chloride (PF) 0.9% PF flush 3 mL  3 mL Intracatheter q1 min prn Ming Quinn DO   3 mL at 02/22/25 0228    timolol maleate (TIMOPTIC) 0.5 % ophthalmic solution 1 drop  1 drop Both Eyes BID Ming Quinn DO   1 drop at 02/21/25 2108     Current Facility-Administered Medications   Medication Dose Route Frequency Provider Last Rate Last Admin    acetaminophen (TYLENOL) tablet 650 mg  650 mg  Oral Q4H PRN Ming Quinn DO        Or    acetaminophen (TYLENOL) Suppository 650 mg  650 mg Rectal Q4H PRN Ming Quinn DO        acetaminophen (TYLENOL) tablet 975 mg  975 mg Oral TID Ming Quinn DO   975 mg at 02/21/25 2104    carBAMazepine (TEGretol) chewable tablet 200 mg  200 mg Oral BID Ming Quinn DO   200 mg at 02/21/25 2107    furosemide (LASIX) injection 40 mg  40 mg Intravenous BID Ming Quinn DO        latanoprost (XALATAN) 0.005 % ophthalmic solution 1 drop  1 drop Both Eyes At Bedtime Ming Quinn DO   1 drop at 02/21/25 2108    lidocaine (LMX4) cream   Topical Q1H PRN Ming Quinn DO        lidocaine 1 % 0.1-1 mL  0.1-1 mL Other Q1H PRN Ming Quinn DO        lisinopril (ZESTRIL) tablet 2.5 mg  2.5 mg Oral Daily Ming Quinn DO        metoprolol succinate ER (TOPROL-XL) 24 hr half-tab 12.5 mg  12.5 mg Oral Daily Ming Quinn DO        ondansetron (ZOFRAN ODT) ODT tab 4 mg  4 mg Oral Q6H PRN Ming Quinn DO        Or    ondansetron (ZOFRAN) injection 4 mg  4 mg Intravenous Q6H PRN Ming Quinn DO        potassium chloride (KAYCIEL) solution 15 mEq  15 mEq Oral Daily Ming Quinn DO        sodium chloride (PF) 0.9% PF flush 3 mL  3 mL Intracatheter Q8H Ming Quinn DO   3 mL at 02/21/25 2108    sodium chloride (PF) 0.9% PF flush 3 mL  3 mL Intracatheter q1 min prn Ming Quinn DO   3 mL at 02/22/25 0228    timolol maleate (TIMOPTIC) 0.5 % ophthalmic solution 1 drop  1 drop Both Eyes BID Ming Quinn DO   1 drop at 02/21/25 2108     Allergies   Allergies   Allergen Reactions    Brimonidine Other (See Comments)     Follicular conjunctivitis    Dorzolamide Other (See Comments)     Follicular conjunctivitis    Benzalkonium Chloride Other (See Comments)     Eye irritation.  Does better with preservative free but can tolerate preserved drops    Codeine Unknown and  "Rash    Fd&C Yellow #5 (Tartrazine) Rash    Fluorescein Itching     fluress    Penicillins      Rash      Sulfa Antibiotics Other (See Comments)     rash       Social History    reports that she has never smoked. She has never used smokeless tobacco. She reports that she does not drink alcohol and does not use drugs.      Family History            Review of Systems   A comprehensive review of system was performed and is negative other than that noted in the HPI or here.     Physical Exam   Vital Signs with Ranges  Temp:  [97.5  F (36.4  C)-99  F (37.2  C)] 98.3  F (36.8  C)  Pulse:  [60-78] 78  Resp:  [11-26] 20  BP: (104-141)/(75-92) 104/76  SpO2:  [96 %-100 %] 97 %  Wt Readings from Last 4 Encounters:   02/22/25 55.5 kg (122 lb 5.7 oz)   11/22/24 52.8 kg (116 lb 4.8 oz)   11/19/24 52.2 kg (115 lb)   10/18/24 51.3 kg (113 lb 1.6 oz)     I/O last 3 completed shifts:  In: 120 [P.O.:120]  Out: 720 [Urine:720]      Vitals: /76 (BP Location: Right arm)   Pulse 78   Temp 98.3  F (36.8  C) (Oral)   Resp 20   Wt 55.5 kg (122 lb 5.7 oz)   SpO2 97%   BMI 24.71 kg/m      Physical Exam:   General - Alert and oriented to time place and person in no acute distress  Eyes - No scleral icterus  HEENT - Neck supple, moist mucous membranes  Cardiovascular -regular heart rate and rhythm, JVP elevated, 2 out of 6 ejection systolic murmur.  Extremities - There is 1+ bilateral edema  Respiratory -symmetrical expansion with soft inspiratory crackles right base.  Skin - No pallor or cyanosis  Gastrointestinal - Non tender and non distended without rebound or guarding  Psych - Appropriate affect   Neurological - No gross motor neurological focal deficits    No lab results found in last 7 days.    Invalid input(s): \"TROPONINIES\"    Recent Labs   Lab 02/22/25  0753 02/21/25  1330   WBC 6.3 5.9   HGB 15.4 15.1    101*    235   * 135   POTASSIUM 4.6 4.9   CHLORIDE 97* 98   CO2 26 26   BUN 33.4* 34.5*   CR 1.62* " "1.75*   GFRESTIMATED 30* 27*   ANIONGAP 11 11   GILL 8.9 9.1   * 127*     Recent Labs   Lab Test 08/11/23  1544   CHOL 198   HDL 51   *   TRIG 132     Recent Labs   Lab 02/22/25  0753 02/21/25  1330   WBC 6.3 5.9   HGB 15.4 15.1   HCT 47.0 46.0    101*    235     No results for input(s): \"PH\", \"PHV\", \"PO2\", \"PO2V\", \"SAT\", \"PCO2\", \"PCO2V\", \"HCO3\", \"HCO3V\" in the last 168 hours.  Recent Labs   Lab 02/21/25  1330   NTBNPI 26,510*     No results for input(s): \"DD\" in the last 168 hours.  No results for input(s): \"SED\", \"CRP\" in the last 168 hours.  Recent Labs   Lab 02/22/25  0753 02/21/25  1330    235     No results for input(s): \"TSH\" in the last 168 hours.  No results for input(s): \"COLOR\", \"APPEARANCE\", \"URINEGLC\", \"URINEBILI\", \"URINEKETONE\", \"SG\", \"UBLD\", \"URINEPH\", \"PROTEIN\", \"UROBILINOGEN\", \"NITRITE\", \"LEUKEST\", \"RBCU\", \"WBCU\" in the last 168 hours.    Imaging:  Recent Results (from the past 48 hours)   XR Chest 2 Views    Narrative    EXAM: XR CHEST 2 VIEWS  LOCATION: [facility]  DATE: 2/21/2025    INDICATION: Dyspnea. Question CHF exacerbation.  COMPARISON: Chest x-ray 9/16/2024      Impression    IMPRESSION: Stable cardiomegaly. New, small left pleural effusion. Mild prominence of the pulmonary vascularity suggesting vascular congestion. Calcified atherosclerotic changes at the aortic arch. Increased compression of a midthoracic vertebral body   with increased thoracic kyphosis.       Echo:  No results found for this or any previous visit (from the past 4320 hours).    Clinically Significant Risk Factors Present on Admission         # Hyponatremia: Lowest Na = 134 mmol/L in last 2 days, will monitor as appropriate  # Hypochloremia: Lowest Cl = 97 mmol/L in last 2 days, will monitor as appropriate          # Hypertension: Home medication list includes antihypertensive(s)  # Acute heart failure with reduced ejection fraction: last echo with EF <40% and " receiving IV diuretics

## 2025-02-22 NOTE — PLAN OF CARE
Vss, no co pain/cp/sob, pyle. Strict I and O, FR 1500ml, IV lasix, PW in place (has had over 200ml output and one unmeasured occurrence with +BM), last bladder scan at 1112 was 380ml and last void was at 1317, would recommend another BS around 1600 if no further output in PW. K+/Mag WDL with rechecks ordered for am, sodium 134. Cardiology seen and added jardiance, tele SR BBB. Takes pills whole in apple sauce, DNR/DNI, falls risk, contact isolation for ESBL. Continue poc and monitoring.     Heart Failure Care Map  GOALS TO BE MET BEFORE DISCHARGE:    1. Decrease congestion and/or edema with diuretic therapy to achieve near optimal volume status.     Dyspnea improved: No, further care required to meet this goal. Please explain still PYLE at times   Edema improved: No, further care required to meet this goal. Please explain stil ble edema noted        Last 24 hour I/O:   Intake/Output Summary (Last 24 hours) at 2/22/2025 1500  Last data filed at 2/22/2025 1129  Gross per 24 hour   Intake 360 ml   Output 920 ml   Net -560 ml           Net I/O and Weights since admission:   01/23 2300 - 02/22 2259  In: 360 [P.O.:360]  Out: 920 [Urine:920]  Net: -560     Vitals:    02/21/25 1301 02/21/25 1457 02/22/25 0500   Weight: 56.7 kg (125 lb) 54.4 kg (120 lb) 55.5 kg (122 lb 5.7 oz)       2.  O2 sats > 90% on room air, or at prior home O2 therapy level.      Able to wean O2 this shift to keep sats above 90%?: Yes, satisfactory for discharge.   Does patient use Home O2? No          Current oxygenation status:   SpO2: 97 %     O2 Device: None (Room air),      3.  Tolerates ambulation and mobility near baseline.     Ambulation: No, further care required to meet this goal. Please explain up with Ax1+gb+walker   Times patient ambulated with staff this shift: 1 to the bathroom    Please review the Heart Failure Care Map for additional HF goal outcomes.    Edilia Kang RN  2/22/2025      Goal Outcome Evaluation:      Plan of Care  "Reviewed With: patient    Overall Patient Progress: no changeOverall Patient Progress: no change    Outcome Evaluation: no acute changes this shift, continues to be bladder scanned but volumes have been </=400ml      Problem: Adult Inpatient Plan of Care  Goal: Plan of Care Review  Description: The Plan of Care Review/Shift note should be completed every shift.  The Outcome Evaluation is a brief statement about your assessment that the patient is improving, declining, or no change.  This information will be displayed automatically on your shift  note.  Outcome: Not Progressing  Flowsheets (Taken 2/22/2025 1446)  Outcome Evaluation: no acute changes this shift, continues to be bladder scanned but volumes have been </=400ml  Plan of Care Reviewed With: patient  Overall Patient Progress: no change  Goal: Patient-Specific Goal (Individualized)  Description: You can add care plan individualizations to a care plan. Examples of Individualization might be:  \"Parent requests to be called daily at 9am for status\", \"I have a hard time hearing out of my right ear\", or \"Do not touch me to wake me up as it startles  me\".  Outcome: Not Progressing  Goal: Absence of Hospital-Acquired Illness or Injury  Outcome: Not Progressing  Intervention: Identify and Manage Fall Risk  Recent Flowsheet Documentation  Taken 2/22/2025 1341 by Edilia Kang RN  Safety Promotion/Fall Prevention: safety round/check completed  Taken 2/22/2025 1315 by Edilia Kang RN  Safety Promotion/Fall Prevention: safety round/check completed  Taken 2/22/2025 1251 by Edilia Kang RN  Safety Promotion/Fall Prevention: safety round/check completed  Taken 2/22/2025 1129 by Edilia Kang RN  Safety Promotion/Fall Prevention: safety round/check completed  Taken 2/22/2025 1000 by Edilia Kang, RN  Safety Promotion/Fall Prevention: safety round/check completed  Taken 2/22/2025 0909 by Edilia Kang RN  Safety Promotion/Fall Prevention:   activity " supervised   assistive device/personal items within reach   treat underlying cause   treat reversible contributory factors   supervised activity   safety round/check completed   room organization consistent   room near nurse's station   room door open   patient and family education   nonskid shoes/slippers when out of bed   mobility aid in reach   lighting adjusted   increase visualization of patient   increased rounding and observation   clutter free environment maintained  Taken 2/22/2025 0842 by Edilia Kang, RN  Safety Promotion/Fall Prevention: safety round/check completed  Taken 2/22/2025 0729 by Edilia Kang RN  Safety Promotion/Fall Prevention: safety round/check completed  Intervention: Prevent Skin Injury  Recent Flowsheet Documentation  Taken 2/22/2025 1000 by Edilia Kang RN  Body Position: (boosted up in bed)   weight shifting   other (see comments)  Taken 2/22/2025 0909 by Edilia Kang RN  Body Position: (side to side in bed for skin assessment)   turned   weight shifting   other (see comments)  Intervention: Prevent and Manage VTE (Venous Thromboembolism) Risk  Recent Flowsheet Documentation  Taken 2/22/2025 0909 by Edilia Kang RN  VTE Prevention/Management: SCDs off (sequential compression devices)  Goal: Optimal Comfort and Wellbeing  Outcome: Not Progressing  Goal: Readiness for Transition of Care  Outcome: Not Progressing     Problem: Heart Failure  Goal: Optimal Cardiac Output  Outcome: Not Progressing  Goal: Fluid and Electrolyte Balance  Outcome: Not Progressing  Goal: Effective Breathing Pattern During Sleep  Outcome: Not Progressing  Intervention: Monitor and Manage Obstructive Sleep Apnea  Recent Flowsheet Documentation  Taken 2/22/2025 0909 by Edilia Kang RN  Medication Review/Management:   medications reviewed   high-risk medications identified     Problem: Fall Injury Risk  Goal: Absence of Fall and Fall-Related Injury  Outcome: Not  Progressing  Intervention: Identify and Manage Contributors  Recent Flowsheet Documentation  Taken 2/22/2025 0909 by Edilia Kang, RN  Medication Review/Management:   medications reviewed   high-risk medications identified  Intervention: Promote Injury-Free Environment  Recent Flowsheet Documentation  Taken 2/22/2025 1341 by Edilia Kang, RN  Safety Promotion/Fall Prevention: safety round/check completed  Taken 2/22/2025 1315 by Edilia Kang RN  Safety Promotion/Fall Prevention: safety round/check completed  Taken 2/22/2025 1251 by Edilia Kang RN  Safety Promotion/Fall Prevention: safety round/check completed  Taken 2/22/2025 1129 by Edilia Kang RN  Safety Promotion/Fall Prevention: safety round/check completed  Taken 2/22/2025 1000 by Edilia Kang RN  Safety Promotion/Fall Prevention: safety round/check completed  Taken 2/22/2025 0909 by Edilia Kang RN  Safety Promotion/Fall Prevention:   activity supervised   assistive device/personal items within reach   treat underlying cause   treat reversible contributory factors   supervised activity   safety round/check completed   room organization consistent   room near nurse's station   room door open   patient and family education   nonskid shoes/slippers when out of bed   mobility aid in reach   lighting adjusted   increase visualization of patient   increased rounding and observation   clutter free environment maintained  Taken 2/22/2025 0842 by Edilia Kang, RN  Safety Promotion/Fall Prevention: safety round/check completed  Taken 2/22/2025 0729 by Edilia Kang RN  Safety Promotion/Fall Prevention: safety round/check completed

## 2025-02-22 NOTE — PLAN OF CARE
Goal Outcome Evaluation:      Plan of Care Reviewed With: patient    Overall Patient Progress: no changeOverall Patient Progress: no change    Outcome Evaluation: Anticipate discharge back to [facility] when medically cleared.

## 2025-02-22 NOTE — CONSULTS
Care Management Initial Consult    General Information  Assessment completed with: Patient,    Type of CM/SW Visit: Initial Assessment    Primary Care Provider verified and updated as needed: Yes   Readmission within the last 30 days: no previous admission in last 30 days      Reason for Consult: discharge planning      Communication Assessment  Patient's communication style: spoken language (English or Bilingual)    Hearing Difficulty or Deaf: yes (a little bit og difficulty of hearing)   Wear Glasses or Blind: yes    Cognitive  Cognitive/Neuro/Behavioral: .WDL except  Level of Consciousness: intermittent confusion, alert  Arousal Level: opens eyes spontaneously  Orientation: disoriented to, time, other (see comments) (thought she was 89 years old, then later remembered she turned 89)        Speech: clear    Living Environment:   People in home: facility resident     Current living Arrangements: assisted living      Able to return to prior arrangements: yes     Family/Social Support:  Care provided by: self (Facility staff)  Provides care for: no one  Marital Status:   Support system: Facility resident(s)/Staff          Description of Support System: Supportive, Involved       Current Resources:   Patient receiving home care services: No     Equipment currently used at home: walker, standard, wheelchair, manual    Does the patient's insurance plan have a 3 day qualifying hospital stay waiver?  No    Lifestyle & Psychosocial Needs:  Social Drivers of Health     Food Insecurity: Low Risk  (2/21/2025)    Food Insecurity     Within the past 12 months, did you worry that your food would run out before you got money to buy more?: No     Within the past 12 months, did the food you bought just not last and you didn t have money to get more?: No   Depression: Not at risk (8/6/2024)    PHQ-2     PHQ-2 Score: 0   Housing Stability: Low Risk  (2/21/2025)    Housing Stability     Do you have housing? : Yes     Are you  worried about losing your housing?: No   Tobacco Use: Low Risk  (11/19/2024)    Patient History     Smoking Tobacco Use: Never     Smokeless Tobacco Use: Never     Passive Exposure: Not on file   Financial Resource Strain: Low Risk  (2/21/2025)    Financial Resource Strain     Within the past 12 months, have you or your family members you live with been unable to get utilities (heat, electricity) when it was really needed?: No   Alcohol Use: Not on file   Transportation Needs: Low Risk  (2/21/2025)    Transportation Needs     Within the past 12 months, has lack of transportation kept you from medical appointments, getting your medicines, non-medical meetings or appointments, work, or from getting things that you need?: No   Physical Activity: Not on file   Interpersonal Safety: Low Risk  (2/21/2025)    Interpersonal Safety     Do you feel physically and emotionally safe where you currently live?: Yes     Within the past 12 months, have you been hit, slapped, kicked or otherwise physically hurt by someone?: No     Within the past 12 months, have you been humiliated or emotionally abused in other ways by your partner or ex-partner?: No   Stress: Not on file   Social Connections: Not on file   Health Literacy: Not on file       Functional Status:  Prior to admission patient needed assistance:   Dependent ADLs:: Ambulation-walker, Bathing  Dependent IADLs:: Cleaning, Cooking, Laundry, Shopping, Meal Preparation, Medication Management, Transportation       Discussed  Partnership in Safe Discharge Planning  document with patient/family: No    Additional Information:  RN CC/SW is following/consulted for discharge planning. Pt admitted with CHF exacerbation. Per chart review, pt resides in an Assisted Living Facility. Spoke to pt  to verify pt s residence at [facility]. Verbal permission was received to speak to the facility to verify services and to discuss the discharge plan of care. A call was placed to [facility] to  verify services.    Patient receives services as follows: Per pt, she states has help with bathing, med management, meals, housekeeping, escort to meals, compression stockings.    [facility] contact (name/number): [phone]  Fax #: [phone]  Barriers to pt returning: None  Baseline mobility: A1 with walker  Time of preferred return: M-F before 1700  New meds/prescriptions/Pharmacy: A&E  Transportation: Son-in-law, [facility] CC/SW will continue to follow for discharge planning and return to facility.     Next Steps: Continue to follow for discharge planning.    Melanie Chavez RN   Inpatient Care Coordination  [facility]   Phone: [phone]

## 2025-02-22 NOTE — PLAN OF CARE
A&O, forgetful. VSS. Denies pain. LS diminished. Tele SR. Purewick in place. Incontinent of stool. Blanchable redness to buttocks/coccyx- mepilex applied. Fluid restriction. Cardiology consult.    Heart Failure Care Map  GOALS TO BE MET BEFORE DISCHARGE:    1. Decrease congestion and/or edema with diuretic therapy to achieve near optimal volume status.     Dyspnea improved: No, further care required to meet this goal. Please explain PYLE   Edema improved: No, further care required to meet this goal. Please explain +2 BLE edema        Last 24 hour I/O:   Intake/Output Summary (Last 24 hours) at 2/22/2025 0543  Last data filed at 2/21/2025 2132  Gross per 24 hour   Intake 120 ml   Output 600 ml   Net -480 ml           Net I/O and Weights since admission:   01/23 0700 - 02/22 0659  In: 120 [P.O.:120]  Out: 600 [Urine:600]  Net: -480     Vitals:    02/21/25 1301 02/21/25 1457   Weight: 56.7 kg (125 lb) 54.4 kg (120 lb)       2.  O2 sats > 90% on room air, or at prior home O2 therapy level.      Able to wean O2 this shift to keep sats above 90%?: No, further care required to meet this goal. Please explain +2 BLE edema   Does patient use Home O2? No          Current oxygenation status:   SpO2: 97 %     O2 Device: None (Room air), Oxygen Delivery: 3 LPM    3.  Tolerates ambulation and mobility near baseline.     Ambulation: Yes, satisfactory for discharge.   Times patient ambulated with staff this shift: 0    Please review the Heart Failure Care Map for additional HF goal outcomes.    Jojo Garces RN  2/22/2025    Goal Outcome Evaluation:      Plan of Care Reviewed With: patient    Overall Patient Progress: no changeOverall Patient Progress: no change    Outcome Evaluation: Remains on room air, denies SOB      Problem: Adult Inpatient Plan of Care  Goal: Plan of Care Review  Description: The Plan of Care Review/Shift note should be completed every shift.  The Outcome Evaluation is a brief statement about your  "assessment that the patient is improving, declining, or no change.  This information will be displayed automatically on your shift  note.  Outcome: Progressing  Flowsheets (Taken 2/22/2025 0550)  Outcome Evaluation: Remains on room air, denies SOB  Plan of Care Reviewed With: patient  Overall Patient Progress: no change  Goal: Patient-Specific Goal (Individualized)  Description: You can add care plan individualizations to a care plan. Examples of Individualization might be:  \"Parent requests to be called daily at 9am for status\", \"I have a hard time hearing out of my right ear\", or \"Do not touch me to wake me up as it startles  me\".  Outcome: Progressing  Goal: Absence of Hospital-Acquired Illness or Injury  Outcome: Progressing  Intervention: Identify and Manage Fall Risk  Recent Flowsheet Documentation  Taken 2/22/2025 0218 by Jojo Garces, RN  Safety Promotion/Fall Prevention: safety round/check completed  Taken 2/21/2025 2110 by Jojo Garces, RN  Safety Promotion/Fall Prevention:   activity supervised   clutter free environment maintained   nonskid shoes/slippers when out of bed   safety round/check completed   room near nurse's station  Intervention: Prevent Skin Injury  Recent Flowsheet Documentation  Taken 2/21/2025 2110 by Jojo Garces, RN  Body Position:   turned   side-lying   right  Intervention: Prevent Infection  Recent Flowsheet Documentation  Taken 2/21/2025 2110 by Jojo Garces, RN  Infection Prevention:   rest/sleep promoted   single patient room provided   hand hygiene promoted  Goal: Optimal Comfort and Wellbeing  Outcome: Progressing  Goal: Readiness for Transition of Care  Outcome: Progressing     "

## 2025-02-22 NOTE — CONSULTS
"BRIEF NUTRITION NOTE    Chart reviewed for Positive Malnutrition Screen - poor appetite, unknown weight loss.   Reviewed weight history and skin.   Wt appears to be stable PTA (up trending d/t fluid), Cardiology is following and is aiming for a dry weight of 110-112 lbs. No pressure injuries or edema is documented.     RD changed MST score to 1 and will continue to stayed signed off at this time. RD will re-evaluate patient in 7-10 days per policy guidelines, unless consulted sooner.       Mery Chavez MS, RD, LD  Clinical Dietitian II  [facility] Message Group: \"Dietitian [[place]]\"  Office Phone: [phone]  Pagers: 3rd floor/ICU: [phone]  All other floors: [phone]  Weekend/holiday: [phone]    "

## 2025-02-23 LAB
ANION GAP SERPL CALCULATED.3IONS-SCNC: 14 MMOL/L (ref 7–15)
BUN SERPL-MCNC: 30.3 MG/DL (ref 8–23)
CALCIUM SERPL-MCNC: 8.6 MG/DL (ref 8.8–10.4)
CHLORIDE SERPL-SCNC: 97 MMOL/L (ref 98–107)
CREAT SERPL-MCNC: 1.51 MG/DL (ref 0.51–0.95)
EGFRCR SERPLBLD CKD-EPI 2021: 32 ML/MIN/1.73M2
GLUCOSE SERPL-MCNC: 126 MG/DL (ref 70–99)
HCO3 SERPL-SCNC: 25 MMOL/L (ref 22–29)
HOLD SPECIMEN: NORMAL
MAGNESIUM SERPL-MCNC: 2.2 MG/DL (ref 1.7–2.3)
POTASSIUM SERPL-SCNC: 4.4 MMOL/L (ref 3.4–5.3)
SODIUM SERPL-SCNC: 136 MMOL/L (ref 135–145)

## 2025-02-23 PROCEDURE — 83735 ASSAY OF MAGNESIUM: CPT | Performed by: INTERNAL MEDICINE

## 2025-02-23 PROCEDURE — 250N000011 HC RX IP 250 OP 636: Performed by: INTERNAL MEDICINE

## 2025-02-23 PROCEDURE — 80048 BASIC METABOLIC PNL TOTAL CA: CPT | Performed by: INTERNAL MEDICINE

## 2025-02-23 PROCEDURE — 82565 ASSAY OF CREATININE: CPT | Performed by: INTERNAL MEDICINE

## 2025-02-23 PROCEDURE — 250N000013 HC RX MED GY IP 250 OP 250 PS 637: Performed by: INTERNAL MEDICINE

## 2025-02-23 PROCEDURE — 99232 SBSQ HOSP IP/OBS MODERATE 35: CPT | Performed by: INTERNAL MEDICINE

## 2025-02-23 PROCEDURE — 36415 COLL VENOUS BLD VENIPUNCTURE: CPT | Performed by: INTERNAL MEDICINE

## 2025-02-23 PROCEDURE — 120N000001 HC R&B MED SURG/OB

## 2025-02-23 PROCEDURE — 99233 SBSQ HOSP IP/OBS HIGH 50: CPT | Performed by: INTERNAL MEDICINE

## 2025-02-23 RX ADMIN — CARBAMAZEPINE 200 MG: 100 TABLET, CHEWABLE ORAL at 08:37

## 2025-02-23 RX ADMIN — FUROSEMIDE 40 MG: 10 INJECTION, SOLUTION INTRAVENOUS at 16:10

## 2025-02-23 RX ADMIN — ACETAMINOPHEN 975 MG: 325 TABLET, FILM COATED ORAL at 20:59

## 2025-02-23 RX ADMIN — METOPROLOL SUCCINATE 12.5 MG: 25 TABLET, EXTENDED RELEASE ORAL at 08:34

## 2025-02-23 RX ADMIN — LISINOPRIL 2.5 MG: 2.5 TABLET ORAL at 08:34

## 2025-02-23 RX ADMIN — EMPAGLIFLOZIN 10 MG: 10 TABLET, FILM COATED ORAL at 08:34

## 2025-02-23 RX ADMIN — ACETAMINOPHEN 975 MG: 325 TABLET, FILM COATED ORAL at 08:34

## 2025-02-23 RX ADMIN — FUROSEMIDE 40 MG: 10 INJECTION, SOLUTION INTRAVENOUS at 08:35

## 2025-02-23 RX ADMIN — ACETAMINOPHEN 975 MG: 325 TABLET, FILM COATED ORAL at 14:52

## 2025-02-23 RX ADMIN — POTASSIUM CHLORIDE 15 MEQ: 20 SOLUTION ORAL at 08:35

## 2025-02-23 RX ADMIN — TIMOLOL MALEATE 1 DROP: 5 SOLUTION/ DROPS OPHTHALMIC at 21:01

## 2025-02-23 RX ADMIN — TIMOLOL MALEATE 1 DROP: 5 SOLUTION/ DROPS OPHTHALMIC at 08:39

## 2025-02-23 RX ADMIN — CARBAMAZEPINE 200 MG: 100 TABLET, CHEWABLE ORAL at 21:01

## 2025-02-23 RX ADMIN — LATANOPROST 1 DROP: 50 SOLUTION/ DROPS OPHTHALMIC at 21:01

## 2025-02-23 ASSESSMENT — ACTIVITIES OF DAILY LIVING (ADL)
ADLS_ACUITY_SCORE: 67
ADLS_ACUITY_SCORE: 68
ADLS_ACUITY_SCORE: 67

## 2025-02-23 NOTE — PROGRESS NOTES
[facility]    Hospitalist Progress Note  Name: Maya Cullen    MRN: [id number]  Provider: Alysha Beckham MD  Date of Service: 02/23/2025    Assessment & Plan   Summary of Stay: Maya Cullen is a 89 year old female who was admitted on 2/21/2025 for acute CHF exacerbation.  Patient's past medical significant for heart failure with reduced EF of 20%, moderate mitral valve regurg, mild to moderate tricuspid regurg, hypertension, history of CVA, recurrent falls, subclinical hypothyroidism, mild cognitive impairment, pulmonary hypertension, trigeminal neuralgia presented with complaints of shortness of breath.    2/23/2025.  She lost 5 pounds and is responding to IV diuresis.  Plan is to continue IV diuretics for a few more days    Acute Exacerbation of HFrEF - EF 20%  NSTEMI - Likely Type 2  - Strict I/O  - Daily Weights (Dry weight seems to be around 110-115 lbs)  - Telemetry  - Lasix 40 mg IV BID  - Most recent echocardiogram in 6/2024 showed EF 20-25%, severely reduced RVSF, moderate mitral valve regurgitation, mild to moderate TR, moderate to severe pulmonary hypertension.   - Appreciate Cardiology recommendations     Suspected Cardiorenal Syndrome  Chronic Kidney Disease Stage 3a  - Baseline Cr = 1.1-1.3  - Diuresis as above  - Daily BMP     Chronic Medical Problems:  Mild Cognitive Impairment  Hypertension  Moderate MR  Mild to Moderate TR  Hx of CVA: ?not on asa  Subclinical Hypothyroidism: ? No replacement therapy  Trigeminal Neuralgia: on Carbamezaapine  Pulmonary Hypertension       DVT Prophylaxis: Pneumatic Compression Devices  Code Status: No CPR- Do NOT Intubate    Disposition:   Medically Ready for Discharge: Anticipated in 2-4 Days        Interval History   Reviewed chart.  Patient continues to have shortness of breath.  However breathing is somewhat better than yesterday.  Continues to have dyspnea on exertion and orthopnea complains of generalized malaise and weakness.   More than 10 point review of system was carried out was limited.    Total time spent in direct patient care and coordination of care is more than 40 minutes    -Data reviewed today: I reviewed all new labs and imaging reports over the last 24 hours. I personally reviewed no images or EKG's today.    Physical Exam   Temp: 97.6  F (36.4  C) Temp src: Oral BP: 130/78 Pulse: 53   Resp: 18 SpO2: (!) 80 % O2 Device: None (Room air)    Vitals:    02/21/25 1457 02/22/25 0500 02/23/25 0628   Weight: 54.4 kg (120 lb) 55.5 kg (122 lb 5.7 oz) 54.5 kg (120 lb 2.4 oz)     Vital Signs with Ranges  Temp:  [97.6  F (36.4  C)-98.9  F (37.2  C)] 97.6  F (36.4  C)  Pulse:  [53-73] 53  Resp:  [17-20] 18  BP: (115-135)/(60-81) 130/78  SpO2:  [80 %-98 %] 80 %  I/O last 3 completed shifts:  In: 370 [P.O.:360; I.V.:10]  Out: 600 [Urine:600]    GEN:  Alert, cooperative, NAD.  HEENT:  Normocephalic/atraumatic, no scleral icterus, no nasal discharge, mouth moist.  CV:  Regular rate and rhythm, no murmur or JVD.  S1 + S2 noted, no S3 or S4.  LUNGS: Bibasilar crackles   Symmetric chest rise on inhalation noted.  ABD:  Active bowel sounds, soft, non-tender/non-distended.  No rebound/guarding/rigidity.  EXT: Lateral lower extremity edema no cyanosis.  No joint synovitis noted.  SKIN:  Dry to touch, no exanthems noted in the visualized areas.    Medications   Current Facility-Administered Medications   Medication Dose Route Frequency Provider Last Rate Last Admin     Current Facility-Administered Medications   Medication Dose Route Frequency Provider Last Rate Last Admin    acetaminophen (TYLENOL) tablet 975 mg  975 mg Oral TID Ming Quinn, DO   975 mg at 02/22/25 2103    carBAMazepine (TEGretol) chewable tablet 200 mg  200 mg Oral BID Ming Quinn DO   200 mg at 02/22/25 2105    empagliflozin (JARDIANCE) tablet 10 mg  10 mg Oral Daily Ip, Anshu Blanchard MD   10 mg at 02/22/25 1131    furosemide (LASIX) injection 40 mg  40 mg  "Intravenous BID Ming Quinn, DO   40 mg at 02/22/25 1623    latanoprost (XALATAN) 0.005 % ophthalmic solution 1 drop  1 drop Both Eyes At Bedtime Ming Quinn, DO   1 drop at 02/22/25 2105    lisinopril (ZESTRIL) tablet 2.5 mg  2.5 mg Oral Daily Ming Quinn, DO   2.5 mg at 02/22/25 0901    metoprolol succinate ER (TOPROL-XL) 24 hr half-tab 12.5 mg  12.5 mg Oral Daily Ming Quinn, DO   12.5 mg at 02/22/25 0903    potassium chloride (KAYCIEL) solution 15 mEq  15 mEq Oral Daily Ming Quinn, DO   15 mEq at 02/22/25 0904    sodium chloride (PF) 0.9% PF flush 3 mL  3 mL Intracatheter Q8H Ming Quinn, DO   3 mL at 02/22/25 2117    timolol maleate (TIMOPTIC) 0.5 % ophthalmic solution 1 drop  1 drop Both Eyes BID Ming Quinn, DO   1 drop at 02/22/25 2105     Data     No results for input(s): \"PH\", \"PHV\", \"PO2\", \"PO2V\", \"SAT\", \"PCO2\", \"PCO2V\", \"HCO3\", \"HCO3V\" in the last 168 hours.  Recent Labs   Lab 02/22/25  0753 02/21/25  1330   WBC 6.3 5.9   HGB 15.4 15.1   HCT 47.0 46.0    101*    235     Recent Labs   Lab 02/23/25  0637 02/22/25  0753 02/21/25  1330    134* 135   POTASSIUM 4.4 4.6 4.9   CHLORIDE 97* 97* 98   CO2 25 26 26   ANIONGAP 14 11 11   * 117* 127*   BUN 30.3* 33.4* 34.5*   CR 1.51* 1.62* 1.75*   GFRESTIMATED 32* 30* 27*   GILL 8.6* 8.9 9.1     7-Day Micro Results       Collected Updated Procedure Result Status      02/21/2025 1330 02/21/2025 1423 Influenza A/B, RSV and SARS-CoV2 PCR (COVID-19) Nasopharyngeal [[id number]]    Swab from Nasopharyngeal    Final result Component Value   Influenza A PCR Negative   Influenza B PCR Negative   RSV PCR Negative   SARS CoV2 PCR Negative   NEGATIVE: SARS-CoV-2 (COVID-19) RNA not detected, presumed negative.                  Recent Labs   Lab 02/21/25  1330   NTBNPI 26,510*     Recent Labs   Lab 02/22/25  0753 02/21/25  1330   HGB 15.4 15.1     No results for input(s): \"AST\", \"ALT\", " "\"GGT\", \"ALKPHOS\", \"BILITOTAL\", \"BILICONJ\", \"BILIDIRECT\", \"CASSY\" in the last 168 hours.    Invalid input(s): \"BILIRUBININDIRECT\"  No results for input(s): \"INR\" in the last 168 hours.  No results for input(s): \"LACT\" in the last 168 hours.  No results for input(s): \"LIPASE\" in the last 168 hours.  No results for input(s): \"COLOR\", \"APPEARANCE\", \"URINEGLC\", \"URINEBILI\", \"URINEKETONE\", \"SG\", \"UBLD\", \"URINEPH\", \"PROTEIN\", \"UROBILINOGEN\", \"NITRITE\", \"LEUKEST\", \"RBCU\", \"WBCU\" in the last 168 hours.    No results found for this or any previous visit (from the past 24 hours).         "

## 2025-02-23 NOTE — PLAN OF CARE
"A&O, forgetful. VSS. LS diminished. Tele SR w/ BBB. Purewick in place. Fluid restriction. Continue to diurese.     Goal Outcome Evaluation:      Plan of Care Reviewed With: patient    Overall Patient Progress: no changeOverall Patient Progress: no change    Outcome Evaluation: Denies SOB, remains on room air      Problem: Adult Inpatient Plan of Care  Goal: Plan of Care Review  Description: The Plan of Care Review/Shift note should be completed every shift.  The Outcome Evaluation is a brief statement about your assessment that the patient is improving, declining, or no change.  This information will be displayed automatically on your shift  note.  Outcome: Progressing  Flowsheets (Taken 2/23/2025 0253)  Outcome Evaluation: Denies SOB, remains on room air  Plan of Care Reviewed With: patient  Overall Patient Progress: no change  Goal: Patient-Specific Goal (Individualized)  Description: You can add care plan individualizations to a care plan. Examples of Individualization might be:  \"Parent requests to be called daily at 9am for status\", \"I have a hard time hearing out of my right ear\", or \"Do not touch me to wake me up as it startles  me\".  Outcome: Progressing  Goal: Absence of Hospital-Acquired Illness or Injury  Outcome: Progressing  Intervention: Identify and Manage Fall Risk  Recent Flowsheet Documentation  Taken 2/23/2025 0020 by Jojo Garces RN  Safety Promotion/Fall Prevention: safety round/check completed  Taken 2/22/2025 2223 by Jojo Garces RN  Safety Promotion/Fall Prevention: safety round/check completed  Taken 2/22/2025 2100 by Jojo Garces, RN  Safety Promotion/Fall Prevention:   activity supervised   clutter free environment maintained   nonskid shoes/slippers when out of bed   safety round/check completed  Intervention: Prevent and Manage VTE (Venous Thromboembolism) Risk  Recent Flowsheet Documentation  Taken 2/22/2025 2100 by Jojo Garces RN  VTE Prevention/Management: SCDs off " (sequential compression devices)  Intervention: Prevent Infection  Recent Flowsheet Documentation  Taken 2/22/2025 2100 by Jojo Garces, RN  Infection Prevention:   rest/sleep promoted   single patient room provided  Goal: Optimal Comfort and Wellbeing  Outcome: Progressing  Goal: Readiness for Transition of Care  Outcome: Progressing

## 2025-02-23 NOTE — PROGRESS NOTES
[facility] Cardiology Progress Note       Assessment and Plan:   Acute systolic heart failure exacerbation, possibly dietary but at age with with multiple comorbidities is frequently no clear cause is identified.  She is improving, already on low doses of Toprol lisinopril and Jardiance.  Continue IV diuresis for 1-2 more days, then hopefully discharge with oral maintenance diuretics.    Cardiomyopathy with severe left ventricular systolic dysfunction, patient has declined further workup.  Mild to both degenerative valvular heart lesions, borderline hemodynamic significance  DNR/DNI  Stage III-IV chronic renal failure  Cognitive impairment  Senile frailty            Interval History:   Wt down by about 5 lbs since admission. BP holding at about 130/80  Looking better and feeling better, no shortness of breath at rest.                  Medications:     Current Facility-Administered Medications   Medication Dose Route Frequency Provider Last Rate Last Admin    acetaminophen (TYLENOL) tablet 650 mg  650 mg Oral Q4H PRN Ming Quinn DO        Or    acetaminophen (TYLENOL) Suppository 650 mg  650 mg Rectal Q4H PRN Ming Quinn DO        acetaminophen (TYLENOL) tablet 975 mg  975 mg Oral TID Ming Quinn DO   975 mg at 02/22/25 2103    carBAMazepine (TEGretol) chewable tablet 200 mg  200 mg Oral BID Ming Quinn DO   200 mg at 02/22/25 2105    empagliflozin (JARDIANCE) tablet 10 mg  10 mg Oral Daily Ip, Anshu Blanchard MD   10 mg at 02/22/25 1131    furosemide (LASIX) injection 40 mg  40 mg Intravenous BID Ming Quinn DO   40 mg at 02/22/25 1623    latanoprost (XALATAN) 0.005 % ophthalmic solution 1 drop  1 drop Both Eyes At Bedtime Ming Quinn DO   1 drop at 02/22/25 2105    lidocaine (LMX4) cream   Topical Q1H PRN Ming Quinn DO        lidocaine 1 % 0.1-1 mL  0.1-1 mL Other Q1H PRN Ming Quinn DO        lisinopril (ZESTRIL) tablet 2.5 mg   2.5 mg Oral Daily Ming Quinn DO   2.5 mg at 25 0901    metoprolol succinate ER (TOPROL-XL) 24 hr half-tab 12.5 mg  12.5 mg Oral Daily Ming Quinn DO   12.5 mg at 25 0903    ondansetron (ZOFRAN ODT) ODT tab 4 mg  4 mg Oral Q6H PRN Ming Quinn DO        Or    ondansetron (ZOFRAN) injection 4 mg  4 mg Intravenous Q6H PRN Ming Quinn,         potassium chloride (KAYCIEL) solution 15 mEq  15 mEq Oral Daily Ming Quinn DO   15 mEq at 25 0904    sodium chloride (PF) 0.9% PF flush 3 mL  3 mL Intracatheter Q8H Ming Quinn, DO   3 mL at 25 211    sodium chloride (PF) 0.9% PF flush 3 mL  3 mL Intracatheter q1 min prn Ming Quinn,    3 mL at 25 0228    timolol maleate (TIMOPTIC) 0.5 % ophthalmic solution 1 drop  1 drop Both Eyes BID Ming Quinn DO   1 drop at 25 2105             Physical Exam:   Blood pressure 130/78, pulse 53, temperature 97.6  F (36.4  C), temperature source Oral, resp. rate 18, weight 54.5 kg (120 lb 2.4 oz), SpO2 (!) 80%, not currently breastfeeding.  Wt Readings from Last 4 Encounters:   25 54.5 kg (120 lb 2.4 oz)   24 52.8 kg (116 lb 4.8 oz)   24 52.2 kg (115 lb)   10/18/24 51.3 kg (113 lb 1.6 oz)         Vital Sign Ranges  Temperature Temp  Av  F (36.7  C)  Min: 97.6  F (36.4  C)  Max: 98.9  F (37.2  C)   Blood pressure Systolic (24hrs), Av , Min:115 , Max:135        Diastolic (24hrs), Av, Min:60, Max:81      Pulse Pulse  Av.2  Min: 53  Max: 73   Respirations Resp  Av.8  Min: 17  Max: 20   Pulse oximetry SpO2  Av.6 %  Min: 80 %  Max: 98 %         Intake/Output Summary (Last 24 hours) at 2025 0834  Last data filed at 2025 0606  Gross per 24 hour   Intake 370 ml   Output 600 ml   Net -230 ml          Cardiovascular:   Normal apical impulse, regular rate and rhythm, normal S1 and S2, no S3 or S4, and no murmur noted   Chest  "clear.  Trace to 1+ peripheral oedema         Data:     Labs:  Lab Results   Component Value Date     02/23/2025     07/13/2013    Lab Results   Component Value Date    CHLORIDE 97 02/23/2025    CHLORIDE 102 07/13/2013    Lab Results   Component Value Date    BUN 30.3 02/23/2025    BUN 13 07/13/2013      Lab Results   Component Value Date    POTASSIUM 4.4 02/23/2025    POTASSIUM 4.1 07/13/2013    Lab Results   Component Value Date    CO2 25 02/23/2025    CO2 25 07/13/2013    Lab Results   Component Value Date    CR 1.51 02/23/2025    CR 0.89 07/13/2013        Lab Results   Component Value Date    WBC 6.3 02/22/2025    HGB 15.4 02/22/2025    HCT 47.0 02/22/2025     02/22/2025     02/22/2025     No results found for: \"TROPONIN\", \"TROPI\", \"TROPR\"        Attestation:  I have reviewed today's vital signs, notes, medications, labs and imaging.         DR KATLIN QUIÑONEZ MD 2/23/2025  8:34 AM       "

## 2025-02-23 NOTE — PLAN OF CARE
"Temp: 98.1  F (36.7  C) Temp src: Oral BP: 131/75 Pulse: 69   Resp: 18 SpO2: 98 % O2 Device: None (Room air)      A&Ox4 intermittent confusion, mild pain managed with scheduled tylenol. A little tired today but did ambulate in the ascencio and sat in chair for awhile. A1 gb/walker in room, a2 gb/walker in the ascencio. RPIV-sl Iv lasix on shift. Tele-SR BBB. 2g Na 1800 FR maintained. Purwick in place with good output. K and mag protocols-AM rechecks. Discharge TBD.          Goal Outcome Evaluation:      Plan of Care Reviewed With: patient    Overall Patient Progress: no changeOverall Patient Progress: no change    Outcome Evaluation: Denies SOB, has mild back pain managed, IV lasix continued.      Problem: Adult Inpatient Plan of Care  Goal: Plan of Care Review  Description: The Plan of Care Review/Shift note should be completed every shift.  The Outcome Evaluation is a brief statement about your assessment that the patient is improving, declining, or no change.  This information will be displayed automatically on your shift  note.  Outcome: Progressing  Flowsheets (Taken 2/23/2025 1743)  Outcome Evaluation: Denies SOB, has mild back pain managed, IV lasix continued.  Plan of Care Reviewed With: patient  Overall Patient Progress: no change  Goal: Patient-Specific Goal (Individualized)  Description: You can add care plan individualizations to a care plan. Examples of Individualization might be:  \"Parent requests to be called daily at 9am for status\", \"I have a hard time hearing out of my right ear\", or \"Do not touch me to wake me up as it startles  me\".  Outcome: Progressing  Goal: Absence of Hospital-Acquired Illness or Injury  Outcome: Progressing  Intervention: Identify and Manage Fall Risk  Recent Flowsheet Documentation  Taken 2/23/2025 1610 by Charanjit Thomas, RN  Safety Promotion/Fall Prevention: safety round/check completed  Taken 2/23/2025 1114 by Charanjit Thomas, RN  Safety Promotion/Fall Prevention: safety " round/check completed  Taken 2/23/2025 0834 by Charanjit Thomas RN  Safety Promotion/Fall Prevention: safety round/check completed  Intervention: Prevent Skin Injury  Recent Flowsheet Documentation  Taken 2/23/2025 0834 by Charanjit Thomas RN  Body Position: position changed independently  Intervention: Prevent and Manage VTE (Venous Thromboembolism) Risk  Recent Flowsheet Documentation  Taken 2/23/2025 0834 by Charanjit Thomas RN  VTE Prevention/Management: SCDs off (sequential compression devices)  Goal: Optimal Comfort and Wellbeing  Outcome: Progressing  Goal: Readiness for Transition of Care  Outcome: Progressing     Problem: Heart Failure  Goal: Optimal Cardiac Output  Outcome: Progressing  Goal: Fluid and Electrolyte Balance  Outcome: Progressing  Goal: Effective Breathing Pattern During Sleep  Outcome: Progressing     Problem: Fall Injury Risk  Goal: Absence of Fall and Fall-Related Injury  Outcome: Progressing  Intervention: Promote Injury-Free Environment  Recent Flowsheet Documentation  Taken 2/23/2025 1610 by Charanjit Thomas RN  Safety Promotion/Fall Prevention: safety round/check completed  Taken 2/23/2025 1114 by Charanjit Thomas RN  Safety Promotion/Fall Prevention: safety round/check completed  Taken 2/23/2025 0834 by Charanjit Thomas RN  Safety Promotion/Fall Prevention: safety round/check completed

## 2025-02-24 LAB
ANION GAP SERPL CALCULATED.3IONS-SCNC: 13 MMOL/L (ref 7–15)
BUN SERPL-MCNC: 29.8 MG/DL (ref 8–23)
CALCIUM SERPL-MCNC: 8.5 MG/DL (ref 8.8–10.4)
CHLORIDE SERPL-SCNC: 99 MMOL/L (ref 98–107)
CREAT SERPL-MCNC: 1.38 MG/DL (ref 0.51–0.95)
EGFRCR SERPLBLD CKD-EPI 2021: 36 ML/MIN/1.73M2
GLUCOSE SERPL-MCNC: 97 MG/DL (ref 70–99)
HCO3 SERPL-SCNC: 25 MMOL/L (ref 22–29)
HOLD SPECIMEN: NORMAL
MAGNESIUM SERPL-MCNC: 2.3 MG/DL (ref 1.7–2.3)
POTASSIUM SERPL-SCNC: 4 MMOL/L (ref 3.4–5.3)
SODIUM SERPL-SCNC: 137 MMOL/L (ref 135–145)

## 2025-02-24 PROCEDURE — 120N000001 HC R&B MED SURG/OB

## 2025-02-24 PROCEDURE — 80048 BASIC METABOLIC PNL TOTAL CA: CPT | Performed by: INTERNAL MEDICINE

## 2025-02-24 PROCEDURE — 250N000011 HC RX IP 250 OP 636: Performed by: INTERNAL MEDICINE

## 2025-02-24 PROCEDURE — 99233 SBSQ HOSP IP/OBS HIGH 50: CPT | Performed by: STUDENT IN AN ORGANIZED HEALTH CARE EDUCATION/TRAINING PROGRAM

## 2025-02-24 PROCEDURE — 99232 SBSQ HOSP IP/OBS MODERATE 35: CPT | Performed by: INTERNAL MEDICINE

## 2025-02-24 PROCEDURE — 250N000013 HC RX MED GY IP 250 OP 250 PS 637: Performed by: INTERNAL MEDICINE

## 2025-02-24 PROCEDURE — 82565 ASSAY OF CREATININE: CPT | Performed by: INTERNAL MEDICINE

## 2025-02-24 PROCEDURE — 36415 COLL VENOUS BLD VENIPUNCTURE: CPT | Performed by: INTERNAL MEDICINE

## 2025-02-24 PROCEDURE — 83735 ASSAY OF MAGNESIUM: CPT | Performed by: INTERNAL MEDICINE

## 2025-02-24 RX ADMIN — FUROSEMIDE 40 MG: 10 INJECTION, SOLUTION INTRAVENOUS at 09:42

## 2025-02-24 RX ADMIN — LATANOPROST 1 DROP: 50 SOLUTION/ DROPS OPHTHALMIC at 21:09

## 2025-02-24 RX ADMIN — TIMOLOL MALEATE 1 DROP: 5 SOLUTION/ DROPS OPHTHALMIC at 09:43

## 2025-02-24 RX ADMIN — METOPROLOL SUCCINATE 12.5 MG: 25 TABLET, EXTENDED RELEASE ORAL at 09:41

## 2025-02-24 RX ADMIN — CARBAMAZEPINE 200 MG: 100 TABLET, CHEWABLE ORAL at 09:44

## 2025-02-24 RX ADMIN — ACETAMINOPHEN 975 MG: 325 TABLET, FILM COATED ORAL at 09:41

## 2025-02-24 RX ADMIN — POTASSIUM CHLORIDE 15 MEQ: 20 SOLUTION ORAL at 09:43

## 2025-02-24 RX ADMIN — LISINOPRIL 2.5 MG: 2.5 TABLET ORAL at 09:41

## 2025-02-24 RX ADMIN — ACETAMINOPHEN 975 MG: 325 TABLET, FILM COATED ORAL at 14:44

## 2025-02-24 RX ADMIN — TIMOLOL MALEATE 1 DROP: 5 SOLUTION/ DROPS OPHTHALMIC at 21:08

## 2025-02-24 RX ADMIN — FUROSEMIDE 40 MG: 10 INJECTION, SOLUTION INTRAVENOUS at 16:58

## 2025-02-24 RX ADMIN — ACETAMINOPHEN 975 MG: 325 TABLET, FILM COATED ORAL at 21:08

## 2025-02-24 RX ADMIN — EMPAGLIFLOZIN 10 MG: 10 TABLET, FILM COATED ORAL at 09:41

## 2025-02-24 RX ADMIN — CARBAMAZEPINE 200 MG: 100 TABLET, CHEWABLE ORAL at 21:08

## 2025-02-24 ASSESSMENT — ACTIVITIES OF DAILY LIVING (ADL)
ADLS_ACUITY_SCORE: 63
ADLS_ACUITY_SCORE: 67
ADLS_ACUITY_SCORE: 63
ADLS_ACUITY_SCORE: 67
ADLS_ACUITY_SCORE: 67
ADLS_ACUITY_SCORE: 63
ADLS_ACUITY_SCORE: 67
ADLS_ACUITY_SCORE: 63
ADLS_ACUITY_SCORE: 67
ADLS_ACUITY_SCORE: 67
ADLS_ACUITY_SCORE: 63
ADLS_ACUITY_SCORE: 67
ADLS_ACUITY_SCORE: 63
ADLS_ACUITY_SCORE: 67

## 2025-02-24 NOTE — PROGRESS NOTES
Care Management Follow Up    Length of Stay (days): 3    Expected Discharge Date: 02/25/2025     Concerns to be Addressed: discharge planning     Patient plan of care discussed at interdisciplinary rounds: Yes    Anticipated Discharge Disposition: Assisted Living    Patient/family educated on Medicare website which has current facility and service quality ratings:  yes  Patient/Family in Agreement with the Plan: yes    Referrals Placed by CM/SW:  n/a  Private pay costs discussed: Not applicable    Discussed  Partnership in Safe Discharge Planning  document with patient/family: No     Handoff Completed: No, handoff not indicated or clinically appropriate    Additional Information:  Received call from Noa director of nursing at [facility] (ph [phone]). Provided update on patient status and possible discharge tomorrow. Confirmed that facility would like her to return by 1700 on day of discharge.       Next Steps: Continue to follow and assist with discharge planning.         Carmelina Del Toro, RN  Care Coordinator  [facility]  [phone]

## 2025-02-24 NOTE — PLAN OF CARE
13:45  RN shift summary 7-3:  No c/o pain.  Alert and oriented with minor forgetfulness.  Ambulates with assist of 1, gait belt and walker.  Lungs: PYLE, diminished, clear. 96% RA.  Tele: SR (and discontinued today.)  2 GM NA diet.  1800cc fluid restriction. Pur wick in place.  PIV saline locked.  Cardiology, SW and Spiritual Health following. Possible discharge to [facility] tomorrow.              Heart Failure Care Map  GOALS TO BE MET BEFORE DISCHARGE:    1. Decrease congestion and/or edema with diuretic therapy to achieve near optimal volume status.     Dyspnea improved: Yes, satisfactory for discharge.   Edema improved: No, further care required to meet this goal. Please explain diuresis continues.         Last 24 hour I/O:   Intake/Output Summary (Last 24 hours) at 2/24/2025 1349  Last data filed at 2/24/2025 1244  Gross per 24 hour   Intake 540 ml   Output 1525 ml   Net -985 ml           Net I/O and Weights since admission:   01/25 1500 - 02/24 1459  In: 1390 [P.O.:1380; I.V.:10]  Out: 2845 [Urine:2845]  Net: -1455     Vitals:    02/21/25 1301 02/21/25 1457 02/22/25 0500 02/23/25 0628   Weight: 56.7 kg (125 lb) 54.4 kg (120 lb) 55.5 kg (122 lb 5.7 oz) 54.5 kg (120 lb 2.4 oz)    02/24/25 0654   Weight: 52.3 kg (115 lb 4.8 oz)       2.  O2 sats > 90% on room air, or at prior home O2 therapy level.      Able to wean O2 this shift to keep sats above 90%?: Yes, satisfactory for discharge. Pt feels close to baseline.   Does patient use Home O2? No          Current oxygenation status:   SpO2: 96 %     O2 Device: None (Room air),      3.  Tolerates ambulation and mobility near baseline.     Ambulation: Yes, satisfactory for discharge.     Times patient ambulated with staff this shift: 3    Please review the Heart Failure Care Map for additional HF goal outcomes.    Edilia Schreiber RN  2/24/2025            Goal Outcome Evaluation:      Plan of Care Reviewed With: patient    Overall Patient Progress: improvingOverall  "Patient Progress: improving    Outcome Evaluation: Alert and oriented.  Continue IV lasix.  Pur wick in place.      Problem: Adult Inpatient Plan of Care  Goal: Plan of Care Review  Description: The Plan of Care Review/Shift note should be completed every shift.  The Outcome Evaluation is a brief statement about your assessment that the patient is improving, declining, or no change.  This information will be displayed automatically on your shift  note.  Outcome: Progressing  Flowsheets (Taken 2/24/2025 1321)  Outcome Evaluation: Alert and oriented.  Continue IV lasix.  Pur wick in place.  Plan of Care Reviewed With: patient  Overall Patient Progress: improving  Goal: Patient-Specific Goal (Individualized)  Description: You can add care plan individualizations to a care plan. Examples of Individualization might be:  \"Parent requests to be called daily at 9am for status\", \"I have a hard time hearing out of my right ear\", or \"Do not touch me to wake me up as it startles  me\".  Outcome: Progressing  Goal: Absence of Hospital-Acquired Illness or Injury  Outcome: Progressing  Intervention: Identify and Manage Fall Risk  Recent Flowsheet Documentation  Taken 2/24/2025 0945 by Edilia Schreiber, RN  Safety Promotion/Fall Prevention:   clutter free environment maintained   nonskid shoes/slippers when out of bed  Intervention: Prevent Skin Injury  Recent Flowsheet Documentation  Taken 2/24/2025 0945 by Edilia Schreiber, RN  Body Position: position changed independently  Goal: Optimal Comfort and Wellbeing  Outcome: Progressing  Goal: Readiness for Transition of Care  Outcome: Progressing     Problem: Heart Failure  Goal: Optimal Cardiac Output  Outcome: Progressing  Goal: Fluid and Electrolyte Balance  Outcome: Progressing  Intervention: Monitor and Manage Fluid and Electrolyte Balance  Recent Flowsheet Documentation  Taken 2/24/2025 0945 by Edilia Schreiber, RN  Fluid/Electrolyte Management: (1800cc FR) fluids restricted  Goal: " Effective Breathing Pattern During Sleep  Outcome: Progressing  Intervention: Monitor and Manage Obstructive Sleep Apnea  Recent Flowsheet Documentation  Taken 2/24/2025 0945 by Edilia Scrheiber, RN  Medication Review/Management: medications reviewed     Problem: Fall Injury Risk  Goal: Absence of Fall and Fall-Related Injury  Outcome: Progressing  Intervention: Identify and Manage Contributors  Recent Flowsheet Documentation  Taken 2/24/2025 0945 by Edilia Schreiber, RN  Medication Review/Management: medications reviewed  Intervention: Promote Injury-Free Environment  Recent Flowsheet Documentation  Taken 2/24/2025 0945 by Edilia Schreiber, RN  Safety Promotion/Fall Prevention:   clutter free environment maintained   nonskid shoes/slippers when out of bed

## 2025-02-24 NOTE — CONSULTS
"SPIRITUAL HEALTH SERVICES - Consult Note  RH Med/Surg 3  Referral Source/Reason for Visit: [facility] consult.    Summary and Recommendations -  Pt Tj named multiple losses, including the deaths of her daughter and son.  She named her son-in-law and a [place] of friends as being core to her support network.  Tj is a founding member of [employer] in [place].  They are aware of her admission.  She welcomes prayer.    Plan: Informed pt how she can request further  support.  This author and other chaplains remain available per pt/family request.     Luis Gordon M.Div., [facility]  Staff     SHS available  for emergent requests/referrals, either by paging the on-call  or by entering an ASAP/STAT consult in [employer], which will also page the on-call .    Assessment    Saw pt Maya Cullen per [facility] consult to assess spiritual and emotional needs because patient responded \"Yes\" to the question in the admission assessment, \"Do you have any spiritual or Advent beliefs that will affect your care?\"      Patient/Family Understanding of Illness and Goals of Care - Tj shared that she sustained a compression fracture in her shoulder and spine recently.    Distress and Loss - She named multiple losses:  Her daughter  five years ago from colon cancer, and her son  two years ago from an accident at work.  Tj  reported that she had to move from her home after her fall and injuries.  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    Strengths, Coping, and Resources - Tj named her son-in-law and a [place] of friends as being core to her support network.  Her son-in-law helps her with finances.  She mentioned that two of her friends have visited her while here.    Meaning, Beliefs, and Spirituality - Tj is a founding member of " [employer] [facility] in [place].  She welcomed prayer.

## 2025-02-24 NOTE — PLAN OF CARE
"Alert/intermittently confused. VSS. Denies pain. LS diminished. BLE edema. Fluid restriction. Tele SR. Purewick in place. Continue to diurese.    Goal Outcome Evaluation:      Plan of Care Reviewed With: patient    Overall Patient Progress: no changeOverall Patient Progress: no change    Outcome Evaluation: Denies pain and SOB      Problem: Adult Inpatient Plan of Care  Goal: Plan of Care Review  Description: The Plan of Care Review/Shift note should be completed every shift.  The Outcome Evaluation is a brief statement about your assessment that the patient is improving, declining, or no change.  This information will be displayed automatically on your shift  note.  Outcome: Progressing  Flowsheets (Taken 2/24/2025 0422)  Outcome Evaluation: Denies pain and SOB  Plan of Care Reviewed With: patient  Overall Patient Progress: no change  Goal: Patient-Specific Goal (Individualized)  Description: You can add care plan individualizations to a care plan. Examples of Individualization might be:  \"Parent requests to be called daily at 9am for status\", \"I have a hard time hearing out of my right ear\", or \"Do not touch me to wake me up as it startles  me\".  Outcome: Progressing  Goal: Absence of Hospital-Acquired Illness or Injury  Outcome: Progressing  Intervention: Identify and Manage Fall Risk  Recent Flowsheet Documentation  Taken 2/24/2025 0421 by Jojo Garces RN  Safety Promotion/Fall Prevention: safety round/check completed  Taken 2/24/2025 0229 by Jojo Garces, RN  Safety Promotion/Fall Prevention: safety round/check completed  Taken 2/23/2025 2104 by Jojo Garces, RN  Safety Promotion/Fall Prevention:   activity supervised   clutter free environment maintained   nonskid shoes/slippers when out of bed   safety round/check completed  Intervention: Prevent Skin Injury  Recent Flowsheet Documentation  Taken 2/23/2025 2104 by Jojo Garces, RN  Body Position:   position changed independently   weight " shifting  Intervention: Prevent and Manage VTE (Venous Thromboembolism) Risk  Recent Flowsheet Documentation  Taken 2/23/2025 2104 by Jojo Garces, RN  VTE Prevention/Management: SCDs off (sequential compression devices)  Intervention: Prevent Infection  Recent Flowsheet Documentation  Taken 2/23/2025 2104 by Jojo Garces, RN  Infection Prevention:   single patient room provided   rest/sleep promoted  Goal: Optimal Comfort and Wellbeing  Outcome: Progressing  Goal: Readiness for Transition of Care  Outcome: Progressing

## 2025-02-24 NOTE — PROGRESS NOTES
[facility]    Medicine Progress Note - Hospitalist Service    Date of Admission:  2/21/2025    Assessment & Plan   Summary of Stay: Maya Cullen is a 89 year old female with past medical significant for heart failure with reduced EF of 20%, moderate mitral valve regurg, mild to moderate tricuspid regurg, hypertension, history of CVA, recurrent falls, subclinical hypothyroidism, mild cognitive impairment, pulmonary hypertension, trigeminal neuralgia presented with complaints of shortness of breath who was admitted on 2/21/2025 for acute CHF exacerbation.      Cardiology was consulted.  She has received aggressive diuresis with improvement in symptoms and renal function.        Acute Exacerbation of HFrEF - EF 20%  NSTEMI - Likely Type 2  -Strict I/O  -Daily Weights (Dry weight seems to be around 110-115 lbs)  -Telemetry  -Lasix 40 mg IV BID, anticipate transition to oral tomorrow 2/25  -Most recent echocardiogram in 6/2024 showed EF 20-25%, severely reduced RVSF, moderate mitral valve regurgitation, mild to moderate TR, moderate to severe pulmonary hypertension.   -Appreciate Cardiology recommendations     Suspected Cardiorenal Syndrome  MARICRUZ on Chronic Kidney Disease Stage 3a  -Baseline Cr = 1.1-1.3  -Improving with diuresis as above  -Daily BMP     Chronic Medical Problems:  Mild Cognitive Impairment  Hypertension  Moderate MR  Mild to Moderate TR  Hx of CVA: ?not on asa  Subclinical Hypothyroidism: ? No replacement therapy  Trigeminal Neuralgia: on Carbamezaapine  Pulmonary Hypertension           Diet: Fluid restriction 1800 ML FLUID  2 Gram Sodium Diet    DVT Prophylaxis: Pneumatic Compression Devices  Barton Catheter: Not present  Lines: None     Cardiac Monitoring: None  Code Status: No CPR- Do NOT Intubate      Clinically Significant Risk Factors          # Hypochloremia: Lowest Cl = 97 mmol/L in last 2 days, will monitor as appropriate           # Acute heart failure with reduced  ejection fraction: last echo with EF <40% and receiving IV diuretics                      Social Drivers of Health            Disposition Plan     Medically Ready for Discharge: Anticipated Tomorrow      Prasanna Cheek DO  Hospitalist Service  [facility]  Securely message with Federico (more info)  Text page via [employer] Paging/Directory   ______________________________________________________________________    Interval History   No acute events overnight.  Patient is feeling well.  No big complaints.      Physical Exam   Vital Signs: Temp: 97.4  F (36.3  C) Temp src: Oral BP: 126/65 Pulse: 70   Resp: 16 SpO2: 96 % O2 Device: None (Room air)    Weight: 115 lbs 4.81 oz    General Appearance: Patient awake & alert.  No apparent distress.   Respiratory: Lungs with basilar rales.  Work of breathing appears normal on room air.  Cardiovascular: Regular rate and rhythm.  No murmurs rubs or gallops.  There is 2+ pitting edema to the BLE.   GI: Benign.  Soft.  Non-tender.  Bowel sounds active.   Skin: No rashes or lesions exposed skin.  Neuro:  The patient is moving all extremities.  No obvious focal asymmetries.   Other: Patient is appropriate and oriented x3.     Medical Decision Making       50 MINUTES SPENT BY ME on the date of service doing chart review, history, exam, documentation & further activities per the note.      Data   ------------------------- PAST 24 HR DATA REVIEWED -----------------------------------------------    I have personally reviewed the following data over the past 24 hrs:    N/A  \   N/A   / N/A     137 99 29.8 (H) /  97   4.0 25 1.38 (H) \       Imaging results reviewed over the past 24 hrs:   No results found for this or any previous visit (from the past 24 hours).

## 2025-02-24 NOTE — PROGRESS NOTES
[facility]  Cardiology Progress Note  Date of Service: 02/24/2025  Primary Cardiologist: Dr. Ochoa     Assessment & Plan    Maya Cullen is a 89 year old female with chronic HFrEF, severe cardiomyopathy, moderate mitral regurgitation, mild to moderate tricuspid regurgitation, essential hypertension, subclinical hypothyroidism, mild cognitive impairment, recurrent falls, and history of CVA.  On 2/21/2025 with a chief complaint of shortness of breath and weight gain. Labs notable for NT proBNP 85877 (elevated from baseline), troponin trend 83-82, Cr 1.75 (baseline 1.1-1.3). Negative for influenza, RSV, COVID. EKG shows SR, no acute changes from prior. CXR with new small left pleural effusion, vascular congestion, stable cardiomegaly. She was admitted on 2/21/2025 with acute on chronic HFrEF exacerbation. Cardiology consulted.     Assessment:  1.  Acute on chronic HFrEF, severe cardiomyopathy - TTE 6/16/24 LVEF 20-25%, severe global hypokinesia of LV, inferior and inferolateral walls appear to contract least well/appear akinetic. Patient has elected to manage conservatively, dry weight ~110-112 lbs.   - Continue furosemide 40 mg IV twice daily today and consider transitioning to oral diuretics tomorrow   - GDMT: Toprol XL 12.5 mg daily, lisinopril 2.5 mg daily. Jardiance 10 mg daily added this admission   2.  Moderate mitral regurgitation - TTE 6/16/24 moderate 2+ MR, improved from prior TTE  3.  Mild to moderate tricuspid regurgitation - TTE 6/16/24 mild to moderate 1-2+ TR similar to prior TTE  4.  Essential hypertension - controlled. Continue medications as above   5.  MARICRUZ on CKD IIIa - improving with diuresis. Baseline 1.1-1.3  6.  Cognitive Impairment       Plan:   Weight today 115 lbs. Will continue IV diuresis with furosemide 40 mg IV twice daily today, consider transitioning to oral diuretics tomorrow   Continue Toprol, Lisinopril, and newly added Jardiance   Cardiology will  continue to follow      Mar Frederick PA-C  [facility] - [facility]    Interval History   Patient is seen resting in bed at the time of visit, appears comfortable. Family present at bedside. Patient overall states she is feeling much better than when she arrived. She is able to tolerate laying flat better and is less short of breath. Family agrees patient does appear improved from prior. She denies any orthopnea, chest discomfort, abdominal bloating, and shortness of breath at this time. Does note some lower extremity edema and tenderness to palpation. Does not have frequent UTIs. Lives in assisted facility, was not weighing herself daily. Did encourage daily weights. Patient agreeable with today's plan     Physical Exam   Temp: 97.4  F (36.3  C) Temp src: Oral BP: 126/65 Pulse: 70   Resp: 16 SpO2: 96 % O2 Device: None (Room air)    Vitals:    02/22/25 0500 02/23/25 0628 02/24/25 0654   Weight: 55.5 kg (122 lb 5.7 oz) 54.5 kg (120 lb 2.4 oz) 52.3 kg (115 lb 4.8 oz)       GEN: well nourished, in no acute distress.  HEENT:  Pupils equal, round. Sclerae nonicteric.   NECK: Supple, no masses appreciated. Minimal JVD with patient.  C/V:  Regular rate and rhythm; 2/6 systolic murmur   RESP: Respirations are unlabored. Clear to auscultation bilaterally without wheezing, rales, or rhonchi.  GI: Abdomen soft, nontender.  EXTREM: 1+ LE edema.  NEURO: Alert and oriented, cooperative.  SKIN: Warm and dry.     Medications   Current Facility-Administered Medications   Medication Dose Route Frequency Provider Last Rate Last Admin     Current Facility-Administered Medications   Medication Dose Route Frequency Provider Last Rate Last Admin    acetaminophen (TYLENOL) tablet 975 mg  975 mg Oral TID Ming Quinn, DO   975 mg at 02/23/25 2059    carBAMazepine (TEGretol) chewable tablet 200 mg  200 mg Oral BID Ming Quinn DO   200 mg at 02/23/25 2101    empagliflozin (JARDIANCE) tablet 10 mg  10 mg Oral  Daily Ip, Anshu Blanchard MD   10 mg at 02/23/25 0834    furosemide (LASIX) injection 40 mg  40 mg Intravenous BID Ming Quinn, DO   40 mg at 02/23/25 1610    latanoprost (XALATAN) 0.005 % ophthalmic solution 1 drop  1 drop Both Eyes At Bedtime Ming Quinn, DO   1 drop at 02/23/25 2101    lisinopril (ZESTRIL) tablet 2.5 mg  2.5 mg Oral Daily Ming Quinn, DO   2.5 mg at 02/23/25 0834    metoprolol succinate ER (TOPROL-XL) 24 hr half-tab 12.5 mg  12.5 mg Oral Daily Ming Quinn, DO   12.5 mg at 02/23/25 0834    potassium chloride (KAYCIEL) solution 15 mEq  15 mEq Oral Daily Ming Quinn, DO   15 mEq at 02/23/25 0835    sodium chloride (PF) 0.9% PF flush 3 mL  3 mL Intracatheter Q8H Ming Quinn, DO   3 mL at 02/23/25 1610    timolol maleate (TIMOPTIC) 0.5 % ophthalmic solution 1 drop  1 drop Both Eyes BID Ming Quinn, DO   1 drop at 02/23/25 2101       Data   Last 24 hours labs reviewed     Tele: SR 60-70    Prior Echo: 6/16/24  The visual ejection fraction is 20-25%.  Left ventricular systolic function is severely reduced.  The right ventricular systolic function is severely reduced.  There is moderate (2+) mitral regurgitation.  There is mild to moderate (1-2+) tricuspid regurgitation.  Right ventricular systolic pressure is elevated, consistent with moderate to  severe pulmonary hypertension.  The overall left ventricular systolic function appears to be worse on the  study. The degree of tricuspid regurgitation appears to be similar but the  degree of mitral regurgitation appears less.

## 2025-02-25 VITALS
SYSTOLIC BLOOD PRESSURE: 130 MMHG | OXYGEN SATURATION: 96 % | RESPIRATION RATE: 18 BRPM | DIASTOLIC BLOOD PRESSURE: 80 MMHG | BODY MASS INDEX: 22.72 KG/M2 | HEART RATE: 81 BPM | TEMPERATURE: 97.5 F | WEIGHT: 112.5 LBS

## 2025-02-25 LAB
ANION GAP SERPL CALCULATED.3IONS-SCNC: 11 MMOL/L (ref 7–15)
BUN SERPL-MCNC: 28 MG/DL (ref 8–23)
CALCIUM SERPL-MCNC: 8.5 MG/DL (ref 8.8–10.4)
CHLORIDE SERPL-SCNC: 100 MMOL/L (ref 98–107)
CREAT SERPL-MCNC: 1.22 MG/DL (ref 0.51–0.95)
EGFRCR SERPLBLD CKD-EPI 2021: 42 ML/MIN/1.73M2
GLUCOSE SERPL-MCNC: 108 MG/DL (ref 70–99)
HCO3 SERPL-SCNC: 26 MMOL/L (ref 22–29)
MAGNESIUM SERPL-MCNC: 2.2 MG/DL (ref 1.7–2.3)
POTASSIUM SERPL-SCNC: 3.9 MMOL/L (ref 3.4–5.3)
SODIUM SERPL-SCNC: 137 MMOL/L (ref 135–145)

## 2025-02-25 PROCEDURE — 80048 BASIC METABOLIC PNL TOTAL CA: CPT | Performed by: STUDENT IN AN ORGANIZED HEALTH CARE EDUCATION/TRAINING PROGRAM

## 2025-02-25 PROCEDURE — 250N000013 HC RX MED GY IP 250 OP 250 PS 637: Performed by: INTERNAL MEDICINE

## 2025-02-25 PROCEDURE — 250N000011 HC RX IP 250 OP 636: Performed by: INTERNAL MEDICINE

## 2025-02-25 PROCEDURE — 83735 ASSAY OF MAGNESIUM: CPT | Performed by: STUDENT IN AN ORGANIZED HEALTH CARE EDUCATION/TRAINING PROGRAM

## 2025-02-25 PROCEDURE — 99239 HOSP IP/OBS DSCHRG MGMT >30: CPT | Performed by: STUDENT IN AN ORGANIZED HEALTH CARE EDUCATION/TRAINING PROGRAM

## 2025-02-25 PROCEDURE — 84295 ASSAY OF SERUM SODIUM: CPT | Performed by: STUDENT IN AN ORGANIZED HEALTH CARE EDUCATION/TRAINING PROGRAM

## 2025-02-25 PROCEDURE — 36415 COLL VENOUS BLD VENIPUNCTURE: CPT | Performed by: STUDENT IN AN ORGANIZED HEALTH CARE EDUCATION/TRAINING PROGRAM

## 2025-02-25 PROCEDURE — 999N000147 HC STATISTIC PT IP EVAL DEFER: Performed by: PHYSICAL THERAPIST

## 2025-02-25 RX ORDER — FUROSEMIDE 40 MG/1
40 TABLET ORAL DAILY
Status: DISCONTINUED | OUTPATIENT
Start: 2025-02-26 | End: 2025-02-25 | Stop reason: HOSPADM

## 2025-02-25 RX ORDER — FUROSEMIDE 20 MG/1
20 TABLET ORAL DAILY
Status: DISCONTINUED | OUTPATIENT
Start: 2025-02-25 | End: 2025-02-25 | Stop reason: HOSPADM

## 2025-02-25 RX ORDER — FUROSEMIDE 20 MG/1
TABLET ORAL
Qty: 60 TABLET | Refills: 0 | Status: SHIPPED | OUTPATIENT
Start: 2025-02-25

## 2025-02-25 RX ADMIN — METOPROLOL SUCCINATE 12.5 MG: 25 TABLET, EXTENDED RELEASE ORAL at 08:42

## 2025-02-25 RX ADMIN — POTASSIUM CHLORIDE 15 MEQ: 20 SOLUTION ORAL at 08:44

## 2025-02-25 RX ADMIN — ACETAMINOPHEN 975 MG: 325 TABLET, FILM COATED ORAL at 08:43

## 2025-02-25 RX ADMIN — FUROSEMIDE 40 MG: 10 INJECTION, SOLUTION INTRAVENOUS at 08:44

## 2025-02-25 RX ADMIN — TIMOLOL MALEATE 1 DROP: 5 SOLUTION/ DROPS OPHTHALMIC at 08:44

## 2025-02-25 RX ADMIN — CARBAMAZEPINE 200 MG: 100 TABLET, CHEWABLE ORAL at 08:44

## 2025-02-25 RX ADMIN — LISINOPRIL 2.5 MG: 2.5 TABLET ORAL at 08:43

## 2025-02-25 RX ADMIN — EMPAGLIFLOZIN 10 MG: 10 TABLET, FILM COATED ORAL at 08:43

## 2025-02-25 ASSESSMENT — ACTIVITIES OF DAILY LIVING (ADL)
ADLS_ACUITY_SCORE: 63

## 2025-02-25 NOTE — PROGRESS NOTES
Care Management Discharge Note    Discharge Date: 02/25/2025     Discharge Disposition: Assisted Living    Discharge Transportation: family will provide    Private pay costs discussed: Not applicable    Does the patient's insurance plan have a 3 day qualifying hospital stay waiver?  No    PAS Confirmation Code:  n/a  Patient/family educated on Medicare website which has current facility and service quality ratings:  n/a    Education Provided on the Discharge Plan:  yes  Persons Notified of Discharge Plans: bedside RN, son in law Gonzales  Patient/Family in Agreement with the Plan: yes      Additional Information:  Patient is medically ready for discharge today back to [facility]. Signed discharge orders have been faxed to [facility] (F: [phone]) for review. All new meds have been sent to [facility]. Discussed discharge with Mary nurse manager at [facility] who is in agreement with discharge plan today. Spoke with pt's son in law Gonzales who will be here around 1100 today to drive patient back to [facility]. [facility] staff updated on discharge time. No further discharge needs noted.       Carmelina Del Toro RN  Care Coordinator  [facility]  [phone]

## 2025-02-25 NOTE — PLAN OF CARE
"9:44  RN shift summary 7-9:45 AM:  No c/o pain.  Alert and oriented with minor forgetfulness.  Ambulates with assist of 1, gait belt and walker.  Lungs: PYLE, diminished, clear. 96% RA.  Tele: No tele. 2 GM NA diet.  1800cc fluid restriction. Pur wick in place.  PIV saline locked.  Cardiology, SW and Spiritual Health following[facility] Son to provide transport at 11AM.          Goal Outcome Evaluation:      Plan of Care Reviewed With: patient    Overall Patient Progress: improvingOverall Patient Progress: improving    Outcome Evaluation: Plan to discharge home today.  Son to transport about 11AM.      Problem: Adult Inpatient Plan of Care  Goal: Plan of Care Review  Description: The Plan of Care Review/Shift note should be completed every shift.  The Outcome Evaluation is a brief statement about your assessment that the patient is improving, declining, or no change.  This information will be displayed automatically on your shift  note.  Outcome: Adequate for Care Transition  Flowsheets (Taken 2/25/2025 5323)  Outcome Evaluation: Plan to discharge home today.  Son to transport about 11AM.  Plan of Care Reviewed With: patient  Overall Patient Progress: improving  Goal: Patient-Specific Goal (Individualized)  Description: You can add care plan individualizations to a care plan. Examples of Individualization might be:  \"Parent requests to be called daily at 9am for status\", \"I have a hard time hearing out of my right ear\", or \"Do not touch me to wake me up as it startles  me\".  Outcome: Adequate for Care Transition  Goal: Absence of Hospital-Acquired Illness or Injury  Outcome: Adequate for Care Transition  Intervention: Identify and Manage Fall Risk  Recent Flowsheet Documentation  Taken 2/25/2025 1002 by Edilia Schreiber, RN  Safety Promotion/Fall Prevention:   clutter free environment maintained   nonskid shoes/slippers when out of bed  Intervention: Prevent Skin Injury  Recent " Flowsheet Documentation  Taken 2/25/2025 0845 by Edilia Schreiber RN  Body Position: position changed independently  Goal: Optimal Comfort and Wellbeing  Outcome: Adequate for Care Transition  Goal: Readiness for Transition of Care  Outcome: Adequate for Care Transition     Problem: Heart Failure  Goal: Optimal Cardiac Output  Outcome: Adequate for Care Transition  Goal: Fluid and Electrolyte Balance  Outcome: Adequate for Care Transition  Intervention: Monitor and Manage Fluid and Electrolyte Balance  Recent Flowsheet Documentation  Taken 2/25/2025 0845 by Edilia Schreiber RN  Fluid/Electrolyte Management: (1800cc FR) fluids restricted  Goal: Effective Breathing Pattern During Sleep  Outcome: Adequate for Care Transition  Intervention: Monitor and Manage Obstructive Sleep Apnea  Recent Flowsheet Documentation  Taken 2/25/2025 0845 by Edilia Schreiber RN  Medication Review/Management: medications reviewed     Problem: Fall Injury Risk  Goal: Absence of Fall and Fall-Related Injury  Outcome: Adequate for Care Transition  Intervention: Identify and Manage Contributors  Recent Flowsheet Documentation  Taken 2/25/2025 0845 by Edilia Schreiber RN  Medication Review/Management: medications reviewed  Intervention: Promote Injury-Free Environment  Recent Flowsheet Documentation  Taken 2/25/2025 0845 by Edilia Schreiber RN  Safety Promotion/Fall Prevention:   clutter free environment maintained   nonskid shoes/slippers when out of bed

## 2025-02-25 NOTE — PROGRESS NOTES
Patient's After Visit Summary was reviewed with patient and family.   Patient verbalized understanding of After Visit Summary, recommended follow up and was given an opportunity to ask questions. CHF booklet given to pt and pt will follow up with [facility] on Friday. Educated about low salt diet and fluid restriction. Pt/family said she will ask nurses to help her get her weight everyday and document.   Discharge medications sent home with patient/family: No - Lasix sent to pharmacy   Discharged with daughter to [facility]

## 2025-02-25 NOTE — PROGRESS NOTES
[facility]  Cardiology Progress Note  Date of Service: 02/25/2025  Primary Cardiologist: Dr. Ochoa     Assessment & Plan   Maya Cullen is a 89 year old female with chronic HFrEF, severe cardiomyopathy, moderate mitral regurgitation, mild to moderate tricuspid regurgitation, essential hypertension, subclinical hypothyroidism, mild cognitive impairment, recurrent falls, and history of CVA.  On 2/21/2025 with a chief complaint of shortness of breath and weight gain. Labs notable for NT proBNP 18804 (elevated from baseline), troponin trend 83-82, Cr 1.75 (baseline 1.1-1.3). Negative for influenza, RSV, COVID. EKG shows SR, no acute changes from prior. CXR with new small left pleural effusion, vascular congestion, stable cardiomegaly. She was admitted on 2/21/2025 with acute on chronic HFrEF exacerbation. Cardiology consulted.     Assessment:  1.  Acute on chronic HFrEF, severe cardiomyopathy - TTE 6/16/24 LVEF 20-25%, severe global hypokinesia of LV, inferior and inferolateral walls appear to contract least well/appear akinetic. Patient has elected to manage conservatively, dry weight ~110-112 lbs.   - Diuretic regimen: Furosemide 40 mg in the morning and 20 mg in the afternoon   - GDMT: Toprol XL 12.5 mg daily, lisinopril 2.5 mg daily  2.  Moderate mitral regurgitation - TTE 6/16/24 moderate 2+ MR, improved from prior TTE  3.  Mild to moderate tricuspid regurgitation - TTE 6/16/24 mild to moderate 1-2+ TR similar to prior TTE  4.  Essential hypertension - controlled. Continue medications as above   5.  MARICRUZ - resolved, CKD IIIa - improved with diuresis. Baseline 1.1-1.3  6.  Cognitive Impairment     Plan:   Weight today 112 lbs, appears euvolemic. Will transition to furosemide 40 mg in the morning and 20 mg in the afternoon for diuretic regimen   Continue GDMT as follows: Toprol XL 12.5 mg daily, lisinopril 2.5 mg daily. Jardiance was initially added this admission but this medication is  cost prohibitive for patient and was discontinued   BMP prior to follow up. She has a scheduled appointment with [facility] 2/28/25 with labs scheduled prior. Recommend patient keep this appointment   Patient can discharge from a cardiac standpoint     Mar Frederick PA-C  [facility]    Interval History   Patient is seen resting in bed at the time of visit, appears comfortable. She reports feeling well last today. She was able to tolerate laying flat last night, denies any shortness of breath, abdominal bloating, and chest discomfort. She is excited to discharge today. She does note that the jardiance cost will not be feasible for her. She is agreeable with today's plan.     Physical Exam   Temp: 97.5  F (36.4  C) Temp src: Oral BP: 130/80 Pulse: 81   Resp: 18 SpO2: 96 % O2 Device: None (Room air)    Vitals:    02/23/25 0628 02/24/25 0654 02/25/25 0530   Weight: 54.5 kg (120 lb 2.4 oz) 52.3 kg (115 lb 4.8 oz) 51 kg (112 lb 8 oz)       GEN: well nourished, in no acute distress.  HEENT:  Pupils equal, round. Sclerae nonicteric.   NECK: Supple, no masses appreciated. Minimal JVD with patient.  C/V:  Regular rate and rhythm; 2/6 systolic murmur   RESP: Respirations are unlabored. Clear to auscultation bilaterally without wheezing, rales, or rhonchi.  GI: Abdomen soft, nontender.  EXTREM: 1+ LE edema.  NEURO: Alert and oriented, cooperative.  SKIN: Warm and dry.    Medications   Current Facility-Administered Medications   Medication Dose Route Frequency Provider Last Rate Last Admin     Current Facility-Administered Medications   Medication Dose Route Frequency Provider Last Rate Last Admin    acetaminophen (TYLENOL) tablet 975 mg  975 mg Oral TID Ming Quinn, DO   975 mg at 02/24/25 2108    carBAMazepine (TEGretol) chewable tablet 200 mg  200 mg Oral BID Ming Quinn DO   200 mg at 02/24/25 2108    empagliflozin (JARDIANCE) tablet 10 mg  10 mg Oral Daily Ip, Anshu Blanchard MD   10 mg  at 02/24/25 0941    furosemide (LASIX) injection 40 mg  40 mg Intravenous BID Ming Quinn, DO   40 mg at 02/24/25 1658    latanoprost (XALATAN) 0.005 % ophthalmic solution 1 drop  1 drop Both Eyes At Bedtime Ming Quinn, DO   1 drop at 02/24/25 2109    lisinopril (ZESTRIL) tablet 2.5 mg  2.5 mg Oral Daily Ming Quinn, DO   2.5 mg at 02/24/25 0941    metoprolol succinate ER (TOPROL-XL) 24 hr half-tab 12.5 mg  12.5 mg Oral Daily Ming Quinn, DO   12.5 mg at 02/24/25 0941    potassium chloride (KAYCIEL) solution 15 mEq  15 mEq Oral Daily Ming Quinn, DO   15 mEq at 02/24/25 0943    sodium chloride (PF) 0.9% PF flush 3 mL  3 mL Intracatheter Q8H Ming Quinn, DO   3 mL at 02/24/25 2333    timolol maleate (TIMOPTIC) 0.5 % ophthalmic solution 1 drop  1 drop Both Eyes BID Ming Quinn, DO   1 drop at 02/24/25 2108       Data   Last 24 hours labs reviewed     Prior Echo: 6/16/24  The visual ejection fraction is 20-25%.  Left ventricular systolic function is severely reduced.  The right ventricular systolic function is severely reduced.  There is moderate (2+) mitral regurgitation.  There is mild to moderate (1-2+) tricuspid regurgitation.  Right ventricular systolic pressure is elevated, consistent with moderate to  severe pulmonary hypertension.  The overall left ventricular systolic function appears to be worse on the  study. The degree of tricuspid regurgitation appears to be similar but the  degree of mitral regurgitation appears less.

## 2025-02-25 NOTE — DISCHARGE SUMMARY
[facility]  Hospitalist Discharge Summary      Date of Admission:  2/21/2025  Date of Discharge:  2/25/2025 10:30 AM  Discharging Provider: Prasanna Cheek DO  Discharge Service: Hospitalist Service    Discharge Diagnoses   Maya Cullen is a 89 year old female with past medical significant for heart failure with reduced EF of 20%, moderate mitral valve regurg, mild to moderate tricuspid regurg, hypertension, history of CVA, recurrent falls, subclinical hypothyroidism, mild cognitive impairment, pulmonary hypertension, trigeminal neuralgia presented with complaints of shortness of breath who was admitted on 2/21/2025 for acute CHF exacerbation.      Cardiology was consulted.  She has received aggressive diuresis with improvement in symptoms and renal function.         Acute Exacerbation of HFrEF - EF 20%  NSTEMI - Likely Type 2:  Dry weight seems to be around 110-115 lbs.  Presented with 125 lbs weight, BLE edema.  Diuresed while inpatient.  Most recent echocardiogram in 6/2024 showed EF 20-25%, severely reduced RVSF, moderate mitral valve regurgitation, mild to moderate TR, moderate to severe pulmonary hypertension.   -Cardiology followed while inpatient  -Continue PTA lisinopril, metoprolol  -Lasix 40 qAM and 20 qPM  -Considered jaurdiance but not affordable for the patient  -Recommended daily weights, low sodium diet, and fluid restriction to patient  -Unfortunately, lives at [facility] that cannot accommodate low sodium diet  -Has [facility] follow-up later this week     Suspected Cardiorenal Syndrome  MARICRUZ on Chronic Kidney Disease Stage 3a  Baseline Cr = 1.1-1.3.  Returned to baseline with diuresis.     Chronic Medical Problems:  Mild Cognitive Impairment  Hypertension  Moderate MR  Mild to Moderate TR  Hx of CVA: ?not on asa  Subclinical Hypothyroidism: ? No replacement therapy  Trigeminal Neuralgia: on Carbamezaapine  Pulmonary Hypertension        Clinically Significant Risk Factors           Follow-ups Needed After Discharge   Follow-up Appointments       Follow-up and recommended labs and tests       Follow up with primary care provider, Jordyn Jon, within 7 days for hospital follow- up.  No follow up labs or test are needed.                Unresulted Labs Ordered in the Past 30 Days of this Admission       No orders found from 1/22/2025 to 2/22/2025.        These results will be followed up by NA    Discharge Disposition   Discharged to assisted living  Condition at discharge: Stable    Consultations This Hospital Stay   CARDIOLOGY IP CONSULT  CARE MANAGEMENT / SOCIAL WORK IP CONSULT  PHYSICAL THERAPY ADULT IP CONSULT  SPIRITUAL HEALTH SERVICES IP CONSULT    Code Status   No CPR- Do NOT Intubate    Time Spent on this Encounter   I, Prasanna Cheek DO, personally saw the patient today and spent greater than 30 minutes discharging this patient.       Prasanna Cheek DO  [place] [address] [facility]  [address] [place] [address]  Phone: [phone]  Fax: [phone]  ______________________________________________________________________    Physical Exam   Vital Signs: Temp: 97.5  F (36.4  C) Temp src: Oral BP: 130/80 Pulse: 81   Resp: 18 SpO2: 96 % O2 Device: None (Room air)    Weight: 112 lbs 8 oz  General Appearance: Patient awake & alert.  No apparent distress.   Respiratory: Lungs are CTAB.  Work of breathing appears normal on room air.  Cardiovascular: Regular rate and rhythm.  No murmurs rubs or gallops.  Trace edema to BLE.  GI: Benign.  Soft.  Non-tender.  Bowel sounds active.   Skin: No rashes or lesions exposed skin.  Neuro:  The patient is moving all extremities.  No obvious focal asymmetries.   Other: Patient is appropriate and oriented x3.        Primary Care Physician   Jordyn Jon    Discharge Orders      Follow-Up with Cardiology [facility] Heart Failure Discharge      Reason for your hospital stay    You were hospitalized for a heart failure  exacerbation.  You were treated with IV diuretic medications to help with fluid removal.     Follow-up and recommended labs and tests     Follow up with primary care provider, Jordyn Jon, within 7 days for hospital follow- up.  No follow up labs or test are needed.     Activity    Your activity upon discharge: activity as tolerated     Diet    Follow this diet upon discharge:       Fluid restriction 1800 ML FLUID      2 Gram Sodium Diet       Significant Results and Procedures   Most Recent 3 CBC's:  Recent Labs   Lab Test 02/22/25  0753 02/21/25  1330 09/17/24  0827   WBC 6.3 5.9 9.3   HGB 15.4 15.1 13.7    101* 102*    235 270     Most Recent 3 BMP's:  Recent Labs   Lab Test 02/25/25  0619 02/24/25  0620 02/23/25  0637    137 136   POTASSIUM 3.9 4.0 4.4   CHLORIDE 100 99 97*   CO2 26 25 25   BUN 28.0* 29.8* 30.3*   CR 1.22* 1.38* 1.51*   ANIONGAP 11 13 14   GILL 8.5* 8.5* 8.6*   * 97 126*       Discharge Medications   Current Discharge Medication List        CONTINUE these medications which have CHANGED    Details   furosemide (LASIX) 20 MG tablet Take 2 tablets (40 mg) in the morning and 1 tablet (20 mg) in the late afternoon.  Qty: 60 tablet, Refills: 0    Associated Diagnoses: Acute on chronic systolic congestive heart failure (H)           CONTINUE these medications which have NOT CHANGED    Details   acetaminophen (TYLENOL) 325 MG tablet Take 3 tablets (975 mg) by mouth 3 times daily      carBAMazepine (TEGRETOL) 100 MG chewable tablet Take 2 tablets (200 mg) by mouth 2 times daily      diclofenac (VOLTAREN) 1 % topical gel Apply 2 g topically 4 times daily. To right lower leg  Qty: 350 g, Refills: 1    Associated Diagnoses: Pain of right lower leg      latanoprost (XALATAN) 0.005 % ophthalmic solution Place 1 drop into both eyes at bedtime.      lisinopril (ZESTRIL) 5 MG tablet Take 0.5 tablets (2.5 mg) by mouth daily.  Qty: 90 tablet, Refills: 3      metoprolol succinate ER  (TOPROL XL) 25 MG 24 hr tablet Take 12.5 mg by mouth daily. HOLD Metoprolol for SBP less than 100    Comments: Hold for SBP<100 mmHg  Associated Diagnoses: Acute systolic heart failure (H)      multivitamin w/minerals (THERA-VIT-M) tablet Take 1 tablet by mouth daily      ondansetron (ZOFRAN ODT) 4 MG ODT tab Take 1 tablet (4 mg) by mouth every 6 hours as needed for nausea    Associated Diagnoses: Nausea      potassium chloride nick ER (KLOR-CON M15) 15 MEQ CR tablet Take 1 tablet (15 mEq) by mouth daily    Associated Diagnoses: Hypokalemia      timolol maleate (TIMOPTIC) 0.5 % ophthalmic solution Place 1 drop into both eyes 2 times daily.           Allergies   Allergies   Allergen Reactions    Brimonidine Other (See Comments)     Follicular conjunctivitis    Dorzolamide Other (See Comments)     Follicular conjunctivitis    Benzalkonium Chloride Other (See Comments)     Eye irritation.  Does better with preservative free but can tolerate preserved drops    Codeine Unknown and Rash    Fd&C Yellow #5 (Tartrazine) Rash    Fluorescein Itching     fluress    Penicillins      Rash      Sulfa Antibiotics Other (See Comments)     rash

## 2025-02-25 NOTE — PLAN OF CARE
"Shift Summary (1500 - 2165):    Pt A/Ox4, some intermittent forgetfulness. VSS, afebrile. Denies pain, dizziness, SOB, n/v. LS dim/clear, 94% on RA. K/Mg protocols, AM rechecks. EF 20-25%. Getting IV Lasix, voids adequately. Assist of 1 gb wk, purewick in place. Fair appetite, cardiac/1800 ml fluid restriction maintained. Pt takes meds whole in applesauce. Contact precautions maintained. Plan for possible discharge to the [facility] tomorrow.     Goal Outcome Evaluation:      Plan of Care Reviewed With: patient    Overall Patient Progress: improvingOverall Patient Progress: improving    Outcome Evaluation: Continue IV Lasix BID, pt voiding adequately. Purewick in place. Denies pain. 94% on RA.      Problem: Adult Inpatient Plan of Care  Goal: Plan of Care Review  Description: The Plan of Care Review/Shift note should be completed every shift.  The Outcome Evaluation is a brief statement about your assessment that the patient is improving, declining, or no change.  This information will be displayed automatically on your shift  note.  Outcome: Progressing  Flowsheets (Taken 2/24/2025 9976)  Outcome Evaluation: Continue IV Lasix BID, pt voiding adequately. Purewick in place. Denies pain. 94% on RA.  Plan of Care Reviewed With: patient  Overall Patient Progress: improving  Goal: Patient-Specific Goal (Individualized)  Description: You can add care plan individualizations to a care plan. Examples of Individualization might be:  \"Parent requests to be called daily at 9am for status\", \"I have a hard time hearing out of my right ear\", or \"Do not touch me to wake me up as it startles  me\".  Outcome: Progressing  Goal: Absence of Hospital-Acquired Illness or Injury  Outcome: Progressing  Intervention: Identify and Manage Fall Risk  Recent Flowsheet Documentation  Taken 2/24/2025 1655 by Mai Moreland, RN  Safety Promotion/Fall Prevention:   activity supervised   assistive device/personal items within reach   clutter free " environment maintained   increased rounding and observation   nonskid shoes/slippers when out of bed   patient and family education   room near nurse's station   room organization consistent   safety round/check completed   supervised activity  Intervention: Prevent Skin Injury  Recent Flowsheet Documentation  Taken 2/24/2025 1655 by Mai Moreland RN  Body Position:   position changed independently   supine, head elevated  Intervention: Prevent and Manage VTE (Venous Thromboembolism) Risk  Recent Flowsheet Documentation  Taken 2/24/2025 1655 by Mai Moreland RN  VTE Prevention/Management: compression stockings on  Intervention: Prevent Infection  Recent Flowsheet Documentation  Taken 2/24/2025 1655 by Mai Moreland RN  Infection Prevention:   single patient room provided   personal protective equipment utilized   hand hygiene promoted   equipment surfaces disinfected   environmental surveillance performed   cohorting utilized  Goal: Optimal Comfort and Wellbeing  Outcome: Progressing  Intervention: Monitor Pain and Promote Comfort  Recent Flowsheet Documentation  Taken 2/24/2025 2108 by Mai Moreland RN  Pain Management Interventions: medication (see MAR)  Goal: Readiness for Transition of Care  Outcome: Progressing     Problem: Heart Failure  Goal: Optimal Cardiac Output  Outcome: Progressing  Goal: Fluid and Electrolyte Balance  Outcome: Progressing  Goal: Effective Breathing Pattern During Sleep  Outcome: Progressing  Intervention: Monitor and Manage Obstructive Sleep Apnea  Recent Flowsheet Documentation  Taken 2/24/2025 1655 by Mai Moreland RN  Medication Review/Management: medications reviewed     Problem: Fall Injury Risk  Goal: Absence of Fall and Fall-Related Injury  Outcome: Progressing  Intervention: Identify and Manage Contributors  Recent Flowsheet Documentation  Taken 2/24/2025 1655 by Mai Moreland RN  Medication Review/Management: medications reviewed  Intervention: Promote Injury-Free Environment  Recent  Flowsheet Documentation  Taken 2/24/2025 1655 by Mai Moreland, RN  Safety Promotion/Fall Prevention:   activity supervised   assistive device/personal items within reach   clutter free environment maintained   increased rounding and observation   nonskid shoes/slippers when out of bed   patient and family education   room near nurse's station   room organization consistent   safety round/check completed   supervised activity

## 2025-02-25 NOTE — PLAN OF CARE
"A&O with intermittent confusion. VSS. Denies pain. LS diminished. BLE edema. Purewick in place. Fluid restriction. Possible discharge today to [facility].    Goal Outcome Evaluation:      Plan of Care Reviewed With: patient    Overall Patient Progress: no changeOverall Patient Progress: no change    Outcome Evaluation: Denies SOB and pain      Problem: Adult Inpatient Plan of Care  Goal: Plan of Care Review  Description: The Plan of Care Review/Shift note should be completed every shift.  The Outcome Evaluation is a brief statement about your assessment that the patient is improving, declining, or no change.  This information will be displayed automatically on your shift  note.  Outcome: Progressing  Flowsheets (Taken 2/25/2025 0437)  Outcome Evaluation: Denies SOB and pain  Plan of Care Reviewed With: patient  Overall Patient Progress: no change  Goal: Patient-Specific Goal (Individualized)  Description: You can add care plan individualizations to a care plan. Examples of Individualization might be:  \"Parent requests to be called daily at 9am for status\", \"I have a hard time hearing out of my right ear\", or \"Do not touch me to wake me up as it startles  me\".  Outcome: Progressing  Goal: Absence of Hospital-Acquired Illness or Injury  Outcome: Progressing  Intervention: Identify and Manage Fall Risk  Recent Flowsheet Documentation  Taken 2/25/2025 0218 by Jojo Garces, RN  Safety Promotion/Fall Prevention: safety round/check completed  Taken 2/24/2025 2333 by Jojo Garces, RN  Safety Promotion/Fall Prevention:   activity supervised   clutter free environment maintained   nonskid shoes/slippers when out of bed   safety round/check completed   room near nurse's station  Intervention: Prevent Skin Injury  Recent Flowsheet Documentation  Taken 2/24/2025 2333 by Jojo Garces, RN  Body Position: position changed independently  Intervention: Prevent and Manage VTE (Venous Thromboembolism) Risk  Recent Flowsheet " Documentation  Taken 2/24/2025 2333 by Jojo Garces, RN  VTE Prevention/Management: SCDs off (sequential compression devices)  Intervention: Prevent Infection  Recent Flowsheet Documentation  Taken 2/24/2025 2333 by Jojo Garces, RN  Infection Prevention:   rest/sleep promoted   single patient room provided  Goal: Optimal Comfort and Wellbeing  Outcome: Progressing  Goal: Readiness for Transition of Care  Outcome: Progressing

## 2025-02-26 ENCOUNTER — PATIENT OUTREACH (OUTPATIENT)
Dept: CARE COORDINATION | Facility: CLINIC | Age: OVER 89
End: 2025-02-26
Payer: MEDICARE

## 2025-02-26 NOTE — PROGRESS NOTES
[facility]: [facility]    Background: Transitional Care Management program identified per system criteria and reviewed by [facility] team for possible outreach.    Assessment: Upon chart review, CCRC Team member will not proceed with patient outreach related to this episode of Transitional Care Management program due to reason below:    Non-[facility] TCU: CCRC team member noted patient discharged to TCU/ARU/LTACH. Patient is not established with a [facility] currently supported by Primary Care-Care Coordination therefore handoff to Primary Care-Care Coordination is not appropriate at this time.    Plan: Transitional Care Management episode addressed appropriately per reason noted above.      MAYDA Martinez  [facility] Resource [place], [facility]    *Connected Care Resource Team does NOT follow patient ongoing. Referrals are identified based on internal discharge reports and the outreach is to ensure patient has an understanding of their discharge instructions.

## 2025-02-28 ENCOUNTER — OFFICE VISIT (OUTPATIENT)
Dept: CARDIOLOGY | Facility: CLINIC | Age: OVER 89
End: 2025-02-28
Attending: NURSE PRACTITIONER
Payer: MEDICARE

## 2025-02-28 VITALS
BODY MASS INDEX: 23.31 KG/M2 | SYSTOLIC BLOOD PRESSURE: 119 MMHG | WEIGHT: 115.6 LBS | HEART RATE: 70 BPM | HEIGHT: 59 IN | DIASTOLIC BLOOD PRESSURE: 81 MMHG | OXYGEN SATURATION: 100 %

## 2025-02-28 DIAGNOSIS — I50.22 CHRONIC HFREF (HEART FAILURE WITH REDUCED EJECTION FRACTION) (H): ICD-10-CM

## 2025-02-28 DIAGNOSIS — I50.23 ACUTE ON CHRONIC SYSTOLIC CONGESTIVE HEART FAILURE (H): ICD-10-CM

## 2025-02-28 PROCEDURE — 3079F DIAST BP 80-89 MM HG: CPT | Performed by: NURSE PRACTITIONER

## 2025-02-28 PROCEDURE — 99215 OFFICE O/P EST HI 40 MIN: CPT | Performed by: NURSE PRACTITIONER

## 2025-02-28 PROCEDURE — 1111F DSCHRG MED/CURRENT MED MERGE: CPT | Performed by: NURSE PRACTITIONER

## 2025-02-28 PROCEDURE — 3074F SYST BP LT 130 MM HG: CPT | Performed by: NURSE PRACTITIONER

## 2025-02-28 RX ORDER — FUROSEMIDE 40 MG/1
40 TABLET ORAL
Qty: 180 TABLET | Refills: 3 | Status: SHIPPED | OUTPATIENT
Start: 2025-02-28

## 2025-02-28 NOTE — PATIENT INSTRUCTIONS
If you have questions or concerns please call the [facility] nurse team at [phone] or send a netZentry message (Mon-Fri 8am-4pm).  If you have concerns after hours, please call [phone], option 2 to speak with an on call Cardiologist.    Today's plan:  - Increase the dose of furosemide (Lasix) to 40 mg twice daily.  - Check non-fasting labs in about 1 week to recheck kidney function electrolytes.   - Check your weights daily and try to stick to a low sodium diet (under 2,000 mg/day).  - Call the [facility] nurse team at the number listed above if you develop signs concerning for fluid retention, including weight gain of over 2 pounds in 1 day or over 5 pounds in 1 week, increasing shortness of breath, or increasing swelling in the legs or abdomen.  - Follow up in the clinic in about 1 month.     Your lab results today show stable electrolytes and kidney function. NT pro BNP level is up but we are more focused on symptom management.   Component      Latest Ref Rng 2/28/2025  9:54 AM   Sodium      135 - 145 mmol/L 136    Potassium      3.4 - 5.3 mmol/L 4.6    Chloride      98 - 107 mmol/L 98    Carbon Dioxide (CO2)      22 - 29 mmol/L 28    Anion Gap      7 - 15 mmol/L 10    Urea Nitrogen      8.0 - 23.0 mg/dL 26.8 (H)    Creatinine      0.51 - 0.95 mg/dL 1.26 (H)    GFR Estimate      >60 mL/min/1.73m2 40 (L)    Calcium      8.8 - 10.4 mg/dL 8.9    Glucose      70 - 99 mg/dL 116 (H)    N-Terminal Pro Bnp      0 - 1,800 pg/mL 31,956 (H)       Legend:  (H) High  (L) Low    It was a pleasure to see you today!     BENNETT Funk, CNP  Nurse Practitioner  [facility]    Thank you for your visit with the [facility] today. [facility] stands for Cardiomyopathy Optimization [facility] and Education.    The [facility] is a heart failure specialty clinic within the [facility] where you will work with specialized nurse practitioners, physician assistants, doctors, and registered nurses.  They are dedicated to helping patients with heart failure to carefully adjust medications, receive education, and learn who and when to call if symptoms develop. They specialize in helping you better understand your condition, slow the progression of your disease, improve the length and quality of your life, help you detect future heart problems before they become life threatening, and avoid hospitalizations.

## 2025-02-28 NOTE — LETTER
2/28/2025    ALEKSANDR Palacios  [address] [address] [id number]    RE: Maya Cullen       Dear Colleague,     I had the pleasure of seeing Maya Cullen in the [facility].    Cardiology Clinic Progress Note    C.O.R.E. Clinic Visit (Heart Failure Specialty Clinic)    Service Date: February 28, 2025  Primary Cardiology Team: Dr. Ochoa  Reason for Visit: Hospital follow up for chronic HFrEF    HPI:   I had the pleasure of seeing Ms. Maya Cullen in the clinic today. She is a very pleasant 89 year old female with a past medical history notable for history of stroke, mild cognitive impairment, and hypertension. In 2023, she had an echocardiogram done when she presented to the hospital for noncardiac reasons but was noted to have an EF of 40-45% with severe MR.  Subsequently, I do not see any cardiology follow-up. She is not on any cardiac medications. She presented with a fall recently to [facility] when she was trying to get up to get her walker.  She injured and fractured her cervical vertebrae.  She is in a neck collar right now. During the hospital stay, she was noted to have severe heart failure with reduced ejection fraction and echocardiogram revealed 2+ MR and EF of 10-20%.    She was referred to outpatient cardiology and met with Dr. Ochoa in consultation in 07/2024. At that time, she was reporting NYHA class IIIb heart failure symptoms with shortness of breath with minimal exertion and was functionally quite limited and predominantly wheelchair-bound at that time. Etiology of her cardiomyopathy was felt very likely secondary to underlying occult coronary artery disease. However, she has elected to change her code status to DNR/DNI and continued conservative medical management was agreed upon after extensive discussion. Palliative care referral was also placed for further goals of care discussion and possible consideration of hospice. She was  continued on Lasix 40 mg once daily along with low-dose lisinopril 2.5 mg daily and metoprolol succinate 12.5 mg once daily.    The patient was subsequently admitted at [facility] on 9/16/2024 after presenting with worsening leg swelling and foot pain.  She was found to have acute CHF and was diuresed with IV Lasix.  Her weight at discharge trended down to 110 pounds.  She was discharged on Lasix at a higher dose of 40 mg once daily.    I met the patient in follow-up in October 2024 and we again reviewed her cardiomyopathy diagnosis and options for further evaluation and management of this. We decided on a regimen of continuing Lasix at 40 mg daily and adding an extra 20 mg of Lasix daily as needed basis when her weight is at or above 115 pounds as she had noted increased symptoms and leg swelling when her weight gets above this range. The patient and her son-in-law again expressed preference for conservative management. We discussed referral to the palliative care team again for continued discussions regarding goals of care given concerns for poor prognosis with her severe cardiomyopathy and advanced age. They met with Dr. Yadav in Palliative Care consultation in November 2024. POLST form was completed. She expressed wish for DNR/DNI code status, but the patient was feeling good with where things are at for the time being and did not want to pursue hospice care at that time.    More recently, the patient was admitted to [facility] on 2/21/25 after presenting with worsening shortness of breath and being found to have acute HFrEF exacerbation. Dry weight was felt to be around 110-115 lbs as she presented with weight at 125 lbs and bilateral lower extremity edema. She was diuresed and ultimately discharged on lasix at a higher dose of 40 mg q AM and 20 mg q PM.    Today, Ms. Cullen presents to the clinic in follow up of her recent hospitalization. She is again  accompanied by her son-in-law Christian, who is her primary support person. She lives in [facility] in [place]. She states she has been feeling well since she's been home from the hospital. Her weight has been stable at that facility around 112-115 lbs. She has not noticed issues with any worsening shortness of breath or any symptoms of chest pain, palpitations, dizziness, or lightheadedness. She does continue to have some lower extremity edema. Labs were checked prior to the visit today with a basic metabolic panel showing stable electrolytes with potassium at 4.6 and creatinine at 1.26 which is stable around her recent baseline. NT pro BNP level is significantly elevated at over 31,000, which is up from just over 26,000 during her recent hospitalization.    ASSESSMENT:  1. 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% by TTE 6/17/24  - LVEF: 10-20%  - NYHA class II-III, ACC/AHA stage C  - Etiology: Unclear, with suspected ischemic secondary to underlying occult CAD  - Fluid status: Appears fairly well compensated on exam but continues to have lower extremity edema and NT pro BNP level is trending up since her recent hospital stay. Suspect dry weight may now be closer to around 110 lbs.   - Diuretic regimen: Lasix 40 mg q AM and 20 mg q PM  - Ischemic evaluation: Declined due to advanced age with preference for conservative medical management    Guideline directed medical therapy (GDMT):  - Beta blocker: Metoprolol succinate 12.5 mg once daily  - ACEI/ARB/ARNI: Lisinopril 2.5 mg once daily  - MRA: None currently due to borderline blood pressures and fall risk  - SGLT2 inhibitor: None currently due to report on blood pressures fall risk    2.  Moderate mitral regurgitation  3.  Mild to moderate tricuspid regurgitation  4.  Essential hypertension  5.  History of stroke  6.  Recurrent falls  7.  Sub-clinical hypothyroidism    PLAN:  - Increase  furosemide to 40 mg twice daily.  - Repeat a BMP in about 1 week.  - Continue the rest of the current cardiac regimen without changes.   - Counseled on checking daily weights and trying to stick to a low-sodium diet. Reviewed signs/symptoms concerning for fluid retention and when to contact the clinic.  - Follow-up in the [facility] in about 1 month. Repeating an echocardiogram for reassessment of her ejection fraction was previously discussed, though given her preference for conservative medical management this would be unlikely to change her management so we will defer for the time being.   - Recommend continued follow up with the palliative care team as planned for continued goals of care discussions and could consider hospice informational meeting given several hospitalizations over the past 6-9 months and overall guarded prognosis.     Thank you for the opportunity to participate in this patient's care. We would be happy to see her sooner if needed for any concerns in the meantime.      40 total minutes was spent today including chart review, precharting, history and exam, post visit documentation, and reviewing studies as outlined above.     Please kindly note that this document was completed in part using Dragon voice recognition software. Although reviewed after completion, some word substitutions and typographical errors may occur. Please contact me if clarification is needed.     BENNETT Funk, CNP   Nurse Practitioner  [facility]    Orders this Visit:  No orders of the defined types were placed in this encounter.    No orders of the defined types were placed in this encounter.    There are no discontinued medications.    No diagnosis found.      CURRENT MEDICATIONS:  Current Outpatient Medications   Medication Sig Dispense Refill     acetaminophen (TYLENOL) 325 MG tablet Take 3 tablets (975 mg) by mouth 3 times daily       carBAMazepine (TEGRETOL) 100 MG chewable tablet Take 2  tablets (200 mg) by mouth 2 times daily       diclofenac (VOLTAREN) 1 % topical gel Apply 2 g topically 4 times daily. To right lower leg 350 g 1     furosemide (LASIX) 20 MG tablet Take 2 tablets (40 mg) in the morning and 1 tablet (20 mg) in the late afternoon. 60 tablet 0     latanoprost (XALATAN) 0.005 % ophthalmic solution Place 1 drop into both eyes at bedtime.       lisinopril (ZESTRIL) 5 MG tablet Take 0.5 tablets (2.5 mg) by mouth daily. 90 tablet 3     metoprolol succinate ER (TOPROL XL) 25 MG 24 hr tablet Take 12.5 mg by mouth daily. HOLD Metoprolol for SBP less than 100       multivitamin w/minerals (THERA-VIT-M) tablet Take 1 tablet by mouth daily       ondansetron (ZOFRAN ODT) 4 MG ODT tab Take 1 tablet (4 mg) by mouth every 6 hours as needed for nausea       potassium chloride nick ER (KLOR-CON M15) 15 MEQ CR tablet Take 1 tablet (15 mEq) by mouth daily       timolol maleate (TIMOPTIC) 0.5 % ophthalmic solution Place 1 drop into both eyes 2 times daily.       ALLERGIES  Allergies   Allergen Reactions     Brimonidine Other (See Comments)     Follicular conjunctivitis     Dorzolamide Other (See Comments)     Follicular conjunctivitis     Benzalkonium Chloride Other (See Comments)     Eye irritation.  Does better with preservative free but can tolerate preserved drops     Codeine Unknown and Rash     Fd&C Yellow #5 (Tartrazine) Rash     Fluorescein Itching     fluress     Penicillins      Rash       Sulfa Antibiotics Other (See Comments)     rash     PAST MEDICAL, SURGICAL, FAMILY HISTORY:  History was reviewed and updated as needed, see medical record.    SOCIAL HISTORY:  Social History     Socioeconomic History     Marital status:      Spouse name: Not on file     Number of children: Not on file     Years of education: Not on file     Highest education level: Not on file   Occupational History     Not on file   Tobacco Use     Smoking status: Never     Smokeless tobacco: Never   Substance and  Sexual Activity     Alcohol use: No     Drug use: No     Sexual activity: Never   Other Topics Concern     Not on file   Social History Narrative     Not on file     Social Drivers of Health     Financial Resource Strain: Low Risk  (2/21/2025)    Financial Resource Strain      Within the past 12 months, have you or your family members you live with been unable to get utilities (heat, electricity) when it was really needed?: No   Food Insecurity: Low Risk  (2/21/2025)    Food Insecurity      Within the past 12 months, did you worry that your food would run out before you got money to buy more?: No      Within the past 12 months, did the food you bought just not last and you didn t have money to get more?: No   Transportation Needs: Low Risk  (2/21/2025)    Transportation Needs      Within the past 12 months, has lack of transportation kept you from medical appointments, getting your medicines, non-medical meetings or appointments, work, or from getting things that you need?: No   Physical Activity: Not on file   Stress: Not on file   Social Connections: Not on file   Interpersonal Safety: Low Risk  (2/21/2025)    Interpersonal Safety      Do you feel physically and emotionally safe where you currently live?: Yes      Within the past 12 months, have you been hit, slapped, kicked or otherwise physically hurt by someone?: No      Within the past 12 months, have you been humiliated or emotionally abused in other ways by your partner or ex-partner?: No   Housing Stability: Low Risk  (2/21/2025)    Housing Stability      Do you have housing? : Yes      Are you worried about losing your housing?: No     Review of Systems:  Focused cardiovascular and respiratory review of systems is negative other than the symptoms noted above in the HPI.     Physical Exam:  Vitals: There were no vitals taken for this visit.   Wt Readings from Last 4 Encounters:   02/25/25 51 kg (112 lb 8 oz)   11/22/24 52.8 kg (116 lb 4.8 oz)   11/19/24  52.2 kg (115 lb)   10/18/24 51.3 kg (113 lb 1.6 oz)     CONSTITUTIONAL: Frail elderly woman in no acute distress.  NECK: No JVD.  CARDIOVASCULAR:  Regular rate and rhythm. No murmur, rub or gallop.   RESPIRATORY: Breathing non-labored. Lungs are clear to auscultation with no wheezes or crackles bilaterally.  GASTROINTESTINAL: Abdomen non-distended.  EXTREMITIES: Trace to 1+ lower extremity edema in the ankles bilaterally, L slightly > than R.   NEUROPSYCHIATRIC: No gross focal deficits. Affect appropriate. Forgetful.     Recent Lab Results:  LIPID RESULTS:  Lab Results   Component Value Date    CHOL 198 08/11/2023    HDL 51 08/11/2023     (H) 08/11/2023    TRIG 132 08/11/2023     LIVER ENZYME RESULTS:  Lab Results   Component Value Date    AST 32 09/16/2024    ALT 32 09/16/2024     CBC RESULTS:  Lab Results   Component Value Date    WBC 6.3 02/22/2025    WBC 6.6 07/13/2013    RBC 4.69 02/22/2025    RBC 5.39 (H) 07/13/2013    HGB 15.4 02/22/2025    HGB 16.2 (H) 07/13/2013    HCT 47.0 02/22/2025    HCT 49.6 (H) 07/13/2013     02/22/2025    MCV 92 07/13/2013    MCH 32.8 02/22/2025    MCH 30.1 07/13/2013    MCHC 32.8 02/22/2025    MCHC 32.7 07/13/2013    RDW 15.9 (H) 02/22/2025    RDW 13.3 07/13/2013     02/22/2025     07/13/2013     BMP RESULTS:  Lab Results   Component Value Date     02/25/2025     07/13/2013    POTASSIUM 3.9 02/25/2025    POTASSIUM 4.1 07/13/2013    CHLORIDE 100 02/25/2025    CHLORIDE 102 07/13/2013    CO2 26 02/25/2025    CO2 25 07/13/2013    ANIONGAP 11 02/25/2025    ANIONGAP 13 07/13/2013     (H) 02/25/2025     (H) 08/12/2023     (H) 07/13/2013    BUN 28.0 (H) 02/25/2025    BUN 13 07/13/2013    CR 1.22 (H) 02/25/2025    CR 0.89 07/13/2013    GFRESTIMATED 42 (L) 02/25/2025    GFRESTIMATED 61 07/13/2013    GFRESTBLACK 74 07/13/2013    GILL 8.5 (L) 02/25/2025    GILL 9.3 07/13/2013      A1C RESULTS:  Lab Results   Component Value Date    A1C  5.9 (H) 06/16/2024     CC  Shefali Ochoa MD  [address] [place] [id number]  [place] [id number]      Thank you for allowing me to participate in the care of your patient.      Sincerely,     Prasanna Moe NP     [facility]  cc:   Prasanna Moe NP  [address] [place] [id number]

## 2025-02-28 NOTE — PROGRESS NOTES
Cardiology Clinic Progress Note    [facility] Clinic Visit ([facility])    Service Date: February 28, 2025  Primary Cardiology Team: Dr. Ochoa  Reason for Visit: Hospital follow up for chronic HFrEF    HPI:   I had the pleasure of seeing Ms. Maya Cullen in the clinic today. She is a very pleasant 89 year old female with a past medical history notable for history of stroke, mild cognitive impairment, and hypertension. In 2023, she had an echocardiogram done when she presented to the hospital for noncardiac reasons but was noted to have an EF of 40-45% with severe MR.  Subsequently, I do not see any cardiology follow-up. She is not on any cardiac medications. She presented with a fall recently to [facility] when she was trying to get up to get her walker.  She injured and fractured her cervical vertebrae.  She is in a neck collar right now. During the hospital stay, she was noted to have severe heart failure with reduced ejection fraction and echocardiogram revealed 2+ MR and EF of 10-20%.    She was referred to outpatient cardiology and met with Dr. Ochoa in consultation in 07/2024. At that time, she was reporting NYHA class IIIb heart failure symptoms with shortness of breath with minimal exertion and was functionally quite limited and predominantly wheelchair-bound at that time. Etiology of her cardiomyopathy was felt very likely secondary to underlying occult coronary artery disease. However, she has elected to change her code status to DNR/DNI and continued conservative medical management was agreed upon after extensive discussion. Palliative care referral was also placed for further goals of care discussion and possible consideration of hospice. She was continued on Lasix 40 mg once daily along with low-dose lisinopril 2.5 mg daily and metoprolol succinate 12.5 mg once daily.    The patient was subsequently admitted at [facility] on 9/16/2024  after presenting with worsening leg swelling and foot pain.  She was found to have acute CHF and was diuresed with IV Lasix.  Her weight at discharge trended down to 110 pounds.  She was discharged on Lasix at a higher dose of 40 mg once daily.    I met the patient in follow-up in October 2024 and we again reviewed her cardiomyopathy diagnosis and options for further evaluation and management of this. We decided on a regimen of continuing Lasix at 40 mg daily and adding an extra 20 mg of Lasix daily as needed basis when her weight is at or above 115 pounds as she had noted increased symptoms and leg swelling when her weight gets above this range. The patient and her son-in-law again expressed preference for conservative management. We discussed referral to the palliative care team again for continued discussions regarding goals of care given concerns for poor prognosis with her severe cardiomyopathy and advanced age. They met with Dr. Yadav in Palliative Care consultation in November 2024. POLST form was completed. She expressed wish for DNR/DNI code status, but the patient was feeling good with where things are at for the time being and did not want to pursue hospice care at that time.    More recently, the patient was admitted to [facility] on 2/21/25 after presenting with worsening shortness of breath and being found to have acute HFrEF exacerbation. Dry weight was felt to be around 110-115 lbs as she presented with weight at 125 lbs and bilateral lower extremity edema. She was diuresed and ultimately discharged on lasix at a higher dose of 40 mg q AM and 20 mg q PM.    Today, Ms. Cullen presents to the clinic in follow up of her recent hospitalization. She is again accompanied by her son-in-law Christian, who is her primary support person. She lives in [facility] in [place]. She states she has been feeling well since she's been home from the hospital. Her  weight has been stable at that facility around 112-115 lbs. She has not noticed issues with any worsening shortness of breath or any symptoms of chest pain, palpitations, dizziness, or lightheadedness. She does continue to have some lower extremity edema. Labs were checked prior to the visit today with a basic metabolic panel showing stable electrolytes with potassium at 4.6 and creatinine at 1.26 which is stable around her recent baseline. NT pro BNP level is significantly elevated at over 31,000, which is up from just over 26,000 during her recent hospitalization.    ASSESSMENT:  1. 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% by TTE 6/17/24  - LVEF: 10-20%  - NYHA class II-III, ACC/AHA stage C  - Etiology: Unclear, with suspected ischemic secondary to underlying occult CAD  - Fluid status: Appears fairly well compensated on exam but continues to have lower extremity edema and NT pro BNP level is trending up since her recent hospital stay. Suspect dry weight may now be closer to around 110 lbs.   - Diuretic regimen: Lasix 40 mg q AM and 20 mg q PM  - Ischemic evaluation: Declined due to advanced age with preference for conservative medical management    Guideline directed medical therapy (GDMT):  - Beta blocker: Metoprolol succinate 12.5 mg once daily  - ACEI/ARB/ARNI: Lisinopril 2.5 mg once daily  - MRA: None currently due to borderline blood pressures and fall risk  - SGLT2 inhibitor: None currently due to report on blood pressures fall risk    2.  Moderate mitral regurgitation  3.  Mild to moderate tricuspid regurgitation  4.  Essential hypertension  5.  History of stroke  6.  Recurrent falls  7.  Sub-clinical hypothyroidism    PLAN:  - Increase furosemide to 40 mg twice daily.  - Repeat a BMP in about 1 week.  - Continue the rest of the current cardiac regimen without changes.   - Counseled on checking daily weights and trying to stick to a low-sodium diet.  Reviewed signs/symptoms concerning for fluid retention and when to contact the clinic.  - Follow-up in [facility] in about 1 month. Repeating an echocardiogram for reassessment of her ejection fraction was previously discussed, though given her preference for conservative medical management this would be unlikely to change her management so we will defer for the time being.   - Recommend continued follow up with the palliative care team as planned for continued goals of care discussions and could consider hospice informational meeting given several hospitalizations over the past 6-9 months and overall guarded prognosis.     Thank you for the opportunity to participate in this patient's care. We would be happy to see her sooner if needed for any concerns in the meantime.      40 total minutes was spent today including chart review, precharting, history and exam, post visit documentation, and reviewing studies as outlined above.     Please kindly note that this document was completed in part using Dragon voice recognition software. Although reviewed after completion, some word substitutions and typographical errors may occur. Please contact me if clarification is needed.     BENNETT Funk, CNP   Nurse Practitioner  [facility]    Orders this Visit:  No orders of the defined types were placed in this encounter.    No orders of the defined types were placed in this encounter.    There are no discontinued medications.    No diagnosis found.      CURRENT MEDICATIONS:  Current Outpatient Medications   Medication Sig Dispense Refill    acetaminophen (TYLENOL) 325 MG tablet Take 3 tablets (975 mg) by mouth 3 times daily      carBAMazepine (TEGRETOL) 100 MG chewable tablet Take 2 tablets (200 mg) by mouth 2 times daily      diclofenac (VOLTAREN) 1 % topical gel Apply 2 g topically 4 times daily. To right lower leg 350 g 1    furosemide (LASIX) 20 MG tablet Take 2 tablets (40 mg) in the morning and 1  tablet (20 mg) in the late afternoon. 60 tablet 0    latanoprost (XALATAN) 0.005 % ophthalmic solution Place 1 drop into both eyes at bedtime.      lisinopril (ZESTRIL) 5 MG tablet Take 0.5 tablets (2.5 mg) by mouth daily. 90 tablet 3    metoprolol succinate ER (TOPROL XL) 25 MG 24 hr tablet Take 12.5 mg by mouth daily. HOLD Metoprolol for SBP less than 100      multivitamin w/minerals (THERA-VIT-M) tablet Take 1 tablet by mouth daily      ondansetron (ZOFRAN ODT) 4 MG ODT tab Take 1 tablet (4 mg) by mouth every 6 hours as needed for nausea      potassium chloride nick ER (KLOR-CON M15) 15 MEQ CR tablet Take 1 tablet (15 mEq) by mouth daily      timolol maleate (TIMOPTIC) 0.5 % ophthalmic solution Place 1 drop into both eyes 2 times daily.       ALLERGIES  Allergies   Allergen Reactions    Brimonidine Other (See Comments)     Follicular conjunctivitis    Dorzolamide Other (See Comments)     Follicular conjunctivitis    Benzalkonium Chloride Other (See Comments)     Eye irritation.  Does better with preservative free but can tolerate preserved drops    Codeine Unknown and Rash    Fd&C Yellow #5 (Tartrazine) Rash    Fluorescein Itching     fluress    Penicillins      Rash      Sulfa Antibiotics Other (See Comments)     rash     PAST MEDICAL, SURGICAL, FAMILY HISTORY:  History was reviewed and updated as needed, see medical record.    SOCIAL HISTORY:  Social History     Socioeconomic History    Marital status:      Spouse name: Not on file    Number of children: Not on file    Years of education: Not on file    Highest education level: Not on file   Occupational History    Not on file   Tobacco Use    Smoking status: Never    Smokeless tobacco: Never   Substance and Sexual Activity    Alcohol use: No    Drug use: No    Sexual activity: Never   Other Topics Concern    Not on file   Social History Narrative    Not on file     Social Drivers of Health     Financial Resource Strain: Low Risk  (2/21/2025)     Financial Resource Strain     Within the past 12 months, have you or your family members you live with been unable to get utilities (heat, electricity) when it was really needed?: No   Food Insecurity: Low Risk  (2/21/2025)    Food Insecurity     Within the past 12 months, did you worry that your food would run out before you got money to buy more?: No     Within the past 12 months, did the food you bought just not last and you didn t have money to get more?: No   Transportation Needs: Low Risk  (2/21/2025)    Transportation Needs     Within the past 12 months, has lack of transportation kept you from medical appointments, getting your medicines, non-medical meetings or appointments, work, or from getting things that you need?: No   Physical Activity: Not on file   Stress: Not on file   Social Connections: Not on file   Interpersonal Safety: Low Risk  (2/21/2025)    Interpersonal Safety     Do you feel physically and emotionally safe where you currently live?: Yes     Within the past 12 months, have you been hit, slapped, kicked or otherwise physically hurt by someone?: No     Within the past 12 months, have you been humiliated or emotionally abused in other ways by your partner or ex-partner?: No   Housing Stability: Low Risk  (2/21/2025)    Housing Stability     Do you have housing? : Yes     Are you worried about losing your housing?: No     Review of Systems:  Focused cardiovascular and respiratory review of systems is negative other than the symptoms noted above in the HPI.     Physical Exam:  Vitals: There were no vitals taken for this visit.   Wt Readings from Last 4 Encounters:   02/25/25 51 kg (112 lb 8 oz)   11/22/24 52.8 kg (116 lb 4.8 oz)   11/19/24 52.2 kg (115 lb)   10/18/24 51.3 kg (113 lb 1.6 oz)     CONSTITUTIONAL: Frail elderly woman in no acute distress.  NECK: No JVD.  CARDIOVASCULAR:  Regular rate and rhythm. No murmur, rub or gallop.   RESPIRATORY: Breathing non-labored. Lungs are clear to  auscultation with no wheezes or crackles bilaterally.  GASTROINTESTINAL: Abdomen non-distended.  EXTREMITIES: Trace to 1+ lower extremity edema in the ankles bilaterally, L slightly > than R.   NEUROPSYCHIATRIC: No gross focal deficits. Affect appropriate. Forgetful.     Recent Lab Results:  LIPID RESULTS:  Lab Results   Component Value Date    CHOL 198 08/11/2023    HDL 51 08/11/2023     (H) 08/11/2023    TRIG 132 08/11/2023     LIVER ENZYME RESULTS:  Lab Results   Component Value Date    AST 32 09/16/2024    ALT 32 09/16/2024     CBC RESULTS:  Lab Results   Component Value Date    WBC 6.3 02/22/2025    WBC 6.6 07/13/2013    RBC 4.69 02/22/2025    RBC 5.39 (H) 07/13/2013    HGB 15.4 02/22/2025    HGB 16.2 (H) 07/13/2013    HCT 47.0 02/22/2025    HCT 49.6 (H) 07/13/2013     02/22/2025    MCV 92 07/13/2013    MCH 32.8 02/22/2025    MCH 30.1 07/13/2013    MCHC 32.8 02/22/2025    MCHC 32.7 07/13/2013    RDW 15.9 (H) 02/22/2025    RDW 13.3 07/13/2013     02/22/2025     07/13/2013     BMP RESULTS:  Lab Results   Component Value Date     02/25/2025     07/13/2013    POTASSIUM 3.9 02/25/2025    POTASSIUM 4.1 07/13/2013    CHLORIDE 100 02/25/2025    CHLORIDE 102 07/13/2013    CO2 26 02/25/2025    CO2 25 07/13/2013    ANIONGAP 11 02/25/2025    ANIONGAP 13 07/13/2013     (H) 02/25/2025     (H) 08/12/2023     (H) 07/13/2013    BUN 28.0 (H) 02/25/2025    BUN 13 07/13/2013    CR 1.22 (H) 02/25/2025    CR 0.89 07/13/2013    GFRESTIMATED 42 (L) 02/25/2025    GFRESTIMATED 61 07/13/2013    GFRESTBLACK 74 07/13/2013    GILL 8.5 (L) 02/25/2025    GILL 9.3 07/13/2013      A1C RESULTS:  Lab Results   Component Value Date    A1C 5.9 (H) 06/16/2024     CC  Shefali Ochoa MD  [id number] [place] [id number]  [place] [id number]

## 2025-03-06 ENCOUNTER — LAB REQUISITION (OUTPATIENT)
Dept: LAB | Facility: CLINIC | Age: OVER 89
End: 2025-03-06
Payer: MEDICARE

## 2025-03-06 DIAGNOSIS — R60.9 EDEMA, UNSPECIFIED: ICD-10-CM

## 2025-03-06 DIAGNOSIS — I50.9 HEART FAILURE, UNSPECIFIED (H): ICD-10-CM

## 2025-03-10 LAB
ANION GAP SERPL CALCULATED.3IONS-SCNC: 13 MMOL/L (ref 7–15)
BUN SERPL-MCNC: 37.8 MG/DL (ref 8–23)
CALCIUM SERPL-MCNC: 9.3 MG/DL (ref 8.8–10.4)
CHLORIDE SERPL-SCNC: 100 MMOL/L (ref 98–107)
CREAT SERPL-MCNC: 1.51 MG/DL (ref 0.51–0.95)
EGFRCR SERPLBLD CKD-EPI 2021: 32 ML/MIN/1.73M2
GLUCOSE SERPL-MCNC: 105 MG/DL (ref 70–99)
HCO3 SERPL-SCNC: 25 MMOL/L (ref 22–29)
NT-PROBNP SERPL-MCNC: ABNORMAL PG/ML (ref 0–1800)
POTASSIUM SERPL-SCNC: 5.5 MMOL/L (ref 3.4–5.3)
SODIUM SERPL-SCNC: 138 MMOL/L (ref 135–145)

## 2025-03-10 PROCEDURE — 83880 ASSAY OF NATRIURETIC PEPTIDE: CPT | Mod: ORL | Performed by: NURSE PRACTITIONER

## 2025-03-10 PROCEDURE — 36415 COLL VENOUS BLD VENIPUNCTURE: CPT | Mod: ORL | Performed by: NURSE PRACTITIONER

## 2025-03-10 PROCEDURE — 80048 BASIC METABOLIC PNL TOTAL CA: CPT | Mod: ORL | Performed by: NURSE PRACTITIONER

## 2025-03-10 PROCEDURE — P9604 ONE-WAY ALLOW PRORATED TRIP: HCPCS | Mod: ORL | Performed by: NURSE PRACTITIONER

## 2025-03-12 ENCOUNTER — TELEPHONE (OUTPATIENT)
Dept: CARDIOLOGY | Facility: CLINIC | Age: OVER 89
End: 2025-03-12
Payer: MEDICARE

## 2025-03-12 NOTE — TELEPHONE ENCOUNTER
[facility] -      Spoke with Son in law Christian re: stopping KCL. He asked that we call Rosie brasher. Called and left a VM. Will have in clinic nurse fax orders to Rosie Brasher at [phone].     STOP KLOR -CON 15 Meq once daily      Noy Leija RN BAN   2:30 PM 3/12/2025    Nurse line M-F 8a-4p: [phone]

## 2025-03-12 NOTE — TELEPHONE ENCOUNTER
[facility]      Fax sent relaying order to stop Potassium 15meq daily to Rosie Hayes at [phone]      Elizabeth EDMONDN, RN  4:21 PM   Nurse line [facility] 8am-4pm   [phone]

## 2025-03-12 NOTE — TELEPHONE ENCOUNTER
----- Message from Prasanna Moe sent at 3/11/2025  9:11 AM CDT -----  Please update the patient's son-in-law Christian that her labs look good with slight improvement in her BNP level.  Potassium level is high so recommend stopping the potassium supplement.  Otherwise can continue as planned from the recent visit.  Thank you! Noah Moe, BENNETT, CNP

## 2025-03-18 ENCOUNTER — LAB REQUISITION (OUTPATIENT)
Dept: LAB | Facility: CLINIC | Age: OVER 89
End: 2025-03-18
Payer: MEDICARE

## 2025-03-18 DIAGNOSIS — R30.0 DYSURIA: ICD-10-CM

## 2025-03-18 LAB
ALBUMIN UR-MCNC: 20 MG/DL
APPEARANCE UR: ABNORMAL
BACTERIA #/AREA URNS HPF: ABNORMAL /HPF
BILIRUB UR QL STRIP: NEGATIVE
COLOR UR AUTO: YELLOW
GLUCOSE UR STRIP-MCNC: NEGATIVE MG/DL
HGB UR QL STRIP: NEGATIVE
HYALINE CASTS: 26 /LPF
KETONES UR STRIP-MCNC: NEGATIVE MG/DL
LEUKOCYTE ESTERASE UR QL STRIP: ABNORMAL
MUCOUS THREADS #/AREA URNS LPF: PRESENT /LPF
NITRATE UR QL: NEGATIVE
PH UR STRIP: 5 [PH] (ref 5–7)
RBC URINE: 2 /HPF
SP GR UR STRIP: 1.02 (ref 1–1.03)
SQUAMOUS EPITHELIAL: 3 /HPF
TRANSITIONAL EPI: <1 /HPF
UROBILINOGEN UR STRIP-MCNC: NORMAL MG/DL
WBC URINE: 59 /HPF

## 2025-03-18 PROCEDURE — 87086 URINE CULTURE/COLONY COUNT: CPT | Mod: ORL | Performed by: PHYSICIAN ASSISTANT

## 2025-03-18 PROCEDURE — 81001 URINALYSIS AUTO W/SCOPE: CPT | Mod: ORL | Performed by: PHYSICIAN ASSISTANT

## 2025-03-19 LAB — BACTERIA UR CULT: NORMAL

## 2025-03-26 ENCOUNTER — HOSPITAL ENCOUNTER (INPATIENT)
Facility: CLINIC | Age: OVER 89
DRG: 602 | End: 2025-03-26
Attending: EMERGENCY MEDICINE | Admitting: INTERNAL MEDICINE
Payer: MEDICARE

## 2025-03-26 ENCOUNTER — APPOINTMENT (OUTPATIENT)
Dept: GENERAL RADIOLOGY | Facility: CLINIC | Age: OVER 89
DRG: 602 | End: 2025-03-26
Attending: EMERGENCY MEDICINE
Payer: MEDICARE

## 2025-03-26 ENCOUNTER — APPOINTMENT (OUTPATIENT)
Dept: ULTRASOUND IMAGING | Facility: CLINIC | Age: OVER 89
DRG: 602 | End: 2025-03-26
Attending: EMERGENCY MEDICINE
Payer: MEDICARE

## 2025-03-26 DIAGNOSIS — I50.23 ACUTE ON CHRONIC SYSTOLIC HEART FAILURE (H): ICD-10-CM

## 2025-03-26 DIAGNOSIS — E03.9 HYPOTHYROIDISM, UNSPECIFIED TYPE: Primary | ICD-10-CM

## 2025-03-26 DIAGNOSIS — L03.115 CELLULITIS OF RIGHT LEG: ICD-10-CM

## 2025-03-26 DIAGNOSIS — R29.6 RECURRENT FALLS: ICD-10-CM

## 2025-03-26 LAB
ANION GAP SERPL CALCULATED.3IONS-SCNC: 11 MMOL/L (ref 7–15)
BASOPHILS # BLD AUTO: 0.1 10E3/UL (ref 0–0.2)
BASOPHILS NFR BLD AUTO: 0 %
BUN SERPL-MCNC: 22.3 MG/DL (ref 8–23)
CALCIUM SERPL-MCNC: 9 MG/DL (ref 8.8–10.4)
CHLORIDE SERPL-SCNC: 93 MMOL/L (ref 98–107)
CREAT SERPL-MCNC: 1.28 MG/DL (ref 0.51–0.95)
EGFRCR SERPLBLD CKD-EPI 2021: 39 ML/MIN/1.73M2
EOSINOPHIL # BLD AUTO: 0.1 10E3/UL (ref 0–0.7)
EOSINOPHIL NFR BLD AUTO: 1 %
ERYTHROCYTE [DISTWIDTH] IN BLOOD BY AUTOMATED COUNT: 15.5 % (ref 10–15)
GLUCOSE SERPL-MCNC: 109 MG/DL (ref 70–99)
HCO3 SERPL-SCNC: 28 MMOL/L (ref 22–29)
HCT VFR BLD AUTO: 51 % (ref 35–47)
HGB BLD-MCNC: 17 G/DL (ref 11.7–15.7)
HOLD SPECIMEN: NORMAL
HOLD SPECIMEN: NORMAL
IMM GRANULOCYTES # BLD: 0.1 10E3/UL
IMM GRANULOCYTES NFR BLD: 0 %
LACTATE SERPL-SCNC: 2 MMOL/L (ref 0.7–2)
LYMPHOCYTES # BLD AUTO: 0.5 10E3/UL (ref 0.8–5.3)
LYMPHOCYTES NFR BLD AUTO: 4 %
MCH RBC QN AUTO: 33.2 PG (ref 26.5–33)
MCHC RBC AUTO-ENTMCNC: 33.3 G/DL (ref 31.5–36.5)
MCV RBC AUTO: 100 FL (ref 78–100)
MONOCYTES # BLD AUTO: 0.4 10E3/UL (ref 0–1.3)
MONOCYTES NFR BLD AUTO: 3 %
NEUTROPHILS # BLD AUTO: 10.9 10E3/UL (ref 1.6–8.3)
NEUTROPHILS NFR BLD AUTO: 91 %
NRBC # BLD AUTO: 0 10E3/UL
NRBC BLD AUTO-RTO: 0 /100
NT-PROBNP SERPL-MCNC: ABNORMAL PG/ML (ref 0–1800)
PLATELET # BLD AUTO: 281 10E3/UL (ref 150–450)
POTASSIUM SERPL-SCNC: 4.6 MMOL/L (ref 3.4–5.3)
RBC # BLD AUTO: 5.12 10E6/UL (ref 3.8–5.2)
SODIUM SERPL-SCNC: 132 MMOL/L (ref 135–145)
TROPONIN T SERPL HS-MCNC: 41 NG/L
TROPONIN T SERPL HS-MCNC: 44 NG/L
WBC # BLD AUTO: 12 10E3/UL (ref 4–11)

## 2025-03-26 PROCEDURE — G0378 HOSPITAL OBSERVATION PER HR: HCPCS

## 2025-03-26 PROCEDURE — 99222 1ST HOSP IP/OBS MODERATE 55: CPT | Performed by: NURSE PRACTITIONER

## 2025-03-26 PROCEDURE — 85018 HEMOGLOBIN: CPT | Performed by: EMERGENCY MEDICINE

## 2025-03-26 PROCEDURE — 87040 BLOOD CULTURE FOR BACTERIA: CPT | Performed by: NURSE PRACTITIONER

## 2025-03-26 PROCEDURE — 85004 AUTOMATED DIFF WBC COUNT: CPT | Performed by: EMERGENCY MEDICINE

## 2025-03-26 PROCEDURE — 71046 X-RAY EXAM CHEST 2 VIEWS: CPT

## 2025-03-26 PROCEDURE — 96366 THER/PROPH/DIAG IV INF ADDON: CPT

## 2025-03-26 PROCEDURE — 96375 TX/PRO/DX INJ NEW DRUG ADDON: CPT

## 2025-03-26 PROCEDURE — 36415 COLL VENOUS BLD VENIPUNCTURE: CPT | Performed by: NURSE PRACTITIONER

## 2025-03-26 PROCEDURE — 80048 BASIC METABOLIC PNL TOTAL CA: CPT | Performed by: EMERGENCY MEDICINE

## 2025-03-26 PROCEDURE — 96365 THER/PROPH/DIAG IV INF INIT: CPT

## 2025-03-26 PROCEDURE — 93922 UPR/L XTREMITY ART 2 LEVELS: CPT

## 2025-03-26 PROCEDURE — 84520 ASSAY OF UREA NITROGEN: CPT | Performed by: EMERGENCY MEDICINE

## 2025-03-26 PROCEDURE — 93005 ELECTROCARDIOGRAM TRACING: CPT

## 2025-03-26 PROCEDURE — 83880 ASSAY OF NATRIURETIC PEPTIDE: CPT | Performed by: EMERGENCY MEDICINE

## 2025-03-26 PROCEDURE — 82565 ASSAY OF CREATININE: CPT | Performed by: EMERGENCY MEDICINE

## 2025-03-26 PROCEDURE — 36415 COLL VENOUS BLD VENIPUNCTURE: CPT | Performed by: EMERGENCY MEDICINE

## 2025-03-26 PROCEDURE — 250N000011 HC RX IP 250 OP 636: Performed by: NURSE PRACTITIONER

## 2025-03-26 PROCEDURE — 84484 ASSAY OF TROPONIN QUANT: CPT | Performed by: EMERGENCY MEDICINE

## 2025-03-26 PROCEDURE — 99285 EMERGENCY DEPT VISIT HI MDM: CPT | Mod: 25

## 2025-03-26 PROCEDURE — 93970 EXTREMITY STUDY: CPT

## 2025-03-26 PROCEDURE — 999N000127 HC STATISTIC PERIPHERAL IV START W US GUIDANCE

## 2025-03-26 PROCEDURE — 83605 ASSAY OF LACTIC ACID: CPT | Performed by: NURSE PRACTITIONER

## 2025-03-26 PROCEDURE — 84484 ASSAY OF TROPONIN QUANT: CPT | Performed by: NURSE PRACTITIONER

## 2025-03-26 PROCEDURE — 82435 ASSAY OF BLOOD CHLORIDE: CPT | Performed by: EMERGENCY MEDICINE

## 2025-03-26 RX ORDER — HEPARIN SODIUM 5000 [USP'U]/.5ML
5000 INJECTION, SOLUTION INTRAVENOUS; SUBCUTANEOUS EVERY 12 HOURS
Status: DISCONTINUED | OUTPATIENT
Start: 2025-03-27 | End: 2025-03-28 | Stop reason: HOSPADM

## 2025-03-26 RX ORDER — ACETAMINOPHEN 325 MG/1
650 TABLET ORAL EVERY 4 HOURS PRN
Status: DISCONTINUED | OUTPATIENT
Start: 2025-03-26 | End: 2025-03-28 | Stop reason: HOSPADM

## 2025-03-26 RX ORDER — AMOXICILLIN 250 MG
2 CAPSULE ORAL 2 TIMES DAILY PRN
Status: DISCONTINUED | OUTPATIENT
Start: 2025-03-26 | End: 2025-03-28 | Stop reason: HOSPADM

## 2025-03-26 RX ORDER — TIMOLOL MALEATE 5 MG/ML
1 SOLUTION/ DROPS OPHTHALMIC 2 TIMES DAILY
Status: DISCONTINUED | OUTPATIENT
Start: 2025-03-26 | End: 2025-03-28 | Stop reason: HOSPADM

## 2025-03-26 RX ORDER — CEFAZOLIN SODIUM 2 G/50ML
2 SOLUTION INTRAVENOUS ONCE
Status: COMPLETED | OUTPATIENT
Start: 2025-03-26 | End: 2025-03-27

## 2025-03-26 RX ORDER — ONDANSETRON 2 MG/ML
4 INJECTION INTRAMUSCULAR; INTRAVENOUS EVERY 6 HOURS PRN
Status: DISCONTINUED | OUTPATIENT
Start: 2025-03-26 | End: 2025-03-28 | Stop reason: HOSPADM

## 2025-03-26 RX ORDER — CEFAZOLIN SODIUM 2 G/50ML
2 SOLUTION INTRAVENOUS EVERY 8 HOURS
Status: DISCONTINUED | OUTPATIENT
Start: 2025-03-27 | End: 2025-03-27

## 2025-03-26 RX ORDER — ONDANSETRON 4 MG/1
4 TABLET, ORALLY DISINTEGRATING ORAL EVERY 6 HOURS PRN
Status: DISCONTINUED | OUTPATIENT
Start: 2025-03-26 | End: 2025-03-28 | Stop reason: HOSPADM

## 2025-03-26 RX ORDER — ACETAMINOPHEN 650 MG/1
650 SUPPOSITORY RECTAL EVERY 4 HOURS PRN
Status: DISCONTINUED | OUTPATIENT
Start: 2025-03-26 | End: 2025-03-28 | Stop reason: HOSPADM

## 2025-03-26 RX ORDER — CARBAMAZEPINE 100 MG/1
200 TABLET, CHEWABLE ORAL 2 TIMES DAILY
Status: DISCONTINUED | OUTPATIENT
Start: 2025-03-26 | End: 2025-03-28 | Stop reason: HOSPADM

## 2025-03-26 RX ORDER — LISINOPRIL 2.5 MG/1
2.5 TABLET ORAL DAILY
Status: DISCONTINUED | OUTPATIENT
Start: 2025-03-27 | End: 2025-03-28 | Stop reason: HOSPADM

## 2025-03-26 RX ORDER — FUROSEMIDE 40 MG/1
40 TABLET ORAL
Status: DISCONTINUED | OUTPATIENT
Start: 2025-03-27 | End: 2025-03-28 | Stop reason: HOSPADM

## 2025-03-26 RX ORDER — PROCHLORPERAZINE MALEATE 5 MG/1
5 TABLET ORAL EVERY 6 HOURS PRN
Status: DISCONTINUED | OUTPATIENT
Start: 2025-03-26 | End: 2025-03-28 | Stop reason: HOSPADM

## 2025-03-26 RX ORDER — AMOXICILLIN 250 MG
1 CAPSULE ORAL 2 TIMES DAILY PRN
Status: DISCONTINUED | OUTPATIENT
Start: 2025-03-26 | End: 2025-03-28 | Stop reason: HOSPADM

## 2025-03-26 RX ORDER — FUROSEMIDE 10 MG/ML
60 INJECTION INTRAMUSCULAR; INTRAVENOUS ONCE
Status: COMPLETED | OUTPATIENT
Start: 2025-03-26 | End: 2025-03-26

## 2025-03-26 RX ORDER — LATANOPROST 50 UG/ML
1 SOLUTION/ DROPS OPHTHALMIC AT BEDTIME
Status: DISCONTINUED | OUTPATIENT
Start: 2025-03-26 | End: 2025-03-28 | Stop reason: HOSPADM

## 2025-03-26 RX ADMIN — FUROSEMIDE 60 MG: 10 INJECTION, SOLUTION INTRAMUSCULAR; INTRAVENOUS at 22:21

## 2025-03-26 RX ADMIN — CEFAZOLIN SODIUM 2 G: 2 SOLUTION INTRAVENOUS at 22:20

## 2025-03-26 ASSESSMENT — ACTIVITIES OF DAILY LIVING (ADL)
ADLS_ACUITY_SCORE: 64

## 2025-03-26 NOTE — ED PROVIDER NOTES
Emergency Department Note      History of Present Illness     Chief Complaint   Leg Swelling    HPI     Maya Cullen is a 89 year old female with history of CHF and hypertension who presents to the ED with her son for evaluation of leg swelling. The patient arrives from assisted living where she reports that she has had bilateral leg swelling, redness, and pain with worse pain in her right leg. She states that her leg pain radiates to her right side and to her chest. She states that her pain is transient and very brief. She denies any cough, fever, shortness of breath, numbness, or history of clots. Of note, her son reports that she recently increased her lasix dose from 1 tablet a day to 2 and denies any known history of Raynaud's.     Independent Historian   Son as detailed above.    Review of External Notes   I reviewed cardiology note from 2/28/2025 which patient has chronic heart failure with reduced ejection fraction of 10-20%.  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  Furosemide was increased to 40 mg twice daily from once daily.    Past Medical History     Medical History and Problem List   Cerebrovascular accident (H)  Glaucoma  HTN (hypertension)  L4 vertebral fracture (H)  Mild cognitive impairment  Pulmonary hypertension (H)  Severe mitral regurgitation  Systolic CHF, chronic (H)  Tricuspid regurgitation  Trigeminal neuralgia    Medications   carBAMazepine (TEGRETOL) 100 MG chewable tablet  furosemide (LASIX) 40 MG tablet  lisinopril (ZESTRIL) 5 MG tablet  metoprolol succinate ER (TOPROL XL) 25 MG 24 hr tablet  multivitamin w/minerals (THERA-VIT-M) tablet  ondansetron (ZOFRAN ODT) 4 MG ODT tab    Surgical History   Past Surgical History:   Procedure Laterality Date    APPENDECTOMY      CATARACT IOL, RT/LT      EYE SURGERY      HERNIA REPAIR      HYSTERECTOMY, TERESA      bso    TONSILLECTOMY       Physical Exam     Patient Vitals for the past 24 hrs:   BP Temp Temp  "src Pulse Resp SpO2 Height Weight   03/26/25 2030 103/75 -- -- 71 -- 98 % -- --   03/26/25 1830 -- -- -- 72 -- (!) 90 % -- --   03/26/25 1804 111/73 -- -- -- -- -- -- --   03/26/25 1803 104/67 -- -- -- -- -- -- --   03/26/25 1800 114/69 -- -- -- -- -- -- --   03/26/25 1758 112/81 -- -- -- -- -- -- --   03/26/25 1656 101/53 97  F (36.1  C) Temporal 100 20 98 % 1.499 m (4' 11\") 49.4 kg (109 lb)     Physical Exam  Skin:            Comments: Erythematous blanching patch with warmth and tenderness.  No rapidly expanding erythema or crepitus           HEENT:    Oropharynx is moist  Eyes:    Conjunctiva normal  Neck:     Supple, no meningismus.     CV:     Regular rate and rhythm.      No murmurs, rubs or gallops.       No unilateral leg swelling.       2+ radial pulses bilateral.       DP and posterior tibial pulses are nonpalpable but are present on Doppler at bedside     1+ bilateral lower extremity edema.  PULM:    Clear to auscultation bilateral.       No respiratory distress.      Good air exchange.     No rales or wheezing.     No stridor.  ABD:    Soft, non-tender, non-distended.       No pulsatile masses.       No rebound, guarding or rigidity.  MSK:     No gross deformity to all four extremities.   LYMPH:   No cervical lymphadenopathy.  NEURO:   Alert. Good muscle tone, no atrophy.  Skin:    Warm, dry     Right foot:      Wet socks were removed      Cool to touch with cyanosis to the digits   Capillary refill 3-4 seconds  After warming the feet with warm blankets, cyanosis has resolved.   Feet are now warm to touch  Left foot:   Wet socks were removed   Cool to touch with cyanosis to the digits   Capillary refill 3-4 seconds  After warming the feet with warm blankets, cyanosis has resolved.   Feet are now warm to touch  Psych:    Mood is good and affect is appropriate.      Diagnostics     Lab Results   Labs Ordered and Resulted from Time of ED Arrival to Time of ED Departure   BASIC METABOLIC PANEL - Abnormal    "    Result Value    Sodium 132 (*)     Potassium 4.6      Chloride 93 (*)     Carbon Dioxide (CO2) 28      Anion Gap 11      Urea Nitrogen 22.3      Creatinine 1.28 (*)     GFR Estimate 39 (*)     Calcium 9.0      Glucose 109 (*)    TROPONIN T, HIGH SENSITIVITY - Abnormal    Troponin T, High Sensitivity 44 (*)    NT PROBNP INPATIENT - Abnormal    N terminal Pro BNP Inpatient 29,364 (*)    CBC WITH PLATELETS AND DIFFERENTIAL - Abnormal    WBC Count 12.0 (*)     RBC Count 5.12      Hemoglobin 17.0 (*)     Hematocrit 51.0 (*)           MCH 33.2 (*)     MCHC 33.3      RDW 15.5 (*)     Platelet Count 281      % Neutrophils 91      % Lymphocytes 4      % Monocytes 3      % Eosinophils 1      % Basophils 0      % Immature Granulocytes 0      NRBCs per 100 WBC 0      Absolute Neutrophils 10.9 (*)     Absolute Lymphocytes 0.5 (*)     Absolute Monocytes 0.4      Absolute Eosinophils 0.1      Absolute Basophils 0.1      Absolute Immature Granulocytes 0.1      Absolute NRBCs 0.0     TROPONIN T, HIGH SENSITIVITY - Abnormal    Troponin T, High Sensitivity 41 (*)    LACTIC ACID WHOLE BLOOD WITH 1X REPEAT IN 2 HR WHEN >2 - Normal    Lactic Acid, Initial 2.0     BLOOD CULTURE   BLOOD CULTURE       Imaging   US NASREEN Doppler No Exercise   Final Result   IMPRESSION:   RIGHT LOWER EXTREMITY:   NASREEN at rest is normal.      LEFT LOWER EXTREMITY:   NASREEN at rest is normal.         US Lower Extremity Venous Duplex Bilateral   Final Result   IMPRESSION:   1.  No deep venous thrombosis in either lower extremity.   2.  Bilateral calf subcutaneous edema         Chest XR,  PA & LAT   Final Result   IMPRESSION: Increased pulmonary vascularity with probable interstitial edema. Streaky right infrahilar opacities could be atelectasis or early pneumonia. Small left pleural effusion. No pneumothorax. Stable cardiomediastinal silhouette.          EKG   ECG results from 03/26/25   EKG 12-lead, tracing only     Value    Systolic Blood Pressure      Diastolic Blood Pressure     Ventricular Rate 73    Atrial Rate 73    DE Interval 206    QRS Duration 96        QTc 456    P Axis 67    R AXIS -24    T Axis 56    Interpretation ECG      Sinus rhythm with Premature supraventricular complexes  Minimal voltage criteria for LVH, may be normal variant ( Johan product )  Septal infarct ST & T wave abnormality, consider anterior ischemia  Abnormal ECG  When compared with ECG of 21-Feb-2025 13:06,  Significant changes have occurred         Independent Interpretation   CXR: mild pulmonary edema.    ED Course      Medications Administered   Medications   senna-docusate (SENOKOT-S/PERICOLACE) 8.6-50 MG per tablet 1 tablet (has no administration in time range)     Or   senna-docusate (SENOKOT-S/PERICOLACE) 8.6-50 MG per tablet 2 tablet (has no administration in time range)   ondansetron (ZOFRAN ODT) ODT tab 4 mg (has no administration in time range)     Or   ondansetron (ZOFRAN) injection 4 mg (has no administration in time range)   prochlorperazine (COMPAZINE) injection 5 mg (has no administration in time range)     Or   prochlorperazine (COMPAZINE) tablet 5 mg (has no administration in time range)   heparin ANTICOAGULANT injection 5,000 Units (has no administration in time range)   acetaminophen (TYLENOL) tablet 650 mg (has no administration in time range)     Or   acetaminophen (TYLENOL) Suppository 650 mg (has no administration in time range)   melatonin tablet 3 mg (has no administration in time range)   ceFAZolin (ANCEF) 2 g in dextrose 50 mL intermittent infusion (has no administration in time range)   carBAMazepine (TEGretol) chewable tablet 200 mg (has no administration in time range)   diclofenac (VOLTAREN) 1 % topical gel 2 g (has no administration in time range)   furosemide (LASIX) tablet 40 mg (has no administration in time range)   latanoprost (XALATAN) 0.005 % ophthalmic solution 1 drop (has no administration in time range)   lisinopril (ZESTRIL) tablet  2.5 mg (has no administration in time range)   metoprolol succinate ER (TOPROL-XL) 24 hr half-tab 12.5 mg (has no administration in time range)   timolol maleate (TIMOPTIC) 0.5 % ophthalmic solution 1 drop (has no administration in time range)   ceFAZolin (ANCEF) 2 g in dextrose 50 mL intermittent infusion (2 g Intravenous $New Bag 3/26/25 2220)   furosemide (LASIX) injection 60 mg (60 mg Intravenous $Given 3/26/25 2221)     Procedures   Procedures     Discussion of Management   Admitting Hospitalist, Dr. Fairchild    ED Course   ED Course as of 03/26/25 2321   Wed Mar 26, 2025   1739 I obtained history and examined the patient as noted above.    2017 I rechecked and updated the patient. Patient has cellulitis in right leg. Admit.    2041 I consulted with admitting hospitalist, Oriana GUAJARDO CP for Dr. Fairchild, regarding admission. Discussed patient's presentation, findings, and plan of care.       Additional Documentation  None    Medical Decision Making / Diagnosis     CMS Diagnoses: IV Antibiotics given and/or elevated Lactate of 2 and no sepsis note found - Delete this reminder and enter the sepsis note or '.edcms' before signing chart.>>>None    MIPS       None    [facility]     Maya Cullen is a 89 year old female with a history of systolic heart failure presents with complaints of bilateral leg swelling right worse than left.  In regards to her systolic heart failure, she has mild volume overload.  There is mild pulmonary edema on chest x-ray but O2 saturations are largely greater than 90%.  She was given a single dose of IV Lasix.    Her primary concern was right > left leg pain.  She has a clear cellulitis to the right lower extremity.  Patient given IV Ancef and will require admission given surface area involved and comorbidities.    Patient's feet were very cold to touch with cyanosis and nonpalpable pulses although had wet socks on during presentation.  Pulses were positive on Dopplers at bedside.   ABIs and venous ultrasound negative for acute pathology.  With rewarming at bedside, she has loss of cyanosis and feet are not warm to touch.  There may be a component of Raynaud's phenomenon.    Disposition   The patient was admitted to the hospital.     Diagnosis     ICD-10-CM    1. Cellulitis of right leg  L03.115       2. Acute on chronic systolic heart failure (H)  I50.23          Discharge Medications   New Prescriptions    No medications on file     Scribe Disclosure:  I, Geoffrey Rodriguez, am serving as a scribe at 5:39 PM on 3/26/2025 to document services personally performed by Nik Mcgee MD based on my observations and the provider's statements to me.        Nik Mcgee MD  03/26/25 3288

## 2025-03-26 NOTE — ED TRIAGE NOTES
Pt arrives from assisted living with son she has been having increased leg swelling and pain she feels this is also giving her chest pain and arm pain.  Pt is also having abdominal pain .  Pt states that the swelling has gotten worse and causing redness.  Pt also has blue finer tips but o2 sats on ear are normal

## 2025-03-27 VITALS
WEIGHT: 106.9 LBS | HEART RATE: 85 BPM | SYSTOLIC BLOOD PRESSURE: 90 MMHG | RESPIRATION RATE: 18 BRPM | OXYGEN SATURATION: 93 % | TEMPERATURE: 97.8 F | BODY MASS INDEX: 21.55 KG/M2 | DIASTOLIC BLOOD PRESSURE: 50 MMHG | HEIGHT: 59 IN

## 2025-03-27 LAB
ALBUMIN SERPL BCG-MCNC: 2.8 G/DL (ref 3.5–5.2)
ALP SERPL-CCNC: 200 U/L (ref 40–150)
ALT SERPL W P-5'-P-CCNC: 19 U/L (ref 0–50)
ANION GAP SERPL CALCULATED.3IONS-SCNC: 12 MMOL/L (ref 7–15)
AST SERPL W P-5'-P-CCNC: 28 U/L (ref 0–45)
ATRIAL RATE - MUSE: 73 BPM
BACTERIA BLD CULT: NORMAL
BILIRUB SERPL-MCNC: 0.6 MG/DL
BUN SERPL-MCNC: 21.5 MG/DL (ref 8–23)
CALCIUM SERPL-MCNC: 8.4 MG/DL (ref 8.8–10.4)
CHLORIDE SERPL-SCNC: 96 MMOL/L (ref 98–107)
CREAT SERPL-MCNC: 1.18 MG/DL (ref 0.51–0.95)
DIASTOLIC BLOOD PRESSURE - MUSE: NORMAL MMHG
EGFRCR SERPLBLD CKD-EPI 2021: 43 ML/MIN/1.73M2
ERYTHROCYTE [DISTWIDTH] IN BLOOD BY AUTOMATED COUNT: 15.4 % (ref 10–15)
GLUCOSE SERPL-MCNC: 95 MG/DL (ref 70–99)
HCO3 SERPL-SCNC: 27 MMOL/L (ref 22–29)
HCT VFR BLD AUTO: 47.3 % (ref 35–47)
HGB BLD-MCNC: 15.6 G/DL (ref 11.7–15.7)
INTERPRETATION ECG - MUSE: NORMAL
MAGNESIUM SERPL-MCNC: 2 MG/DL (ref 1.7–2.3)
MCH RBC QN AUTO: 32.7 PG (ref 26.5–33)
MCHC RBC AUTO-ENTMCNC: 33 G/DL (ref 31.5–36.5)
MCV RBC AUTO: 99 FL (ref 78–100)
NT-PROBNP SERPL-MCNC: ABNORMAL PG/ML (ref 0–1800)
P AXIS - MUSE: 67 DEGREES
PLATELET # BLD AUTO: 253 10E3/UL (ref 150–450)
POTASSIUM SERPL-SCNC: 4.4 MMOL/L (ref 3.4–5.3)
PR INTERVAL - MUSE: 206 MS
PROT SERPL-MCNC: 6.6 G/DL (ref 6.4–8.3)
QRS DURATION - MUSE: 96 MS
QT - MUSE: 414 MS
QTC - MUSE: 456 MS
R AXIS - MUSE: -24 DEGREES
RBC # BLD AUTO: 4.77 10E6/UL (ref 3.8–5.2)
SODIUM SERPL-SCNC: 135 MMOL/L (ref 135–145)
SYSTOLIC BLOOD PRESSURE - MUSE: NORMAL MMHG
T AXIS - MUSE: 56 DEGREES
T4 FREE SERPL-MCNC: 0.75 NG/DL (ref 0.9–1.7)
TSH SERPL DL<=0.005 MIU/L-ACNC: 12 UIU/ML (ref 0.3–4.2)
VENTRICULAR RATE- MUSE: 73 BPM
WBC # BLD AUTO: 11.2 10E3/UL (ref 4–11)

## 2025-03-27 PROCEDURE — G0378 HOSPITAL OBSERVATION PER HR: HCPCS

## 2025-03-27 PROCEDURE — 85027 COMPLETE CBC AUTOMATED: CPT | Performed by: NURSE PRACTITIONER

## 2025-03-27 PROCEDURE — 80053 COMPREHEN METABOLIC PANEL: CPT | Performed by: NURSE PRACTITIONER

## 2025-03-27 PROCEDURE — 84439 ASSAY OF FREE THYROXINE: CPT | Performed by: NURSE PRACTITIONER

## 2025-03-27 PROCEDURE — 250N000013 HC RX MED GY IP 250 OP 250 PS 637: Performed by: NURSE PRACTITIONER

## 2025-03-27 PROCEDURE — 96366 THER/PROPH/DIAG IV INF ADDON: CPT

## 2025-03-27 PROCEDURE — 84443 ASSAY THYROID STIM HORMONE: CPT | Performed by: NURSE PRACTITIONER

## 2025-03-27 PROCEDURE — 250N000009 HC RX 250: Performed by: NURSE PRACTITIONER

## 2025-03-27 PROCEDURE — 96372 THER/PROPH/DIAG INJ SC/IM: CPT | Performed by: NURSE PRACTITIONER

## 2025-03-27 PROCEDURE — 120N000001 HC R&B MED SURG/OB

## 2025-03-27 PROCEDURE — 96376 TX/PRO/DX INJ SAME DRUG ADON: CPT

## 2025-03-27 PROCEDURE — 250N000011 HC RX IP 250 OP 636: Performed by: NURSE PRACTITIONER

## 2025-03-27 PROCEDURE — G0463 HOSPITAL OUTPT CLINIC VISIT: HCPCS

## 2025-03-27 PROCEDURE — 99233 SBSQ HOSP IP/OBS HIGH 50: CPT | Performed by: INTERNAL MEDICINE

## 2025-03-27 PROCEDURE — 83880 ASSAY OF NATRIURETIC PEPTIDE: CPT | Performed by: NURSE PRACTITIONER

## 2025-03-27 PROCEDURE — 83735 ASSAY OF MAGNESIUM: CPT | Performed by: NURSE PRACTITIONER

## 2025-03-27 PROCEDURE — 36415 COLL VENOUS BLD VENIPUNCTURE: CPT | Performed by: NURSE PRACTITIONER

## 2025-03-27 RX ORDER — CEFAZOLIN SODIUM 2 G/50ML
2 SOLUTION INTRAVENOUS EVERY 12 HOURS
Status: DISCONTINUED | OUTPATIENT
Start: 2025-03-28 | End: 2025-03-28 | Stop reason: HOSPADM

## 2025-03-27 RX ORDER — LISINOPRIL 2.5 MG/1
2.5 TABLET ORAL DAILY
Status: ON HOLD | COMMUNITY
End: 2025-03-28

## 2025-03-27 RX ORDER — LEVOTHYROXINE SODIUM 25 UG/1
25 TABLET ORAL
Status: DISCONTINUED | OUTPATIENT
Start: 2025-03-28 | End: 2025-03-28 | Stop reason: HOSPADM

## 2025-03-27 RX ADMIN — ACETAMINOPHEN 650 MG: 325 TABLET ORAL at 20:48

## 2025-03-27 RX ADMIN — HEPARIN SODIUM 5000 UNITS: 5000 INJECTION, SOLUTION INTRAVENOUS; SUBCUTANEOUS at 11:03

## 2025-03-27 RX ADMIN — HEPARIN SODIUM 5000 UNITS: 5000 INJECTION, SOLUTION INTRAVENOUS; SUBCUTANEOUS at 20:45

## 2025-03-27 RX ADMIN — FUROSEMIDE 40 MG: 40 TABLET ORAL at 17:36

## 2025-03-27 RX ADMIN — CARBAMAZEPINE 200 MG: 100 TABLET, CHEWABLE ORAL at 20:44

## 2025-03-27 RX ADMIN — ONDANSETRON 4 MG: 2 INJECTION, SOLUTION INTRAMUSCULAR; INTRAVENOUS at 20:55

## 2025-03-27 RX ADMIN — CEFAZOLIN SODIUM 2 G: 2 SOLUTION INTRAVENOUS at 13:03

## 2025-03-27 RX ADMIN — CEFAZOLIN SODIUM 2 G: 2 SOLUTION INTRAVENOUS at 05:15

## 2025-03-27 RX ADMIN — TIMOLOL MALEATE 1 DROP: 5 SOLUTION OPHTHALMIC at 20:45

## 2025-03-27 RX ADMIN — LATANOPROST 1 DROP: 50 SOLUTION OPHTHALMIC at 21:42

## 2025-03-27 RX ADMIN — ACETAMINOPHEN 650 MG: 325 TABLET ORAL at 11:05

## 2025-03-27 ASSESSMENT — ACTIVITIES OF DAILY LIVING (ADL)
ADLS_ACUITY_SCORE: 70
ADLS_ACUITY_SCORE: 72
ADLS_ACUITY_SCORE: 65
ADLS_ACUITY_SCORE: 79
ADLS_ACUITY_SCORE: 72
ADLS_ACUITY_SCORE: 70
ADLS_ACUITY_SCORE: 79
ADLS_ACUITY_SCORE: 79
ADLS_ACUITY_SCORE: 70
ADLS_ACUITY_SCORE: 70
ADLS_ACUITY_SCORE: 79
DEPENDENT_IADLS:: CLEANING;COOKING;LAUNDRY;SHOPPING;MEAL PREPARATION;MEDICATION MANAGEMENT;INCONTINENCE;TRANSPORTATION;MONEY MANAGEMENT
ADLS_ACUITY_SCORE: 70
ADLS_ACUITY_SCORE: 70
ADLS_ACUITY_SCORE: 72
ADLS_ACUITY_SCORE: 79
ADLS_ACUITY_SCORE: 65
ADLS_ACUITY_SCORE: 70
ADLS_ACUITY_SCORE: 70

## 2025-03-27 NOTE — PROGRESS NOTES
Care Management Initial Consult    General Information  Assessment completed with: Care Team Member,    Type of CM/SW Visit: Initial Assessment    Primary Care Provider verified and updated as needed:     Readmission within the last 30 days:           Advance Care Planning:            Communication Assessment  Patient's communication style: spoken language (English or Bilingual)    Hearing Difficulty or Deaf: no   Wear Glasses or Blind: yes    Cognitive  Cognitive/Neuro/Behavioral: .WDL except, orientation  Level of Consciousness: alert  Arousal Level: opens eyes spontaneously  Orientation: disoriented to, time  Mood/Behavior: calm, cooperative  Best Language: 0 - No aphasia  Speech: clear, spontaneous, logical    Living Environment:   People in home: facility resident     Current living Arrangements: assisted living  Name of Facility: [facility]   Able to return to prior arrangements:         Family/Social Support:  Care provided by: self, other (see comments) (facility staff)  Provides care for: no one, unable/limited ability to care for self  Marital Status:   Support system:            Description of Support System:           Current Resources:   Patient receiving home care services:          Community Resources:    Equipment currently used at home:    Supplies currently used at home:      Employment/Financial:  Employment Status:          Financial Concerns:             Does the patient's insurance plan have a 3 day qualifying hospital stay waiver?  No    Lifestyle & Psychosocial Needs:  Social Drivers of Health     Food Insecurity: Unknown (3/27/2025)    Food Insecurity     Within the past 12 months, did you worry that your food would run out before you got money to buy more?: Patient unable to answer     Within the past 12 months, did the food you bought just not last and you didn t have money to get more?: Patient unable to answer   Depression: Not at risk (8/6/2024)    PHQ-2     PHQ-2  Score: 0   Housing Stability: Unknown (3/27/2025)    Housing Stability     Do you have housing? : Patient unable to answer     Are you worried about losing your housing?: Patient unable to answer   Tobacco Use: Low Risk  (2/28/2025)    Patient History     Smoking Tobacco Use: Never     Smokeless Tobacco Use: Never     Passive Exposure: Not on file   Financial Resource Strain: Unknown (3/27/2025)    Financial Resource Strain     Within the past 12 months, have you or your family members you live with been unable to get utilities (heat, electricity) when it was really needed?: Patient unable to answer   Alcohol Use: Not on file   Transportation Needs: Unknown (3/27/2025)    Transportation Needs     Within the past 12 months, has lack of transportation kept you from medical appointments, getting your medicines, non-medical meetings or appointments, work, or from getting things that you need?: Patient unable to answer   Physical Activity: Not on file   Interpersonal Safety: Low Risk  (2/21/2025)    Interpersonal Safety     Do you feel physically and emotionally safe where you currently live?: Yes     Within the past 12 months, have you been hit, slapped, kicked or otherwise physically hurt by someone?: No     Within the past 12 months, have you been humiliated or emotionally abused in other ways by your partner or ex-partner?: No   Stress: Not on file   Social Connections: Not on file   Health Literacy: Not on file     Discussed  Partnership in Safe Discharge Planning  document with patient/family: No    Additional Information:  Patient from [facility]. Gets Ax1 with most I/ADLs.   [facility]   Fax: [phone]  Phone for RN:[phone]    Next Steps: SW to follow for care progression and discharge needs.     CAMELIA Ozuna   Social Work Care Manager   [facility]   [phone]

## 2025-03-27 NOTE — ED NOTES
[facility]  ED Nurse Handoff Report    ED Chief complaint: Leg Swelling  . ED Diagnosis:   Final diagnoses:   Cellulitis of right leg   Acute on chronic systolic heart failure (H)       Allergies:   Allergies   Allergen Reactions    Brimonidine Other (See Comments)     Follicular conjunctivitis    Dorzolamide Other (See Comments)     Follicular conjunctivitis    Benzalkonium Chloride Other (See Comments)     Eye irritation.  Does better with preservative free but can tolerate preserved drops    Codeine Unknown and Rash    Fd&C Yellow #5 (Tartrazine) Rash    Fluorescein Itching     fluress    Penicillins      Rash      Sulfa Antibiotics Other (See Comments)     rash       Code Status: Full Code    Activity level - Baseline/Home:  assist of 1.  Activity Level - Current:   assist of 2.   Lift room needed: No.   Bariatric: No   Needed: No   Isolation: Yes.   Infection: ESBL.     Respiratory status: Room air    Vital Signs (within 30 minutes):   Vitals:    03/26/25 1803 03/26/25 1804 03/26/25 1830 03/26/25 2030   BP: 104/67 111/73  103/75   Pulse:   72 71   Resp:       Temp:       TempSrc:       SpO2:   (!) 90% 98%   Weight:       Height:           Cardiac Rhythm:  ,      Pain level:    Patient confused: No.   Patient Falls Risk: patient and family education.   Elimination Status: Has voided     Patient Report - Initial Complaint: Swelling.   Focused Assessment: Maya Cullen is a 89 year old female with history of CHF and hypertension who presents to the ED with her son for evaluation of leg swelling. The patient arrives from assisted living where she reports that she has had bilateral leg swelling, redness, and pain. She states that her leg pain radiates to her right side and to her chest. She states that her pain is transient and very brief. She denies any cough, fever, shortness of breath, numbness, or history of clots. Of note, her son reports that she recently increased her  lasix dose from 1 tablet a day to 2 and denies any known history of Raynaud's.      Abnormal Results:   Labs Ordered and Resulted from Time of ED Arrival to Time of ED Departure   BASIC METABOLIC PANEL - Abnormal       Result Value    Sodium 132 (*)     Potassium 4.6      Chloride 93 (*)     Carbon Dioxide (CO2) 28      Anion Gap 11      Urea Nitrogen 22.3      Creatinine 1.28 (*)     GFR Estimate 39 (*)     Calcium 9.0      Glucose 109 (*)    TROPONIN T, HIGH SENSITIVITY - Abnormal    Troponin T, High Sensitivity 44 (*)    NT PROBNP INPATIENT - Abnormal    N terminal Pro BNP Inpatient 29,364 (*)    CBC WITH PLATELETS AND DIFFERENTIAL - Abnormal    WBC Count 12.0 (*)     RBC Count 5.12      Hemoglobin 17.0 (*)     Hematocrit 51.0 (*)           MCH 33.2 (*)     MCHC 33.3      RDW 15.5 (*)     Platelet Count 281      % Neutrophils 91      % Lymphocytes 4      % Monocytes 3      % Eosinophils 1      % Basophils 0      % Immature Granulocytes 0      NRBCs per 100 WBC 0      Absolute Neutrophils 10.9 (*)     Absolute Lymphocytes 0.5 (*)     Absolute Monocytes 0.4      Absolute Eosinophils 0.1      Absolute Basophils 0.1      Absolute Immature Granulocytes 0.1      Absolute NRBCs 0.0     TROPONIN T, HIGH SENSITIVITY   LACTIC ACID WHOLE BLOOD WITH 1X REPEAT IN 2 HR WHEN >2   BLOOD CULTURE   BLOOD CULTURE        Chest XR,  PA & LAT   Final Result   IMPRESSION: Increased pulmonary vascularity with probable interstitial edema. Streaky right infrahilar opacities could be atelectasis or early pneumonia. Small left pleural effusion. No pneumothorax. Stable cardiomediastinal silhouette.      US Lower Extremity Venous Duplex Bilateral    (Results Pending)   US NASREEN Doppler No Exercise    (Results Pending)       Treatments provided: SEE MAR  Family Comments: At bedside  OBS brochure/video discussed/provided to patient:  Yes  ED Medications:   Medications   ceFAZolin (ANCEF) 2 g in dextrose 50 mL intermittent infusion (has no  administration in time range)   furosemide (LASIX) injection 60 mg (has no administration in time range)   senna-docusate (SENOKOT-S/PERICOLACE) 8.6-50 MG per tablet 1 tablet (has no administration in time range)     Or   senna-docusate (SENOKOT-S/PERICOLACE) 8.6-50 MG per tablet 2 tablet (has no administration in time range)   ondansetron (ZOFRAN ODT) ODT tab 4 mg (has no administration in time range)     Or   ondansetron (ZOFRAN) injection 4 mg (has no administration in time range)   prochlorperazine (COMPAZINE) injection 5 mg (has no administration in time range)     Or   prochlorperazine (COMPAZINE) tablet 5 mg (has no administration in time range)   heparin ANTICOAGULANT injection 5,000 Units (has no administration in time range)   acetaminophen (TYLENOL) tablet 650 mg (has no administration in time range)     Or   acetaminophen (TYLENOL) Suppository 650 mg (has no administration in time range)   melatonin tablet 3 mg (has no administration in time range)   ceFAZolin (ANCEF) 2 g in dextrose 50 mL intermittent infusion (has no administration in time range)       Drips infusing:  No  For the majority of the shift this patient was Green.   Interventions performed were NA.    Sepsis treatment initiated: No    Cares/treatment/interventions/medications to be completed following ED care: Abx, IV duretics    ED Nurse Name: Dian Adams RN  9:44 PM

## 2025-03-27 NOTE — ED NOTES
[facility]    ED Boarding Nurse Handoff Addendum Report:    Date/time: 3/27/2025, 4:54 AM    Isolation Precautions:   Patient is on Contact precuations for ESBL.  (2024)  Neuro: Alert and Oriented x4  Activity: have not been out of bed yet  Telemetry Monitoring: No  Pain: denying pain.  Labs / Tests: Troponin: 44, 41. Blood cultures pending.   GI: incontinent at times  : Purewick in place, incontinent cares provided  O2: RA  LDA's: Peripheral  Fluids: is Saline locked.  Diet: Regular  Consults: WOC  Discharge Disposition:  TBD    Pt takes pills in apple sauce.     Plan of Care:    Abx: ancef    Vital signs (within last 30 minutes):    Vitals:    03/26/25 2030 03/26/25 2300 03/27/25 0000 03/27/25 0400   BP: 103/75 111/72 102/59 110/65   Pulse: 71 82 74 71   Resp:       Temp:   97.6  F (36.4  C)    TempSrc:   Oral    SpO2: 98%  100% 100%   Weight:       Height:             ED Boarding Nurse name: Melanie Emery RN

## 2025-03-27 NOTE — DISCHARGE INSTRUCTIONS
Coccyx wound(s): Every 3 days and PRN when soiled  Cleanse the area with wound cleanser and pat dry.  Apply No sting film barrier to periwound skin.  Cover wound with Sacral Mepilex- apply with pointed end towards head and fold like a taco for best fit  Turn and reposition Q 2hrs side to side only.  Ensure pt has Irving-cushion while sitting up in the chair.  If not in ICU: Ensure air pump on bed and set to Isoflex.  FYI- If pt has constant incontinent loose stools needing dressing changes Q shift please discontinue the Mepilex dressing and apply criticaid barrier paste BID and PRN.

## 2025-03-27 NOTE — H&P
[facility]    History and Physical - Hospitalist Service       Date of Admission:  3/26/2025    Assessment & Plan   Maya Cullen is a 89-year-old female with a medical history includes a CVA, NSTEMI in February 2025, HFrEF with an ejection fraction of 20% as of June 2024, pulmonary hypertension, moderate mitral valve regurgitation, mild to moderate tricuspid regurgitation, hypertension, CKD stage IIIa, subclinical hypothyroidism, mild cognitive impairment, trigeminal neuralgia, an L4 vertebral fracture, and a history of recurrent falls who presented to the [facility] emergency department for evaluation of bilateral leg swelling, along with pain and redness in the right leg.    #. Right lower extremity cellulitis   #. Bilateral lower extremity edema   #. Leukocytosis   The patient presents with bilateral leg swelling, pain, and redness in the right leg, but no fever, and is hemodynamically stable.  Lactate is normal, which is at 2.  Laboratory findings indicate leukocytosis (WBC 12) and erythrocytosis (hemoglobin 17/hematocrit 51), possibly due to intravascular dehydration. A lower extremity ultrasound reveals bilateral calf subcutaneous edema, but no deep venous thrombosis in either leg. An upper extremity ultrasound shows a normal ankle-brachial index at rest bilaterally. In the emergency department, IV Ancef has been initiated for the treatment of right leg cellulitis and will be continued.  -Continue IV Ancef  -Blood culture x1 pending (unable to draw second blood culture, multiple attempts has been made)  -Consult wound team for cellulitis with edema management    -The patient would benefit OT/PT when she feels stronger   -Morning labs    #. Acute on chronic systolic heart failure   #. NSTEMI in February 2025  #. Hypertension   #. History of pulmonary hypertension   #. History of CVA   The patient has severe bilateral lower extremity edema. Echocardiogram in 6/17/24 showed EF  20-25%, severely reduced RVSF, moderate mitral valve regurgitation, mild to moderate TR, and moderate to severe pulmonary hypertension. Chest x-ray showed increased pulmonary vascularity, probable interstitial edema, streaky right infrahilar opacities potential atelectasis or early pneumonia, a small left pleural effusion, no pneumothorax, and a stable cardiomediastinal silhouette. In the ED, the patient received x1 dose of IV Lasix 60mg.   -Continue PTA Lisinopril, Metoprolol, and Lasix     #. CKD stage IIIa  #. Hyponatremia   The patient's baseline creatinine ranges from 1.1 to 1.3, and on admission, the creatinine level is 1.28 with a GFR of 39. Mild hyponatremia (Na 132) is also observed, which may normalize following diuresis. The plan is to closely monitor both electrolytes and creatinine levels.  -CMP am     #. Subclinical hypothyroidism   -Repeat TSH in the morning     #. Trigeminal neuralgia  -Continue PTA Carbamazepine     Observation Goals: -diagnostic tests and consults completed and resulted, -vital signs normal or at patient baseline, -infection is improving, -returns to baseline functional status, -safe disposition plan has been identified, Nurse to notify provider when observation goals have been met and patient is ready for discharge.  Diet: Regular Diet Adult    DVT Prophylaxis: Heparin SQ  Barton Catheter: Not present  Lines: None     Cardiac Monitoring: None  Code Status: No CPR- Do NOT Intubate      Clinically Significant Risk Factors Present on Admission         # Hyponatremia: Lowest Na = 132 mmol/L in last 2 days, will monitor as appropriate  # Hypochloremia: Lowest Cl = 93 mmol/L in last 2 days, will monitor as appropriate          # Hypertension: Home medication list includes antihypertensive(s)  # Chronic heart failure with reduced ejection fraction: last echo with EF <40%     Disposition Plan     Medically Ready for Discharge: Anticipated Tomorrow    The patient's care was discussed with  the Bedside Nurse, Patient, Patient's Family, and ED provider .    BENNETT Newsome [facility]  Hospitalist Service  [facility]  Securely message with [employer] (more info)  Text page via [facility] Paging/Directory     ______________________________________________________________________    Chief Complaint   Leg pain and swelling.    History is obtained from the patient    History of Present Illness   Maya Cullen is a 89-year-old female with a medical history includes a CVA, a non-ST elevation myocardial infarction (NSTEMI) in February 2025, HFrEF with an ejection fraction of 20% as of July 2024, pulmonary hypertension, moderate mitral valve regurgitation, mild to moderate tricuspid regurgitation, hypertension, CKD stage IIIa, subclinical hypothyroidism, mild cognitive impairment, trigeminal neuralgia, an L4 vertebral fracture, and a history of recurrent falls who presented to the [facility] Emergency Department for evaluation of bilateral leg swelling, along with pain and redness in the right leg.    The patient, who resides in an assisted living facility, presents with bilateral leg swelling, redness, and pain, with the pain being more severe in her right leg. She reports that the pain radiates from her leg to her right side and chest, describing it as transient and very brief. She denies experiencing any cough, fever, shortness of breath, numbness, or history of blood clots. The patient's son-in-law mentioned a recent increase in patient's Lasix dose from one tablet per day to two tablets per day. Additionally, there is no known history of Raynaud's disease.    Past Medical History    Past Medical History:   Diagnosis Date    Cerebrovascular accident (H)     Cerebellar CVA seen on MRI 2023    Glaucoma     HTN (hypertension)     L4 vertebral fracture (H)     Mild cognitive impairment     Pulmonary hypertension (H)     Severe mitral regurgitation     Systolic CHF, chronic (H)      Tricuspid regurgitation     Trigeminal neuralgia        Past Surgical History   Past Surgical History:   Procedure Laterality Date    APPENDECTOMY      CATARACT IOL, RT/LT      EYE SURGERY      HERNIA REPAIR      HYSTERECTOMY, TERESA      bso    TONSILLECTOMY         Prior to Admission Medications   Prior to Admission Medications   Prescriptions Last Dose Informant Patient Reported? Taking?   acetaminophen (TYLENOL) 325 MG tablet   No No   Sig: Take 3 tablets (975 mg) by mouth 3 times daily   carBAMazepine (TEGRETOL) 100 MG chewable tablet   Yes No   Sig: Take 2 tablets (200 mg) by mouth 2 times daily   diclofenac (VOLTAREN) 1 % topical gel   No No   Sig: Apply 2 g topically 4 times daily. To right lower leg   furosemide (LASIX) 40 MG tablet   No No   Sig: Take 1 tablet (40 mg) by mouth 2 times daily.   latanoprost (XALATAN) 0.005 % ophthalmic solution   Yes No   Sig: Place 1 drop into both eyes at bedtime.   lisinopril (ZESTRIL) 5 MG tablet   No No   Sig: Take 0.5 tablets (2.5 mg) by mouth daily.   metoprolol succinate ER (TOPROL XL) 25 MG 24 hr tablet   Yes No   Sig: Take 12.5 mg by mouth daily. HOLD Metoprolol for SBP less than 100   multivitamin w/minerals (THERA-VIT-M) tablet   Yes No   Sig: Take 1 tablet by mouth daily   ondansetron (ZOFRAN ODT) 4 MG ODT tab   No No   Sig: Take 1 tablet (4 mg) by mouth every 6 hours as needed for nausea   timolol maleate (TIMOPTIC) 0.5 % ophthalmic solution   Yes No   Sig: Place 1 drop into both eyes 2 times daily.      Facility-Administered Medications: None        Review of Systems    The 10 point Review of Systems is negative other than noted in the HPI or here.     Physical Exam   Vital Signs: Temp: 97  F (36.1  C) Temp src: Temporal BP: 103/75 Pulse: 71   Resp: 20 SpO2: 98 %      Weight: 109 lbs 0 oz    Constitutional: Awake, alert, cooperative, no apparent distress.  Eyes: Conjunctiva and pupils examined and normal.  HEENT: Moist mucous membranes, normal  dentition.  Respiratory: Clear to auscultation bilaterally, no crackles or wheezing.  Cardiovascular: Regular rate and rhythm, normal S1 and S2, and no murmur noted.  GI: Soft, non-distended, non-tender, normal bowel sounds.  Lymph/Hematologic: No anterior cervical or supraclavicular adenopathy.  Skin: Bilateral lower extremity +2 pitting edema. Multiple small scabbed lesions are present on both lower extremities, and the right lower extremity is erythematous.  Musculoskeletal: No joint swelling, erythema or tenderness.  Neurologic: Cranial nerves 2-12 intact, normal strength and sensation.  Psychiatric: Alert, oriented to person, place and time, no obvious anxiety or depression.     Medical Decision Making       60 MINUTES SPENT BY ME on the date of service doing chart review, history, exam, documentation & further activities per the note.      Data   ------------------------- PAST 24 HR DATA REVIEWED -----------------------------------------------    I have personally reviewed the following data over the past 24 hrs:    12.0 (H)  \   17.0 (H)   / 281     132 (L) 93 (L) 22.3 /  109 (H)   4.6 28 1.28 (H) \     Trop: 44 (H) BNP: 29,364 (H)       Imaging results reviewed over the past 24 hrs:   Recent Results (from the past 24 hours)   Chest XR,  PA & LAT    Narrative    EXAM: XR CHEST 2 VIEWS  LOCATION: [facility]  DATE: 3/26/2025    INDICATION: right sided chest pain  COMPARISON: 2/21/2025      Impression    IMPRESSION: Increased pulmonary vascularity with probable interstitial edema. Streaky right infrahilar opacities could be atelectasis or early pneumonia. Small left pleural effusion. No pneumothorax. Stable cardiomediastinal silhouette.

## 2025-03-27 NOTE — CONSULTS
Consult addressed. See [facility] note from today.     CAMELIA Ozuna   Social Work Care Manager   [facility]   [phone]  MARILEE Canas on 3/27/2025 at 11:51 AM

## 2025-03-27 NOTE — PHARMACY-ADMISSION MEDICATION HISTORY
Pharmacy Intern Admission Medication History    Admission medication history is complete. The information provided in this note is only as accurate as the sources available at the time of the update.    Information Source(s): Facility ([facility] medication list/MAR and CareEverywhere/SureScripts via phone    Pertinent Information: Patient comes from [facility] ([phone]). Confirmed patient is NOT on potassium as there is a recent dispense in fill hx.     Changes made to PTA medication list:  Added: None  Deleted: None  Changed: Lisinopril 5m.5 tab (2.5 mg) daily-> 2.5m tab daily     Allergies reviewed with patient and updates made in EHR: no    Medication History Completed By: Mariaa Martinez RPH 3/27/2025 2:05 PM    PTA Med List   Medication Sig Last Dose/Taking    acetaminophen (TYLENOL) 325 MG tablet Take 3 tablets (975 mg) by mouth 3 times daily 3/26/2025    carBAMazepine (TEGRETOL) 100 MG chewable tablet Take 2 tablets (200 mg) by mouth 2 times daily 3/26/2025 Morning    diclofenac (VOLTAREN) 1 % topical gel Apply 2 g topically 4 times daily. To right lower leg 3/26/2025    furosemide (LASIX) 40 MG tablet Take 1 tablet (40 mg) by mouth 2 times daily. 3/26/2025    latanoprost (XALATAN) 0.005 % ophthalmic solution Place 1 drop into both eyes at bedtime. 3/25/2025 Bedtime    lisinopril (ZESTRIL) 2.5 MG tablet Take 2.5 mg by mouth daily. 3/26/2025 Morning    metoprolol succinate ER (TOPROL XL) 25 MG 24 hr tablet Take 12.5 mg by mouth daily. HOLD Metoprolol for SBP less than 100 3/26/2025 Morning    multivitamin w/minerals (THERA-VIT-M) tablet Take 1 tablet by mouth daily 3/26/2025 Morning    ondansetron (ZOFRAN ODT) 4 MG ODT tab Take 1 tablet (4 mg) by mouth every 6 hours as needed for nausea Taking As Needed    timolol maleate (TIMOPTIC) 0.5 % ophthalmic solution Place 1 drop into both eyes 2 times daily. 3/26/2025 Morning

## 2025-03-27 NOTE — PLAN OF CARE
"PRIMARY DIAGNOSIS: Right Lower Extremity Cellulitis   OUTPATIENT/OBSERVATION GOALS TO BE MET BEFORE DISCHARGE:  Vitals sign stable or return to baseline: No - BP lower but stable     Tolerating oral antibiotics or has home infusion set up if applicable: No - Continuing IV Ancef at time of admission     Pain status: Pain free.    Return to near baseline physical activity: No    Discharge Planner Nurse   Safe discharge environment identified: No  Barriers to discharge: Yes       Entered by: Jose Aparicio RN 03/27/2025 1:17 PM       Patient is Alert - Disoriented to time - displays some signs of intermittent confusion (asking if she can go to dining room and reoriented that she is in the hospital. Her VS are notable for lower blood pressure - although stable (soft). Has a left PIV SL. Reported lower extremity soreness and improved after acetaminophen.       Troubles seeing - Hx of glaucoma and son-in-law reports very foggy vision.     All pulses weak but present. Her capillary refill is approximately 4 seconds. Edema present in BLE. Redness and warmth noted to RLE - outlined upon admission. Continuing Ancef IV.     On regular diet - needs help setting up tray. Incontinent of stool and urine noted in chart but has so far called appropriately for bathroom.     Moving Ax1 to 2 with walker and gait belt.       PT/SW/WOC following. PTA pressure wound present.       BP 95/58   Pulse 82   Temp 97.3  F (36.3  C) (Oral)   Resp 18   Ht 1.499 m (4' 11\")   Wt 48.5 kg (106 lb 14.4 oz)   SpO2 93%   BMI 21.59 kg/m     Please review provider order for any additional goals.     Nurse to notify provider when observation goals have been met and patient is ready for discharge.  Problem: Adult Inpatient Plan of Care  Goal: Plan of Care Review  Description: The Plan of Care Review/Shift note should be completed every shift.  The Outcome Evaluation is a brief statement about your assessment that the patient is improving, declining, " "or no change.  This information will be displayed automatically on your shiftnote.  3/27/2025 1316 by Jose Aparicio RN  Outcome: Progressing  Flowsheets (Taken 3/27/2025 1316)  Outcome Evaluation: Erythema outlined  Overall Patient Progress: no change  3/27/2025 0838 by Jose Aparicio RN  Outcome: Progressing  Flowsheets (Taken 3/27/2025 0838)  Outcome Evaluation: Skin redness to RLE - Just admitted  Plan of Care Reviewed With: patient  Overall Patient Progress: no change  Goal: Patient-Specific Goal (Individualized)  Description: You can add care plan individualizations to a care plan. Examples of Individualization might be:  \"Parent requests to be called daily at 9am for status\", \"I have a hard time hearing out of my right ear\", or \"Do not touch me to wake me up as it startlesme\".  3/27/2025 1316 by Jose Aparicio RN  Outcome: Progressing  3/27/2025 0838 by Jose Aparicio RN  Outcome: Progressing  Goal: Absence of Hospital-Acquired Illness or Injury  3/27/2025 1316 by Jose Aparicio RN  Outcome: Progressing  3/27/2025 0838 by Jose Aparicio RN  Outcome: Progressing  Intervention: Identify and Manage Fall Risk  Recent Flowsheet Documentation  Taken 3/27/2025 0831 by Jose Aparicio RN  Safety Promotion/Fall Prevention:   activity supervised   assistive device/personal items within reach   clutter free environment maintained   lighting adjusted   mobility aid in reach   nonskid shoes/slippers when out of bed   patient and family education   room organization consistent   safety round/check completed   supervised activity  Intervention: Prevent Skin Injury  Recent Flowsheet Documentation  Taken 3/27/2025 1228 by Joes Aparicio RN  Body Position:   legs elevated   turned   left  Taken 3/27/2025 1048 by Jose Aparicio RN  Body Position:   legs elevated   turned   right  Taken 3/27/2025 0831 by Jose Aparicio RN  Body Position: heels elevated  Intervention: Prevent and Manage VTE (Venous " Thromboembolism) Risk  Recent Flowsheet Documentation  Taken 3/27/2025 0831 by Jose Aparicio RN  VTE Prevention/Management: SCDs off (sequential compression devices)  Goal: Optimal Comfort and Wellbeing  3/27/2025 1316 by Jose Aparicio RN  Outcome: Progressing  3/27/2025 0838 by Jose Aparicio RN  Outcome: Progressing  Intervention: Monitor Pain and Promote Comfort  Recent Flowsheet Documentation  Taken 3/27/2025 0810 by Jose Aparicio RN  Pain Management Interventions: rest  Goal: Readiness for Transition of Care  3/27/2025 1316 by Jose Aparicio RN  Outcome: Progressing  3/27/2025 0838 by Jose Aparicio RN  Outcome: Progressing     Problem: Delirium  Goal: Optimal Coping  3/27/2025 1316 by Jose Aparicio RN  Outcome: Progressing  3/27/2025 0838 by Jose Aparicio RN  Outcome: Progressing  Goal: Improved Behavioral Control  3/27/2025 1316 by Jose Aparicio RN  Outcome: Progressing  3/27/2025 0838 by Jose Aparicio RN  Outcome: Progressing  Intervention: Minimize Safety Risk  Recent Flowsheet Documentation  Taken 3/27/2025 0831 by Jose Aparicio RN  Enhanced Safety Measures:   pain management   patient/family teach back on injury risk   review medications for side effects with activity  Goal: Improved Attention and Thought Clarity  3/27/2025 1316 by Jose Aparicio RN  Outcome: Progressing  3/27/2025 0838 by Jose Aparicio RN  Outcome: Progressing  Goal: Improved Sleep  3/27/2025 1316 by Jose Aparicio RN  Outcome: Progressing  3/27/2025 0838 by Jose Aparicio RN  Outcome: Progressing     Problem: Skin or Soft Tissue Infection  Goal: Absence of Infection Signs and Symptoms  3/27/2025 1316 by Jose Aparicio RN  Outcome: Progressing  3/27/2025 0838 by Jose Aparicio RN  Outcome: Progressing   Goal Outcome Evaluation:      Plan of Care Reviewed With: patient    Overall Patient Progress: no changeOverall Patient Progress: no change    Outcome Evaluation: Erythema outlined

## 2025-03-27 NOTE — CONSULTS
[facility] Nurse Inpatient Assessment     Consulted for: lower extremity cellulitis and edema, buttocks    WO nurse follow-up plan: weekly    Patient History (according to H&P provider note(s):      Maya Cullen is a 89-year-old female with a medical history includes a CVA, NSTEMI in February 2025, HFrEF with an ejection fraction of 20% as of June 2024, pulmonary hypertension, moderate mitral valve regurgitation, mild to moderate tricuspid regurgitation, hypertension, CKD stage IIIa, subclinical hypothyroidism, mild cognitive impairment, trigeminal neuralgia, an L4 vertebral fracture, and a history of recurrent falls who presented to the [facility] emergency department for evaluation of bilateral leg swelling, along with pain and redness in the right leg.     #. Right lower extremity cellulitis   #. Bilateral lower extremity edema   #. Leukocytosis   The patient presents with bilateral leg swelling, pain, and redness in the right leg, but no fever, and is hemodynamically stable.  Lactate is normal, which is at 2.  Laboratory findings indicate leukocytosis (WBC 12) and erythrocytosis (hemoglobin 17/hematocrit 51), possibly due to intravascular dehydration. A lower extremity ultrasound reveals bilateral calf subcutaneous edema, but no deep venous thrombosis in either leg. An upper extremity ultrasound shows a normal ankle-brachial index at rest bilaterally. In the emergency department, IV Ancef has been initiated for the treatment of right leg cellulitis and will be continued.    Assessment:      Areas visualized during today's visit: Sacrum/coccyx and Lower extremities - bilateral LE with edema and cellulitis but no open areas. Would recommend compression and pt states she has compressions socks she wears routinely. OK to leave BLE PAIGE and she soul resume compressions stockings.       Wound location: coccyx    Last photo: 3/27/25  Wound due to: Pressure Injury and  Incontinence Associated Dermatitis (IAD), stage 2- present on admission  Wound history/plan of care: Per pt she has been working in healing this wound for a while. No able to see entire wound base due to only pinpoint open area. Majority of erythema is blanchable. PT is continent but prefers to wear pull up briefs so moisture may also be a factor  Wound base: 100 % Dermis,     Palpation of the wound bed: normal      Drainage: scant     Description of drainage: serosanguinous     Measurements (length x width x depth, in cm): 0.4  x 0.4  x  0.2 cm      Tunneling: N/A     Undermining: N/A  Periwound skin: Intact and Erythema- blanchable      Color: normal and consistent with surrounding tissue and red      Temperature: normal   Odor: none  Pain: mild and during dressing change, tender  Pain interventions prior to dressing change: patient tolerated well and slow and gentle cares   Treatment goal: Maintain (prevention of deterioration) and Protection  STATUS: initial assessment  Supplies ordered: supplies stored on unit, discussed with RN, and discussed with patient        Treatment Plan:     Coccyx wound(s): Every 3 days and PRN when soiled  Cleanse the area with wound cleanser and pat dry.  Apply No sting film barrier to periwound skin.  Cover wound with Sacral Mepilex (#[id number])- apply with pointed end towards head and fold like a taco for best fit  Turn and reposition Q 2hrs side to side only.  Ensure pt has Irving-cushion while sitting up in the chair.  If not in ICU: Ensure air pump on bed and set to Isoflex.  FYI- If pt has constant incontinent loose stools needing dressing changes Q shift please discontinue the Mepilex dressing and apply criticaid barrier paste BID and PRN.     Orders: Written    RECOMMEND PRIMARY TEAM ORDER: None, at this time  Education provided: importance of repositioning, plan of care, and wound progress  Discussed plan of care with: Patient and Nurse  Notify WOC if wound(s)  deteriorate.  Nursing to notify the Provider(s) and re-consult the [facility] Nurse if new skin concern.    DATA:     Current support surface: Standard  Standard gel mattress (Isoflex)  Containment of urine/stool: Incontinent pad in bed  BMI: Body mass index is 21.59 kg/m .   Active diet order: Orders Placed This Encounter      Regular Diet Adult     Output: I/O last 3 completed shifts:  In: -   Out: 650 [Urine:650]     Labs:   Recent Labs   Lab 03/27/25  0812   HGB 15.6   WBC 11.2*     Pressure injury risk assessment:   Sensory Perception: 3-->slightly limited  Moisture: 3-->occasionally moist  Activity: 2-->chairfast  Mobility: 3-->slightly limited  Nutrition: 3-->adequate  Friction and Shear: 2-->potential problem  Rob Score: 16    Bonita Macario RN CWON  Pager no longer is use, please contact through [employer] group: [facility] Nurse Rian  Dept. Office Number: [phone]

## 2025-03-27 NOTE — PLAN OF CARE
"ROOM # 231    Living Situation (if not independent, order SW consult): [facility]   Facility name: [facility]   : Son in Law Christian Grossman [phone]    Activity level at baseline: SBA w/ Walker (she says she is supposed to call for help)  Activity level on admit: Ax2 with walker and gait belt    Who will be transporting you at discharge: TBD    Patient registered to observation; given Patient Bill of Rights; given the opportunity to ask questions about observation status and their plan of care.  Patient has been oriented to the observation room, bathroom and call light is in place.    Discussed discharge goals and expectations with patient/family.           Problem: Adult Inpatient Plan of Care  Goal: Plan of Care Review  Description: The Plan of Care Review/Shift note should be completed every shift.  The Outcome Evaluation is a brief statement about your assessment that the patient is improving, declining, or no change.  This information will be displayed automatically on your shiftnote.  Outcome: Progressing  Flowsheets (Taken 3/27/2025 0206)  Outcome Evaluation: Skin redness to RLE - Just admitted  Plan of Care Reviewed With: patient  Overall Patient Progress: no change  Goal: Patient-Specific Goal (Individualized)  Description: You can add care plan individualizations to a care plan. Examples of Individualization might be:  \"Parent requests to be called daily at 9am for status\", \"I have a hard time hearing out of my right ear\", or \"Do not touch me to wake me up as it startlesme\".  Outcome: Progressing  Goal: Absence of Hospital-Acquired Illness or Injury  Outcome: Progressing  Goal: Optimal Comfort and Wellbeing  Outcome: Progressing  Intervention: Monitor Pain and Promote Comfort  Recent Flowsheet Documentation  Taken 3/27/2025 8453 by Jose Aparicio RN  Pain Management Interventions: rest  Goal: Readiness for Transition of Care  Outcome: Progressing     Problem: Delirium  Goal: Optimal " Coping  Outcome: Progressing  Goal: Improved Behavioral Control  Outcome: Progressing  Goal: Improved Attention and Thought Clarity  Outcome: Progressing  Goal: Improved Sleep  Outcome: Progressing     Problem: Skin or Soft Tissue Infection  Goal: Absence of Infection Signs and Symptoms  Outcome: Progressing   Goal Outcome Evaluation:      Plan of Care Reviewed With: patient    Overall Patient Progress: no changeOverall Patient Progress: no change    Outcome Evaluation: Skin redness to RLE - Just admitted

## 2025-03-27 NOTE — PLAN OF CARE
"PRIMARY DIAGNOSIS: Right Lower Extremity Cellulitis   OUTPATIENT/OBSERVATION GOALS TO BE MET BEFORE DISCHARGE:  Vitals sign stable or return to baseline: No - BP lower but stable     Tolerating oral antibiotics or has home infusion set up if applicable: No - Continuing IV Ancef at time of admission     Pain status: Pain free.    Return to near baseline physical activity: No    Discharge Planner Nurse   Safe discharge environment identified: No  Barriers to discharge: Yes       Entered by: Jose Aparicio RN 03/27/2025 1:23 PM       Patient is Alert - Disoriented to time - displays some signs of intermittent confusion (asking if she can go to dining room and reoriented that she is in the hospital. Her VS are notable for lower blood pressure - although stable (soft). Has a left PIV SL. Reported lower extremity soreness and improved after acetaminophen.       Troubles seeing - Hx of glaucoma and son-in-law reports very foggy vision.     All pulses weak but present. Her capillary refill is approximately 4 seconds. Edema present in BLE. Redness and warmth noted to RLE - outlined upon admission. Continuing Ancef IV.     On regular diet - needs help setting up tray. Incontinent of stool and urine noted in chart but has so far called appropriately for bathroom.     Moving Ax1 to 2 with walker and gait belt.       PT/SW/WOC following. PTA pressure wound present. At admission - this was cleaned with warm wash cloth, antiseptic spray, and foam dressing applied - assessed by WOC.       BP 95/58   Pulse 82   Temp 97.3  F (36.3  C) (Oral)   Resp 18   Ht 1.499 m (4' 11\")   Wt 48.5 kg (106 lb 14.4 oz)   SpO2 93%   BMI 21.59 kg/m     Please review provider order for any additional goals.     Nurse to notify provider when observation goals have been met and patient is ready for discharge.  Problem: Adult Inpatient Plan of Care  Goal: Plan of Care Review  Description: The Plan of Care Review/Shift note should be completed every " "shift.  The Outcome Evaluation is a brief statement about your assessment that the patient is improving, declining, or no change.  This information will be displayed automatically on your shiftnote.  3/27/2025 1316 by Jose Aparicio RN  Outcome: Progressing  Flowsheets (Taken 3/27/2025 1316)  Outcome Evaluation: Erythema outlined  Overall Patient Progress: no change  3/27/2025 0838 by Jose Aparicio RN  Outcome: Progressing  Flowsheets (Taken 3/27/2025 0838)  Outcome Evaluation: Skin redness to RLE - Just admitted  Plan of Care Reviewed With: patient  Overall Patient Progress: no change  Goal: Patient-Specific Goal (Individualized)  Description: You can add care plan individualizations to a care plan. Examples of Individualization might be:  \"Parent requests to be called daily at 9am for status\", \"I have a hard time hearing out of my right ear\", or \"Do not touch me to wake me up as it startlesme\".  3/27/2025 1316 by Jose Aparicio RN  Outcome: Progressing  3/27/2025 0838 by Jose Aparicio RN  Outcome: Progressing  Goal: Absence of Hospital-Acquired Illness or Injury  3/27/2025 1316 by Jose Aparicio RN  Outcome: Progressing  3/27/2025 0838 by Jose Aparicio RN  Outcome: Progressing  Intervention: Identify and Manage Fall Risk  Recent Flowsheet Documentation  Taken 3/27/2025 0831 by Jose Aparicio RN  Safety Promotion/Fall Prevention:   activity supervised   assistive device/personal items within reach   clutter free environment maintained   lighting adjusted   mobility aid in reach   nonskid shoes/slippers when out of bed   patient and family education   room organization consistent   safety round/check completed   supervised activity  Intervention: Prevent Skin Injury  Recent Flowsheet Documentation  Taken 3/27/2025 1228 by Jose Aparicio RN  Body Position:   legs elevated   turned   left  Taken 3/27/2025 1048 by Jose Aparicio RN  Body Position:   legs elevated   turned   right  Taken 3/27/2025 " 0831 by Jose Aparicio RN  Body Position: heels elevated  Intervention: Prevent and Manage VTE (Venous Thromboembolism) Risk  Recent Flowsheet Documentation  Taken 3/27/2025 0831 by Jose Aparicio RN  VTE Prevention/Management: SCDs off (sequential compression devices)  Goal: Optimal Comfort and Wellbeing  3/27/2025 1316 by Jose Aparicio RN  Outcome: Progressing  3/27/2025 0838 by Jose Aparicio RN  Outcome: Progressing  Intervention: Monitor Pain and Promote Comfort  Recent Flowsheet Documentation  Taken 3/27/2025 0810 by Jose Aparicio RN  Pain Management Interventions: rest  Goal: Readiness for Transition of Care  3/27/2025 1316 by Jose Aparicio RN  Outcome: Progressing  3/27/2025 0838 by Jose Aparicio RN  Outcome: Progressing     Problem: Delirium  Goal: Optimal Coping  3/27/2025 1316 by Jose Aparicio RN  Outcome: Progressing  3/27/2025 0838 by Jose Aparicio RN  Outcome: Progressing  Goal: Improved Behavioral Control  3/27/2025 1316 by Jose Aparicio RN  Outcome: Progressing  3/27/2025 0838 by Jose Aparicio RN  Outcome: Progressing  Intervention: Minimize Safety Risk  Recent Flowsheet Documentation  Taken 3/27/2025 0831 by Jose Aparicio RN  Enhanced Safety Measures:   pain management   patient/family teach back on injury risk   review medications for side effects with activity  Goal: Improved Attention and Thought Clarity  3/27/2025 1316 by Jose Aparicio RN  Outcome: Progressing  3/27/2025 0838 by Jose Aparicio RN  Outcome: Progressing  Goal: Improved Sleep  3/27/2025 1316 by Jose Aparicio RN  Outcome: Progressing  3/27/2025 0838 by Jose Aparicio RN  Outcome: Progressing     Problem: Skin or Soft Tissue Infection  Goal: Absence of Infection Signs and Symptoms  3/27/2025 1316 by Jose Aparicio RN  Outcome: Progressing  3/27/2025 0838 by Jose Aparicio RN  Outcome: Progressing   Goal Outcome Evaluation:      Plan of Care Reviewed With: patient    Overall Patient  Progress: no changeOverall Patient Progress: no change    Outcome Evaluation: Erythema outlined

## 2025-03-27 NOTE — PROGRESS NOTES
[facility]  Hospitalist Progress Note  Nolan Ratliff M.D., M.B.A.   03/27/2025    Reason for Stay/active problem list    Right lower extremity cellulitis          Assessment and Plan:        Summary of Stay:   Maya Cullen is a 89-year-old female with a medical history includes a CVA, NSTEMI in February 2025, HFrEF with an ejection fraction of 20% as of June 2024, pulmonary hypertension, moderate mitral valve regurgitation, mild to moderate tricuspid regurgitation, hypertension, CKD stage IIIa, subclinical hypothyroidism, mild cognitive impairment, trigeminal neuralgia, an L4 vertebral fracture, and a history of recurrent falls who presented to the [facility] emergency department for evaluation of bilateral leg swelling, along with pain and redness in the right leg.       Problem List with Assessment and Plan:    #. Right lower extremity cellulitis   #. Bilateral lower extremity edema   #. Leukocytosis       The patient presents with bilateral leg swelling, pain, and redness in the right leg, but no fever, and is hemodynamically stable.  Lactate is normal, which is at 2.  Laboratory findings indicate leukocytosis (WBC 12) and erythrocytosis (hemoglobin 17/hematocrit 51), possibly due to intravascular dehydration. A lower extremity ultrasound reveals bilateral calf subcutaneous edema, but no deep venous thrombosis in either leg. An upper extremity ultrasound shows a normal ankle-brachial index at rest bilaterally. In the emergency department, IV Ancef has been initiated for the treatment of right leg cellulitis and will be continued.  -Continue IV Ancef  -Blood culture - pending (unable to draw second blood culture, multiple attempts has been made)  -Consulted wound team for cellulitis with edema management    -OT/PT  -Morning labs, follow progress      #. Acute on chronic systolic heart failure   #. NSTEMI in February 2025  #. Hypertension   #. History of pulmonary hypertension  "  #. History of CVA   The patient has severe bilateral lower extremity edema. Echocardiogram in 6/17/24 showed EF 20-25%, severely reduced RVSF, moderate mitral valve regurgitation, mild to moderate TR, and moderate to severe pulmonary hypertension. Chest x-ray showed increased pulmonary vascularity, probable interstitial edema, streaky right infrahilar opacities potential atelectasis or early pneumonia, a small left pleural effusion, no pneumothorax, and a stable cardiomediastinal silhouette. In the ED, the patient received x1 dose of IV Lasix 60mg.   -Continue PTA Lisinopril, Metoprolol, and Lasix      #. CKD stage IIIa  #. Hyponatremia   The patient's baseline creatinine ranges from 1.1 to 1.3, and on admission, the creatinine level is 1.28 with a GFR of 39. Mild hyponatremia (Na 132) is also observed, which may normalize following diuresis. The plan is to closely monitor both electrolytes and creatinine levels.  -CMP am      #. Subclinical hypothyroidism - now overt   - TSH 12 T4 free 0.75, will start  25 mcg of levothyroxine and needs to follow with PCP and Endocrinologist with follow up labs in 4 weeks      #. Trigeminal neuralgia  -Continue PTA Carbamazepine     VTE Prophylaxis: Heparin SQ  Code Status: No CPR- Do NOT Intubate  Diet: Regular Diet Adult    Barton Catheter: Not present      Anticipated Disposition :  home        Medically Ready for Discharge: Anticipated Tomorrow  : Pending  improvement and negative cultures               Interval History (Subjective):        Patient is seen and examined by me today and medical record reviewed.Overnight events noted and care discussed with nursing staff. She feels better. Still swollen and slightly painful. No fever or chills.                   Physical Exam:        Last Vital Signs:  BP 92/57 (BP Location: Right arm, Patient Position: Semi-Figueroa's, Cuff Size: Adult Regular)   Pulse 82   Temp 97.3  F (36.3  C) (Oral)   Resp 18   Ht 1.499 m (4' 11\")   Wt " "48.5 kg (106 lb 14.4 oz)   SpO2 93%   BMI 21.59 kg/m      I/O last 3 completed shifts:  In: -   Out: 650 [Urine:650]    Wt Readings from Last 5 Encounters:   03/27/25 48.5 kg (106 lb 14.4 oz)   02/28/25 52.4 kg (115 lb 9.6 oz)   02/25/25 51 kg (112 lb 8 oz)   11/22/24 52.8 kg (116 lb 4.8 oz)   11/19/24 52.2 kg (115 lb)        Constitutional: Awake, alert, cooperative, no apparent distress     Respiratory: Clear to auscultation bilaterally, no crackles or wheezing   Cardiovascular: Regular rate and rhythm, normal S1 and S2, and no murmur noted   Abdomen: Normal bowel sounds, soft, non-distended, non-tender   Skin: RLE erythema , warm    Neuro: Alert with  no new focal weakness   Extremities: No edema   Other(s):        All other systems: Negative          Medications:        All current medications were reviewed with changes reflected in problem list.         Data:      All new lab and imaging data was reviewed.      Data reviewed today: I reviewed all new labs and imaging results over the last 24 hours. I personally reviewed       Recent Labs   Lab 03/27/25  0812 03/26/25  1900   WBC 11.2* 12.0*   HGB 15.6 17.0*   HCT 47.3* 51.0*   MCV 99 100    281     No results for input(s): \"CULT\" in the last 168 hours.  Recent Labs   Lab 03/27/25  0812 03/26/25  1900    132*   POTASSIUM 4.4 4.6   CHLORIDE 96* 93*   CO2 27 28   ANIONGAP 12 11   GLC 95 109*   BUN 21.5 22.3   CR 1.18* 1.28*   GFRESTIMATED 43* 39*   GILL 8.4* 9.0   MAG 2.0  --    PROTTOTAL 6.6  --    ALBUMIN 2.8*  --    BILITOTAL 0.6  --    ALKPHOS 200*  --    AST 28  --    ALT 19  --        Recent Labs   Lab 03/27/25  0812 03/26/25  1900   GLC 95 109*       No results for input(s): \"INR\" in the last 168 hours.      No results for input(s): \"TROPONIN\", \"TROPI\", \"TROPR\" in the last 168 hours.    Invalid input(s): \"TROP\", \"TROPONINIES\"    Recent Results (from the past 48 hours)   Chest XR,  PA & LAT    Narrative    EXAM: XR CHEST 2 VIEWS  LOCATION: [facility]  DATE: 3/26/2025    INDICATION: right sided chest pain  COMPARISON: 2/21/2025      Impression    IMPRESSION: Increased pulmonary vascularity with probable interstitial edema. Streaky right infrahilar opacities could be atelectasis or early pneumonia. Small left pleural effusion. No pneumothorax. Stable cardiomediastinal silhouette.   US Lower Extremity Venous Duplex Bilateral    Narrative    EXAM: US LOWER EXTREMITY VENOUS DUPLEX BILATERAL  LOCATION: [facility]  DATE: 3/26/2025    INDICATION: bilateral leg pain, swelling  COMPARISON: None.  TECHNIQUE: Venous Duplex ultrasound of bilateral lower extremities with and without compression, augmentation and duplex. Color flow and spectral Doppler with waveform analysis performed.    FINDINGS: Exam includes the common femoral, femoral, popliteal veins as well as segmentally visualized deep calf veins and greater saphenous vein.     RIGHT: No deep vein thrombosis. No superficial thrombophlebitis. No popliteal cyst. Subcutaneous edema in the calf.    LEFT: No deep vein thrombosis. No superficial thrombophlebitis. No popliteal cyst. Subcutaneous edema in the calf      Impression    IMPRESSION:  1.  No deep venous thrombosis in either lower extremity.  2.  Bilateral calf subcutaneous edema     US NASREEN Doppler No Exercise    Narrative    EXAM: RESTING ANKLE-BRACHIAL INDICES (ABIS)  LOCATION: [facility]  DATE: 3/26/2025    INDICATION: Bilateral leg pain, non palpable pulses bilateral.  COMPARISON: None.    NASREEN FINDINGS:  RIGHT  Brachial: 110  Ankle (PT): 110 Index: 1  Ankle (DP): 110 Index: 1    LEFT  Brachial: 110  Ankle (PT): 0 Index: 0, monophasic flow was noted with ultrasound  Ankle (DP): 109 Index: 0.99      The right NASREEN at rest is 1. The left NASREEN at rest is 0.99.      WAVEFORMS: The dorsalis pedis and posterior tibial arteries are multiphasic on the right. Monophasic in the left posterior  tibial artery (with ultrasound only, not with Doppler). Multiphasic in the left dorsalis pedis.      Impression    IMPRESSION:  RIGHT LOWER EXTREMITY:  NASREEN at rest is normal.    LEFT LOWER EXTREMITY:  NASREEN at rest is normal.         COVID Status:  COVID-19 PCR Results          8/11/2023    23:45 2/21/2025    13:30   COVID-19 PCR Results   SARS CoV2 PCR Negative  Negative      COVID-19 Antibody Results, Testing for Immunity           No data to display                 Disclaimer: This note consists of symbols derived from keyboarding, dictation and/or voice recognition software. As a result, there may be errors in the script that have gone undetected. Please consider this when interpreting information found in this chart.

## 2025-03-28 ENCOUNTER — APPOINTMENT (OUTPATIENT)
Dept: PHYSICAL THERAPY | Facility: CLINIC | Age: OVER 89
DRG: 602 | End: 2025-03-28
Attending: INTERNAL MEDICINE
Payer: MEDICARE

## 2025-03-28 VITALS
OXYGEN SATURATION: 100 % | BODY MASS INDEX: 21.55 KG/M2 | HEIGHT: 59 IN | HEART RATE: 74 BPM | SYSTOLIC BLOOD PRESSURE: 100 MMHG | WEIGHT: 106.9 LBS | DIASTOLIC BLOOD PRESSURE: 62 MMHG | RESPIRATION RATE: 20 BRPM | TEMPERATURE: 97.5 F

## 2025-03-28 LAB
ANION GAP SERPL CALCULATED.3IONS-SCNC: 13 MMOL/L (ref 7–15)
BUN SERPL-MCNC: 23.8 MG/DL (ref 8–23)
CALCIUM SERPL-MCNC: 8.2 MG/DL (ref 8.8–10.4)
CHLORIDE SERPL-SCNC: 95 MMOL/L (ref 98–107)
CREAT SERPL-MCNC: 1.34 MG/DL (ref 0.51–0.95)
EGFRCR SERPLBLD CKD-EPI 2021: 37 ML/MIN/1.73M2
ERYTHROCYTE [DISTWIDTH] IN BLOOD BY AUTOMATED COUNT: 15.5 % (ref 10–15)
GLUCOSE SERPL-MCNC: 132 MG/DL (ref 70–99)
HCO3 SERPL-SCNC: 25 MMOL/L (ref 22–29)
HCT VFR BLD AUTO: 43 % (ref 35–47)
HGB BLD-MCNC: 14.3 G/DL (ref 11.7–15.7)
MCH RBC QN AUTO: 33.3 PG (ref 26.5–33)
MCHC RBC AUTO-ENTMCNC: 33.3 G/DL (ref 31.5–36.5)
MCV RBC AUTO: 100 FL (ref 78–100)
PLATELET # BLD AUTO: 263 10E3/UL (ref 150–450)
POTASSIUM SERPL-SCNC: 3.9 MMOL/L (ref 3.4–5.3)
RBC # BLD AUTO: 4.3 10E6/UL (ref 3.8–5.2)
SODIUM SERPL-SCNC: 133 MMOL/L (ref 135–145)
WBC # BLD AUTO: 8.5 10E3/UL (ref 4–11)

## 2025-03-28 PROCEDURE — 250N000013 HC RX MED GY IP 250 OP 250 PS 637: Performed by: NURSE PRACTITIONER

## 2025-03-28 PROCEDURE — 250N000013 HC RX MED GY IP 250 OP 250 PS 637: Performed by: INTERNAL MEDICINE

## 2025-03-28 PROCEDURE — 250N000011 HC RX IP 250 OP 636: Performed by: INTERNAL MEDICINE

## 2025-03-28 PROCEDURE — 99238 HOSP IP/OBS DSCHRG MGMT 30/<: CPT

## 2025-03-28 PROCEDURE — 80048 BASIC METABOLIC PNL TOTAL CA: CPT

## 2025-03-28 PROCEDURE — 85018 HEMOGLOBIN: CPT | Performed by: INTERNAL MEDICINE

## 2025-03-28 PROCEDURE — 36415 COLL VENOUS BLD VENIPUNCTURE: CPT | Performed by: INTERNAL MEDICINE

## 2025-03-28 PROCEDURE — 97530 THERAPEUTIC ACTIVITIES: CPT | Mod: GP

## 2025-03-28 PROCEDURE — 97161 PT EVAL LOW COMPLEX 20 MIN: CPT | Mod: GP

## 2025-03-28 PROCEDURE — 250N000011 HC RX IP 250 OP 636: Performed by: NURSE PRACTITIONER

## 2025-03-28 PROCEDURE — 97116 GAIT TRAINING THERAPY: CPT | Mod: GP

## 2025-03-28 PROCEDURE — 250N000013 HC RX MED GY IP 250 OP 250 PS 637

## 2025-03-28 PROCEDURE — 36415 COLL VENOUS BLD VENIPUNCTURE: CPT

## 2025-03-28 RX ORDER — MULTIPLE VITAMINS W/ MINERALS TAB 9MG-400MCG
1 TAB ORAL DAILY
Status: DISCONTINUED | OUTPATIENT
Start: 2025-03-28 | End: 2025-03-28 | Stop reason: HOSPADM

## 2025-03-28 RX ORDER — LEVOTHYROXINE SODIUM 25 UG/1
25 TABLET ORAL
Qty: 30 TABLET | Refills: 0 | Status: SHIPPED | OUTPATIENT
Start: 2025-03-29

## 2025-03-28 RX ORDER — CEPHALEXIN 500 MG/1
500 CAPSULE ORAL 2 TIMES DAILY
Qty: 10 CAPSULE | Refills: 0 | Status: SHIPPED | OUTPATIENT
Start: 2025-03-28 | End: 2025-04-02

## 2025-03-28 RX ADMIN — TIMOLOL MALEATE 1 DROP: 5 SOLUTION OPHTHALMIC at 07:57

## 2025-03-28 RX ADMIN — HEPARIN SODIUM 5000 UNITS: 5000 INJECTION, SOLUTION INTRAVENOUS; SUBCUTANEOUS at 08:00

## 2025-03-28 RX ADMIN — Medication 60 ML: at 13:04

## 2025-03-28 RX ADMIN — Medication 1 TABLET: at 13:04

## 2025-03-28 RX ADMIN — CARBAMAZEPINE 200 MG: 100 TABLET, CHEWABLE ORAL at 07:58

## 2025-03-28 RX ADMIN — CEFAZOLIN SODIUM 2 G: 2 SOLUTION INTRAVENOUS at 01:35

## 2025-03-28 RX ADMIN — LEVOTHYROXINE SODIUM 25 MCG: 0.03 TABLET ORAL at 07:58

## 2025-03-28 ASSESSMENT — ACTIVITIES OF DAILY LIVING (ADL)
ADLS_ACUITY_SCORE: 79
ADLS_ACUITY_SCORE: 81
ADLS_ACUITY_SCORE: 79
ADLS_ACUITY_SCORE: 81
ADLS_ACUITY_SCORE: 79
ADLS_ACUITY_SCORE: 81
ADLS_ACUITY_SCORE: 79
ADLS_ACUITY_SCORE: 81
ADLS_ACUITY_SCORE: 81

## 2025-03-28 NOTE — PLAN OF CARE
Physical Therapy Discharge Summary    Reason for therapy discharge:    Discharged to home with home therapy.    Progress towards therapy goal(s). See goals on Care Plan in [facility] electronic health record for goal details.  Goals partially met.  Barriers to achieving goals:   discharge from facility.    Therapy recommendation(s):    [facility]. Patient mobilizing >200 ft with FWW and Ax1 today, slow gait speed but no loss of balance. Anticipate return home with Ax1 for ADLs and mobility. [place] primarily limited by impaired vision and intermittent confusion. Would benefit from HHPT to promote safety and independence with mobility.

## 2025-03-28 NOTE — CONSULTS
"  CLINICAL NUTRITION SERVICES - ASSESSMENT NOTE    Registered Dietitian Interventions:  Continue current diet order    Adding Expedite + MVI/M to assist in wound healing    Nutrition supplements: Cherry Gel+ at 2 PM    Appreciate encouragement with intakes as able    RD team will continue to monitor PO intakes acutely per policy guidelines       REASON FOR ASSESSMENT  Positive admission nutrition risk screen of 2 for weight loss and poor PO d/t decreased appetite    PMH: CVA, NSTEMI in February 2025, HFrEF with an ejection fraction of 20% as of June 2024, pulmonary hypertension, moderate mitral valve regurgitation, mild to moderate tricuspid regurgitation, hypertension, CKD stage IIIa, subclinical hypothyroidism, mild cognitive impairment, trigeminal neuralgia, an L4 vertebral fracture, and a history of recurrent falls      Per EMR, pt presented to ED on 3/26 for evaluation of bilateral leg swelling, along with pain and redness in the right leg.     INFORMATION OBTAINED  Assessed patient in room.    NUTRITION HISTORY  - Information obtained from chart review and pt at bedside.  - Diet at home: Pt reports that she follows a regular diet at home. Did not verbalize any dietary restrictions or items that she self-limits.  - Usual intakes: Pt reports that she usually eats 2 meals per day.   B: typically scrambled eggs, a hashbrown, and sausage (Eats 100%)  D: \"whatever they are serving\" (notes she will eat 50%)  - Barriers to PO intakes: States that she is sometimes just not hungry, states that she occasionally has gas pain in her stomach that makes her not hungry  - Use of oral supplements: Denies every having tried them.  - Chewing/swallowing issues: Denies  - Meal preparation/grocery shopping: custodial   - Allergies: NKFA    Pt was seen by dietitian in June of 2024 and did not meet malnutrition criteria, though had been eating less over the last few months d/t not feeling well w/ heart conditions.    CURRENT NUTRITION " "ORDERS  Diet: Regular    CURRENT INTAKE/TOLERANCE  Per nursing flowsheet, no charted intakes or appetites. Pt was noted to have refused breakfast this AM.     Per [facility], pt ordered well portioned meal for breakfast yesterday AM and then had chicken noodle soup for dinner last night.     Pt reported that she feels like she is eating at her baseline and that she does not feel like she will be able to eat 3 full meals per day. We discussed adding a snack at 2 PM to give her something in the middle of the day to get a little more nutrition. She was agreeable to trying a Cherry Gel+ at 2 PM daily while admitted. Was also agreeable to MVI/M + Expedite    LABS  Nutrition-relevant labs: Reviewed  Noted (as of 3/27): Creat 1.19(H), Alk Phos 200(H), BG 95    MEDICATIONS  Nutrition-relevant medications: Reviewed  Noted: 40 mg Lasix BID, levothyroxine, zofran PRN      ANTHROPOMETRICS  Height: 149.9 cm (4' 11\")  Most Recent Weight: 48.5 kg (106 lb 14.4 oz)  BMI (kg/m ): Body mass index is 21.59 kg/m . (Normal BMI)  Weight History:  Wt Readings from Last 10 Encounters:   03/27/25 48.5 kg (106 lb 14.4 oz)   02/28/25 52.4 kg (115 lb 9.6 oz)   02/25/25 51 kg (112 lb 8 oz)   11/22/24 52.8 kg (116 lb 4.8 oz)   11/19/24 52.2 kg (115 lb)   10/18/24 51.3 kg (113 lb 1.6 oz)   10/04/24 52.1 kg (114 lb 14.4 oz)   09/18/24 50.2 kg (110 lb 11.2 oz)   09/07/24 52.2 kg (115 lb)   08/06/24 52.6 kg (116 lb)     -7.44% BW x 1 month. Difficult to assess accuracy of this weight loss based on last admission, noted that pt was fluid up and cardiology was following, aiming for a dry weight of 110-112 lb (RD note from sign off last admission)      ASSESSED NUTRITION NEEDS  Dosing Weight: 48.5 kg, based on actual weight  (?dry weight)  Estimated Energy Needs: 20 - 25 kcals/kg  Justification: Maintenance  Estimated Protein Needs: 1.2 - 1.5 grams of pro/kg  Justification:  wound healing   Estimated Fluid Needs: per provider pending fluid " "status    SYSTEM AND PHYSICAL FINDINGS    GI symptoms:   - Stools: No stools yet noted. Limited window of admission so far.  - No emesis documented this admission.    Skin/wounds:   - Edema: 1+ to BL legs, right ankle, left foot. 2+ to left ankle  - Stage 2 PI to coccyx (also IAD)    MALNUTRITION  % Intake: Decreased intake does not meet criteria  % Weight Loss: Unable to assess d/t fluid - pt states that weight loss was d/t fluid coming off.   Subcutaneous Fat Loss: Orbital: Mild, Buccal: Mild, Triceps: Mild, and Fat overlying the ribs: Mild  Muscle Loss: Temples (temporalis muscle): Mild, Clavicles (pectoralis and deltoids): Severe, Shoulders (deltoids): Severe, Interosseous muscles: Mild, and Scapula (latissimus dorsi, trapezious, deltoids): Moderate  Fluid Accumulation/Edema: 1-2+  Malnutrition Diagnosis: Moderate malnutrition in the context of chronic illness  Malnutrition Present on Admission: Yes    NUTRITION DIAGNOSIS  Predicted inadequate oral intake related to baseline fairly low appetite and potential for appetite to decrease further pending clinical course and LOS.    INTERVENTIONS  See nutrition interventions above    Goals  Patient to consume >/= 50% of nutritionally adequate meal trays or supplements TID to show improved trending of intakes.     Monitoring/Evaluation  Progress toward goals will be monitored and evaluated per policy.        Tawnya Schilling RD, LD  Clinical Dietitian  [place] Message Group: \"Dietitian [Rian]\"  Office Phone: [phone]  Pagers: 3rd floor/ICU: [phone]  All other floors: [phone]  Weekend/holiday: [phone]    "

## 2025-03-28 NOTE — PLAN OF CARE
"PatientPRIMARY DIAGNOSIS: Right Lower Extremity Cellulitis     OUTPATIENT/OBSERVATION GOALS TO BE MET BEFORE DISCHARGE:  1. Pain Status: Pain free.    2. Return to near baseline physical activity: No    3. Cleared for discharge by consultants (if involved): No    Discharge Planner Nurse   Safe discharge environment identified: No  Barriers to discharge: Yes       Entered by: Norma Simon RN 03/28/2025 3:30 AM    Pt Alert and Disoriented to time and situation. VSS. RA. Pt c/o back pain and is unable to rate pain level. PRN acetaminophen was given; the pt reported pain improved. Utilizing Purewich with adequate output. Regular Diet. Ax1 to 2 with a walker and gait belt. Redness and warmth were noted in RLE. elevated legs with a blue wedge. Continue IV Ancef. Consulted. OT/PT: The patient has a pressure wound on her buttocks. cleaned with a warm washcloth, antiseptic spray, and foam dressing applied. The wound team consulted. Blood culture--pending From a pt from an assisted living facility. - Monitor morning labs.    Problem: Adult Inpatient Plan of Care  Goal: Plan of Care Review  Description: The Plan of Care Review/Shift note should be completed every shift.  The Outcome Evaluation is a brief statement about your assessment that the patient is improving, declining, or no change.  This information will be displayed automatically on your shiftnote.  Outcome: Progressing  Goal: Patient-Specific Goal (Individualized)  Description: You can add care plan individualizations to a care plan. Examples of Individualization might be:  \"Parent requests to be called daily at 9am for status\", \"I have a hard time hearing out of my right ear\", or \"Do not touch me to wake me up as it startlesme\".  Outcome: Progressing  Goal: Absence of Hospital-Acquired Illness or Injury  Outcome: Progressing  Intervention: Identify and Manage Fall Risk  Recent Flowsheet Documentation  Taken 3/27/2025 2048 by Norma Simon, SHADI  Safety " Promotion/Fall Prevention: activity supervised  Intervention: Prevent Skin Injury  Recent Flowsheet Documentation  Taken 3/27/2025 2048 by Norma Simon RN  Body Position: legs elevated  Intervention: Prevent and Manage VTE (Venous Thromboembolism) Risk  Recent Flowsheet Documentation  Taken 3/27/2025 2048 by Norma Simon RN  VTE Prevention/Management: SCDs off (sequential compression devices)  Goal: Optimal Comfort and Wellbeing  Outcome: Progressing  Goal: Readiness for Transition of Care  Outcome: Progressing     Problem: Delirium  Goal: Optimal Coping  Outcome: Progressing  Goal: Improved Behavioral Control  Outcome: Progressing  Intervention: Minimize Safety Risk  Recent Flowsheet Documentation  Taken 3/27/2025 2048 by Norma Simon RN  Enhanced Safety Measures: room near unit station  Goal: Improved Attention and Thought Clarity  Outcome: Progressing  Goal: Improved Sleep  Outcome: Progressing     Problem: Skin or Soft Tissue Infection  Goal: Absence of Infection Signs and Symptoms  Outcome: Progressing   Goal Outcome Evaluation:

## 2025-03-28 NOTE — DISCHARGE SUMMARY
[facility]  Hospitalist Discharge Summary      Date of Admission:  3/26/2025  Date of Discharge:  3/28/2025  Discharging Provider: SHABNAM Jackson PA-C  Discharge Service: Hospitalist Service    Discharge Diagnoses   Right lower extremity cellulitis   Bilateral lower extremity edema   Hyponatremia   Subclinical hypothyroidism - now overt   Coccyx Pressure Injury and Incontinence Associated Dermatitis (IAD), stage 2- present on admission     Clinically Significant Risk Factors     # Moderate Malnutrition: based on nutrition assessment and treatment provided per dietitian's recommendations.      Follow-ups Needed After Discharge   Follow-up Appointments       Hospital Follow-up with Existing Primary Care Provider (PCP)      Please see details below         Schedule Primary Care visit within: 7 Days   Recommended labs and Imaging (to be ordered by Primary Care Provider): BMP, TSH in 4 weeks              Unresulted Labs Ordered in the Past 30 Days of this Admission       Date and Time Order Name Status Description    3/26/2025  8:18 PM Blood Culture Arm, Right Preliminary NGTD    3/26/2025  8:18 PM Blood Culture Arm, Left Preliminary NGTD        These results will be followed up by hospitalist    Discharge Disposition   Discharged to assisted living  Condition at discharge: Stable    Hospital Course   Maya Cullen is a 89-year-old female with a medical history includes a CVA, NSTEMI in February 2025, HFrEF with an ejection fraction of 20% as of June 2024, pulmonary hypertension, moderate mitral valve regurgitation, mild to moderate tricuspid regurgitation, hypertension, CKD stage IIIa, subclinical hypothyroidism, mild cognitive impairment, trigeminal neuralgia, an L4 vertebral fracture, and a history of recurrent falls who presented to the [facility] emergency department for evaluation of bilateral leg swelling, along with pain and redness in the right leg.     Assumed patient care  today.  Erythema, edema much improved per patient and compared to picture in progress note from day prior.  Plan to discharge back to her facility on oral antibiotics.  Was started on 25 mcg levothyroxine for hypothyroidism will need follow-up with PCP in 4 weeks for repeat labs.  Home services ordered at time of discharge.  Patient denied any chest pain, shortness of breath, abdominal pain, nausea, vomiting.  Ambulating independently.  Educated patient on return precautions and answered all questions prior to discharge.    Right lower extremity cellulitis   Bilateral lower extremity edema    The patient presents with bilateral leg swelling, pain, and redness in the right leg, but no fever, and is hemodynamically stable.  Lactate is normal, which is at 2.  Laboratory findings indicate leukocytosis (WBC 12) and erythrocytosis (hemoglobin 17/hematocrit 51), possibly due to intravascular dehydration. A lower extremity ultrasound reveals bilateral calf subcutaneous edema, but no deep venous thrombosis in either leg. An upper extremity ultrasound shows a normal ankle-brachial index at rest bilaterally. In the emergency department, IV Ancef has been initiated for the treatment of right leg cellulitis and will be continued.  -Continue IV Ancef, discharge on PO antibiotics  -Blood culture - pending (unable to draw second blood culture, multiple attempts has been made)  -Consulted wound team  -OT/PT    Coccyx Pressure Injury and Incontinence Associated Dermatitis (IAD), stage 2- present on admission   - WOC consult     Acute on chronic systolic heart failure   NSTEMI in February 2025  Hypertension   History of pulmonary hypertension   History of CVA   The patient has severe bilateral lower extremity edema. Echocardiogram in 6/17/24 showed EF 20-25%, severely reduced RVSF, moderate mitral valve regurgitation, mild to moderate TR, and moderate to severe pulmonary hypertension. Chest x-ray showed increased pulmonary  vascularity, probable interstitial edema, streaky right infrahilar opacities potential atelectasis or early pneumonia, a small left pleural effusion, no pneumothorax, and a stable cardiomediastinal silhouette. In the ED, the patient received x1 dose of IV Lasix 60mg.   -Continue PTA Lisinopril, Metoprolol, and Lasix      CKD stage IIIa  Hyponatremia   The patient's baseline creatinine ranges from 1.1 to 1.3, and on admission, the creatinine level is 1.28 with a GFR of 39. Mild hyponatremia (Na 132) is also observed, which may normalize following diuresis.    Subclinical hypothyroidism - now overt   - TSH 12 T4 free 0.75, will start  25 mcg of levothyroxine and needs to follow with PCP and Endocrinologist with follow up labs in 4 weeks        Consultations This Hospital Stay   NURSING TO CONSULT FOR VASCULAR ACCESS CARE IP CONSULT  WOUND OSTOMY CONTINENCE NURSE  IP CONSULT  CARE MANAGEMENT / SOCIAL WORK IP CONSULT  PHYSICAL THERAPY ADULT IP CONSULT  WOUND OSTOMY CONTINENCE NURSE  IP CONSULT    Code Status   No CPR- Do NOT Intubate    Time Spent on this Encounter   I, SHABNAM Jackson PA-C, personally saw the patient today and spent less than or equal to 30 minutes discharging this patient.       SHABNAM Jackson PA-C  [facility] OBSERVATION DEPT  [address] [place] [address]  Phone: [phone]  ______________________________________________________________________    Physical Exam   Vital Signs: Temp: 97.3  F (36.3  C) Temp src: Axillary BP: 102/63 Pulse: 81   Resp: 20 SpO2: 96 % O2 Device: None (Room air)    Weight: 106 lbs 14.4 oz    GENERAL:  Alert, Comfortable, No acute distress. Sitting up in chair.  PSYCH: pleasant, oriented.  HEENT:  Normocephalic, No scleral icterus or conjunctival injection  HEART:  Normal S1, S2 with no murmur, RRR  LUNGS:  Normal Respiratory effort.   EXTREMITIES:  right lower extremities slight erythema and warmth much improved from picture in progress note  yesterday  SKIN:  Warm, dry to touch  NEUROLOGIC:  Speech clear, alert & orientated x 4.     Primary Care Physician   Jordyn Jon    Discharge Orders      Home Care Referral      Reason for your hospital stay    Right lower leg cellulitis,     Activity    Your activity upon discharge: activity as tolerated     Diet    Follow this diet upon discharge: Regular     Hospital Follow-up with Existing Primary Care Provider (PCP)    Please see details below            Significant Results and Procedures   Results for orders placed or performed during the hospital encounter of 03/26/25   Chest XR,  PA & LAT    Narrative    EXAM: XR CHEST 2 VIEWS  LOCATION: [facility]  DATE: 3/26/2025    INDICATION: right sided chest pain  COMPARISON: 2/21/2025      Impression    IMPRESSION: Increased pulmonary vascularity with probable interstitial edema. Streaky right infrahilar opacities could be atelectasis or early pneumonia. Small left pleural effusion. No pneumothorax. Stable cardiomediastinal silhouette.   US Lower Extremity Venous Duplex Bilateral    Narrative    EXAM: US LOWER EXTREMITY VENOUS DUPLEX BILATERAL  LOCATION: [facility]  DATE: 3/26/2025    INDICATION: bilateral leg pain, swelling  COMPARISON: None.  TECHNIQUE: Venous Duplex ultrasound of bilateral lower extremities with and without compression, augmentation and duplex. Color flow and spectral Doppler with waveform analysis performed.    FINDINGS: Exam includes the common femoral, femoral, popliteal veins as well as segmentally visualized deep calf veins and greater saphenous vein.     RIGHT: No deep vein thrombosis. No superficial thrombophlebitis. No popliteal cyst. Subcutaneous edema in the calf.    LEFT: No deep vein thrombosis. No superficial thrombophlebitis. No popliteal cyst. Subcutaneous edema in the calf      Impression    IMPRESSION:  1.  No deep venous thrombosis in either lower extremity.  2.  Bilateral calf  subcutaneous edema     US NASREEN Doppler No Exercise    Narrative    EXAM: RESTING ANKLE-BRACHIAL INDICES (ABIS)  LOCATION: [facility]  DATE: 3/26/2025    INDICATION: Bilateral leg pain, non palpable pulses bilateral.  COMPARISON: None.    NASREEN FINDINGS:  RIGHT  Brachial: 110  Ankle (PT): 110 Index: 1  Ankle (DP): 110 Index: 1    LEFT  Brachial: 110  Ankle (PT): 0 Index: 0, monophasic flow was noted with ultrasound  Ankle (DP): 109 Index: 0.99      The right NASREEN at rest is 1. The left NASREEN at rest is 0.99.      WAVEFORMS: The dorsalis pedis and posterior tibial arteries are multiphasic on the right. Monophasic in the left posterior tibial artery (with ultrasound only, not with Doppler). Multiphasic in the left dorsalis pedis.      Impression    IMPRESSION:  RIGHT LOWER EXTREMITY:  NASREEN at rest is normal.    LEFT LOWER EXTREMITY:  NASREEN at rest is normal.         Discharge Medications   Current Discharge Medication List        START taking these medications    Details   cephALEXin (KEFLEX) 500 MG capsule Take 1 capsule (500 mg) by mouth 2 times daily for 5 days. Start taking this evening 3/28  Qty: 10 capsule, Refills: 0    Associated Diagnoses: Cellulitis of right leg      levothyroxine (SYNTHROID/LEVOTHROID) 25 MCG tablet Take 1 tablet (25 mcg) by mouth every morning (before breakfast).  Qty: 30 tablet, Refills: 0    Associated Diagnoses: Hypothyroidism, unspecified type           CONTINUE these medications which have NOT CHANGED    Details   acetaminophen (TYLENOL) 325 MG tablet Take 3 tablets (975 mg) by mouth 3 times daily      carBAMazepine (TEGRETOL) 100 MG chewable tablet Take 2 tablets (200 mg) by mouth 2 times daily      diclofenac (VOLTAREN) 1 % topical gel Apply 2 g topically 4 times daily. To right lower leg  Qty: 350 g, Refills: 1    Associated Diagnoses: Pain of right lower leg      furosemide (LASIX) 40 MG tablet Take 1 tablet (40 mg) by mouth 2 times daily.  Qty: 180 tablet, Refills: 3     Associated Diagnoses: Acute on chronic systolic congestive heart failure (H)      latanoprost (XALATAN) 0.005 % ophthalmic solution Place 1 drop into both eyes at bedtime.      metoprolol succinate ER (TOPROL XL) 25 MG 24 hr tablet Take 12.5 mg by mouth daily. HOLD Metoprolol for SBP less than 100    Comments: Hold for SBP<100 mmHg  Associated Diagnoses: Acute systolic heart failure (H)      multivitamin w/minerals (THERA-VIT-M) tablet Take 1 tablet by mouth daily      ondansetron (ZOFRAN ODT) 4 MG ODT tab Take 1 tablet (4 mg) by mouth every 6 hours as needed for nausea    Associated Diagnoses: Nausea      timolol maleate (TIMOPTIC) 0.5 % ophthalmic solution Place 1 drop into both eyes 2 times daily.           STOP taking these medications       lisinopril (ZESTRIL) 2.5 MG tablet Comments:   Reason for Stopping:         lisinopril (ZESTRIL) 5 MG tablet Comments:   Reason for Stopping:             Allergies   Allergies   Allergen Reactions    Brimonidine Other (See Comments)     Follicular conjunctivitis    Dorzolamide Other (See Comments)     Follicular conjunctivitis    Benzalkonium Chloride Other (See Comments)     Eye irritation.  Does better with preservative free but can tolerate preserved drops    Codeine Unknown and Rash    Fd&C Yellow #5 (Tartrazine) Rash    Fluorescein Itching     fluress    Penicillins      Rash      Sulfa Antibiotics Other (See Comments)     rash

## 2025-03-28 NOTE — CARE PLAN
Reviewed  with provider during AM rounds. Notified and ok'd with provider to hold metoprolol, lasix and lisinopril.

## 2025-03-28 NOTE — PLAN OF CARE
"Goal Outcome Evaluation:      Plan of Care Reviewed With: patient    Overall Patient Progress: improvingOverall Patient Progress: improving    Outcome Evaluation: Discharging today    /63 (BP Location: Right arm)   Pulse 81   Temp 97.3  F (36.3  C) (Axillary)   Resp 20   Ht 1.499 m (4' 11\")   Wt 48.5 kg (106 lb 14.4 oz)   SpO2 96%   BMI 21.59 kg/m      On RA.    Reviewed discharge instructions with patient and son-in-law. Questions answered. Patient discharged to prior living facility via private ride with discharge instructions, medications (e-scribed to preferred Rx) and belongings.    /62 (BP Location: Right arm)   Pulse 74   Temp 97.5  F (36.4  C) (Oral)   Resp 20   Ht 1.499 m (4' 11\")   Wt 48.5 kg (106 lb 14.4 oz)   SpO2 100%   BMI 21.59 kg/m      On RA.    Pt was concerned that she was missing slippers. Son-in-law who provided the ride for patient has assured nursing staff that she had all the belongings she came in with.   "

## 2025-03-28 NOTE — PROGRESS NOTES
03/28/25 0900   Appointment Info   Signing Clinician's Name / Credentials (PT) [place] Aceves, PT, DPT   Living Environment   People in Home facility resident   Current Living Arrangements assisted living   Home Accessibility no concerns   Living Environment Comments Patient resides at [facility].   Self-Care   Usual Activity Tolerance good   Current Activity Tolerance good   Regular Exercise No   Equipment Currently Used at Home walker, rolling   Fall history within last six months yes   Activity/Exercise/Self-Care Comment Patient reports she receives staff assist for I/ADLs and ambulates with a walker.  She has a supportive son-in-law.   General Information   Onset of Illness/Injury or Date of Surgery 03/26/25   Referring Physician Nolan Ratliff MD   Patient/Family Therapy Goals Statement (PT) Patient eager to discharge home.   Pertinent History of Current Problem (include personal factors and/or comorbidities that impact the POC) 89-year-old female with a medical history includes a CVA, NSTEMI in February 2025, HFrEF with an ejection fraction of 20% as of June 2024, pulmonary hypertension, moderate mitral valve regurgitation, mild to moderate tricuspid regurgitation, hypertension, CKD stage IIIa, subclinical hypothyroidism, mild cognitive impairment, trigeminal neuralgia, an L4 vertebral fracture, and a history of recurrent falls who presented to the [facility] emergency department for evaluation of bilateral leg swelling, along with pain and redness in the right leg.   Existing Precautions/Restrictions fall   Cognition   Affect/Mental Status (Cognition) WFL   Orientation Status (Cognition) oriented to;person;disoriented to;place;time   Follows Commands (Cognition) follows one-step commands;over 90% accuracy;delayed response/completion;increased processing time needed   Pain Assessment   Patient Currently in Pain No   Integumentary/Edema   Integumentary/Edema Comments Age-related skin changes,  redness right leg, BLE edema, skin on legs dry   Posture    Posture Forward head position;Protracted shoulders   Range of Motion (ROM)   Range of Motion ROM is WFL   Strength (Manual Muscle Testing)   Strength (Manual Muscle Testing) Able to perform R SLR;Able to perform L SLR;Deficits observed during functional mobility   Bed Mobility   Comment, (Bed Mobility) SBA supine > sit.   Transfers   Comment, (Transfers) CGA sit > stand from bed, initially with posterior lean.   Gait/Stairs (Locomotion)   Comment, (Gait/Stairs) CGA 15 ft ambulation with FWW, difficulty negotiating walker in room due to impaired vision.   Balance   Balance Comments Impaired dynamic balance, reports falling.  Uses walker.   Sensory Examination   Sensory Perception Comments Impaired vision due to glaucoma   Clinical Impression   Criteria for Skilled Therapeutic Intervention Yes, treatment indicated   PT Diagnosis (PT) Impaired functional mobility   Influenced by the following impairments Weakness, deconditioning, impaired balance, decreased activity tolerance, BLE edema, RLE cellulitis   Functional limitations due to impairments Impaired independence with bed mobility, transfers, and gait   Clinical Presentation (PT Evaluation Complexity) stable   Clinical Presentation Rationale Clinical judgement, PMH, social support   Clinical Decision Making (Complexity) low complexity   Planned Therapy Interventions (PT) balance training;bed mobility training;gait training;home exercise program;neuromuscular re-education;patient/family education;strengthening;transfer training;progressive activity/exercise;postural re-education   Risk & Benefits of therapy have been explained evaluation/treatment results reviewed;care plan/treatment goals reviewed;risks/benefits reviewed;participants voiced agreement with care plan;participants included;patient   PT Total Evaluation Time   PT Eval, Low Complexity Minutes (76741) 8   Physical Therapy Goals   PT Frequency  Daily   PT Predicted Duration/Target Date for Goal Attainment 03/31/25   PT Goals Bed Mobility;Transfers;Gait   PT: Bed Mobility Independent;Supine to/from sit;Rolling   PT: Transfers Modified independent;Sit to/from stand;Bed to/from chair;Assistive device   PT: Gait Supervision/stand-by assist;150 feet;Rolling walker   Interventions   Interventions Quick Adds Gait Training;Therapeutic Activity   Therapeutic Activity   Therapeutic Activities: dynamic activities to improve functional performance Minutes (04630) 9   Symptoms Noted During/After Treatment None   Treatment Detail/Skilled Intervention Patient supine in bed, eager to discharge home. Oriented to time and location.  Patient SBA for sitting balance at EOB.  MaxA to don brief while standing at bedside.  Patient sitting on EOB with SBA while chair set-up for breakfast.  Patient completes additional sit > stand from bed with close SBA but again with mild posterior loss of balance initially upon standing.  Extended pivot transfer to bedside chair.  Seated in chair awaiting breakfast on PT departure, left with call light in reach and chair alarm activated.   Gait Training   Gait Training Minutes (62176) 11   Symptoms Noted During/After Treatment (Gait Training) fatigue   Treatment Detail/Skilled Intervention Following ambulation in room, patient ambulates 200 ft with FWW in ascencio.  Patient ambulates with CGA to close SBA, difficulty negotiating ascencio due to vision impairment.  Patient ambulates with forward flexed posture, knees often remain flexed.  Patient reporting fatigue.   PT Discharge Planning   PT Plan Repeated sit <> stands, progress gait with walker   PT Discharge Recommendation (DC Rec) home with assist;home with home care physical therapy   PT Rationale for DC Rec Patient admitted from [facility].  Patient mobilizing >200 ft with FWW and Ax1 today, slow gait speed but no loss of balance.  Anticipate return home with Ax1 for ADLs and mobility.  Cache  primarily limited by impaired vision and intermittent confusion.  Would benefit from HHPT to promote safety and independence with mobility.   PT Brief overview of current status Ax1 FWW. Goals of therapy will be to address safe mobility and make recs for d/c to next level of care. Pt and RN will continue to follow all falls risk precautions as documented by RN staff while hospitalized.   PT Total Distance Amb During Session (feet) 215   Physical Therapy Time and Intention   Timed Code Treatment Minutes 20   Total Session Time (sum of timed and untimed services) 28

## 2025-03-28 NOTE — PROGRESS NOTES
Care Management Discharge Note    Discharge Date: 03/28/2025       Discharge Disposition:  home to [facility] with home care     Discharge Services:  RN/PT    Discharge DME:  none    Discharge Transportation:  family     Private pay costs discussed: Not applicable    Does the patient's insurance plan have a 3 day qualifying hospital stay waiver?  No    PAS Confirmation Code:  NA  Patient/family educated on Medicare website which has current facility and service quality ratings: yes    Education Provided on the Discharge Plan:  yes  Persons Notified of Discharge Plans: patient, KEVEN, care team   Patient/Family in Agreement with the Plan: yes    Handoff Referral Completed: No, handoff not indicated or clinically appropriate    Additional Information:  Patient discharging to  home at [facility] with homecare RN/PT  via family transport. Discharge discussed and confirmed with patient, nursing, hospitalist, patient family member, Christian., and accepting facility, [facility].. Discharge orders faxed to facility. Home care referral pending with [facility].     2:02 PM   Patient accepted with [facility] home care for RN/PT.     CAMELIA Ozuna   Social Work Care Manager   [facility]   [phone]       Care Management Follow Up    Length of Stay (days): 1    Expected Discharge Date: 03/28/2025     Concerns to be Addressed:     discharge planning   Patient plan of care discussed at interdisciplinary rounds: Yes    Anticipated Discharge Disposition:  home with increased care services at [facility]     Anticipated Discharge Services:  none  Anticipated Discharge DME:  none    Patient/family educated on Medicare website which has current facility and service quality ratings: yes  Education Provided on the Discharge Plan:  yes  Patient/Family in Agreement with the Plan: yes    Referrals Placed by CM/SW:  none  Private pay costs discussed: Not applicable    Discussed  Partnership in Safe Discharge Planning   document with patient/family: No     Handoff Completed: No, handoff not indicated or clinically appropriate    Additional Information:  10:17 AM   SW spoke with SARABJIT Armstrong at [facility], in person as she was here to assess patient. They are likely able to take patient back today and would need her by 2:30/3pm. If unable to take today, patient would not be able to return until Monday. [facility] confirmed with family that they can provide transport if patient is able to return. Patti needs to return to her office, review documentation and then will be calling writer to provide update as to whether they can accept patient back into their care today.     11:01 AM  Per [facility], they are able to accept patient today. SAURABH discussed with provider; awaiting update as to whether patient will be medically ready to return to facility by 3pm.     11:16 AM  Patient will be able to discharge if her labs look alright. They are being drawn currently.     Next Steps: SW follow for discharge coordination.     CAMELIA Ozuna   Social Work Care Manager   [facility]   [phone]

## 2025-03-29 ENCOUNTER — PATIENT OUTREACH (OUTPATIENT)
Dept: CARE COORDINATION | Facility: CLINIC | Age: OVER 89
End: 2025-03-29
Payer: MEDICARE

## 2025-03-29 NOTE — PROGRESS NOTES
Connected Care Resource Center: [facility]cted [facility] [place]    Background: Transitional Care Management program identified per system criteria and reviewed by Connected [facility] team for possible outreach.    Assessment: Upon chart review, CCRC Team member will not proceed with patient outreach related to this episode of Transitional Care Management program due to reason below:    Non-[facility] TCU: CCRC team member noted patient discharged to TCU/ARU/LTACH. Patient is not established with a [facility] currently supported by Primary Care-Care Coordination therefore handoff to Primary Care-Care Coordination is not appropriate at this time.    Plan: Transitional Care Management episode addressed appropriately per reason noted above.      Christina Avila MA  [facility] [place], [facility]    *Connected Care Resource Team does NOT follow patient ongoing. Referrals are identified based on internal discharge reports and the outreach is to ensure patient has an understanding of their discharge instructions.

## 2025-04-01 LAB
BACTERIA BLD CULT: NO GROWTH
BACTERIA BLD CULT: NO GROWTH